# Patient Record
Sex: FEMALE | Race: BLACK OR AFRICAN AMERICAN | NOT HISPANIC OR LATINO | Employment: OTHER | URBAN - METROPOLITAN AREA
[De-identification: names, ages, dates, MRNs, and addresses within clinical notes are randomized per-mention and may not be internally consistent; named-entity substitution may affect disease eponyms.]

---

## 2017-01-31 ENCOUNTER — HISTORICAL (OUTPATIENT)
Dept: ADMINISTRATIVE | Facility: HOSPITAL | Age: 67
End: 2017-01-31

## 2017-03-21 ENCOUNTER — HISTORICAL (OUTPATIENT)
Dept: ADMINISTRATIVE | Facility: HOSPITAL | Age: 67
End: 2017-03-21

## 2017-04-17 ENCOUNTER — HISTORICAL (OUTPATIENT)
Dept: RADIOLOGY | Facility: HOSPITAL | Age: 67
End: 2017-04-17

## 2017-08-22 ENCOUNTER — HISTORICAL (OUTPATIENT)
Dept: ADMINISTRATIVE | Facility: HOSPITAL | Age: 67
End: 2017-08-22

## 2017-09-06 ENCOUNTER — HISTORICAL (OUTPATIENT)
Dept: RADIOLOGY | Facility: HOSPITAL | Age: 67
End: 2017-09-06

## 2017-10-24 ENCOUNTER — HISTORICAL (OUTPATIENT)
Dept: ADMINISTRATIVE | Facility: HOSPITAL | Age: 67
End: 2017-10-24

## 2018-08-27 ENCOUNTER — HISTORICAL (OUTPATIENT)
Dept: RADIOLOGY | Facility: HOSPITAL | Age: 68
End: 2018-08-27

## 2021-08-16 ENCOUNTER — HISTORICAL (OUTPATIENT)
Dept: ADMINISTRATIVE | Facility: HOSPITAL | Age: 71
End: 2021-08-16

## 2021-08-16 LAB — SARS-COV-2 RNA RESP QL NAA+PROBE: DETECTED

## 2022-04-10 ENCOUNTER — HISTORICAL (OUTPATIENT)
Dept: ADMINISTRATIVE | Facility: HOSPITAL | Age: 72
End: 2022-04-10

## 2022-04-29 VITALS
BODY MASS INDEX: 21.31 KG/M2 | DIASTOLIC BLOOD PRESSURE: 77 MMHG | HEIGHT: 65 IN | SYSTOLIC BLOOD PRESSURE: 157 MMHG | WEIGHT: 127.88 LBS

## 2022-05-04 NOTE — HISTORICAL [facility]
This is a historical note converted from [employer]. Formatting and pictures may have been removed.  Please reference C[employer]ony for original formatting and attached multimedia. Chief Complaint  F/U RIGHT KNEE REPLACEMENT 8/1/16. DENIES OF ANY PAIN...SB  History of Present Illness  This patient comes in today?primarily for the issue of right hip pain. ?She had a right partial knee done?on 8/1/2016 and she is doing amazingly well with that.? She states her hip hurts her in the groin. ?She has pain with weightbearing.? States the pain?in the hip is moderate to severe.? She also complains of night pain.  Review of Systems  ????GENERAL: No fevers, chills, unexpected weight loss or gain  ????MUSCULOSKELETAL: Joint pain, extremity pain  Physical Exam  Vitals & Measurements  HR:?88?(Peripheral)? BP:?155/77? HT:?165?cm? HT:?165?cm? WT:?58.96?kg? WT:?58.96?kg? BMI:?21.66?  General: Well-developed, well-nourished.  Neuro: Alert and oriented x 3.  Psych: Normal mood and affect.  CV: Palpable radial pulses.  Resp: Smooth and unlabored.  Skin: No evidence of focal lesions or trauma.  Hem/Imm/Lymph: No evidence of lymphangitis or adenopathy.  Gait: No trendelenburg gait.  DTR: 2+, no hypo or hyperreflexia.  Coordination and Balance: No tremors or abnormal station.  Hip Exam:  No obvious deformity.??Functional range of motion?negative ERNIE test. No flexion contracture. Negative greater trochanteric tenderness. 4/5 strength, normal skin appearance and palpable pulses distally. Sensibility normal.  Assessment/Plan  1.?S/P right unicompartmental knee replacement  Patient is doing overall fairly well with her right partial knee replacement.? Her x-rays from today demonstrate?no signs of loosening and appropriate alignment of the knee.  Ordered:  nabumetone, 500 mg = 1 tab(s), Oral, Daily, # 90 tab(s), 0 Refill(s), Pharmacy: [facility] #[id number]  Office/Outpatient Visit Level 4 Established 79620 PC, S/P right unicompartmental knee  replacement  Right hip pain, [facility] AMB - Ortho Surg SW, 08/22/17 8:15:00 CDT  ?  2.?Right hip pain  She has had this right hip pain for the last 3 weeks. ?I will prescribe her nabumetone for now.? I will see her back in a couple of months with x-rays of the right hip.  Ordered:  nabumetone, 500 mg = 1 tab(s), Oral, Daily, # 90 tab(s), 0 Refill(s), Pharmacy: [facility] #[id number]  Office/Outpatient Visit Level 4 Established 23456 PC, S/P right unicompartmental knee replacement  Right hip pain, [facility]MD AMB - Ortho Surg SW, 08/22/17 8:15:00 CDT  ?   Problem List/Past Medical History  DM (diabetes mellitus)  Dyslipidemia  HYPERTENSION  HYPERTENSION  Localized osteoarthritis of right knee  Obesity  S/P right unicompartmental knee replacement  Historical  Acute renal failure  UTI (urinary tract infection)  Procedure/Surgical History  Partial Knee Arthroplasty (Right) (08/01/2016), Replacement of Right Knee Joint with Synthetic Substitute, Cemented, Open Approach (08/01/2016), Catheter placement in coronary artery(s) for coronary angiography, including intraprocedural injection(s) for coronary angiography, imaging supervision and interpretation; with left heart catheterization including intraprocedural injection(s) for left naomi (11/18/2015), Fluoroscopy of Left Heart using Low Osmolar Contrast (11/18/2015), Fluoroscopy of Multiple Coronary Arteries using Low Osmolar Contrast (11/18/2015), Fluoroscopy of Right Lower Extremity Arteries using Low Osmolar Contrast (11/18/2015), Iliac art angio,cardiac cath 01-JAN-2009 <?> (11/18/2015), Measurement of Cardiac Sampling and Pressure, Left Heart, Percutaneous Approach (11/18/2015), Left hip replacement (2010), Left breast biospy (1992), Appendectomy, Hysterectomy, Left knee replacement.  Medications  Actos 45 mg oral tablet, 45 mg, 1 tab(s), Oral, Daily  amlodipine 10 mg oral tablet, 10 mg, 1 tab(s), Oral, Daily  aspirin 81 mg oral Delayed Release (EC) tablet, 81 mg, 1 tab(s), Oral,  Daily  betamethasone valerate 0.1% topical cream, 1 yvonne, TOP, Once  chlorthalidone 25 mg oral tablet, 25 mg, 1 tab(s), Oral, Daily  clopidogrel 75 mg oral tablet, 75 mg, 1 tab(s), Oral, Daily  Lipitor 20 mg oral tablet, 20 mg, 1 tab(s), Oral, Daily  metFORMIN 500 mg oral tablet, extended release, 2000 mg, 4 tab(s), Oral, BID  nabumetone 500 mg oral tablet, 500 mg, 1 tab(s), Oral, Daily  Tradjenta 5 mg oral tablet, 5 mg, 1 tab(s), Oral, Daily  traMADol 50 mg oral tablet, 50 mg, 1 tab(s), Oral, q6hr, PRN  Zetia 10 mg oral tablet, 10 mg, 1 tab(s), Oral, Daily  Allergies  ACE inhibitors  Social History  Alcohol - Denies Alcohol Use  Current, 1-2 times per month  Employment/School - Not employed or in school  Unemployed, Work/School description: disabled.  Exercise - Does not exercise  Home/Environment - No Risk  Lives with Alone. Living situation: Home/Independent. Home equipment: Glucose monitoring, Walker/Cane. Alcohol abuse in household: No. Substance abuse in household: No. Smoker in household: No. Feels unsafe at home: No. Safe place to go: Yes. Family/Friends available for support: Yes.  Substance Abuse - Denies Substance Abuse  Never  Tobacco - Denies Tobacco Use  Former smoker, 2 per day. 25 year(s).  Family History  Heart disease: Father and Sister.  Primary malignant neoplasm of breast: Mother.

## 2022-05-04 NOTE — HISTORICAL [facility]
This is a historical note converted from [employer]. Formatting and pictures may have been removed.  Please reference Ceryony for original formatting and attached multimedia. Chief Complaint  F/U RIGHT HIP PER DR. CHURCHILL, PT ALSO C/O NECK PAIN TODAY...CV  History of Present Illness  This patient had a partial replacement?on 8/1/2016. ?She is doing well with that.? She comes in with a main complaint of right hip pain and neck pain. ?The hip pain?is in the groin area.? Hip pain is a 7 out of 10.? Neck pain is the most severe. ?She states this started yesterday morning.? She has difficulty turning her neck.? He denies any associated trauma.  Review of Systems  ????GENERAL: No fevers, chills, unexpected weight loss or gain  ????MUSCULOSKELETAL: Joint pain, extremity pain  Physical Exam  Vitals & Measurements  BP:?157/77?  HT:?165?cm? HT:?165?cm? WT:?58?kg? WT:?58?kg? BMI:?21.3?  General: Well-developed, well-nourished.  Neuro: Alert and oriented x 3.  Psych: Normal mood and affect.  Hip Exam:  No obvious deformity.??Functional range of motion?negative ERNIE test. No flexion contracture. Negative greater trochanteric tenderness. 4/5 strength, normal skin appearance and palpable pulses distally. Sensibility normal. [1]  Assessment/Plan  1.?Osteoarthritis of right hip  ? I will refer her to Dr. Rose for her hip osteoarthritis.? Nares do show significant degenerative changes.  Ordered:  Office/Outpatient Visit Level 3 Established 29812 PC, Osteoarthritis of right hip  S/P right unicompartmental knee replacement  Neck pain, LGMD AMB - Ortho Surg , 10/24/17 8:39:00 CDT  2.?S/P right unicompartmental knee replacement  ? Doing well.  Ordered:  Office/Outpatient Visit Level 3 Established 27910 PC, Osteoarthritis of right hip  S/P right unicompartmental knee replacement  Neck pain, LGMD AMB - Ortho Surg , 10/24/17 8:39:00 CDT  3.?Neck pain  ? This patient will go?to the nearest urgent care or emergency room for further  workup of this neck pain.? This seems to be?associated with a cramps and spasms.? She denies any?numbness and tingling.  Ordered:  Office/Outpatient Visit Level 3 Established 23180 PC, Osteoarthritis of right hip  S/P right unicompartmental knee replacement  Neck pain, [facility]MD AMB - Ortho Surg , 10/24/17 8:39:00 CDT   Problem List/Past Medical History  Ongoing  DM (diabetes mellitus)  Dyslipidemia  HYPERTENSION  HYPERTENSION  Localized osteoarthritis of right knee  Obesity  S/P right unicompartmental knee replacement  Historical  Acute renal failure  UTI (urinary tract infection)  Procedure/Surgical History  Partial Knee Arthroplasty (Right) (08/01/2016)  Replacement of Right Knee Joint with Synthetic Substitute, Cemented, Open Approach (08/01/2016)  Catheter placement in coronary artery(s) for coronary angiography, including intraprocedural injection(s) for coronary angiography, imaging supervision and interpretation; with left heart catheterization including intraprocedural injection(s) for left naomi (11/18/2015)  Fluoroscopy of Left Heart using Low Osmolar Contrast (11/18/2015)  Fluoroscopy of Multiple Coronary Arteries using Low Osmolar Contrast (11/18/2015)  Fluoroscopy of Right Lower Extremity Arteries using Low Osmolar Contrast (11/18/2015)  Iliac art angio,cardiac cath 01-JAN-2009 <?> (11/18/2015)  Measurement of Cardiac Sampling and Pressure, Left Heart, Percutaneous Approach (11/18/2015)  Left hip replacement (2010)  Left breast biospy (1992)  Appendectomy  Hysterectomy  Left knee replacement  Medications  Actos 45 mg oral tablet, 45 mg= 1 tab(s), Oral, Daily  amlodipine 10 mg oral tablet, 10 mg= 1 tab(s), Oral, Daily  aspirin 81 mg oral Delayed Release (EC) tablet, 81 mg= 1 tab(s), Oral, Daily  betamethasone valerate 0.1% topical cream, 1 yvonne, TOP, Once  chlorthalidone 25 mg oral tablet, 25 mg= 1 tab(s), Oral, Daily  clopidogrel 75 mg oral tablet, 75 mg= 1 tab(s), Oral, Daily  Lipitor 20 mg oral tablet,  20 mg= 1 tab(s), Oral, Daily  metFORMIN 500 mg oral tablet, extended release, 2000 mg= 4 tab(s), Oral, BID  nabumetone 500 mg oral tablet, 500 mg= 1 tab(s), Oral, Daily  tiZANidine 2 mg oral tablet, 2 mg= 1 tab(s), Oral, q8hr, PRN  Tradjenta 5 mg oral tablet, 5 mg= 1 tab(s), Oral, Daily  traMADol 50 mg oral tablet, 50 mg= 1 tab(s), Oral, q6hr, PRN  Zetia 10 mg oral tablet, 10 mg= 1 tab(s), Oral, Daily  Allergies  ACE inhibitors  Social History  Alcohol - Denies Alcohol Use, 05/20/2017  Current, 1-2 times per month  Employment/School - Not employed or in school, 05/20/2017  Unemployed, Work/School description: disabled.  Exercise - Does not exercise, 05/20/2017  Home/Environment - No Risk, 05/20/2017  Lives with Alone. Living situation: Home/Independent. Home equipment: Glucose monitoring, Walker/Cane. Alcohol abuse in household: No. Substance abuse in household: No. Smoker in household: No. Feels unsafe at home: No. Safe place to go: Yes. Family/Friends available for support: Yes.  Substance Abuse - Denies Substance Abuse, 05/20/2017  Never  Tobacco - Denies Tobacco Use, 05/20/2017  Former smoker, 2 per day. 25 year(s).  Family History  Heart disease: Father and Sister.  Primary malignant neoplasm of breast: Mother.     [1]?Office Visit Note; Santana MEZA, William CARDONA 08/22/2017 08:16 CDT

## 2022-10-13 ENCOUNTER — HOSPITAL ENCOUNTER (EMERGENCY)
Facility: HOSPITAL | Age: 72
Discharge: HOME | End: 2022-10-13
Attending: INTERNAL MEDICINE
Payer: MEDICARE

## 2022-10-13 VITALS
RESPIRATION RATE: 16 BRPM | SYSTOLIC BLOOD PRESSURE: 168 MMHG | DIASTOLIC BLOOD PRESSURE: 95 MMHG | TEMPERATURE: 98 F | WEIGHT: 220 LBS | HEIGHT: 65 IN | HEART RATE: 76 BPM | BODY MASS INDEX: 36.65 KG/M2 | OXYGEN SATURATION: 97 %

## 2022-10-13 DIAGNOSIS — G89.29 CHRONIC RIGHT HIP PAIN: Primary | ICD-10-CM

## 2022-10-13 DIAGNOSIS — M25.561 RIGHT KNEE PAIN, UNSPECIFIED CHRONICITY: ICD-10-CM

## 2022-10-13 DIAGNOSIS — M25.551 CHRONIC RIGHT HIP PAIN: Primary | ICD-10-CM

## 2022-10-13 DIAGNOSIS — Z87.39 HISTORY OF OSTEOARTHRITIS: ICD-10-CM

## 2022-10-13 PROCEDURE — 63600175 PHARM REV CODE 636 W HCPCS: Performed by: PHYSICIAN ASSISTANT

## 2022-10-13 PROCEDURE — 99284 EMERGENCY DEPT VISIT MOD MDM: CPT | Mod: 25

## 2022-10-13 PROCEDURE — 96372 THER/PROPH/DIAG INJ SC/IM: CPT | Performed by: PHYSICIAN ASSISTANT

## 2022-10-13 RX ORDER — KETOROLAC TROMETHAMINE 30 MG/ML
15 INJECTION, SOLUTION INTRAMUSCULAR; INTRAVENOUS
Status: COMPLETED | OUTPATIENT
Start: 2022-10-13 | End: 2022-10-13

## 2022-10-13 RX ADMIN — KETOROLAC TROMETHAMINE 15 MG: 30 INJECTION, SOLUTION INTRAMUSCULAR; INTRAVENOUS at 10:10

## 2022-10-13 NOTE — ED PROVIDER NOTES
Encounter Date: 10/13/2022       History     Chief Complaint   Patient presents with    Hip Pain     PT W CO RT HIP AND KNEE PAIN X 1 WK.  DENIES INJURY.       Patient with pmhx of HTN, DM, HLD, and OA presents today c/o right hip pain and right knee pain. She reports chronic right hip pain. Her right knee has been hurting for the last 3 or so days. Hx of partial knee replacement on the right. She is currently in pain management. She takes norco for her pain. She denies injury or trauma, recent fall, fever, chills, joint swelling, calf pain/swelling, cp, sob.     The history is provided by the patient. No  was used.   Review of patient's allergies indicates:   Allergen Reactions    Ace inhibitors Swelling    Clopidogrel Swelling     No past medical history on file.  No past surgical history on file.  No family history on file.     Review of Systems   Constitutional:  Negative for chills and fever.   Respiratory:  Negative for cough, chest tightness and shortness of breath.    Cardiovascular:  Negative for chest pain, palpitations and leg swelling.   Gastrointestinal:  Negative for abdominal pain, constipation, diarrhea, nausea and vomiting.   Genitourinary:  Negative for dysuria, flank pain and hematuria.   Musculoskeletal:  Positive for arthralgias.   Skin:  Negative for rash.   Neurological:  Negative for syncope, light-headedness and headaches.     Physical Exam     Initial Vitals   BP Pulse Resp Temp SpO2   10/13/22 0931 10/13/22 0931 10/13/22 0931 10/13/22 0931 10/13/22 1128   (!) 168/95 76 16 97.9 °F (36.6 °C) 97 %      MAP       --                Physical Exam    Vitals reviewed.  Constitutional: She appears well-developed and well-nourished. She is not diaphoretic. No distress.   Obese   HENT:   Head: Normocephalic and atraumatic.   Mouth/Throat: Oropharynx is clear and moist. No oropharyngeal exudate.   Eyes: Conjunctivae and EOM are normal.   Neck: Neck supple.   Normal range of  motion.  Cardiovascular:  Normal rate, regular rhythm, normal heart sounds and intact distal pulses.           Pulmonary/Chest: Breath sounds normal. No respiratory distress.   Abdominal: Abdomen is soft. Bowel sounds are normal. She exhibits no distension. There is no abdominal tenderness. There is no rebound and no guarding.   Musculoskeletal:      Cervical back: Normal range of motion and neck supple.      Comments: Right hip and knee non-ttp. No swelling, erythema, ecchymosis, or deformity. Full AROM of right hip and knee, but increased pain with movement. NVI, 2+ dp pulses.      Neurological: She is alert and oriented to person, place, and time. She has normal strength. No sensory deficit. GCS score is 15. GCS eye subscore is 4. GCS verbal subscore is 5. GCS motor subscore is 6.   Skin: Skin is warm and dry. Capillary refill takes less than 2 seconds.   Psychiatric: She has a normal mood and affect.       ED Course   Procedures  Labs Reviewed - No data to display       Imaging Results              X-Ray Knee 3 View Right (Final result)  Result time 10/13/22 11:24:54      Final result by Catina Bettencourt MD (10/13/22 11:24:54)                   Impression:      1. No acute fracture identified.  2. Small joint effusion.      Electronically signed by: Catina Bettencourt  Date:    10/13/2022  Time:    11:24               Narrative:    EXAMINATION:  XR KNEE 3 VIEW RIGHT    CLINICAL HISTORY:  Pain in unspecified knee    COMPARISON:  X-rays dated 08/22/2017    FINDINGS:  There are stable postoperative changes of the knee.  There is no acute fracture or malalignment.  There is a small knee joint effusion.  Vascular calcifications are noted.                                       Medications   ketorolac injection 15 mg (15 mg Intramuscular Given 10/13/22 1005)                              Clinical Impression:   Final diagnoses:  [M25.551, G89.29] Chronic right hip pain (Primary)  [M25.561] Right knee pain, unspecified  chronicity  [Z87.39] History of osteoarthritis      ED Disposition Condition    Discharge Stable          ED Prescriptions    None       Follow-up Information       Follow up With Specialties Details Why Contact Info    [facility] - Emergency Dept Emergency Medicine  If symptoms worsen return to ED immediately [address] [facility] [place] [address] [phone]    Primary Care Provider within 1-2 days  Go in 1 day      Follow up with your pain management specialist  In 1 day               FARZAD Nguyen  10/13/22 1135

## 2023-11-02 ENCOUNTER — ANESTHESIA EVENT (OUTPATIENT)
Dept: SURGERY | Facility: HOSPITAL | Age: 73
DRG: 233 | End: 2023-11-02
Payer: MEDICARE

## 2023-11-02 ENCOUNTER — HOSPITAL ENCOUNTER (INPATIENT)
Facility: HOSPITAL | Age: 73
LOS: 7 days | Discharge: NURSING, REHAB OR LONG-TERM CARE | DRG: 233 | End: 2023-11-09
Attending: INTERNAL MEDICINE | Admitting: INTERNAL MEDICINE
Payer: MEDICARE

## 2023-11-02 ENCOUNTER — ANESTHESIA (OUTPATIENT)
Dept: SURGERY | Facility: HOSPITAL | Age: 73
DRG: 233 | End: 2023-11-02
Payer: MEDICARE

## 2023-11-02 DIAGNOSIS — Z01.818 PRE-OP EVALUATION: ICD-10-CM

## 2023-11-02 DIAGNOSIS — I25.10 CORONARY ARTERY DISEASE INVOLVING NATIVE CORONARY ARTERY OF NATIVE HEART WITHOUT ANGINA PECTORIS: ICD-10-CM

## 2023-11-02 DIAGNOSIS — I25.10 ATHEROSCLEROSIS OF NATIVE CORONARY ARTERY OF NATIVE HEART WITHOUT ANGINA PECTORIS: ICD-10-CM

## 2023-11-02 DIAGNOSIS — I25.10 CAD (CORONARY ARTERY DISEASE): ICD-10-CM

## 2023-11-02 DIAGNOSIS — I25.10 CORONARY ARTERY DISEASE: ICD-10-CM

## 2023-11-02 DIAGNOSIS — I25.10 CORONARY ATHEROSCLEROSIS OF NATIVE CORONARY ARTERY: Primary | ICD-10-CM

## 2023-11-02 DIAGNOSIS — Z95.1 S/P CABG (CORONARY ARTERY BYPASS GRAFT): ICD-10-CM

## 2023-11-02 LAB
ALLENS TEST BLOOD GAS (OHS): ABNORMAL
ALLENS TEST BLOOD GAS (OHS): ABNORMAL
ANION GAP SERPL CALC-SCNC: 9 MEQ/L
APTT PPP: 20 SECONDS (ref 23.2–33.7)
APTT PPP: 29 SECONDS (ref 23.2–33.7)
AV INDEX (PROSTH): 0.55
AV MEAN GRADIENT: 6 MMHG
AV PEAK GRADIENT: 11 MMHG
AV VALVE AREA BY VELOCITY RATIO: 1.35 CM²
AV VALVE AREA: 1.4 CM²
AV VELOCITY RATIO: 0.53
BASE EXCESS BLD CALC-SCNC: -2.4 MMOL/L (ref -2–2)
BASE EXCESS BLD CALC-SCNC: -3.5 MMOL/L (ref -2–2)
BASOPHILS # BLD AUTO: 0.03 X10(3)/MCL
BASOPHILS NFR BLD AUTO: 0.2 %
BLOOD GAS SAMPLE TYPE (OHS): ABNORMAL
BLOOD GAS SAMPLE TYPE (OHS): ABNORMAL
BSA FOR ECHO PROCEDURE: 1.99 M2
BUN SERPL-MCNC: 26.6 MG/DL (ref 9.8–20.1)
CA-I BLD-SCNC: 1.22 MMOL/L (ref 1.12–1.23)
CA-I BLD-SCNC: 1.38 MMOL/L (ref 1.12–1.23)
CALCIUM SERPL-MCNC: 9.9 MG/DL (ref 8.4–10.2)
CHLORIDE SERPL-SCNC: 112 MMOL/L (ref 98–107)
CO2 BLDA-SCNC: 23.4 MMOL/L
CO2 BLDA-SCNC: 24.5 MMOL/L
CO2 SERPL-SCNC: 17 MMOL/L (ref 23–31)
COHGB MFR BLDA: 1.6 % (ref 0.5–1.5)
COHGB MFR BLDA: 1.8 % (ref 0.5–1.5)
CREAT SERPL-MCNC: 0.93 MG/DL (ref 0.55–1.02)
CREAT/UREA NIT SERPL: 29
CV ECHO LV RWT: 0.42 CM
DOP CALC AO PEAK VEL: 1.66 M/S
DOP CALC AO VTI: 35.5 CM
DOP CALC LVOT AREA: 2.5 CM2
DOP CALC LVOT DIAMETER: 1.8 CM
DOP CALC LVOT PEAK VEL: 0.88 M/S
DOP CALC LVOT STROKE VOLUME: 49.6 CM3
DOP CALC MV VTI: 23.9 CM
DOP CALCLVOT PEAK VEL VTI: 19.5 CM
DRAWN BY BLOOD GAS (OHS): ABNORMAL
DRAWN BY BLOOD GAS (OHS): ABNORMAL
E WAVE DECELERATION TIME: 240 MSEC
E/A RATIO: 0.59
E/E' RATIO: 8.92 M/S
ECHO LV POSTERIOR WALL: 1 CM (ref 0.6–1.1)
EOSINOPHIL # BLD AUTO: 0.01 X10(3)/MCL (ref 0–0.9)
EOSINOPHIL NFR BLD AUTO: 0.1 %
ERYTHROCYTE [DISTWIDTH] IN BLOOD BY AUTOMATED COUNT: 14.1 % (ref 11.5–17)
EST. AVERAGE GLUCOSE BLD GHB EST-MCNC: 220.2 MG/DL
FIO2 BLOOD GAS (OHS): 21 %
FIO2 BLOOD GAS (OHS): 60 %
FRACTIONAL SHORTENING: 27 % (ref 28–44)
GFR SERPLBLD CREATININE-BSD FMLA CKD-EPI: >60 MLS/MIN/1.73/M2
GLUCOSE SERPL-MCNC: 233 MG/DL (ref 70–110)
GLUCOSE SERPL-MCNC: 264 MG/DL (ref 70–110)
GLUCOSE SERPL-MCNC: 270 MG/DL (ref 82–115)
GLUCOSE SERPL-MCNC: 287 MG/DL (ref 70–110)
GROUP & RH: NORMAL
HBA1C MFR BLD: 9.3 %
HCO3 BLDA-SCNC: 22.1 MMOL/L (ref 22–26)
HCO3 BLDA-SCNC: 23.2 MMOL/L (ref 22–26)
HCO3 UR-SCNC: 18.5 MMOL/L (ref 24–28)
HCO3 UR-SCNC: 21.2 MMOL/L (ref 24–28)
HCO3 UR-SCNC: 22.6 MMOL/L (ref 24–28)
HCT VFR BLD AUTO: 32.4 % (ref 37–47)
HCT VFR BLD CALC: 28 %PCV (ref 36–54)
HCT VFR BLD CALC: 29 %PCV (ref 36–54)
HCT VFR BLD CALC: 33 %PCV (ref 36–54)
HGB BLD-MCNC: 10 G/DL
HGB BLD-MCNC: 10 G/DL
HGB BLD-MCNC: 10.3 G/DL (ref 12–16)
HGB BLD-MCNC: 11 G/DL
IMM GRANULOCYTES # BLD AUTO: 0.18 X10(3)/MCL (ref 0–0.04)
IMM GRANULOCYTES NFR BLD AUTO: 1.2 %
INDIRECT COOMBS GEL: NORMAL
INR PPP: 1.6
INR PPP: 1.6
INTERVENTRICULAR SEPTUM: 0.8 CM (ref 0.6–1.1)
LEFT ATRIUM SIZE: 4.3 CM
LEFT ATRIUM VOLUME INDEX MOD: 38.1 ML/M2
LEFT ATRIUM VOLUME MOD: 73.5 CM3
LEFT INTERNAL DIMENSION IN SYSTOLE: 3.5 CM (ref 2.1–4)
LEFT VENTRICLE DIASTOLIC VOLUME INDEX: 55.96 ML/M2
LEFT VENTRICLE DIASTOLIC VOLUME: 108 ML
LEFT VENTRICLE MASS INDEX: 77 G/M2
LEFT VENTRICLE SYSTOLIC VOLUME INDEX: 26.4 ML/M2
LEFT VENTRICLE SYSTOLIC VOLUME: 50.9 ML
LEFT VENTRICULAR INTERNAL DIMENSION IN DIASTOLE: 4.8 CM (ref 3.5–6)
LEFT VENTRICULAR MASS: 147.78 G
LV LATERAL E/E' RATIO: 7.25 M/S
LV SEPTAL E/E' RATIO: 11.6 M/S
LVOT MG: 2 MMHG
LVOT MV: 0.58 CM/S
LYMPHOCYTES # BLD AUTO: 2.7 X10(3)/MCL (ref 0.6–4.6)
LYMPHOCYTES NFR BLD AUTO: 18.6 %
MCH RBC QN AUTO: 29 PG (ref 27–31)
MCHC RBC AUTO-ENTMCNC: 31.8 G/DL (ref 33–36)
MCV RBC AUTO: 91.3 FL (ref 80–94)
MECH RR (OHS): 18 B/MIN
MECH VT (OHS): 450 ML
METHGB MFR BLDA: 0.7 % (ref 0.4–1.5)
METHGB MFR BLDA: 1.3 % (ref 0.4–1.5)
MODE (OHS): ABNORMAL
MONOCYTES # BLD AUTO: 0.39 X10(3)/MCL (ref 0.1–1.3)
MONOCYTES NFR BLD AUTO: 2.7 %
MRSA PCR SCRN (OHS): NOT DETECTED
MV MEAN GRADIENT: 2 MMHG
MV PEAK A VEL: 0.99 M/S
MV PEAK E VEL: 0.58 M/S
MV PEAK GRADIENT: 6 MMHG
MV VALVE AREA BY CONTINUITY EQUATION: 2.08 CM2
NEUTROPHILS # BLD AUTO: 11.17 X10(3)/MCL (ref 2.1–9.2)
NEUTROPHILS NFR BLD AUTO: 77.2 %
NRBC BLD AUTO-RTO: 0 %
O2 HB BLOOD GAS (OHS): 93.6 % (ref 94–97)
O2 HB BLOOD GAS (OHS): 93.7 % (ref 94–97)
OHS LV EJECTION FRACTION SIMPSONS BIPLANE MOD: 40 %
OXYGEN DEVICE BLOOD GAS (OHS): ABNORMAL
OXYHGB MFR BLDA: 11 G/DL (ref 12–16)
OXYHGB MFR BLDA: 14.2 G/DL (ref 12–16)
PCO2 BLDA: 32.8 MMHG (ref 35–45)
PCO2 BLDA: 38.5 MMHG (ref 35–45)
PCO2 BLDA: 39 MMHG (ref 35–45)
PCO2 BLDA: 41 MMHG (ref 35–45)
PCO2 BLDA: 42 MMHG (ref 35–45)
PEEP (OHS): 5 CMH2O
PH BLDA: 7.34 [PH] (ref 7.35–7.45)
PH BLDA: 7.35 [PH] (ref 7.35–7.45)
PH SMN: 7.35 [PH] (ref 7.35–7.45)
PH SMN: 7.36 [PH] (ref 7.35–7.45)
PH SMN: 7.37 [PH] (ref 7.35–7.45)
PLATELET # BLD AUTO: 191 X10(3)/MCL (ref 130–400)
PMV BLD AUTO: 9.7 FL (ref 7.4–10.4)
PO2 BLDA: 279 MMHG (ref 80–100)
PO2 BLDA: 50 MMHG (ref 40–60)
PO2 BLDA: 50 MMHG (ref 40–60)
PO2 BLDA: 71 MMHG (ref 80–100)
PO2 BLDA: 78 MMHG (ref 80–100)
POC BE: -3 MMOL/L
POC BE: -4 MMOL/L
POC BE: -7 MMOL/L
POC IONIZED CALCIUM: 1.05 MMOL/L (ref 1.06–1.42)
POC IONIZED CALCIUM: 1.22 MMOL/L (ref 1.06–1.42)
POC IONIZED CALCIUM: 1.42 MMOL/L (ref 1.06–1.42)
POC SATURATED O2: 100 % (ref 95–100)
POC SATURATED O2: 83 % (ref 95–100)
POC SATURATED O2: 84 % (ref 95–100)
POC TCO2: 19 MMOL/L (ref 23–27)
POC TCO2: 22 MMOL/L (ref 24–29)
POC TCO2: 24 MMOL/L (ref 24–29)
POCT GLUCOSE: 188 MG/DL (ref 70–110)
POCT GLUCOSE: 330 MG/DL (ref 70–110)
POCT GLUCOSE: 407 MG/DL (ref 70–110)
POCT GLUCOSE: 468 MG/DL (ref 70–110)
POTASSIUM BLD-SCNC: 3.3 MMOL/L (ref 3.5–5.1)
POTASSIUM BLD-SCNC: 3.9 MMOL/L (ref 3.5–5.1)
POTASSIUM BLD-SCNC: 4.6 MMOL/L (ref 3.5–5.1)
POTASSIUM BLOOD GAS (OHS): 2.9 MMOL/L (ref 3.5–5)
POTASSIUM BLOOD GAS (OHS): 3.1 MMOL/L (ref 3.5–5)
POTASSIUM SERPL-SCNC: 3.3 MMOL/L (ref 3.5–5.1)
PROTHROMBIN TIME: 18.4 SECONDS (ref 12.5–14.5)
PROTHROMBIN TIME: 18.9 SECONDS (ref 12.5–14.5)
PS (OHS): 10 CMH2O
RA MAJOR: 4.6 CM
RA WIDTH: 3.7 CM
RBC # BLD AUTO: 3.55 X10(6)/MCL (ref 4.2–5.4)
RIGHT VENTRICULAR END-DIASTOLIC DIMENSION: 3.4 CM
SAMPLE SITE BLOOD GAS (OHS): ABNORMAL
SAMPLE SITE BLOOD GAS (OHS): ABNORMAL
SAMPLE: ABNORMAL
SAO2 % BLDA: 92.9 %
SAO2 % BLDA: 94.7 %
SODIUM BLD-SCNC: 140 MMOL/L (ref 136–145)
SODIUM BLD-SCNC: 141 MMOL/L (ref 136–145)
SODIUM BLD-SCNC: 142 MMOL/L (ref 136–145)
SODIUM BLOOD GAS (OHS): 138 MMOL/L (ref 137–145)
SODIUM BLOOD GAS (OHS): 139 MMOL/L (ref 137–145)
SODIUM SERPL-SCNC: 138 MMOL/L (ref 136–145)
SPECIMEN OUTDATE: NORMAL
TDI LATERAL: 0.08 M/S
TDI SEPTAL: 0.05 M/S
TDI: 0.07 M/S
TRICUSPID ANNULAR PLANE SYSTOLIC EXCURSION: 1.65 CM
WBC # SPEC AUTO: 14.48 X10(3)/MCL (ref 4.5–11.5)
Z-SCORE OF LEFT VENTRICULAR DIMENSION IN END DIASTOLE: -1.3
Z-SCORE OF LEFT VENTRICULAR DIMENSION IN END SYSTOLE: 0.32

## 2023-11-02 PROCEDURE — 36556 INSERT NON-TUNNEL CV CATH: CPT | Mod: 59,,, | Performed by: ANESTHESIOLOGY

## 2023-11-02 PROCEDURE — 76937 CENTRAL LINE: ICD-10-PCS | Mod: 26,,, | Performed by: ANESTHESIOLOGY

## 2023-11-02 PROCEDURE — 25000003 PHARM REV CODE 250: Performed by: SPECIALIST

## 2023-11-02 PROCEDURE — D9220A PRA ANESTHESIA: Mod: CRNA,,,

## 2023-11-02 PROCEDURE — 25000003 PHARM REV CODE 250

## 2023-11-02 PROCEDURE — 94761 N-INVAS EAR/PLS OXIMETRY MLT: CPT

## 2023-11-02 PROCEDURE — 27000513 HC SENSOR FLOTRAC

## 2023-11-02 PROCEDURE — 99223 PR INITIAL HOSPITAL CARE,LEVL III: ICD-10-PCS | Mod: 57,,, | Performed by: PHYSICIAN ASSISTANT

## 2023-11-02 PROCEDURE — 85730 THROMBOPLASTIN TIME PARTIAL: CPT | Performed by: PHYSICIAN ASSISTANT

## 2023-11-02 PROCEDURE — 99900035 HC TECH TIME PER 15 MIN (STAT)

## 2023-11-02 PROCEDURE — C1729 CATH, DRAINAGE: HCPCS | Performed by: SPECIALIST

## 2023-11-02 PROCEDURE — 63600175 PHARM REV CODE 636 W HCPCS: Performed by: PHYSICIAN ASSISTANT

## 2023-11-02 PROCEDURE — D9220A PRA ANESTHESIA: Mod: ANES,,, | Performed by: ANESTHESIOLOGY

## 2023-11-02 PROCEDURE — 63600175 PHARM REV CODE 636 W HCPCS: Performed by: INTERNAL MEDICINE

## 2023-11-02 PROCEDURE — 33533 CABG ARTERIAL SINGLE: CPT | Mod: AS,,, | Performed by: PHYSICIAN ASSISTANT

## 2023-11-02 PROCEDURE — 33533 PR CABG, ARTERIAL, SINGLE: ICD-10-PCS | Mod: ,,, | Performed by: SPECIALIST

## 2023-11-02 PROCEDURE — 83036 HEMOGLOBIN GLYCOSYLATED A1C: CPT | Performed by: NURSE PRACTITIONER

## 2023-11-02 PROCEDURE — 25000003 PHARM REV CODE 250: Performed by: PHYSICIAN ASSISTANT

## 2023-11-02 PROCEDURE — 27201423 OPTIME MED/SURG SUP & DEVICES STERILE SUPPLY: Performed by: INTERNAL MEDICINE

## 2023-11-02 PROCEDURE — C1894 INTRO/SHEATH, NON-LASER: HCPCS | Performed by: INTERNAL MEDICINE

## 2023-11-02 PROCEDURE — 36000713 HC OR TIME LEV V EA ADD 15 MIN: Performed by: SPECIALIST

## 2023-11-02 PROCEDURE — 85610 PROTHROMBIN TIME: CPT | Performed by: PHYSICIAN ASSISTANT

## 2023-11-02 PROCEDURE — 86923 COMPATIBILITY TEST ELECTRIC: CPT | Mod: 91 | Performed by: PHYSICIAN ASSISTANT

## 2023-11-02 PROCEDURE — 36000712 HC OR TIME LEV V 1ST 15 MIN: Performed by: SPECIALIST

## 2023-11-02 PROCEDURE — 63600175 PHARM REV CODE 636 W HCPCS: Performed by: STUDENT IN AN ORGANIZED HEALTH CARE EDUCATION/TRAINING PROGRAM

## 2023-11-02 PROCEDURE — 63600175 PHARM REV CODE 636 W HCPCS: Mod: JZ,JG | Performed by: PHYSICIAN ASSISTANT

## 2023-11-02 PROCEDURE — 99223 1ST HOSP IP/OBS HIGH 75: CPT | Mod: 57,,, | Performed by: PHYSICIAN ASSISTANT

## 2023-11-02 PROCEDURE — 82803 BLOOD GASES ANY COMBINATION: CPT

## 2023-11-02 PROCEDURE — 33508 ENDOSCOPIC VEIN HARVEST: CPT | Mod: 59,,, | Performed by: SPECIALIST

## 2023-11-02 PROCEDURE — 25000003 PHARM REV CODE 250: Performed by: ANESTHESIOLOGY

## 2023-11-02 PROCEDURE — 63600175 PHARM REV CODE 636 W HCPCS

## 2023-11-02 PROCEDURE — 33517 PR CABG, ARTERY-VEIN, SINGLE: ICD-10-PCS | Mod: ,,, | Performed by: SPECIALIST

## 2023-11-02 PROCEDURE — 25000003 PHARM REV CODE 250: Performed by: STUDENT IN AN ORGANIZED HEALTH CARE EDUCATION/TRAINING PROGRAM

## 2023-11-02 PROCEDURE — 33517 CABG ARTERY-VEIN SINGLE: CPT | Mod: AS,,, | Performed by: PHYSICIAN ASSISTANT

## 2023-11-02 PROCEDURE — C1887 CATHETER, GUIDING: HCPCS | Performed by: INTERNAL MEDICINE

## 2023-11-02 PROCEDURE — 25500020 PHARM REV CODE 255: Performed by: INTERNAL MEDICINE

## 2023-11-02 PROCEDURE — 86901 BLOOD TYPING SEROLOGIC RH(D): CPT | Performed by: PHYSICIAN ASSISTANT

## 2023-11-02 PROCEDURE — 37799 UNLISTED PX VASCULAR SURGERY: CPT

## 2023-11-02 PROCEDURE — 20000000 HC ICU ROOM

## 2023-11-02 PROCEDURE — P9045 ALBUMIN (HUMAN), 5%, 250 ML: HCPCS | Mod: JZ,JG | Performed by: PHYSICIAN ASSISTANT

## 2023-11-02 PROCEDURE — 76937 US GUIDE VASCULAR ACCESS: CPT | Mod: 26,,, | Performed by: ANESTHESIOLOGY

## 2023-11-02 PROCEDURE — 63600175 PHARM REV CODE 636 W HCPCS: Performed by: ANESTHESIOLOGY

## 2023-11-02 PROCEDURE — 87641 MR-STAPH DNA AMP PROBE: CPT | Performed by: PHYSICIAN ASSISTANT

## 2023-11-02 PROCEDURE — 33517 PR CABG, ARTERY-VEIN, SINGLE: ICD-10-PCS | Mod: AS,,, | Performed by: PHYSICIAN ASSISTANT

## 2023-11-02 PROCEDURE — 85610 PROTHROMBIN TIME: CPT | Performed by: SPECIALIST

## 2023-11-02 PROCEDURE — 33508 PR ENDOSCOPY W/VIDEO-ASST VEIN HARVEST,CABG: ICD-10-PCS | Mod: 59,,, | Performed by: SPECIALIST

## 2023-11-02 PROCEDURE — C1751 CATH, INF, PER/CENT/MIDLINE: HCPCS

## 2023-11-02 PROCEDURE — 33517 CABG ARTERY-VEIN SINGLE: CPT | Mod: ,,, | Performed by: SPECIALIST

## 2023-11-02 PROCEDURE — 99152 MOD SED SAME PHYS/QHP 5/>YRS: CPT | Performed by: INTERNAL MEDICINE

## 2023-11-02 PROCEDURE — 63600175 PHARM REV CODE 636 W HCPCS: Performed by: SPECIALIST

## 2023-11-02 PROCEDURE — 80048 BASIC METABOLIC PNL TOTAL CA: CPT | Performed by: SPECIALIST

## 2023-11-02 PROCEDURE — 85025 COMPLETE CBC W/AUTO DIFF WBC: CPT | Performed by: SPECIALIST

## 2023-11-02 PROCEDURE — 94002 VENT MGMT INPAT INIT DAY: CPT

## 2023-11-02 PROCEDURE — C1769 GUIDE WIRE: HCPCS | Performed by: INTERNAL MEDICINE

## 2023-11-02 PROCEDURE — 33533 PR CABG, ARTERIAL, SINGLE: ICD-10-PCS | Mod: AS,,, | Performed by: PHYSICIAN ASSISTANT

## 2023-11-02 PROCEDURE — 33533 CABG ARTERIAL SINGLE: CPT | Mod: ,,, | Performed by: SPECIALIST

## 2023-11-02 PROCEDURE — D9220A PRA ANESTHESIA: ICD-10-PCS | Mod: CRNA,,,

## 2023-11-02 PROCEDURE — C1894 INTRO/SHEATH, NON-LASER: HCPCS | Performed by: SPECIALIST

## 2023-11-02 PROCEDURE — 37000008 HC ANESTHESIA 1ST 15 MINUTES: Performed by: SPECIALIST

## 2023-11-02 PROCEDURE — 93005 ELECTROCARDIOGRAM TRACING: CPT

## 2023-11-02 PROCEDURE — 93458 L HRT ARTERY/VENTRICLE ANGIO: CPT | Performed by: INTERNAL MEDICINE

## 2023-11-02 PROCEDURE — 37000009 HC ANESTHESIA EA ADD 15 MINS: Performed by: SPECIALIST

## 2023-11-02 PROCEDURE — D9220A PRA ANESTHESIA: ICD-10-PCS | Mod: ANES,,, | Performed by: ANESTHESIOLOGY

## 2023-11-02 PROCEDURE — 36556 CENTRAL LINE: ICD-10-PCS | Mod: 59,,, | Performed by: ANESTHESIOLOGY

## 2023-11-02 PROCEDURE — S5010 5% DEXTROSE AND 0.45% SALINE: HCPCS | Performed by: SPECIALIST

## 2023-11-02 PROCEDURE — 85730 THROMBOPLASTIN TIME PARTIAL: CPT | Performed by: SPECIALIST

## 2023-11-02 PROCEDURE — 27201423 OPTIME MED/SURG SUP & DEVICES STERILE SUPPLY: Performed by: SPECIALIST

## 2023-11-02 PROCEDURE — C1887 CATHETER, GUIDING: HCPCS | Performed by: SPECIALIST

## 2023-11-02 PROCEDURE — 27200966 HC CLOSED SUCTION SYSTEM

## 2023-11-02 PROCEDURE — 25000003 PHARM REV CODE 250: Performed by: INTERNAL MEDICINE

## 2023-11-02 PROCEDURE — P9016 RBC LEUKOCYTES REDUCED: HCPCS | Performed by: PHYSICIAN ASSISTANT

## 2023-11-02 PROCEDURE — 27100171 HC OXYGEN HIGH FLOW UP TO 24 HOURS

## 2023-11-02 PROCEDURE — 99900031 HC PATIENT EDUCATION (STAT)

## 2023-11-02 RX ORDER — VASOPRESSIN 20 [USP'U]/ML
INJECTION, SOLUTION INTRAMUSCULAR; SUBCUTANEOUS
Status: DISCONTINUED | OUTPATIENT
Start: 2023-11-02 | End: 2023-11-02

## 2023-11-02 RX ORDER — GLYCOPYRROLATE 0.2 MG/ML
INJECTION INTRAMUSCULAR; INTRAVENOUS
Status: DISCONTINUED | OUTPATIENT
Start: 2023-11-02 | End: 2023-11-02

## 2023-11-02 RX ORDER — PAPAVERINE HYDROCHLORIDE 30 MG/ML
INJECTION INTRAMUSCULAR; INTRAVENOUS
Status: DISCONTINUED | OUTPATIENT
Start: 2023-11-02 | End: 2023-11-02 | Stop reason: HOSPADM

## 2023-11-02 RX ORDER — ONDANSETRON 4 MG/1
8 TABLET, ORALLY DISINTEGRATING ORAL EVERY 8 HOURS PRN
Status: DISCONTINUED | OUTPATIENT
Start: 2023-11-02 | End: 2023-11-02

## 2023-11-02 RX ORDER — HYDROCODONE BITARTRATE AND ACETAMINOPHEN 5; 325 MG/1; MG/1
1 TABLET ORAL EVERY 4 HOURS PRN
Status: DISCONTINUED | OUTPATIENT
Start: 2023-11-02 | End: 2023-11-03

## 2023-11-02 RX ORDER — CALCIUM GLUCONATE 20 MG/ML
1 INJECTION, SOLUTION INTRAVENOUS
Status: DISCONTINUED | OUTPATIENT
Start: 2023-11-02 | End: 2023-11-09 | Stop reason: HOSPADM

## 2023-11-02 RX ORDER — PROPOFOL 10 MG/ML
VIAL (ML) INTRAVENOUS
Status: DISCONTINUED | OUTPATIENT
Start: 2023-11-02 | End: 2023-11-02

## 2023-11-02 RX ORDER — AMLODIPINE BESYLATE 10 MG/1
10 TABLET ORAL DAILY
Status: ON HOLD | COMMUNITY
End: 2023-11-21 | Stop reason: SDUPTHER

## 2023-11-02 RX ORDER — MORPHINE SULFATE 4 MG/ML
2 INJECTION, SOLUTION INTRAMUSCULAR; INTRAVENOUS EVERY 4 HOURS PRN
Status: DISCONTINUED | OUTPATIENT
Start: 2023-11-02 | End: 2023-11-02

## 2023-11-02 RX ORDER — MAGNESIUM SULFATE HEPTAHYDRATE 40 MG/ML
4 INJECTION, SOLUTION INTRAVENOUS
Status: DISCONTINUED | OUTPATIENT
Start: 2023-11-02 | End: 2023-11-09 | Stop reason: HOSPADM

## 2023-11-02 RX ORDER — CALCIUM GLUCONATE 20 MG/ML
2 INJECTION, SOLUTION INTRAVENOUS
Status: DISCONTINUED | OUTPATIENT
Start: 2023-11-02 | End: 2023-11-09 | Stop reason: HOSPADM

## 2023-11-02 RX ORDER — ENOXAPARIN SODIUM 100 MG/ML
40 INJECTION SUBCUTANEOUS EVERY 24 HOURS
Status: DISCONTINUED | OUTPATIENT
Start: 2023-11-03 | End: 2023-11-09

## 2023-11-02 RX ORDER — MIDAZOLAM HYDROCHLORIDE 1 MG/ML
INJECTION INTRAMUSCULAR; INTRAVENOUS
Status: DISCONTINUED | OUTPATIENT
Start: 2023-11-02 | End: 2023-11-02

## 2023-11-02 RX ORDER — ETOMIDATE 2 MG/ML
INJECTION INTRAVENOUS
Status: DISCONTINUED | OUTPATIENT
Start: 2023-11-02 | End: 2023-11-02

## 2023-11-02 RX ORDER — ROCURONIUM BROMIDE 10 MG/ML
INJECTION, SOLUTION INTRAVENOUS
Status: DISCONTINUED | OUTPATIENT
Start: 2023-11-02 | End: 2023-11-02

## 2023-11-02 RX ORDER — FENTANYL CITRATE 50 UG/ML
INJECTION, SOLUTION INTRAMUSCULAR; INTRAVENOUS
Status: DISCONTINUED | OUTPATIENT
Start: 2023-11-02 | End: 2023-11-02

## 2023-11-02 RX ORDER — PANTOPRAZOLE SODIUM 20 MG/1
20 TABLET, DELAYED RELEASE ORAL DAILY
Status: ON HOLD | COMMUNITY
End: 2023-11-09

## 2023-11-02 RX ORDER — DOCUSATE SODIUM 100 MG/1
100 CAPSULE, LIQUID FILLED ORAL 2 TIMES DAILY
Status: DISCONTINUED | OUTPATIENT
Start: 2023-11-03 | End: 2023-11-09 | Stop reason: HOSPADM

## 2023-11-02 RX ORDER — MUPIROCIN 20 MG/G
OINTMENT TOPICAL
Status: DISCONTINUED | OUTPATIENT
Start: 2023-11-02 | End: 2023-11-02 | Stop reason: HOSPADM

## 2023-11-02 RX ORDER — LIDOCAINE HYDROCHLORIDE 20 MG/ML
INJECTION, SOLUTION INFILTRATION; PERINEURAL
Status: DISCONTINUED | OUTPATIENT
Start: 2023-11-02 | End: 2023-11-02

## 2023-11-02 RX ORDER — METOPROLOL TARTRATE 25 MG/1
12.5 TABLET ORAL 2 TIMES DAILY
Status: DISCONTINUED | OUTPATIENT
Start: 2023-11-03 | End: 2023-11-07

## 2023-11-02 RX ORDER — METOCLOPRAMIDE HYDROCHLORIDE 5 MG/ML
5 INJECTION INTRAMUSCULAR; INTRAVENOUS EVERY 6 HOURS PRN
Status: DISCONTINUED | OUTPATIENT
Start: 2023-11-02 | End: 2023-11-09 | Stop reason: HOSPADM

## 2023-11-02 RX ORDER — DIPHENHYDRAMINE HCL 25 MG
50 CAPSULE ORAL
Status: DISCONTINUED | OUTPATIENT
Start: 2023-11-02 | End: 2023-11-02

## 2023-11-02 RX ORDER — ASPIRIN 81 MG/1
81 TABLET ORAL DAILY
Status: DISCONTINUED | OUTPATIENT
Start: 2023-11-03 | End: 2023-11-02

## 2023-11-02 RX ORDER — DIPHENHYDRAMINE HCL 25 MG
25 CAPSULE ORAL ONCE
Status: ON HOLD | COMMUNITY
End: 2023-11-09

## 2023-11-02 RX ORDER — NITROGLYCERIN 20 MG/100ML
INJECTION INTRAVENOUS
Status: DISCONTINUED | OUTPATIENT
Start: 2023-11-02 | End: 2023-11-02

## 2023-11-02 RX ORDER — POTASSIUM CHLORIDE 14.9 MG/ML
20 INJECTION INTRAVENOUS
Status: DISCONTINUED | OUTPATIENT
Start: 2023-11-02 | End: 2023-11-09 | Stop reason: HOSPADM

## 2023-11-02 RX ORDER — AMLODIPINE BESYLATE 5 MG/1
10 TABLET ORAL DAILY
Status: DISCONTINUED | OUTPATIENT
Start: 2023-11-02 | End: 2023-11-09 | Stop reason: HOSPADM

## 2023-11-02 RX ORDER — HYDRALAZINE HYDROCHLORIDE 20 MG/ML
10 INJECTION INTRAMUSCULAR; INTRAVENOUS EVERY 4 HOURS PRN
Status: DISCONTINUED | OUTPATIENT
Start: 2023-11-02 | End: 2023-11-09 | Stop reason: HOSPADM

## 2023-11-02 RX ORDER — METOPROLOL SUCCINATE 25 MG/1
25 TABLET, EXTENDED RELEASE ORAL DAILY
Status: ON HOLD | COMMUNITY
End: 2023-11-09

## 2023-11-02 RX ORDER — EZETIMIBE 10 MG/1
10 TABLET ORAL DAILY
Status: DISCONTINUED | OUTPATIENT
Start: 2023-11-02 | End: 2023-11-09 | Stop reason: HOSPADM

## 2023-11-02 RX ORDER — LIDOCAINE HYDROCHLORIDE 10 MG/ML
1 INJECTION, SOLUTION EPIDURAL; INFILTRATION; INTRACAUDAL; PERINEURAL ONCE
Status: CANCELLED | OUTPATIENT
Start: 2023-11-02 | End: 2023-11-02

## 2023-11-02 RX ORDER — PHENYLEPHRINE HCL IN 0.9% NACL 1 MG/10 ML
SYRINGE (ML) INTRAVENOUS
Status: DISCONTINUED | OUTPATIENT
Start: 2023-11-02 | End: 2023-11-02

## 2023-11-02 RX ORDER — OXYCODONE HYDROCHLORIDE 5 MG/1
10 TABLET ORAL EVERY 4 HOURS PRN
Status: DISCONTINUED | OUTPATIENT
Start: 2023-11-02 | End: 2023-11-09 | Stop reason: HOSPADM

## 2023-11-02 RX ORDER — ASPIRIN 81 MG/1
81 TABLET ORAL DAILY
Status: ON HOLD | COMMUNITY
End: 2023-11-21 | Stop reason: SDUPTHER

## 2023-11-02 RX ORDER — DEXTROSE MONOHYDRATE AND SODIUM CHLORIDE 5; .45 G/100ML; G/100ML
INJECTION, SOLUTION INTRAVENOUS CONTINUOUS
Status: DISCONTINUED | OUTPATIENT
Start: 2023-11-02 | End: 2023-11-08

## 2023-11-02 RX ORDER — CEFAZOLIN SODIUM 2 G/50ML
2 SOLUTION INTRAVENOUS
Status: COMPLETED | OUTPATIENT
Start: 2023-11-02 | End: 2023-11-02

## 2023-11-02 RX ORDER — CALCIUM CARB/VITAMIN D3/VIT K1 500-500-40
400 TABLET,CHEWABLE ORAL DAILY
Status: ON HOLD | COMMUNITY
End: 2023-11-21 | Stop reason: HOSPADM

## 2023-11-02 RX ORDER — MUPIROCIN 20 MG/G
OINTMENT TOPICAL 2 TIMES DAILY
Status: COMPLETED | OUTPATIENT
Start: 2023-11-02 | End: 2023-11-07

## 2023-11-02 RX ORDER — ROSUVASTATIN CALCIUM 40 MG/1
10 TABLET, COATED ORAL DAILY
Status: ON HOLD | COMMUNITY
End: 2023-11-09

## 2023-11-02 RX ORDER — HYDROCODONE BITARTRATE AND ACETAMINOPHEN 7.5; 325 MG/1; MG/1
1 TABLET ORAL 3 TIMES DAILY PRN
Status: ON HOLD | COMMUNITY
End: 2023-11-21 | Stop reason: HOSPADM

## 2023-11-02 RX ORDER — IBUPROFEN 800 MG/1
800 TABLET ORAL EVERY 6 HOURS PRN
Status: ON HOLD | COMMUNITY
End: 2023-11-09

## 2023-11-02 RX ORDER — LOPERAMIDE HYDROCHLORIDE 2 MG/1
2 CAPSULE ORAL CONTINUOUS PRN
Status: DISCONTINUED | OUTPATIENT
Start: 2023-11-02 | End: 2023-11-09 | Stop reason: HOSPADM

## 2023-11-02 RX ORDER — FAMOTIDINE 10 MG/ML
20 INJECTION INTRAVENOUS DAILY
Status: DISCONTINUED | OUTPATIENT
Start: 2023-11-03 | End: 2023-11-09 | Stop reason: HOSPADM

## 2023-11-02 RX ORDER — ACETAMINOPHEN 325 MG/1
650 TABLET ORAL EVERY 4 HOURS PRN
Status: DISCONTINUED | OUTPATIENT
Start: 2023-11-02 | End: 2023-11-02

## 2023-11-02 RX ORDER — DEXMEDETOMIDINE HYDROCHLORIDE 4 UG/ML
0-1.4 INJECTION, SOLUTION INTRAVENOUS CONTINUOUS
Status: DISCONTINUED | OUTPATIENT
Start: 2023-11-02 | End: 2023-11-05

## 2023-11-02 RX ORDER — HEPARIN SODIUM 1000 [USP'U]/ML
INJECTION, SOLUTION INTRAVENOUS; SUBCUTANEOUS
Status: DISCONTINUED | OUTPATIENT
Start: 2023-11-02 | End: 2023-11-02

## 2023-11-02 RX ORDER — 3% SODIUM CHLORIDE 3 G/100ML
INJECTION, SOLUTION INTRAVENOUS
Status: COMPLETED | OUTPATIENT
Start: 2023-11-02 | End: 2023-11-02

## 2023-11-02 RX ORDER — SUCCINYLCHOLINE CHLORIDE 20 MG/ML
INJECTION INTRAMUSCULAR; INTRAVENOUS
Status: DISCONTINUED | OUTPATIENT
Start: 2023-11-02 | End: 2023-11-02

## 2023-11-02 RX ORDER — FAMOTIDINE 10 MG/ML
20 INJECTION INTRAVENOUS ONCE
Status: COMPLETED | OUTPATIENT
Start: 2023-11-02 | End: 2023-11-02

## 2023-11-02 RX ORDER — MILRINONE LACTATE 0.2 MG/ML
0.25 INJECTION, SOLUTION INTRAVENOUS CONTINUOUS
Status: DISCONTINUED | OUTPATIENT
Start: 2023-11-03 | End: 2023-11-05

## 2023-11-02 RX ORDER — HEPARIN 100 UNIT/ML
500 SYRINGE INTRAVENOUS
Status: DISCONTINUED | OUTPATIENT
Start: 2023-11-02 | End: 2023-11-02 | Stop reason: HOSPADM

## 2023-11-02 RX ORDER — SODIUM CHLORIDE 9 MG/ML
INJECTION, SOLUTION INTRAVENOUS ONCE
Status: DISCONTINUED | OUTPATIENT
Start: 2023-11-02 | End: 2023-11-02

## 2023-11-02 RX ORDER — EZETIMIBE 10 MG/1
10 TABLET ORAL DAILY
Status: ON HOLD | COMMUNITY
End: 2023-11-21 | Stop reason: SDUPTHER

## 2023-11-02 RX ORDER — LACTULOSE 10 G/15ML
20 SOLUTION ORAL EVERY 6 HOURS PRN
Status: DISCONTINUED | OUTPATIENT
Start: 2023-11-02 | End: 2023-11-09 | Stop reason: HOSPADM

## 2023-11-02 RX ORDER — ASPIRIN 81 MG/1
81 TABLET ORAL DAILY
Status: DISCONTINUED | OUTPATIENT
Start: 2023-11-02 | End: 2023-11-09 | Stop reason: HOSPADM

## 2023-11-02 RX ORDER — HEPARIN 100 UNIT/ML
SYRINGE INTRAVENOUS
Status: DISCONTINUED | OUTPATIENT
Start: 2023-11-02 | End: 2023-11-02 | Stop reason: HOSPADM

## 2023-11-02 RX ORDER — ACETAMINOPHEN 650 MG/20.3ML
650 LIQUID ORAL EVERY 6 HOURS PRN
Status: DISCONTINUED | OUTPATIENT
Start: 2023-11-02 | End: 2023-11-09 | Stop reason: HOSPADM

## 2023-11-02 RX ORDER — SODIUM CHLORIDE 9 MG/ML
INJECTION, SOLUTION INTRAVENOUS CONTINUOUS
Status: ACTIVE | OUTPATIENT
Start: 2023-11-02 | End: 2023-11-02

## 2023-11-02 RX ORDER — CALCIUM GLUCONATE 20 MG/ML
3 INJECTION, SOLUTION INTRAVENOUS
Status: DISCONTINUED | OUTPATIENT
Start: 2023-11-02 | End: 2023-11-09 | Stop reason: HOSPADM

## 2023-11-02 RX ORDER — FOLIC ACID 1 MG/1
1 TABLET ORAL DAILY
Status: DISCONTINUED | OUTPATIENT
Start: 2023-11-03 | End: 2023-11-09 | Stop reason: HOSPADM

## 2023-11-02 RX ORDER — TRANEXAMIC ACID 100 MG/ML
INJECTION, SOLUTION INTRAVENOUS
Status: DISCONTINUED | OUTPATIENT
Start: 2023-11-02 | End: 2023-11-02

## 2023-11-02 RX ORDER — VERAPAMIL HYDROCHLORIDE 2.5 MG/ML
INJECTION, SOLUTION INTRAVENOUS
Status: DISCONTINUED | OUTPATIENT
Start: 2023-11-02 | End: 2023-11-02

## 2023-11-02 RX ORDER — MORPHINE SULFATE 4 MG/ML
4 INJECTION, SOLUTION INTRAMUSCULAR; INTRAVENOUS EVERY 4 HOURS PRN
Status: DISCONTINUED | OUTPATIENT
Start: 2023-11-02 | End: 2023-11-09 | Stop reason: HOSPADM

## 2023-11-02 RX ORDER — LIDOCAINE HYDROCHLORIDE 20 MG/ML
INJECTION INTRAVENOUS
Status: DISCONTINUED | OUTPATIENT
Start: 2023-11-02 | End: 2023-11-02

## 2023-11-02 RX ORDER — MAGNESIUM SULFATE HEPTAHYDRATE 40 MG/ML
2 INJECTION, SOLUTION INTRAVENOUS
Status: DISCONTINUED | OUTPATIENT
Start: 2023-11-02 | End: 2023-11-09 | Stop reason: HOSPADM

## 2023-11-02 RX ORDER — ONDANSETRON 2 MG/ML
4 INJECTION INTRAMUSCULAR; INTRAVENOUS EVERY 4 HOURS PRN
Status: DISCONTINUED | OUTPATIENT
Start: 2023-11-02 | End: 2023-11-09 | Stop reason: HOSPADM

## 2023-11-02 RX ORDER — CALCIUM CHLORIDE INJECTION 100 MG/ML
INJECTION, SOLUTION INTRAVENOUS
Status: DISCONTINUED | OUTPATIENT
Start: 2023-11-02 | End: 2023-11-02

## 2023-11-02 RX ORDER — SODIUM BICARBONATE 42 MG/ML
INJECTION, SOLUTION INTRAVENOUS
Status: DISCONTINUED | OUTPATIENT
Start: 2023-11-02 | End: 2023-11-02

## 2023-11-02 RX ORDER — PREDNISONE 50 MG/1
50 TABLET ORAL ONCE
Status: ON HOLD | COMMUNITY
End: 2023-11-09

## 2023-11-02 RX ORDER — HYDROCODONE BITARTRATE AND ACETAMINOPHEN 5; 325 MG/1; MG/1
1 TABLET ORAL EVERY 4 HOURS PRN
Status: DISCONTINUED | OUTPATIENT
Start: 2023-11-02 | End: 2023-11-02

## 2023-11-02 RX ORDER — METOPROLOL SUCCINATE 25 MG/1
25 TABLET, EXTENDED RELEASE ORAL DAILY
Status: DISCONTINUED | OUTPATIENT
Start: 2023-11-02 | End: 2023-11-02

## 2023-11-02 RX ORDER — ALBUMIN HUMAN 50 G/1000ML
12.5 SOLUTION INTRAVENOUS
Status: DISCONTINUED | OUTPATIENT
Start: 2023-11-02 | End: 2023-11-09 | Stop reason: HOSPADM

## 2023-11-02 RX ORDER — CEFAZOLIN SODIUM 2 G/50ML
2 SOLUTION INTRAVENOUS
Status: COMPLETED | OUTPATIENT
Start: 2023-11-03 | End: 2023-11-03

## 2023-11-02 RX ORDER — POTASSIUM CHLORIDE 14.9 MG/ML
40 INJECTION INTRAVENOUS
Status: DISCONTINUED | OUTPATIENT
Start: 2023-11-02 | End: 2023-11-09 | Stop reason: HOSPADM

## 2023-11-02 RX ORDER — DIAZEPAM 5 MG/1
10 TABLET ORAL
Status: DISCONTINUED | OUTPATIENT
Start: 2023-11-02 | End: 2023-11-02

## 2023-11-02 RX ORDER — VANCOMYCIN HYDROCHLORIDE 1 G/20ML
INJECTION, POWDER, LYOPHILIZED, FOR SOLUTION INTRAVENOUS
Status: DISCONTINUED | OUTPATIENT
Start: 2023-11-02 | End: 2023-11-02 | Stop reason: HOSPADM

## 2023-11-02 RX ORDER — DEXTROSE MONOHYDRATE AND SODIUM CHLORIDE 5; .45 G/100ML; G/100ML
INJECTION, SOLUTION INTRAVENOUS CONTINUOUS
Status: CANCELLED | OUTPATIENT
Start: 2023-11-02

## 2023-11-02 RX ORDER — SUCRALFATE 1 G/1
1 TABLET ORAL
Status: DISCONTINUED | OUTPATIENT
Start: 2023-11-03 | End: 2023-11-09 | Stop reason: HOSPADM

## 2023-11-02 RX ORDER — FAMOTIDINE 40 MG/1
40 TABLET, FILM COATED ORAL ONCE
Status: ON HOLD | COMMUNITY
End: 2023-11-21 | Stop reason: HOSPADM

## 2023-11-02 RX ORDER — MELOXICAM 15 MG/1
15 TABLET ORAL DAILY
Status: ON HOLD | COMMUNITY
End: 2023-11-21 | Stop reason: HOSPADM

## 2023-11-02 RX ORDER — PROTAMINE SULFATE 10 MG/ML
INJECTION, SOLUTION INTRAVENOUS
Status: DISCONTINUED | OUTPATIENT
Start: 2023-11-02 | End: 2023-11-02

## 2023-11-02 RX ORDER — GLIPIZIDE 10 MG/1
5 TABLET, FILM COATED, EXTENDED RELEASE ORAL
Status: ON HOLD | COMMUNITY
End: 2023-11-21 | Stop reason: HOSPADM

## 2023-11-02 RX ORDER — POTASSIUM CHLORIDE 14.9 MG/ML
60 INJECTION INTRAVENOUS
Status: COMPLETED | OUTPATIENT
Start: 2023-11-02 | End: 2023-11-02

## 2023-11-02 RX ADMIN — SUCCINYLCHOLINE CHLORIDE 140 MG: 20 INJECTION, SOLUTION INTRAMUSCULAR; INTRAVENOUS at 05:11

## 2023-11-02 RX ADMIN — SODIUM CHLORIDE: 9 INJECTION, SOLUTION INTRAVENOUS at 08:11

## 2023-11-02 RX ADMIN — HEPARIN SODIUM 30000 UNITS: 1000 INJECTION, SOLUTION INTRAVENOUS; SUBCUTANEOUS at 06:11

## 2023-11-02 RX ADMIN — TRANEXAMIC ACID 900 MG: 100 INJECTION, SOLUTION INTRAVENOUS at 07:11

## 2023-11-02 RX ADMIN — INSULIN HUMAN 6 UNITS: 100 INJECTION, SOLUTION PARENTERAL at 03:11

## 2023-11-02 RX ADMIN — VASOPRESSIN 1 UNITS: 20 INJECTION INTRAVENOUS at 06:11

## 2023-11-02 RX ADMIN — ETOMIDATE 18 MG: 2 INJECTION INTRAVENOUS at 05:11

## 2023-11-02 RX ADMIN — VASOPRESSIN 0.5 UNITS: 20 INJECTION INTRAVENOUS at 05:11

## 2023-11-02 RX ADMIN — DIAZEPAM 10 MG: 5 TABLET ORAL at 07:11

## 2023-11-02 RX ADMIN — DIPHENHYDRAMINE HYDROCHLORIDE 50 MG: 25 CAPSULE ORAL at 07:11

## 2023-11-02 RX ADMIN — MIDAZOLAM HYDROCHLORIDE 1.5 MG: 1 INJECTION, SOLUTION INTRAMUSCULAR; INTRAVENOUS at 07:11

## 2023-11-02 RX ADMIN — MUPIROCIN: 20 OINTMENT TOPICAL at 10:11

## 2023-11-02 RX ADMIN — METHYLPREDNISOLONE SODIUM SUCCINATE 80 MG: 40 INJECTION, POWDER, FOR SOLUTION INTRAMUSCULAR; INTRAVENOUS at 03:11

## 2023-11-02 RX ADMIN — FENTANYL CITRATE 100 MCG: 50 INJECTION, SOLUTION INTRAMUSCULAR; INTRAVENOUS at 08:11

## 2023-11-02 RX ADMIN — VASOPRESSIN 0.5 UNITS: 20 INJECTION INTRAVENOUS at 06:11

## 2023-11-02 RX ADMIN — PROTAMINE SULFATE 400 MG: 10 INJECTION, SOLUTION INTRAVENOUS at 07:11

## 2023-11-02 RX ADMIN — SODIUM BICARBONATE 50 MEQ: 42 INJECTION, SOLUTION INTRAVENOUS at 08:11

## 2023-11-02 RX ADMIN — FENTANYL CITRATE 50 MCG: 50 INJECTION, SOLUTION INTRAMUSCULAR; INTRAVENOUS at 06:11

## 2023-11-02 RX ADMIN — MIDAZOLAM HYDROCHLORIDE 2 MG: 1 INJECTION, SOLUTION INTRAMUSCULAR; INTRAVENOUS at 05:11

## 2023-11-02 RX ADMIN — FENTANYL CITRATE 150 MCG: 50 INJECTION, SOLUTION INTRAMUSCULAR; INTRAVENOUS at 05:11

## 2023-11-02 RX ADMIN — ROCURONIUM BROMIDE 40 MG: 10 SOLUTION INTRAVENOUS at 05:11

## 2023-11-02 RX ADMIN — MILRINONE LACTATE IN DEXTROSE 0.25 MCG/KG/MIN: 200 INJECTION, SOLUTION INTRAVENOUS at 11:11

## 2023-11-02 RX ADMIN — PROPOFOL 30 MG: 10 INJECTION, EMULSION INTRAVENOUS at 05:11

## 2023-11-02 RX ADMIN — LIDOCAINE HYDROCHLORIDE 80 MG: 20 INJECTION INTRAVENOUS at 05:11

## 2023-11-02 RX ADMIN — TRANEXAMIC ACID 862 MG: 100 INJECTION, SOLUTION INTRAVENOUS at 05:11

## 2023-11-02 RX ADMIN — Medication 100 MCG: at 06:11

## 2023-11-02 RX ADMIN — Medication 50 MCG: at 05:11

## 2023-11-02 RX ADMIN — ALBUMIN (HUMAN) 12.5 G: 12.5 SOLUTION INTRAVENOUS at 10:11

## 2023-11-02 RX ADMIN — ROCURONIUM BROMIDE 25 MG: 10 SOLUTION INTRAVENOUS at 06:11

## 2023-11-02 RX ADMIN — FAMOTIDINE 20 MG: 10 INJECTION, SOLUTION INTRAVENOUS at 03:11

## 2023-11-02 RX ADMIN — POTASSIUM CHLORIDE 60 MEQ: 14.9 INJECTION, SOLUTION INTRAVENOUS at 10:11

## 2023-11-02 RX ADMIN — SODIUM CHLORIDE 6 UNITS/HR: 9 INJECTION, SOLUTION INTRAVENOUS at 06:11

## 2023-11-02 RX ADMIN — MUPIROCIN: 20 OINTMENT TOPICAL at 11:11

## 2023-11-02 RX ADMIN — GLYCOPYRROLATE 0.2 MG: 0.2 INJECTION INTRAMUSCULAR; INTRAVENOUS at 06:11

## 2023-11-02 RX ADMIN — ROCURONIUM BROMIDE 25 MG: 10 SOLUTION INTRAVENOUS at 07:11

## 2023-11-02 RX ADMIN — DEXMEDETOMIDINE HYDROCHLORIDE 0.8 MCG/KG/HR: 400 INJECTION INTRAVENOUS at 07:11

## 2023-11-02 RX ADMIN — EPINEPHRINE 0.02 MCG/KG/MIN: 0.1 INJECTION INTRAVENOUS at 07:11

## 2023-11-02 RX ADMIN — DEXTROSE AND SODIUM CHLORIDE: 5; 450 INJECTION, SOLUTION INTRAVENOUS at 09:11

## 2023-11-02 RX ADMIN — Medication 100 MCG: at 05:11

## 2023-11-02 RX ADMIN — INSULIN HUMAN 12 UNITS: 100 INJECTION, SOLUTION PARENTERAL at 02:11

## 2023-11-02 RX ADMIN — CALCIUM CHLORIDE INJECTION 0.25 G: 100 INJECTION, SOLUTION INTRAVENOUS at 08:11

## 2023-11-02 RX ADMIN — CEFAZOLIN SODIUM 2 G: 2 SOLUTION INTRAVENOUS at 05:11

## 2023-11-02 RX ADMIN — MIDAZOLAM HYDROCHLORIDE 1.5 MG: 1 INJECTION, SOLUTION INTRAMUSCULAR; INTRAVENOUS at 06:11

## 2023-11-02 RX ADMIN — SODIUM CHLORIDE: 9 INJECTION, SOLUTION INTRAVENOUS at 04:11

## 2023-11-02 RX ADMIN — FENTANYL CITRATE 100 MCG: 50 INJECTION, SOLUTION INTRAMUSCULAR; INTRAVENOUS at 06:11

## 2023-11-02 RX ADMIN — Medication 50 MCG: at 06:11

## 2023-11-02 RX ADMIN — ROCURONIUM BROMIDE 55 MG: 10 SOLUTION INTRAVENOUS at 05:11

## 2023-11-02 RX ADMIN — FENTANYL CITRATE 200 MCG: 50 INJECTION, SOLUTION INTRAMUSCULAR; INTRAVENOUS at 05:11

## 2023-11-02 RX ADMIN — ROCURONIUM BROMIDE 5 MG: 10 SOLUTION INTRAVENOUS at 05:11

## 2023-11-02 RX ADMIN — CALCIUM CHLORIDE INJECTION 0.5 G: 100 INJECTION, SOLUTION INTRAVENOUS at 07:11

## 2023-11-02 NOTE — HPI
Ms. Barry is a 73yo female with PMH Of HTN, CAD, cerebrovascular disease, PAD and diabetes mellitus.  She c/o intermittent chest pain with activity over the last few months with activity. Describes it as a heaviness. She underwent a LHC this morning revealing severe multivessel CAD.  CV surgery has been consulted for coronary artery bypass grafting. She ambulates with a rolling walker for balance.

## 2023-11-02 NOTE — ANESTHESIA PROCEDURE NOTES
Intubation    Date/Time: 11/2/2023 5:10 PM    Performed by: Cindy Dorsey CRNA  Authorized by: Boo Klein DO    Intubation:     Induction:  Rapid sequence induction (modified rapid sequence with breaths)    Intubated:  Postinduction    Mask Ventilation:  Easy mask    Attempts:  1    Attempted By:  SANDRA    Blade:  [place] 3    Laryngeal View Grade: Grade I - full view of cords      Difficult Airway Encountered?: No      Complications:  None    Airway Device:  Oral endotracheal tube    Airway Device Size:  8.0    Style/Cuff Inflation:  Cuffed (inflated to minimal occlusive pressure)    Inflation Amount (mL):  6    Tube secured:  22    Secured at:  The lips    Placement Verified By:  Capnometry    Findings Post-Intubation:  BS equal bilateral and atraumatic/condition of teeth unchanged

## 2023-11-02 NOTE — ANESTHESIA PREPROCEDURE EVALUATION
11/02/2023  Geno Barry is a 72 y.o., female.      Pre-op Assessment    I have reviewed the Patient Summary Reports.     I have reviewed the Nursing Notes. I have reviewed the NPO Status.   I have reviewed the Medications.     Review of Systems  Anesthesia Hx:  No problems with previous Anesthesia    Social:  Non-Smoker    Cardiovascular:   Hypertension Denies MI. CAD    Angina  Denies CHF. PVD hyperlipidemia    Pulmonary:   Denies COPD.  Denies Asthma.    Hepatic/GI:   GERD, well controlled Denies Liver Disease. Denies Hepatitis.    Neurological:   Denies CVA. Denies Seizures.    Endocrine:   Diabetes, poorly controlled, type 2, using insulin Denies Hypothyroidism. Denies Hyperthyroidism.  Denies Obesity / BMI > 30      Physical Exam  General: Well nourished, Cooperative, Alert and Oriented    Airway:  Mallampati: I   Mouth Opening: Normal  TM Distance: Normal  Tongue: Normal  Neck ROM: Normal ROM    Dental:  Edentulous        Anesthesia Plan  Type of Anesthesia, risks & benefits discussed:    Anesthesia Type: Gen ETT  Intra-op Monitoring Plan: Standard ASA Monitors, Art Line, Central Line and MAGY  Induction:  IV  Airway Plan: Video and Direct  Informed Consent: Informed consent signed with the Patient and all parties understand the risks and agree with anesthesia plan.  All questions answered. Patient consented to blood products? Yes  ASA Score: 4  Day of Surgery Review of History & Physical: H&P Update referred to the surgeon/provider.    Ready For Surgery From Anesthesia Perspective.     .

## 2023-11-02 NOTE — ANESTHESIA PROCEDURE NOTES
Central Line    Diagnosis: CVP monitoring  Patient location during procedure: done in OR    Staffing  Authorizing Provider: Boo Klein DO  Performing Provider: Tania Walker    Staffing  Anesthesiologist: Boo Klein DO  Other anesthesia staff: Tania Walker  Performed by: Tania Walker  Authorized by: Boo Klein DO    Anesthesiologist was present at the time of the procedure.  Preanesthetic Checklist  Completed: patient identified, IV checked, site marked, risks and benefits discussed, surgical consent, monitors and equipment checked, pre-op evaluation, timeout performed and anesthesia consent given  Indication   Indication: hemodynamic monitoring, vascular access, med administration     Anesthesia   general anesthesia    Central Line   Skin Prep: skin prepped with ChloraPrep, skin prep agent completely dried prior to procedure  Sterile Barriers Followed: Yes    All five maximal barriers used- gloves, gown, cap, mask, and large sterile sheet    hand hygiene performed prior to central venous catheter insertion  Location: right internal jugular.   Catheter type: double lumen  Catheter Size: 7 Fr  Ultrasound: vascular probe with ultrasound   Vessel Caliber: patent  Needle advanced into vessel with real time Ultrasound guidance.  Guidewire confirmed in vessel.  Image recorded and saved.  sterile gel and probe cover used in ultrasound-guided central venous catheter insertion  Manometry: none  Insertion Attempts: 1   Securement:line sutured, sterile dressing applied and blood return through all ports    Post-Procedure        Guidewire Guidewire removed intact. Guidewire removed intact, verified with nurse.

## 2023-11-02 NOTE — SUBJECTIVE & OBJECTIVE
No current facility-administered medications on file prior to encounter.     Current Outpatient Medications on File Prior to Encounter   Medication Sig    aspirin (ECOTRIN) 81 MG EC tablet Take 81 mg by mouth once daily.    cholecalciferol, vitamin D3, (VITAMIN D3) 10 mcg (400 unit) Cap capsule Take 400 Units by mouth once daily.    diphenhydrAMINE (BENADRYL) 25 mg capsule Take 25 mg by mouth once. 2 tabs at 6pm, midnight, and 6am morning of procedure    ezetimibe (ZETIA) 10 mg tablet Take 10 mg by mouth once daily.    famotidine (PEPCID) 40 MG tablet Take 40 mg by mouth once. Take 1 tab at 6pm, midnight, and 6am morning of procedure    glipiZIDE (GLUCOTROL) 10 MG TR24 Take 5 mg by mouth daily with breakfast.    ibuprofen (ADVIL,MOTRIN) 800 MG tablet Take 800 mg by mouth every 6 (six) hours as needed for Pain.    metoprolol succinate (TOPROL-XL) 25 MG 24 hr tablet Take 25 mg by mouth once daily.    pantoprazole (PROTONIX) 20 MG tablet Take 20 mg by mouth once daily.    predniSONE (DELTASONE) 50 MG Tab Take 50 mg by mouth once. Take 1 tab at 6pm, midnight, and 6am morning of procedure    rosuvastatin (CRESTOR) 40 MG Tab Take 10 mg by mouth once daily.    SITagliptan-metformin (JANUMET) 50-1,000 mg per tablet Take 1 tablet by mouth 2 (two) times daily with meals.    amLODIPine (NORVASC) 10 MG tablet Take 10 mg by mouth once daily.    HYDROcodone-acetaminophen (NORCO) 7.5-325 mg per tablet Take 1 tablet by mouth 3 (three) times daily as needed for Pain.    meloxicam (MOBIC) 15 MG tablet Take 15 mg by mouth once daily.       Review of patient's allergies indicates:   Allergen Reactions    Ace inhibitors Swelling    Clopidogrel Swelling    Iodine        Past Medical History:   Diagnosis Date    CAD (coronary artery disease)     Diabetes mellitus     Hypertension     PAD (peripheral artery disease)      Past Surgical History:   Procedure Laterality Date    HYSTERECTOMY      LUMPECTOMY, BREAST Right      Family History     None       Tobacco Use    Smoking status: Not on file    Smokeless tobacco: Not on file   Substance and Sexual Activity    Alcohol use: Not on file    Drug use: Not on file    Sexual activity: Not on file     Review of Systems   Constitutional:  Positive for fatigue. Negative for fever.   HENT: Negative.     Respiratory:  Negative for shortness of breath.    Cardiovascular:  Positive for chest pain.   Gastrointestinal: Negative.    Genitourinary: Negative.    Musculoskeletal: Negative.    Neurological: Negative.    Psychiatric/Behavioral: Negative.       Objective:     Vital Signs (Most Recent):  Temp: 98.6 °F (37 °C) (11/02/23 0649)  Pulse: 73 (11/02/23 0845)  BP: (!) 150/85 (11/02/23 0845)  SpO2: 100 % (11/02/23 0845) Vital Signs (24h Range):  Temp:  [98.6 °F (37 °C)] 98.6 °F (37 °C)  Pulse:  [71-81] 73  SpO2:  [98 %-100 %] 100 %  BP: (129-176)/(70-89) 150/85     Weight: 86.5 kg (190 lb 11.2 oz)  Body mass index is 31.73 kg/m².    SpO2: 100 %        Intake/Output - Last 3 Shifts       None             Lines/Drains/Airways       Drain  Duration             Female External Urinary Catheter 11/02/23 0930 <1 day              Peripheral Intravenous Line  Duration                  Peripheral IV - Single Lumen 11/02/23 0715 22 G Posterior;Right Hand <1 day                     STS Risk Score: not calculated     Physical Exam  Constitutional:       General: She is not in acute distress.  HENT:      Head: Normocephalic and atraumatic.      Mouth/Throat:      Mouth: Mucous membranes are moist.   Eyes:      Extraocular Movements: Extraocular movements intact.      Conjunctiva/sclera: Conjunctivae normal.   Cardiovascular:      Rate and Rhythm: Normal rate and regular rhythm.   Pulmonary:      Effort: Pulmonary effort is normal.      Breath sounds: Normal breath sounds.   Abdominal:      General: Bowel sounds are normal.      Palpations: Abdomen is soft.   Musculoskeletal:         General: Normal range of motion.   Skin:      General: Skin is warm.   Neurological:      General: No focal deficit present.      Mental Status: She is alert.   Psychiatric:         Mood and Affect: Mood normal.            Significant Labs:  All pertinent labs from the last 24 hours have been reviewed.    Significant Diagnostics:  I have reviewed all pertinent imaging results/findings within the past 24 hours.

## 2023-11-02 NOTE — Clinical Note
The catheter was repositioned into the ostium   right coronary artery. Hemodynamics were performed.  An angiography was performed of the right coronary arteries. The angiography was performed via power injection.

## 2023-11-02 NOTE — INTERVAL H&P NOTE
Patient name: Geno Barry  MRN: [id number]  : 1950  Cath Lab Procedure H&P Update    Pre-Procedure Assessment:    I saw and examined the patient face to face. The patient has been re-evaluated and her condition is unchanged. The reason for admission, procedure and care is still present.  Based on the patients H&P, pre-procedure physical exam, relevant diagnostic studies, NPO status and information obtained from the patient, I determined the patient is an appropriate candidate for the proposed procedure and anesthesia planned. I further certify the anesthesia risks, benefits and options have been explained to the patient to which she agrees as documented on the procedural consent.

## 2023-11-03 LAB
ALBUMIN SERPL-MCNC: 3.1 G/DL (ref 3.4–4.8)
ALBUMIN/GLOB SERPL: 1.3 RATIO (ref 1.1–2)
ALLENS TEST BLOOD GAS (OHS): ABNORMAL
ALP SERPL-CCNC: 50 UNIT/L (ref 40–150)
ALT SERPL-CCNC: 13 UNIT/L (ref 0–55)
ANION GAP SERPL CALC-SCNC: 9 MEQ/L
AST SERPL-CCNC: 16 UNIT/L (ref 5–34)
BASE EXCESS BLD CALC-SCNC: 1.1 MMOL/L (ref -2–2)
BASOPHILS # BLD AUTO: 0.02 X10(3)/MCL
BASOPHILS # BLD AUTO: 0.03 X10(3)/MCL
BASOPHILS NFR BLD AUTO: 0.2 %
BASOPHILS NFR BLD AUTO: 0.2 %
BILIRUB SERPL-MCNC: 0.7 MG/DL
BLOOD GAS SAMPLE TYPE (OHS): ABNORMAL
BUN SERPL-MCNC: 22.5 MG/DL (ref 9.8–20.1)
BUN SERPL-MCNC: 22.7 MG/DL (ref 9.8–20.1)
CA-I BLD-SCNC: 1.25 MMOL/L (ref 1.12–1.23)
CALCIUM SERPL-MCNC: 9 MG/DL (ref 8.4–10.2)
CALCIUM SERPL-MCNC: 9.2 MG/DL (ref 8.4–10.2)
CHLORIDE SERPL-SCNC: 111 MMOL/L (ref 98–107)
CHLORIDE SERPL-SCNC: 114 MMOL/L (ref 98–107)
CHOLEST SERPL-MCNC: 129 MG/DL
CHOLEST/HDLC SERPL: 3 {RATIO} (ref 0–5)
CO2 BLDA-SCNC: 28 MMOL/L
CO2 SERPL-SCNC: 22 MMOL/L (ref 23–31)
CO2 SERPL-SCNC: 22 MMOL/L (ref 23–31)
CREAT SERPL-MCNC: 0.79 MG/DL (ref 0.55–1.02)
CREAT SERPL-MCNC: 0.91 MG/DL (ref 0.55–1.02)
CREAT/UREA NIT SERPL: 25
DRAWN BY BLOOD GAS (OHS): ABNORMAL
EOSINOPHIL # BLD AUTO: 0 X10(3)/MCL (ref 0–0.9)
EOSINOPHIL # BLD AUTO: 0 X10(3)/MCL (ref 0–0.9)
EOSINOPHIL NFR BLD AUTO: 0 %
EOSINOPHIL NFR BLD AUTO: 0 %
ERYTHROCYTE [DISTWIDTH] IN BLOOD BY AUTOMATED COUNT: 13.9 % (ref 11.5–17)
ERYTHROCYTE [DISTWIDTH] IN BLOOD BY AUTOMATED COUNT: 14.2 % (ref 11.5–17)
FIO2 BLOOD GAS (OHS): 30 %
GFR SERPLBLD CREATININE-BSD FMLA CKD-EPI: >60 MLS/MIN/1.73/M2
GFR SERPLBLD CREATININE-BSD FMLA CKD-EPI: >60 MLS/MIN/1.73/M2
GLOBULIN SER-MCNC: 2.3 GM/DL (ref 2.4–3.5)
GLUCOSE SERPL-MCNC: 141 MG/DL (ref 82–115)
GLUCOSE SERPL-MCNC: 239 MG/DL (ref 82–115)
GLUCOSE SERPL-MCNC: 296 MG/DL (ref 70–110)
HCO3 BLDA-SCNC: 26.6 MMOL/L (ref 22–26)
HCO3 UR-SCNC: 22.1 MMOL/L (ref 24–28)
HCT VFR BLD AUTO: 31.3 % (ref 37–47)
HCT VFR BLD AUTO: 33.6 % (ref 37–47)
HCT VFR BLD CALC: 26 %PCV (ref 36–54)
HDLC SERPL-MCNC: 38 MG/DL (ref 35–60)
HGB BLD-MCNC: 10.2 G/DL (ref 12–16)
HGB BLD-MCNC: 10.8 G/DL (ref 12–16)
HGB BLD-MCNC: 9 G/DL
IMM GRANULOCYTES # BLD AUTO: 0.07 X10(3)/MCL (ref 0–0.04)
IMM GRANULOCYTES # BLD AUTO: 0.09 X10(3)/MCL (ref 0–0.04)
IMM GRANULOCYTES NFR BLD AUTO: 0.5 %
IMM GRANULOCYTES NFR BLD AUTO: 0.7 %
LDLC SERPL CALC-MCNC: 83 MG/DL (ref 50–140)
LYMPHOCYTES # BLD AUTO: 2.18 X10(3)/MCL (ref 0.6–4.6)
LYMPHOCYTES # BLD AUTO: 2.93 X10(3)/MCL (ref 0.6–4.6)
LYMPHOCYTES NFR BLD AUTO: 17 %
LYMPHOCYTES NFR BLD AUTO: 22.6 %
MAGNESIUM SERPL-MCNC: 2.3 MG/DL (ref 1.6–2.6)
MCH RBC QN AUTO: 28.6 PG (ref 27–31)
MCH RBC QN AUTO: 28.8 PG (ref 27–31)
MCHC RBC AUTO-ENTMCNC: 32.1 G/DL (ref 33–36)
MCHC RBC AUTO-ENTMCNC: 32.6 G/DL (ref 33–36)
MCV RBC AUTO: 87.7 FL (ref 80–94)
MCV RBC AUTO: 89.6 FL (ref 80–94)
MODE (OHS): ABNORMAL
MONOCYTES # BLD AUTO: 0.9 X10(3)/MCL (ref 0.1–1.3)
MONOCYTES # BLD AUTO: 1.59 X10(3)/MCL (ref 0.1–1.3)
MONOCYTES NFR BLD AUTO: 12.4 %
MONOCYTES NFR BLD AUTO: 6.9 %
NEUTROPHILS # BLD AUTO: 8.94 X10(3)/MCL (ref 2.1–9.2)
NEUTROPHILS # BLD AUTO: 9.05 X10(3)/MCL (ref 2.1–9.2)
NEUTROPHILS NFR BLD AUTO: 69.7 %
NEUTROPHILS NFR BLD AUTO: 69.8 %
NRBC BLD AUTO-RTO: 0 %
NRBC BLD AUTO-RTO: 0 %
OXYGEN DEVICE BLOOD GAS (OHS): ABNORMAL
PCO2 BLDA: 37.6 MMHG (ref 35–45)
PCO2 BLDA: 45 MMHG (ref 35–45)
PEEP (OHS): 5 CMH2O
PH BLDA: 7.38 [PH] (ref 7.35–7.45)
PH SMN: 7.38 [PH] (ref 7.35–7.45)
PHOSPHATE SERPL-MCNC: 2.7 MG/DL (ref 2.3–4.7)
PLATELET # BLD AUTO: 190 X10(3)/MCL (ref 130–400)
PLATELET # BLD AUTO: 246 X10(3)/MCL (ref 130–400)
PMV BLD AUTO: 10.1 FL (ref 7.4–10.4)
PMV BLD AUTO: 9.8 FL (ref 7.4–10.4)
PO2 BLDA: 411 MMHG (ref 80–100)
PO2 BLDA: 82 MMHG (ref 80–100)
POC BE: -3 MMOL/L
POC IONIZED CALCIUM: 1.01 MMOL/L (ref 1.06–1.42)
POC SATURATED O2: 100 % (ref 95–100)
POC TCO2: 23 MMOL/L (ref 23–27)
POCT GLUCOSE: 111 MG/DL (ref 70–110)
POCT GLUCOSE: 119 MG/DL (ref 70–110)
POCT GLUCOSE: 121 MG/DL (ref 70–110)
POCT GLUCOSE: 131 MG/DL (ref 70–110)
POCT GLUCOSE: 156 MG/DL (ref 70–110)
POCT GLUCOSE: 163 MG/DL (ref 70–110)
POCT GLUCOSE: 169 MG/DL (ref 70–110)
POCT GLUCOSE: 175 MG/DL (ref 70–110)
POCT GLUCOSE: 185 MG/DL (ref 70–110)
POCT GLUCOSE: 187 MG/DL (ref 70–110)
POCT GLUCOSE: 193 MG/DL (ref 70–110)
POCT GLUCOSE: 213 MG/DL (ref 70–110)
POCT GLUCOSE: 221 MG/DL (ref 70–110)
POCT GLUCOSE: 221 MG/DL (ref 70–110)
POCT GLUCOSE: 244 MG/DL (ref 70–110)
POCT GLUCOSE: 270 MG/DL (ref 70–110)
POCT GLUCOSE: 94 MG/DL (ref 70–110)
POTASSIUM BLD-SCNC: 4.7 MMOL/L (ref 3.5–5.1)
POTASSIUM BLOOD GAS (OHS): 4.2 MMOL/L (ref 3.5–5)
POTASSIUM SERPL-SCNC: 4 MMOL/L (ref 3.5–5.1)
POTASSIUM SERPL-SCNC: 4.3 MMOL/L (ref 3.5–5.1)
PROT SERPL-MCNC: 5.4 GM/DL (ref 5.8–7.6)
PS (OHS): 10 CMH2O
RBC # BLD AUTO: 3.57 X10(6)/MCL (ref 4.2–5.4)
RBC # BLD AUTO: 3.75 X10(6)/MCL (ref 4.2–5.4)
SAMPLE SITE BLOOD GAS (OHS): ABNORMAL
SAMPLE: ABNORMAL
SAO2 % BLDA: 96 %
SODIUM BLD-SCNC: 140 MMOL/L (ref 136–145)
SODIUM BLOOD GAS (OHS): 135 MMOL/L (ref 137–145)
SODIUM SERPL-SCNC: 142 MMOL/L (ref 136–145)
SODIUM SERPL-SCNC: 143 MMOL/L (ref 136–145)
TRIGL SERPL-MCNC: 42 MG/DL (ref 37–140)
VLDLC SERPL CALC-MCNC: 8 MG/DL
WBC # SPEC AUTO: 12.83 X10(3)/MCL (ref 4.5–11.5)
WBC # SPEC AUTO: 12.97 X10(3)/MCL (ref 4.5–11.5)

## 2023-11-03 PROCEDURE — 37799 UNLISTED PX VASCULAR SURGERY: CPT

## 2023-11-03 PROCEDURE — 63600175 PHARM REV CODE 636 W HCPCS

## 2023-11-03 PROCEDURE — 94003 VENT MGMT INPAT SUBQ DAY: CPT

## 2023-11-03 PROCEDURE — 85025 COMPLETE CBC W/AUTO DIFF WBC: CPT | Performed by: INTERNAL MEDICINE

## 2023-11-03 PROCEDURE — 99024 PR POST-OP FOLLOW-UP VISIT: ICD-10-PCS | Mod: POP,,, | Performed by: PHYSICIAN ASSISTANT

## 2023-11-03 PROCEDURE — 99900031 HC PATIENT EDUCATION (STAT)

## 2023-11-03 PROCEDURE — 99900017 HC EXTUBATION W/PARAMETERS (STAT)

## 2023-11-03 PROCEDURE — P9045 ALBUMIN (HUMAN), 5%, 250 ML: HCPCS | Mod: JZ,JG | Performed by: PHYSICIAN ASSISTANT

## 2023-11-03 PROCEDURE — 63600175 PHARM REV CODE 636 W HCPCS: Performed by: NURSE PRACTITIONER

## 2023-11-03 PROCEDURE — 25000003 PHARM REV CODE 250: Performed by: PHYSICIAN ASSISTANT

## 2023-11-03 PROCEDURE — 27100171 HC OXYGEN HIGH FLOW UP TO 24 HOURS

## 2023-11-03 PROCEDURE — 82803 BLOOD GASES ANY COMBINATION: CPT

## 2023-11-03 PROCEDURE — 99024 POSTOP FOLLOW-UP VISIT: CPT | Mod: POP,,, | Performed by: PHYSICIAN ASSISTANT

## 2023-11-03 PROCEDURE — 63600175 PHARM REV CODE 636 W HCPCS: Performed by: PHYSICIAN ASSISTANT

## 2023-11-03 PROCEDURE — 80053 COMPREHEN METABOLIC PANEL: CPT | Performed by: PHYSICIAN ASSISTANT

## 2023-11-03 PROCEDURE — 63600175 PHARM REV CODE 636 W HCPCS: Performed by: SPECIALIST

## 2023-11-03 PROCEDURE — 94761 N-INVAS EAR/PLS OXIMETRY MLT: CPT

## 2023-11-03 PROCEDURE — 25000003 PHARM REV CODE 250: Performed by: INTERNAL MEDICINE

## 2023-11-03 PROCEDURE — 83735 ASSAY OF MAGNESIUM: CPT | Performed by: SPECIALIST

## 2023-11-03 PROCEDURE — 93005 ELECTROCARDIOGRAM TRACING: CPT

## 2023-11-03 PROCEDURE — 85025 COMPLETE CBC W/AUTO DIFF WBC: CPT | Performed by: SPECIALIST

## 2023-11-03 PROCEDURE — 99900035 HC TECH TIME PER 15 MIN (STAT)

## 2023-11-03 PROCEDURE — 80061 LIPID PANEL: CPT | Performed by: NURSE PRACTITIONER

## 2023-11-03 PROCEDURE — 84100 ASSAY OF PHOSPHORUS: CPT | Performed by: SPECIALIST

## 2023-11-03 PROCEDURE — 20000000 HC ICU ROOM

## 2023-11-03 RX ORDER — INSULIN ASPART 100 [IU]/ML
0-5 INJECTION, SOLUTION INTRAVENOUS; SUBCUTANEOUS EVERY 6 HOURS PRN
Status: DISCONTINUED | OUTPATIENT
Start: 2023-11-03 | End: 2023-11-08

## 2023-11-03 RX ORDER — ACETAMINOPHEN 10 MG/ML
1000 INJECTION, SOLUTION INTRAVENOUS EVERY 8 HOURS
Status: COMPLETED | OUTPATIENT
Start: 2023-11-03 | End: 2023-11-03

## 2023-11-03 RX ORDER — HYDROCODONE BITARTRATE AND ACETAMINOPHEN 5; 325 MG/1; MG/1
1 TABLET ORAL EVERY 4 HOURS PRN
Status: DISCONTINUED | OUTPATIENT
Start: 2023-11-03 | End: 2023-11-09 | Stop reason: HOSPADM

## 2023-11-03 RX ORDER — ATORVASTATIN CALCIUM 40 MG/1
40 TABLET, FILM COATED ORAL DAILY
Status: DISCONTINUED | OUTPATIENT
Start: 2023-11-03 | End: 2023-11-09 | Stop reason: HOSPADM

## 2023-11-03 RX ORDER — KETOROLAC TROMETHAMINE 30 MG/ML
15 INJECTION, SOLUTION INTRAMUSCULAR; INTRAVENOUS EVERY 6 HOURS PRN
Status: DISPENSED | OUTPATIENT
Start: 2023-11-03 | End: 2023-11-05

## 2023-11-03 RX ORDER — GLUCAGON 1 MG
1 KIT INJECTION
Status: DISCONTINUED | OUTPATIENT
Start: 2023-11-03 | End: 2023-11-09 | Stop reason: HOSPADM

## 2023-11-03 RX ADMIN — KETOROLAC TROMETHAMINE 15 MG: 30 INJECTION, SOLUTION INTRAMUSCULAR; INTRAVENOUS at 09:11

## 2023-11-03 RX ADMIN — MORPHINE SULFATE 4 MG: 4 INJECTION, SOLUTION INTRAMUSCULAR; INTRAVENOUS at 12:11

## 2023-11-03 RX ADMIN — AMIODARONE HYDROCHLORIDE 150 MG: 1.5 INJECTION, SOLUTION INTRAVENOUS at 11:11

## 2023-11-03 RX ADMIN — ACETAMINOPHEN 1000 MG: 10 INJECTION, SOLUTION INTRAVENOUS at 06:11

## 2023-11-03 RX ADMIN — MUPIROCIN: 20 OINTMENT TOPICAL at 08:11

## 2023-11-03 RX ADMIN — DOCUSATE SODIUM 100 MG: 100 CAPSULE, LIQUID FILLED ORAL at 09:11

## 2023-11-03 RX ADMIN — KETOROLAC TROMETHAMINE 15 MG: 30 INJECTION, SOLUTION INTRAMUSCULAR; INTRAVENOUS at 11:11

## 2023-11-03 RX ADMIN — ASPIRIN 81 MG: 81 TABLET, COATED ORAL at 08:11

## 2023-11-03 RX ADMIN — CEFAZOLIN SODIUM 2 G: 2 SOLUTION INTRAVENOUS at 05:11

## 2023-11-03 RX ADMIN — MUPIROCIN: 20 OINTMENT TOPICAL at 09:11

## 2023-11-03 RX ADMIN — AMIODARONE HYDROCHLORIDE 1 MG/MIN: 1.8 INJECTION, SOLUTION INTRAVENOUS at 11:11

## 2023-11-03 RX ADMIN — ALBUMIN (HUMAN) 12.5 G: 12.5 SOLUTION INTRAVENOUS at 06:11

## 2023-11-03 RX ADMIN — ACETAMINOPHEN 1000 MG: 10 INJECTION, SOLUTION INTRAVENOUS at 02:11

## 2023-11-03 RX ADMIN — FOLIC ACID 1 MG: 1 TABLET ORAL at 08:11

## 2023-11-03 RX ADMIN — ACETAMINOPHEN 1000 MG: 10 INJECTION, SOLUTION INTRAVENOUS at 09:11

## 2023-11-03 RX ADMIN — SUCRALFATE 1 G: 1 TABLET ORAL at 09:11

## 2023-11-03 RX ADMIN — DOCUSATE SODIUM 100 MG: 100 CAPSULE, LIQUID FILLED ORAL at 08:11

## 2023-11-03 RX ADMIN — FAMOTIDINE 20 MG: 10 INJECTION, SOLUTION INTRAVENOUS at 08:11

## 2023-11-03 RX ADMIN — ENOXAPARIN SODIUM 40 MG: 40 INJECTION SUBCUTANEOUS at 05:11

## 2023-11-03 RX ADMIN — INSULIN HUMAN 2 UNITS/HR: 1 INJECTION, SOLUTION INTRAVENOUS at 11:11

## 2023-11-03 NOTE — TRANSFER OF CARE
"Anesthesia Transfer of Care Note    Patient: Geno Barry    Procedure(s) Performed: Procedure(s) (LRB):  CORONARY ARTERY BYPASS GRAFT (CABG) (N/A)  SURGICAL PROCUREMENT, VEIN, ENDOSCOPIC (N/A)    Patient location: ICU    Anesthesia Type: general    Transport from OR: Transported from OR intubated on 100% O2 by AMBU with adequate controlled ventilation. Upon arrival to PACU/ICU, patient attached to ventilator and auscultated to confirm bilateral breath sounds and adequate TV. Continuous ECG monitoring in transport. Continuous SpO2 monitoring in transport. Continuos invasive BP monitoring in transport    Post pain: adequate analgesia    Post assessment: no apparent anesthetic complications    Post vital signs: stable    Level of consciousness: sedated    Nausea/Vomiting: no nausea/vomiting    Complications: none    Transfer of care protocol was followed      Last vitals:   Visit Vitals  /60 (BP Location: Right arm, Patient Position: Lying)   Pulse 78   Temp 36.6 °C (97.9 °F) (Oral)   Resp 16   Ht 5' 4.96" (1.65 m)   Wt 86.2 kg (190 lb)   SpO2 96%   Breastfeeding No   BMI 31.66 kg/m²     "

## 2023-11-03 NOTE — NURSING
Nurses Note -- 4 Eyes      11/3/2023   10:00 AM      Skin assessed during: Daily Assessment      [x] No Altered Skin Integrity Present    [x]Prevention Measures Documented      [] Yes- Altered Skin Integrity Present or Discovered   [] LDA Added if Not in Epic (Describe Wound)   [] New Altered Skin Integrity was Present on Admit and Documented in LDA   [] Wound Image Taken    Wound Care Consulted? No    Attending Nurse:  Varsha ESPINOSA     Second RN/Staff Member:     FRANCISCA Tellez

## 2023-11-03 NOTE — PT/OT/SLP PROGRESS
Physical Therapy      Patient Name:  Geno Barry   MRN:  [id number]    Patient not seen today secondary to RN hold . Pt remains on epi, and had an episode of AFIB. Will follow-up 11/4 as appropriate.

## 2023-11-03 NOTE — ANESTHESIA POSTPROCEDURE EVALUATION
Anesthesia Post Evaluation    Patient: Geno Barry    Procedure(s) Performed: Procedure(s) (LRB):  CORONARY ARTERY BYPASS GRAFT (CABG) (N/A)  SURGICAL PROCUREMENT, VEIN, ENDOSCOPIC (N/A)    Final Anesthesia Type: general (/Regional//MAC)      Patient location during evaluation: ICU  Patient participation: No - Unable to Participate, Intubation  Post-procedure mental status: @ basline.  Post-procedure vital signs: reviewed and stable  Pain management: adequate    PONV status: See postop meds for drugs used to control n/v if any.  Anesthetic complications: no      Cardiovascular status: blood pressure returned to baseline and stable  Respiratory status: ventilator  Hydration status: euvolemic  Follow-up not needed.      Pt transported to ICU, post anesthesia follow up note deferred.    Vitals Value Taken Time   /59 11/03/23 1601   Temp 37 °C (98.6 °F) 11/03/23 1600   Pulse 73 11/03/23 1624   Resp 22 11/03/23 1624   SpO2 61 % 11/03/23 1622   Vitals shown include unvalidated device data.      No case tracking events are documented in the log.      Pain/Brianna Score: Pain Rating Prior to Med Admin: 7 (11/3/2023  2:56 PM)  Pain Rating Post Med Admin: 5 (11/3/2023  2:38 PM)

## 2023-11-03 NOTE — PROGRESS NOTES
"[facility]    Cardiology  Progress Note    Patient Name: Geno Barry  MRN: [id number]  Admission Date: 11/2/2023  Hospital Length of Stay: 1 days  Code Status: Full Code   Attending Physician: Heriberto Lara IV, MD   Primary Care Physician: Ninoska, Primary Doctor  Expected Discharge Date:   Principal Problem:<principal problem not specified>    Subjective:     Brief HPI: Ms. Barry is a 71 y/o female who is known to [facility], Dr. Vera. The patient recently was evaluate in [facility] clinic on a routine follow up, however, at this F/U Appointment she reported Progressively Worsening MICHAEL that started about 3 weeks prior to 10.9.23. She was scheduled for a PET Scan in which she underwent with results of High Risk for Future CV Events. She was evaluated in office on 10.31.23 for results and was scheduled on 11.2.23 for C with Dr. Vera. 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 On 11.2.23 she underwent a CABG with results of LIMA to LAD and rSVG to Diag 1. She tolerated the procedure well and was admitted to ICU for further management.    Hospital Course:   11.3.23: NAD. Laying in Bed. Denies SOB and Palps. + CP/Incisional. No Pressors. "I want coffee."    PMH: PAD, DM II, CAD/CABG, YULI/Bilaterally Mild, HTN  PSH: CABG (11.2.23) LIMA to LAD and rSVG to Diag1, ZACH, R Breast Lumpectomy, Angiogram   Family History: Father, D, 62, Diastolic HF; Mother, D, 63, Breast CA; Brother, L, Colon Cancer; Sister, L, CAD/CABG  Social History: Denies Illicit Drug, ETOH and Tobacco Use; Former Smoker, 1 ppd fro 22+ Years; Quit in 1992    Previous Diagnostics:   C 11.2.23:  Left Main-luminal irregularities  LAD-mid subtotal occlusion involving large diagonal  LCX-non dominant, luminal irregularities  RCA-dominant, 40% ostial stenosis  LVEDP 18    PET 10.26.23:  This is an abnormal perfusion study.   This scan is suggestive of high risk for future " cardiovascular events.   Large partially reversible perfusion abnormality of severe intensity in the anterior apical region. Medium partially reversible perfusion abnormality of severe intensity in the septal region. Medium partially reversible perfusion abnormality of severe intensity in the inferior region. Small partially reversible perfusion abnormality of severe intensity in the apical lateral segment.   The left ventricular cavity is noted to be mildly enlarged on the stress studies. The stress left ventricular ejection fraction was calculated to be 46% and left ventricular global function is mildly reduced. The rest left ventricular cavity is noted to be normal. The rest left ventricular ejection fraction was calculated to be 47% and rest left ventricular global function is mildly reduced.   Persistent hypokinesis of the anterior region and apical inferior segment is noted in both rest and stress studies. When compared to the resting ejection fraction (47%), the stress ejection fraction (46%) has decreased.   The study quality is average.   There was a rise in myocardial blood flow between rest and stress.  Global myocardial blood flow reserve was 2.25.  Myocardial blood flow reserve is reduced in the apical and spetal territories which is suggestive of flow limiting stenosis in these territories.    Carotid US 10.5.23:  The study quality is good.   1-39% stenosis in the proximal left internal carotid artery based on Bluth Criteria.   1-39% stenosis in the proximal right internal carotid artery based on Bluth Criteria.   Antegrade right vertebral artery flow.   Antegrade left vertebral artery flow.     ECHO 10.11.22:  The study quality is average.   The left ventricle is normal in size. Global left ventricular systolic function is normal. The left ventricular ejection fraction is 55%. The left ventricle diastolic function is impaired (Grade I) with normal left atrial pressure.  Mild (1+) mitral regurgitation.    Mild calcification of the aortic valve is noted with adequate cuspal excursion.  The pulmonary artery systolic pressure is 30 mmHg.      Review of Systems   Constitutional: Positive for malaise/fatigue.   Cardiovascular:  Positive for chest pain (Incisional). Negative for claudication, irregular heartbeat, leg swelling, orthopnea, palpitations and paroxysmal nocturnal dyspnea.   Respiratory:  Negative for shortness of breath.    All other systems reviewed and are negative.    Objective:     Vital Signs (Most Recent):  Temp: 97.5 °F (36.4 °C) (11/03/23 0700)  Pulse: 86 (11/03/23 0755)  Resp: 15 (11/03/23 0755)  BP: 110/63 (11/03/23 0445)  SpO2: 99 % (11/03/23 0755) Vital Signs (24h Range):  Temp:  [97.5 °F (36.4 °C)-98.4 °F (36.9 °C)] 97.5 °F (36.4 °C)  Pulse:  [47-96] 86  Resp:  [9-26] 15  SpO2:  [90 %-100 %] 99 %  BP: (101-177)/() 110/63  Arterial Line BP: (102-186)/(51-77) 120/58     Weight: 86.2 kg (190 lb)  Body mass index is 32.61 kg/m².    SpO2: 99 %         Intake/Output Summary (Last 24 hours) at 11/3/2023 0816  Last data filed at 11/3/2023 0809  Gross per 24 hour   Intake 1203.72 ml   Output 2005 ml   Net -801.28 ml     Lines/Drains/Airways       Central Venous Catheter Line  Duration             Percutaneous Central Line Insertion/Assessment - Double Lumen  11/02/23 1836 Internal Jugular Right <1 day              Drain  Duration                  Chest Tube 11/02/23 1933 Mediastinal 24 Fr. <1 day         Urethral Catheter 11/02/23 1800 Straight-tip 16 Fr. <1 day              Arterial Line  Duration             Arterial Line 11/02/23 1600 Left Brachial <1 day              Peripheral Intravenous Line  Duration                  Peripheral IV - Single Lumen 11/02/23 1400 22 G Left;Posterior Hand <1 day                  Significant Labs:   Recent Results (from the past 72 hour(s))   POCT glucose    Collection Time: 11/02/23  7:12 AM   Result Value Ref Range    POCT Glucose 468 (HH) 70 - 110 mg/dL    Prepare RBC 4 Units; on call to OR - CABG 11/2    Collection Time: 11/02/23 10:25 AM   Result Value Ref Range    UNIT NUMBER [id number]     UNIT ABO/RH O POS     DISPENSE STATUS Transfused     Unit Expiration 202312072359     Product Code Q1099N60     Unit Blood Type Code 5100     CROSSMATCH INTERPRETATION Compatible     UNIT NUMBER [id number]     UNIT ABO/RH O POS     DISPENSE STATUS Transfused     Unit Expiration [id number]     Product Code B2616Q34     Unit Blood Type Code 5100     CROSSMATCH INTERPRETATION Compatible     UNIT NUMBER [id number]     UNIT ABO/RH O POS     DISPENSE STATUS Selected     Unit Expiration 202312072359     Product Code V9304Q19     Unit Blood Type Code 5100     CROSSMATCH INTERPRETATION Compatible     UNIT NUMBER [id number]     UNIT ABO/RH O POS     DISPENSE STATUS Selected     Unit Expiration 202312072359     Product Code F3517N61     Unit Blood Type Code 5100     CROSSMATCH INTERPRETATION Compatible    Type & Screen    Collection Time: 11/02/23 10:25 AM   Result Value Ref Range    Group & Rh O POS     Indirect Prasad GEL NEG     Specimen Outdate 11/05/2023 23:59    MRSA PCR    Collection Time: 11/02/23 10:46 AM   Result Value Ref Range    MRSA PCR SCRN ([facility]) Not Detected Not Detected   Echo    Collection Time: 11/02/23  1:00 PM   Result Value Ref Range    BSA 1.99 m2    Craven's Biplane MOD Ejection Fraction 40 %    LVOT stroke volume 49.60 cm3    LVIDd 4.80 3.5 - 6.0 cm    LV Systolic Volume 50.90 mL    LV Systolic Volume Index 26.4 mL/m2    LVIDs 3.50 2.1 - 4.0 cm    LV Diastolic Volume 108.00 mL    LV Diastolic Volume Index 55.96 mL/m2    IVS 0.80 0.6 - 1.1 cm    LVOT diameter 1.80 cm    LVOT area 2.5 cm2    FS 27 (A) 28 - 44 %    Left Ventricle Relative Wall Thickness 0.42 cm    Posterior Wall 1.00 0.6 - 1.1 cm    LV mass 147.78 g    LV Mass Index 77 g/m2    MV Peak E Howard 0.58 m/s    TDI LATERAL 0.08 m/s    TDI SEPTAL 0.05 m/s    E/E' ratio 8.92 m/s    MV Peak A Howard  0.99 m/s    E/A ratio 0.59     E wave deceleration time 240.00 msec    LV SEPTAL E/E' RATIO 11.60 m/s    LV LATERAL E/E' RATIO 7.25 m/s    LVOT peak param 0.88 m/s    Left Ventricular Outflow Tract Mean Velocity 0.58 cm/s    Left Ventricular Outflow Tract Mean Gradient 2.00 mmHg    RVDD 3.40 cm    TAPSE 1.65 cm    LA size 4.30 cm    LA volume (mod) 73.50 cm3    LA Volume Index (Mod) 38.1 mL/m2    RA Major Axis 4.60 cm    RA Width 3.70 cm    AV mean gradient 6 mmHg    AV peak gradient 11 mmHg    Ao peak param 1.66 m/s    Ao VTI 35.50 cm    LVOT peak VTI 19.50 cm    AV valve area 1.40 cm²    AV Velocity Ratio 0.53     AV index (prosthetic) 0.55     MICK by Velocity Ratio 1.35 cm²    MV mean gradient 2 mmHg    MV peak gradient 6 mmHg    MV valve area by continuity eq 2.08 cm2    MV VTI 23.9 cm    Mean e' 0.07 m/s    ZLVIDS 0.32     ZLVIDD -1.30    Hemoglobin A1C    Collection Time: 11/02/23  1:43 PM   Result Value Ref Range    Hemoglobin A1c 9.3 (H) <=7.0 %    Estimated Average Glucose 220.2 mg/dL   POCT glucose    Collection Time: 11/02/23  2:07 PM   Result Value Ref Range    POCT Glucose 407 (H) 70 - 110 mg/dL   POCT glucose    Collection Time: 11/02/23  3:18 PM   Result Value Ref Range    POCT Glucose 330 (H) 70 - 110 mg/dL   RT Blood Gas    Collection Time: 11/02/23  4:00 PM   Result Value Ref Range    Sample Type Arterial Blood     Sample site Arterial Line     Drawn by bb holding     pH, Blood gas 7.350 7.350 - 7.450    pCO2, Blood gas 42.0 35.0 - 45.0 mmHg    pO2, Blood gas 78.0 (L) 80.0 - 100.0 mmHg    Sodium, Blood Gas 138 137 - 145 mmol/L    Potassium, Blood Gas 3.1 (L) 3.5 - 5.0 mmol/L    Calcium Level Ionized 1.22 1.12 - 1.23 mmol/L    TOC2, Blood gas 24.5 mmol/L    Base Excess, Blood gas -2.40 (L) -2.00 - 2.00 mmol/L    sO2, Blood gas 94.7 %    HCO3, Blood gas 23.2 22.0 - 26.0 mmol/L    THb, Blood gas 14.2 12 - 16 g/dL    O2 Hb, Blood Gas 93.6 (L) 94.0 - 97.0 %    CO Hgb 1.8 (H) 0.5 - 1.5 %    Met Hgb 1.3 0.4 -  1.5 %    Allens Test N/A     FIO2, Blood gas 21.0 %   ISTAT PROCEDURE    Collection Time: 11/02/23  5:35 PM   Result Value Ref Range    POC PH 7.371 7.35 - 7.45    POC PCO2 39.0 35 - 45 mmHg    POC PO2 50 40 - 60 mmHg    POC HCO3 22.6 (L) 24 - 28 mmol/L    POC BE -3 (L) -2 to 2 mmol/L    POC SATURATED O2 84 95 - 100 %    POC Glucose 233 (H) 70 - 110 mg/dL    POC Sodium 140 136 - 145 mmol/L    POC Potassium 3.9 3.5 - 5.1 mmol/L    POC TCO2 24 24 - 29 mmol/L    POC Ionized Calcium 1.22 1.06 - 1.42 mmol/L    POC Hematocrit 33 (L) 36 - 54 %PCV    POC HEMOGLOBIN 11 g/dL    Sample VENOUS    Basic Metabolic Panel    Collection Time: 11/02/23  6:35 PM   Result Value Ref Range    Sodium Level 138 136 - 145 mmol/L    Potassium Level 3.3 (L) 3.5 - 5.1 mmol/L    Chloride 112 (H) 98 - 107 mmol/L    Carbon Dioxide 17 (L) 23 - 31 mmol/L    Glucose Level 270 (H) 82 - 115 mg/dL    Blood Urea Nitrogen 26.6 (H) 9.8 - 20.1 mg/dL    Creatinine 0.93 0.55 - 1.02 mg/dL    BUN/Creatinine Ratio 29     Calcium Level Total 9.9 8.4 - 10.2 mg/dL    Anion Gap 9.0 mEq/L    eGFR >60 mls/min/1.73/m2   CBC with Differential    Collection Time: 11/02/23  6:35 PM   Result Value Ref Range    WBC 14.48 (H) 4.50 - 11.50 x10(3)/mcL    RBC 3.55 (L) 4.20 - 5.40 x10(6)/mcL    Hgb 10.3 (L) 12.0 - 16.0 g/dL    Hct 32.4 (L) 37.0 - 47.0 %    MCV 91.3 80.0 - 94.0 fL    MCH 29.0 27.0 - 31.0 pg    MCHC 31.8 (L) 33.0 - 36.0 g/dL    RDW 14.1 11.5 - 17.0 %    Platelet 191 130 - 400 x10(3)/mcL    MPV 9.7 7.4 - 10.4 fL    Neut % 77.2 %    Lymph % 18.6 %    Mono % 2.7 %    Eos % 0.1 %    Basophil % 0.2 %    Lymph # 2.70 0.6 - 4.6 x10(3)/mcL    Neut # 11.17 (H) 2.1 - 9.2 x10(3)/mcL    Mono # 0.39 0.1 - 1.3 x10(3)/mcL    Eos # 0.01 0 - 0.9 x10(3)/mcL    Baso # 0.03 <=0.2 x10(3)/mcL    IG# 0.18 (H) 0 - 0.04 x10(3)/mcL    IG% 1.2 %    NRBC% 0.0 %   Protime-INR    Collection Time: 11/02/23  6:35 PM   Result Value Ref Range    PT 18.9 (H) 12.5 - 14.5 seconds    INR 1.6 (H)  <=1.3   APTT    Collection Time: 11/02/23  6:35 PM   Result Value Ref Range    PTT 20.0 (L) 23.2 - 33.7 seconds   ISTAT PROCEDURE    Collection Time: 11/02/23  6:54 PM   Result Value Ref Range    POC PH 7.378 7.35 - 7.45    POC PCO2 37.6 35 - 45 mmHg    POC PO2 411 (H) 80 - 100 mmHg    POC HCO3 22.1 (L) 24 - 28 mmol/L    POC BE -3 (L) -2 to 2 mmol/L    POC SATURATED O2 100 95 - 100 %    POC Glucose 296 (H) 70 - 110 mg/dL    POC Sodium 140 136 - 145 mmol/L    POC Potassium 4.7 3.5 - 5.1 mmol/L    POC TCO2 23 23 - 27 mmol/L    POC Ionized Calcium 1.01 (L) 1.06 - 1.42 mmol/L    POC Hematocrit 26 (L) 36 - 54 %PCV    POC HEMOGLOBIN 9 g/dL    Sample ARTERIAL    ISTAT PROCEDURE    Collection Time: 11/02/23  6:58 PM   Result Value Ref Range    POC PH 7.348 (L) 7.35 - 7.45    POC PCO2 38.5 35 - 45 mmHg    POC PO2 50 40 - 60 mmHg    POC HCO3 21.2 (L) 24 - 28 mmol/L    POC BE -4 (L) -2 to 2 mmol/L    POC SATURATED O2 83 95 - 100 %    POC Glucose 287 (H) 70 - 110 mg/dL    POC Sodium 141 136 - 145 mmol/L    POC Potassium 4.6 3.5 - 5.1 mmol/L    POC TCO2 22 (L) 24 - 29 mmol/L    POC Ionized Calcium 1.05 (L) 1.06 - 1.42 mmol/L    POC Hematocrit 28 (L) 36 - 54 %PCV    POC HEMOGLOBIN 10 g/dL    Sample VENOUS    ISTAT PROCEDURE    Collection Time: 11/02/23  8:08 PM   Result Value Ref Range    POC PH 7.359 7.35 - 7.45    POC PCO2 32.8 (L) 35 - 45 mmHg    POC PO2 279 (H) 80 - 100 mmHg    POC HCO3 18.5 (L) 24 - 28 mmol/L    POC BE -7 (L) -2 to 2 mmol/L    POC SATURATED O2 100 95 - 100 %    POC Glucose 264 (H) 70 - 110 mg/dL    POC Sodium 142 136 - 145 mmol/L    POC Potassium 3.3 (L) 3.5 - 5.1 mmol/L    POC TCO2 19 (L) 23 - 27 mmol/L    POC Ionized Calcium 1.42 1.06 - 1.42 mmol/L    POC Hematocrit 29 (L) 36 - 54 %PCV    POC HEMOGLOBIN 10 g/dL    Sample ARTERIAL    POCT glucose    Collection Time: 11/02/23  8:49 PM   Result Value Ref Range    POCT Glucose 188 (H) 70 - 110 mg/dL   Protime-INR    Collection Time: 11/02/23  9:36 PM   Result  Value Ref Range    PT 18.4 (H) 12.5 - 14.5 seconds    INR 1.6 (H) <=1.3   APTT    Collection Time: 11/02/23  9:36 PM   Result Value Ref Range    PTT 29.0 23.2 - 33.7 seconds   RT Blood Gas    Collection Time: 11/02/23  9:38 PM   Result Value Ref Range    Sample Type Arterial Blood     Sample site Arterial Line     Drawn by J Race RRT     pH, Blood gas 7.340 (L) 7.350 - 7.450    pCO2, Blood gas 41.0 35.0 - 45.0 mmHg    pO2, Blood gas 71.0 (L) 80.0 - 100.0 mmHg    Sodium, Blood Gas 139 137 - 145 mmol/L    Potassium, Blood Gas 2.9 (L) 3.5 - 5.0 mmol/L    Calcium Level Ionized 1.38 (H) 1.12 - 1.23 mmol/L    TOC2, Blood gas 23.4 mmol/L    Base Excess, Blood gas -3.50 (L) -2.00 - 2.00 mmol/L    sO2, Blood gas 92.9 %    HCO3, Blood gas 22.1 22.0 - 26.0 mmol/L    THb, Blood gas 11.0 (L) 12 - 16 g/dL    O2 Hb, Blood Gas 93.7 (L) 94.0 - 97.0 %    CO Hgb 1.6 (H) 0.5 - 1.5 %    Met Hgb 0.7 0.4 - 1.5 %    Allens Test N/A     MODE SIMV     Oxygen Device, Blood gas Ventilator     FIO2, Blood gas 60 %    Mech Vt 450 ml    Mech RR 18 b/min    PEEP 5.0 cmH2O    PS 10.0 cmH2O   POCT glucose    Collection Time: 11/02/23 10:13 PM   Result Value Ref Range    POCT Glucose 131 (H) 70 - 110 mg/dL   POCT glucose    Collection Time: 11/02/23 11:17 PM   Result Value Ref Range    POCT Glucose 94 70 - 110 mg/dL   POCT glucose    Collection Time: 11/03/23 12:20 AM   Result Value Ref Range    POCT Glucose 111 (H) 70 - 110 mg/dL   Magnesium    Collection Time: 11/03/23 12:27 AM   Result Value Ref Range    Magnesium Level 2.30 1.60 - 2.60 mg/dL   Phosphorus    Collection Time: 11/03/23 12:27 AM   Result Value Ref Range    Phosphorus Level 2.7 2.3 - 4.7 mg/dL   Lipid Panel    Collection Time: 11/03/23 12:27 AM   Result Value Ref Range    Cholesterol Total 129 <=200 mg/dL    HDL Cholesterol 38 35 - 60 mg/dL    Triglyceride 42 37 - 140 mg/dL    Cholesterol/HDL Ratio 3 0 - 5    Very Low Density Lipoprotein 8     LDL Cholesterol 83.00 50.00 - 140.00  mg/dL   Comprehensive Metabolic Panel    Collection Time: 11/03/23 12:27 AM   Result Value Ref Range    Sodium Level 143 136 - 145 mmol/L    Potassium Level 4.3 3.5 - 5.1 mmol/L    Chloride 114 (H) 98 - 107 mmol/L    Carbon Dioxide 22 (L) 23 - 31 mmol/L    Glucose Level 141 (H) 82 - 115 mg/dL    Blood Urea Nitrogen 22.7 (H) 9.8 - 20.1 mg/dL    Creatinine 0.79 0.55 - 1.02 mg/dL    Calcium Level Total 9.2 8.4 - 10.2 mg/dL    Protein Total 5.4 (L) 5.8 - 7.6 gm/dL    Albumin Level 3.1 (L) 3.4 - 4.8 g/dL    Globulin 2.3 (L) 2.4 - 3.5 gm/dL    Albumin/Globulin Ratio 1.3 1.1 - 2.0 ratio    Bilirubin Total 0.7 <=1.5 mg/dL    Alkaline Phosphatase 50 40 - 150 unit/L    Alanine Aminotransferase 13 0 - 55 unit/L    Aspartate Aminotransferase 16 5 - 34 unit/L    eGFR >60 mls/min/1.73/m2   CBC with Differential    Collection Time: 11/03/23 12:27 AM   Result Value Ref Range    WBC 12.83 (H) 4.50 - 11.50 x10(3)/mcL    RBC 3.57 (L) 4.20 - 5.40 x10(6)/mcL    Hgb 10.2 (L) 12.0 - 16.0 g/dL    Hct 31.3 (L) 37.0 - 47.0 %    MCV 87.7 80.0 - 94.0 fL    MCH 28.6 27.0 - 31.0 pg    MCHC 32.6 (L) 33.0 - 36.0 g/dL    RDW 13.9 11.5 - 17.0 %    Platelet 190 130 - 400 x10(3)/mcL    MPV 9.8 7.4 - 10.4 fL    Neut % 69.7 %    Lymph % 17.0 %    Mono % 12.4 %    Eos % 0.0 %    Basophil % 0.2 %    Lymph # 2.18 0.6 - 4.6 x10(3)/mcL    Neut # 8.94 2.1 - 9.2 x10(3)/mcL    Mono # 1.59 (H) 0.1 - 1.3 x10(3)/mcL    Eos # 0.00 0 - 0.9 x10(3)/mcL    Baso # 0.03 <=0.2 x10(3)/mcL    IG# 0.09 (H) 0 - 0.04 x10(3)/mcL    IG% 0.7 %    NRBC% 0.0 %   POCT glucose    Collection Time: 11/03/23  2:15 AM   Result Value Ref Range    POCT Glucose 175 (H) 70 - 110 mg/dL   POCT glucose    Collection Time: 11/03/23  4:05 AM   Result Value Ref Range    POCT Glucose 185 (H) 70 - 110 mg/dL   RT Blood Gas    Collection Time: 11/03/23  4:55 AM   Result Value Ref Range    Sample Type Arterial Blood     Sample site Arterial Line     Drawn by J Race RRT     pH, Blood gas 7.380 7.350 -  7.450    pCO2, Blood gas 45.0 35.0 - 45.0 mmHg    pO2, Blood gas 82.0 80.0 - 100.0 mmHg    Sodium, Blood Gas 135 (L) 137 - 145 mmol/L    Potassium, Blood Gas 4.2 3.5 - 5.0 mmol/L    Calcium Level Ionized 1.25 (H) 1.12 - 1.23 mmol/L    TOC2, Blood gas 28.0 mmol/L    Base Excess, Blood gas 1.10 -2.00 - 2.00 mmol/L    sO2, Blood gas 96.0 %    HCO3, Blood gas 26.6 (H) 22.0 - 26.0 mmol/L    Allens Test N/A     MODE CPAP     Oxygen Device, Blood gas Ventilator     FIO2, Blood gas 30 %    PEEP 5.0 cmH2O    PS 10.0 cmH2O   Basic Metabolic Panel    Collection Time: 11/03/23  6:15 AM   Result Value Ref Range    Sodium Level 142 136 - 145 mmol/L    Potassium Level 4.0 3.5 - 5.1 mmol/L    Chloride 111 (H) 98 - 107 mmol/L    Carbon Dioxide 22 (L) 23 - 31 mmol/L    Glucose Level 239 (H) 82 - 115 mg/dL    Blood Urea Nitrogen 22.5 (H) 9.8 - 20.1 mg/dL    Creatinine 0.91 0.55 - 1.02 mg/dL    BUN/Creatinine Ratio 25     Calcium Level Total 9.0 8.4 - 10.2 mg/dL    Anion Gap 9.0 mEq/L    eGFR >60 mls/min/1.73/m2   CBC with Differential    Collection Time: 11/03/23  6:15 AM   Result Value Ref Range    WBC 12.97 (H) 4.50 - 11.50 x10(3)/mcL    RBC 3.75 (L) 4.20 - 5.40 x10(6)/mcL    Hgb 10.8 (L) 12.0 - 16.0 g/dL    Hct 33.6 (L) 37.0 - 47.0 %    MCV 89.6 80.0 - 94.0 fL    MCH 28.8 27.0 - 31.0 pg    MCHC 32.1 (L) 33.0 - 36.0 g/dL    RDW 14.2 11.5 - 17.0 %    Platelet 246 130 - 400 x10(3)/mcL    MPV 10.1 7.4 - 10.4 fL    Neut % 69.8 %    Lymph % 22.6 %    Mono % 6.9 %    Eos % 0.0 %    Basophil % 0.2 %    Lymph # 2.93 0.6 - 4.6 x10(3)/mcL    Neut # 9.05 2.1 - 9.2 x10(3)/mcL    Mono # 0.90 0.1 - 1.3 x10(3)/mcL    Eos # 0.00 0 - 0.9 x10(3)/mcL    Baso # 0.02 <=0.2 x10(3)/mcL    IG# 0.07 (H) 0 - 0.04 x10(3)/mcL    IG% 0.5 %    NRBC% 0.0 %   POCT glucose    Collection Time: 11/03/23  6:44 AM   Result Value Ref Range    POCT Glucose 187 (H) 70 - 110 mg/dL   POCT glucose    Collection Time: 11/03/23  7:47 AM   Result Value Ref Range    POCT Glucose  193 (H) 70 - 110 mg/dL     Telemetry: SR    Physical Exam  Constitutional:       Appearance: Normal appearance.   HENT:      Head: Normocephalic.      Mouth/Throat:      Mouth: Mucous membranes are moist.   Eyes:      Extraocular Movements: Extraocular movements intact.      Pupils: Pupils are equal, round, and reactive to light.   Cardiovascular:      Rate and Rhythm: Normal rate and regular rhythm.      Pulses: Normal pulses.   Pulmonary:      Effort: Pulmonary effort is normal.      Breath sounds: Normal breath sounds.   Abdominal:      Palpations: Abdomen is soft.   Musculoskeletal:         General: No swelling. Normal range of motion.   Skin:     General: Skin is warm and dry.      Comments: Midline Sternotomy Dressing C/D/I. + CTs   Neurological:      General: No focal deficit present.      Mental Status: She is alert and oriented to person, place, and time.   Psychiatric:         Mood and Affect: Mood normal.         Behavior: Behavior normal.       Current Inpatient Medications:    Current Facility-Administered Medications:     acetaminophen 1,000 mg/100 mL (10 mg/mL) injection 1,000 mg, 1,000 mg, Intravenous, Q8H, Heriberto Lara IV, MD, Last Rate: 400 mL/hr at 11/03/23 0621, 1,000 mg at 11/03/23 0621    acetaminophen oral solution 650 mg, 650 mg, Per OG tube, Q6H PRN, Bi Verduzco PA-C    albumin human 5% bottle 12.5 g, 12.5 g, Intravenous, PRN, Bi Verduzco PA-C, Last Rate: 250 mL/hr at 11/03/23 0809, Rate Verify at 11/03/23 0809    amLODIPine tablet 10 mg, 10 mg, Oral, Daily, Puma Vera MD    aspirin EC tablet 81 mg, 81 mg, Oral, Daily, Puma Vera MD, 81 mg at 11/03/23 0813    calcium gluconate 1 g in NS IVPB (premixed), 1 g, Intravenous, PRN, Bi Verduzco PA-C    calcium gluconate 1 g in NS IVPB (premixed), 2 g, Intravenous, PRN, Bi Verduzco PA-C    calcium gluconate 1 g in NS IVPB (premixed), 3 g, Intravenous, PRN, Bi Verduzco PA-C    cefazolin (ANCEF) 2  gram in dextrose 5% 50 mL IVPB (premix), 2 g, Intravenous, Q12H, Bi Verduzco PA-C, Last Rate: 100 mL/hr at 11/03/23 0550, 2 g at 11/03/23 0550    clevidipine (CLEVIPREX) 25 mg/50 mL infusion, 0-16 mg/hr, Intravenous, Continuous, Heriberto Lara IV, MD, Held at 11/02/23 2300    dexmedetomidine (PRECEDEX) 400mcg/100mL 0.9% NaCL infusion, 0-1.4 mcg/kg/hr, Intravenous, Continuous, Bi Verduzco PA-C, Stopped at 11/02/23 2331    dextrose 10% bolus 125 mL 125 mL, 12.5 g, Intravenous, PRN, Bi Verduzco PA-C    dextrose 10% bolus 125 mL 125 mL, 12.5 g, Intravenous, PRN, Heriberto Lara IV, MD    dextrose 10% bolus 250 mL 250 mL, 25 g, Intravenous, PRN, Bi Verduzco PA-C    dextrose 10% bolus 250 mL 250 mL, 25 g, Intravenous, PRN, Heriberto Lara IV, MD    dextrose 5 % and 0.45 % NaCl infusion, , Intravenous, Continuous, Heriberto Lara IV, MD, Last Rate: 100 mL/hr at 11/02/23 2152, New Bag at 11/02/23 2152    docusate sodium capsule 100 mg, 100 mg, Oral, BID, Bi Verduzco PA-C, 100 mg at 11/03/23 0813    enoxaparin injection 40 mg, 40 mg, Subcutaneous, Daily, Bi Verduzco PA-C    ezetimibe tablet 10 mg, 10 mg, Oral, Daily, Puma Vera MD    famotidine (PF) injection 20 mg, 20 mg, Intravenous, Daily, Bi Verduzco PA-C, 20 mg at 11/03/23 0813    folic acid tablet 1 mg, 1 mg, Oral, Daily, Bi Verduzco PA-C, 1 mg at 11/03/23 0813    hydrALAZINE injection 10 mg, 10 mg, Intravenous, Q4H PRN, Puma Vera MD    HYDROcodone-acetaminophen 5-325 mg per tablet 1 tablet, 1 tablet, Oral, Q4H PRN, Bi Verduzco PA-C    insulin regular in 0.9 % NaCl 100 unit/100 mL (1 unit/mL) infusion, 0-52 Units/hr, Intravenous, Continuous, Bi Verduzco PA-C, Last Rate: 3 mL/hr at 11/03/23 0809, 3 Units/hr at 11/03/23 0809    ketorolac injection 15 mg, 15 mg, Intravenous, Q6H PRN, Bi Verduzco PA-C    lactulose 10 gram/15 ml solution 20 g, 20 g, Oral, Q6H PRN, Bi Verduzco,  POLA    loperamide capsule 2 mg, 2 mg, Oral, Continuous PRN, Bi Verduzco PA-C    magnesium sulfate 2g in water 50mL IVPB (premix), 2 g, Intravenous, PRN, Bi Verduzco PA-C    magnesium sulfate 2g in water 50mL IVPB (premix), 4 g, Intravenous, PRN, Bi Verduzco PA-C    metoclopramide HCl injection 5 mg, 5 mg, Intravenous, Q6H PRN, Bi Verduzco PA-C    metoprolol tartrate (LOPRESSOR) split tablet 12.5 mg, 12.5 mg, Oral, BID, Bi Verduzco PA-C    milrinone 20mg in D5W 100 mL infusion, 0.25 mcg/kg/min, Intravenous, Continuous, Heriberto Lara IV, MD, Last Rate: 6.5 mL/hr at 11/02/23 2327, 0.25 mcg/kg/min at 11/02/23 2327    morphine injection 4 mg, 4 mg, Intravenous, Q4H PRN, Bi Verduzco PA-C, 4 mg at 11/03/23 0004    mupirocin 2 % ointment, , Nasal, BID, Bi Verduzco PA-C, Given at 11/03/23 0814    ondansetron injection 4 mg, 4 mg, Intravenous, Q4H PRN, Bi Verduzco PA-C    oxyCODONE immediate release tablet Tab 10 mg, 10 mg, Oral, Q4H PRN, Bi Verduzco PA-C    potassium chloride 20 mEq in 100 mL IVPB (FOR CENTRAL LINE ADMINISTRATION ONLY), 20 mEq, Intravenous, PRN, Bi Verduzco PA-C    potassium chloride 20 mEq in 100 mL IVPB (FOR CENTRAL LINE ADMINISTRATION ONLY), 40 mEq, Intravenous, PRN, Bi Verduzco PA-C    sodium phosphate 15 mmol in dextrose 5 % (D5W) 250 mL IVPB, 15 mmol, Intravenous, PRN, Bi Verduzco PA-C    sodium phosphate 20.01 mmol in dextrose 5 % (D5W) 250 mL IVPB, 20.01 mmol, Intravenous, PRN, Bi Verduzco PA-KRAIG    sodium phosphate 30 mmol in dextrose 5 % (D5W) 250 mL IVPB, 30 mmol, Intravenous, PRN, Bi Verduzco PA-C    sucralfate tablet 1 g, 1 g, Oral, QID (AC & HS), Bi Verduzco PA-C    VTE Risk Mitigation (From admission, onward)           Ordered     enoxaparin injection 40 mg  Daily         11/02/23 2120     Place sequential compression device  Until discontinued         11/02/23 2120     IP VTE HIGH RISK PATIENT  Once          11/02/23 1724     Place sequential compression device  Until discontinued         11/02/23 1018                  Assessment:   Abnormal PET Scan     - s/p CABG (11.2.23) - LIMA to LAD and rSVG to Diag 1    - s/p LHC (11.2.23) - Left main-luminal irregularities; LAD-mid subtotal occlusion involving large diagonal; LCX-non dominant, luminal irregularities; RCA-dominant, 40% ostial stenosis  MVCAD s/p CABG  YULI/Bilaterally Mild  HTN  DM II  PAD/Interventions   Obesity  No Hx of GIB     Plan:   Continue ASA, Norvasc and BB  Start Atorvastatin 40mg PO Qday  Aggressive Mobilization of PT and Q1HR IS  Labs and EKG in AM: CBC, CMP and Mg    Francisco Porter, OKSANA  Cardiology  [facility]  11/03/2023     Physician addendum:  I have seen and examined this patient as a split-shared visit with the ANDERS d/t complicated medical management of above problems written in assessment and high acuity requiring physician expertise in medical decision-making. I performed the substantive portion of the history and exam. Above medical decision-making is also formulated by me.  Patient doing well without complaints this morning    Cardiovascular exam:  S1, S2  Lungs:  Clear to auscultation, chest tubes in place  Extremities:  No edema    Plan:  Okay for step-down to telemetry from cardiac standpoint.  We will await CT surgery's input on rounds  Plan for outpatient ICR referral    Puma Vera MD  Cardiologist

## 2023-11-03 NOTE — PLAN OF CARE
Patient has sitter services with [facility] from 9-2 everyday. She is set up with [facility]. Awaiting PT/OT tisha.       11/03/23 7682   Discharge Assessment   Assessment Type Discharge Planning Assessment   Confirmed/corrected address, phone number and insurance Yes   Confirmed Demographics Correct on Facesheet   Source of Information patient   Does patient/caregiver understand observation status Yes   Communicated AKHIL with patient/caregiver Yes;Date not available/Unable to determine   People in Home alone   Do you expect to return to your current living situation? Yes   Prior to hospitilization cognitive status: Alert/Oriented   Current cognitive status: Alert/Oriented   Walking or Climbing Stairs ambulation difficulty, requires equipment   Dressing/Bathing bathing difficulty, assistance 1 person;dressing difficulty, assistance 1 person   Equipment Currently Used at Home cane, straight;rollator   Readmission within 30 days? No   Do you currently have service(s) that help you manage your care at home? Yes   Name and Contact number of agency [facility]   Is the pt/caregiver preference to resume services with current agency Yes   Do you take prescription medications? Yes   Do you have prescription coverage? Yes   Coverage Medicare Part A & B and Medicaid   Do you have any problems affording any of your prescribed medications? No   Who is going to help you get home at discharge? Family can take home.   How do you get to doctors appointments? other (see comments)  (Caretaker takes her to her appointments)   Are you on dialysis? No   Do you take coumadin? No   Discharge Plan discussed with: Patient   Discharge Plan A Other  (To be determined)   Discharge Plan B Home Health

## 2023-11-03 NOTE — PROGRESS NOTES
CT SURGERY PROGRESS NOTE  Geno Barry  72 y.o.  1950    Patients Procedure: Procedure(s) (LRB):  CORONARY ARTERY BYPASS GRAFT (CABG) (N/A)  SURGICAL PROCUREMENT, VEIN, ENDOSCOPIC (N/A)    Subjective  Interval History: POD 1    ROS    Medication List  Infusions   clevidipine Stopped (11/02/23 2300)    dexmedeTOMIDine (Precedex) infusion (titrating) Stopped (11/02/23 2331)    dextrose 5 % and 0.45 % NaCl 100 mL/hr at 11/02/23 2152    EPINEPHrine Stopped (11/03/23 0600)    insulin regular 1 units/mL infusion orderable (CTS POST-OP) 3 Units/hr (11/03/23 0700)    loperamide      milrinone 0.25 mcg/kg/min (11/02/23 2327)     Scheduled   acetaminophen  1,000 mg Intravenous Q8H    amLODIPine  10 mg Oral Daily    aspirin  81 mg Oral Daily    ceFAZolin (ANCEF) IVPB  2 g Intravenous Q12H    docusate sodium  100 mg Oral BID    enoxparin  40 mg Subcutaneous Daily    ezetimibe  10 mg Oral Daily    famotidine (PF)  20 mg Intravenous Daily    folic acid  1 mg Oral Daily    metoprolol tartrate  12.5 mg Oral BID    mupirocin   Nasal BID    sucralfate  1 g Oral QID (AC & HS)       Objective:  Recent Vitals:  Temp:  [97.9 °F (36.6 °C)-98.4 °F (36.9 °C)] 98.4 °F (36.9 °C)  Pulse:  [47-96] 95  Resp:  [9-26] 15  SpO2:  [90 %-100 %] 99 %  BP: (101-177)/() 110/63  Arterial Line BP: (102-186)/(51-77) 120/58    Physical Exam     I/O last 24 hrs:  Intake/Output - Last 3 Shifts         11/01 0700 11/02 0659 11/02 0700 11/03 0659 11/03 0700 11/04 0659    Blood  1138     IV Piggyback  50     Total Intake(mL/kg)  1188 (13.8)     Urine (mL/kg/hr)  1800 (0.9)     Chest Tube  100     Total Output  1900     Net  -712                    Labs  BMP:   Recent Labs   Lab 11/03/23  0027 11/03/23  0615    142   K 4.3 4.0   CO2 22* 22*   BUN 22.7* 22.5*   CREATININE 0.79 0.91   CALCIUM 9.2 9.0   MG 2.30  --      CBC:   Recent Labs   Lab 11/03/23  0615   WBC 12.97*   RBC 3.75*   HGB 10.8*   HCT 33.6*      MCV 89.6   MCH 28.8  Kingsley Gibbons MD:  Sony Garcia is a 60 MCHC 32.1*     CMP:   Recent Labs   Lab 11/03/23  0027 11/03/23  0615   CALCIUM 9.2 9.0   ALBUMIN 3.1*  --     142   K 4.3 4.0   CO2 22* 22*   BUN 22.7* 22.5*   CREATININE 0.79 0.91   ALKPHOS 50  --    ALT 13  --    AST 16  --    BILITOT 0.7  --          Imaging:   CXR: X-Ray Chest 1 View    Result Date: 11/2/2023  1.  Please withdraw the endotracheal tube by 1.5 cm. 2.  Please reposition the nasogastric tube which from the level of mid chest loops back into the neck. Electronically signed by: Darrel Carlson Date:    11/02/2023 Time:    21:19    X-Ray Chest AP Portable    Result Date: 11/2/2023  As above. Electronically signed by: Diony Vincent Date:    11/02/2023 Time:    11:04         ASSESSMENT/PLAN:    Just extubated  Doing well   Mobilize  IS   Eval for telem later     Case and plan of care discussed with MD Bi Verduzco PA-C    [email or web address]  Fact sheet for Patients: [email or web address]       [facility]           [facility]. [address] [place] [address],[address] [place] [address]  [phone]    Nithya Guillermo MD Pulmonary Disease Follow up in 2 week(s)  [address]  [address]  [address] [facility]      Will Gamez MD Endocrinology Physician Follow up in 1 week(s)  [address] [facility] [address] [place], [place] Neurology Follow up in 3 week(s)  [address] [facility]      Susana Perez MD Family Medicine Go on 2/22/2023 at 11:00am for your PCP hospital follow up with REMY Stanley.  [address] [place] [address] [address]  [phone]            ________________________________________________________________    Risk of deterioration: High    Condition at Discharge:  Stable  __________________________________________________________________    Disposition  Home with family and home health services    ____________________________________________________________________    Code Status: Full Code  ___________________________________________________________________      Total time in minutes spent coordinating this discharge (includes going over instructions, follow-up, prescriptions, and preparing report for sign off to her PCP) :  54 minutes    Signed:  Moisés Mcneil MD

## 2023-11-03 NOTE — OP NOTE
Date of service 11/02/2023   Preop diagnosis:  Coronary artery disease  Postop diagnosis:  Same  Procedure:  CABG x2; LIMA to the LAD; reverse saphenous vein to the 1st diagonal; endoscopic vein harvest of the left greater saphenous vein  Surgeon:  Marco  Assistant:  Dax   Anesthesia:  General endotracheal with cardiopulmonary bypass  Drains:  Chest tubes x1     Procedure in detail:   The patient was taken to the operating room under informed consent placed on table in a supine position.  General endotracheal anesthesia was induced by the anesthesia department.  The patient was prepped and draped in usual sterile fashion from the chin to the toes.  Incision made the left thigh in the left greater saphenous vein exposed and harvested from the thigh with an endoscopic vein harvester gently dilated.  All branches were ligated and the vein noted to be of adequate caliber for bypass.  These wounds closed with 2-0 Vicryl the skin with a 3-0 subcuticular stitch.  An incision made in the chest from the sternal notch to the xiphoid and carried down sharply to the sternum.  Sternum was transected and the left internal mammary artery harvested along with the surrounding soft tissue pedicle.  Pericardium was incised and the patient fully heparinized.  Aortic and venous cannulae were placed and the patient placed on bypass and cooled to 32° systemic.  The aorta was crossclamped and cold blood cardioplegia given in an antegrade fashion through the aortic root.  The 1st diagonal was dissected and entered.  This was a moderately diseased 1.5 mm vessel.  Reversed saphenous vein was anastomosed in an end-to-side fashion with a running 7 0 Prolene suture.  There was good flow through this vessel after completion of the anastomosis and the vein measured to reach the aorta and transected.  The left anterior descending was dissected and entered.  This was a 2 mm moderately diseased vessel.  The left internal mammary artery was  anastomosed in an end-to-side fashion with a running 7 0 Prolene suture.  There was brisk flow through the distal distribution of this vessel after through the anastomosis and the pedicle sutured to the epicardium with an interrupted 5 0 Prolene suture.  The crossclamp removed and an aortic exclusion clamp placed.  The patient was rewarmed.  A single aortotomy made with an 11 blade and enlarged with a 4.8 mm punch.  The proximal venous anastomosis then performed in an end-to-side fashion with a running 5 0 Prolene suture.  Vein marker was placed on the ostium of the vein graft.  The patient spontaneously returned to sinus rhythm and subsequently was easily weaned from cardiopulmonary bypass.  Protamine given to reverse the heparin and the arterial and venous cannulae removed and their pursestring sutures secured.  The wound copiously irrigated with a 3% saline antibiotic solution.  A mediastinal tube was placed in both the anterior and posterior space and brought out through separate stab wound and secured to the skin.  Platelet concentrated aggregated growth factor was infused throughout the wound and sternum.  The sternum was closed with sternal wires.  The linea alba and sternal fascia closed with a running 1. Vicryl.  The subcutaneous tissue with a 2-0 Vicryl and the skin with a 3-0 subcuticular stitch.  The patient tolerated his procedure well.  Transesophageal echo revealed excellent left ventricular function.  She left the room in stable condition.

## 2023-11-04 LAB
ALBUMIN SERPL-MCNC: 3.2 G/DL (ref 3.4–4.8)
ALBUMIN/GLOB SERPL: 1.5 RATIO (ref 1.1–2)
ALP SERPL-CCNC: 55 UNIT/L (ref 40–150)
ALT SERPL-CCNC: 12 UNIT/L (ref 0–55)
AST SERPL-CCNC: 17 UNIT/L (ref 5–34)
BILIRUB SERPL-MCNC: 0.4 MG/DL
BUN SERPL-MCNC: 24.1 MG/DL (ref 9.8–20.1)
CALCIUM SERPL-MCNC: 8.6 MG/DL (ref 8.4–10.2)
CHLORIDE SERPL-SCNC: 108 MMOL/L (ref 98–107)
CO2 SERPL-SCNC: 18 MMOL/L (ref 23–31)
CREAT SERPL-MCNC: 1.23 MG/DL (ref 0.55–1.02)
ERYTHROCYTE [DISTWIDTH] IN BLOOD BY AUTOMATED COUNT: 14.8 % (ref 11.5–17)
GFR SERPLBLD CREATININE-BSD FMLA CKD-EPI: 46 MLS/MIN/1.73/M2
GLOBULIN SER-MCNC: 2.1 GM/DL (ref 2.4–3.5)
GLUCOSE SERPL-MCNC: 240 MG/DL (ref 82–115)
HCT VFR BLD AUTO: 30.8 % (ref 37–47)
HGB BLD-MCNC: 10 G/DL (ref 12–16)
MAGNESIUM SERPL-MCNC: 1.9 MG/DL (ref 1.6–2.6)
MCH RBC QN AUTO: 28.9 PG (ref 27–31)
MCHC RBC AUTO-ENTMCNC: 32.5 G/DL (ref 33–36)
MCV RBC AUTO: 89 FL (ref 80–94)
NRBC BLD AUTO-RTO: 0 %
PLATELET # BLD AUTO: 235 X10(3)/MCL (ref 130–400)
PMV BLD AUTO: 10.6 FL (ref 7.4–10.4)
POCT GLUCOSE: 246 MG/DL (ref 70–110)
POCT GLUCOSE: 261 MG/DL (ref 70–110)
POCT GLUCOSE: 285 MG/DL (ref 70–110)
POCT GLUCOSE: 304 MG/DL (ref 70–110)
POTASSIUM SERPL-SCNC: 3.5 MMOL/L (ref 3.5–5.1)
PROT SERPL-MCNC: 5.3 GM/DL (ref 5.8–7.6)
RBC # BLD AUTO: 3.46 X10(6)/MCL (ref 4.2–5.4)
SODIUM SERPL-SCNC: 140 MMOL/L (ref 136–145)
WBC # SPEC AUTO: 11.76 X10(3)/MCL (ref 4.5–11.5)

## 2023-11-04 PROCEDURE — 25000003 PHARM REV CODE 250: Performed by: PHYSICIAN ASSISTANT

## 2023-11-04 PROCEDURE — 85027 COMPLETE CBC AUTOMATED: CPT | Performed by: PHYSICIAN ASSISTANT

## 2023-11-04 PROCEDURE — 51798 US URINE CAPACITY MEASURE: CPT

## 2023-11-04 PROCEDURE — 93010 ELECTROCARDIOGRAM REPORT: CPT | Mod: ,,, | Performed by: INTERNAL MEDICINE

## 2023-11-04 PROCEDURE — 63600175 PHARM REV CODE 636 W HCPCS: Performed by: PHYSICIAN ASSISTANT

## 2023-11-04 PROCEDURE — 63600175 PHARM REV CODE 636 W HCPCS: Performed by: STUDENT IN AN ORGANIZED HEALTH CARE EDUCATION/TRAINING PROGRAM

## 2023-11-04 PROCEDURE — 93005 ELECTROCARDIOGRAM TRACING: CPT

## 2023-11-04 PROCEDURE — 25000003 PHARM REV CODE 250: Performed by: INTERNAL MEDICINE

## 2023-11-04 PROCEDURE — 63600175 PHARM REV CODE 636 W HCPCS: Performed by: SPECIALIST

## 2023-11-04 PROCEDURE — 94761 N-INVAS EAR/PLS OXIMETRY MLT: CPT

## 2023-11-04 PROCEDURE — 25000003 PHARM REV CODE 250: Performed by: NURSE PRACTITIONER

## 2023-11-04 PROCEDURE — 93010 EKG 12-LEAD: ICD-10-PCS | Mod: ,,, | Performed by: INTERNAL MEDICINE

## 2023-11-04 PROCEDURE — 21400001 HC TELEMETRY ROOM

## 2023-11-04 PROCEDURE — 80053 COMPREHEN METABOLIC PANEL: CPT | Performed by: PHYSICIAN ASSISTANT

## 2023-11-04 PROCEDURE — 83735 ASSAY OF MAGNESIUM: CPT | Performed by: NURSE PRACTITIONER

## 2023-11-04 PROCEDURE — 25000003 PHARM REV CODE 250: Performed by: SPECIALIST

## 2023-11-04 PROCEDURE — 27000221 HC OXYGEN, UP TO 24 HOURS

## 2023-11-04 RX ADMIN — SODIUM CHLORIDE 250 ML: 9 INJECTION, SOLUTION INTRAVENOUS at 09:11

## 2023-11-04 RX ADMIN — SUCRALFATE 1 G: 1 TABLET ORAL at 05:11

## 2023-11-04 RX ADMIN — INSULIN ASPART 2 UNITS: 100 INJECTION, SOLUTION INTRAVENOUS; SUBCUTANEOUS at 11:11

## 2023-11-04 RX ADMIN — METOPROLOL TARTRATE 12.5 MG: 25 TABLET, FILM COATED ORAL at 08:11

## 2023-11-04 RX ADMIN — AMLODIPINE BESYLATE 10 MG: 5 TABLET ORAL at 08:11

## 2023-11-04 RX ADMIN — ATORVASTATIN CALCIUM 40 MG: 40 TABLET, FILM COATED ORAL at 08:11

## 2023-11-04 RX ADMIN — KETOROLAC TROMETHAMINE 15 MG: 30 INJECTION, SOLUTION INTRAMUSCULAR; INTRAVENOUS at 05:11

## 2023-11-04 RX ADMIN — INSULIN ASPART 4 UNITS: 100 INJECTION, SOLUTION INTRAVENOUS; SUBCUTANEOUS at 05:11

## 2023-11-04 RX ADMIN — FAMOTIDINE 20 MG: 10 INJECTION, SOLUTION INTRAVENOUS at 08:11

## 2023-11-04 RX ADMIN — POTASSIUM CHLORIDE 20 MEQ: 14.9 INJECTION, SOLUTION INTRAVENOUS at 09:11

## 2023-11-04 RX ADMIN — DOCUSATE SODIUM 100 MG: 100 CAPSULE, LIQUID FILLED ORAL at 08:11

## 2023-11-04 RX ADMIN — FOLIC ACID 1 MG: 1 TABLET ORAL at 08:11

## 2023-11-04 RX ADMIN — ASPIRIN 81 MG: 81 TABLET, COATED ORAL at 08:11

## 2023-11-04 RX ADMIN — MUPIROCIN: 20 OINTMENT TOPICAL at 08:11

## 2023-11-04 RX ADMIN — SUCRALFATE 1 G: 1 TABLET ORAL at 08:11

## 2023-11-04 RX ADMIN — INSULIN ASPART 3 UNITS: 100 INJECTION, SOLUTION INTRAVENOUS; SUBCUTANEOUS at 05:11

## 2023-11-04 RX ADMIN — EZETIMIBE 10 MG: 10 TABLET ORAL at 08:11

## 2023-11-04 RX ADMIN — EPINEPHRINE 0.06 MCG/KG/MIN: 1 INJECTION INTRAMUSCULAR; INTRAVENOUS; SUBCUTANEOUS at 02:11

## 2023-11-04 RX ADMIN — KETOROLAC TROMETHAMINE 15 MG: 30 INJECTION, SOLUTION INTRAMUSCULAR; INTRAVENOUS at 08:11

## 2023-11-04 RX ADMIN — ENOXAPARIN SODIUM 40 MG: 40 INJECTION SUBCUTANEOUS at 05:11

## 2023-11-04 RX ADMIN — INSULIN ASPART 3 UNITS: 100 INJECTION, SOLUTION INTRAVENOUS; SUBCUTANEOUS at 08:11

## 2023-11-04 NOTE — H&P
[facility] - [facility]  Pulmonary Critical Care Note    Patient Name: Geno Barry  MRN: [id number]  Admission Date: 11/2/2023  Hospital Length of Stay: 2 days  Code Status: Full Code  Attending Provider: Heriberto Lara IV, MD  Primary Care Provider: Delmy Montano FNP     Subjective:     HPI:   Geno Barry is a 73 yo female with PMH of CAD, CVA, PAD, HTN and DM who presents to [facility] for scheduled LHC. Before presentation, patient was having shortness of breath along with chest pain. She had a positive Pamela scan on 10/26/23 with a large reversible anterior apical, medium reversible septic and medium reversible inferior defect. During LHC today, patient was noted to have severe multivessel CAD and Cardiovascular surgery was consulted for CABG. Patient underwent CABGx2 (lima to LAD & reverse saphenous to 1st diagonal) without complication and is being upgraded to ICU for close surveillance & post-CABG protocol.    Hospital Course/Significant events:  11/2/23: Admitted to ICU s/p CABG    24 Hour Interval History:  Patient remains on epi at 0.02 mcg/kg/Min.  Patient has no current subjective complaints, currently on OxyMask.  No febrile episodes overnight.  Labs today revealed GENNA, elevated creatinine and BUN 1.23/24.1.  White count remains elevated however improved from previous values at 11.6, stable hemoglobin at 10. Net +1575 today.    Past Medical History:   Diagnosis Date    CAD (coronary artery disease)     Diabetes mellitus     Hypertension     PAD (peripheral artery disease)        Past Surgical History:   Procedure Laterality Date    HYSTERECTOMY      LEFT HEART CATHETERIZATION Left 11/2/2023    Procedure: Left heart cath;  Surgeon: Puma Vera MD;  Location: [facility] CATH LAB;  Service: Cardiology;  Laterality: Left;  LHC +/- PCI VIA RRA    LUMPECTOMY, BREAST Right        Social History     Socioeconomic History    Marital status:            Current Outpatient  Medications   Medication Instructions    amLODIPine (NORVASC) 10 mg, Oral, Daily    aspirin (ECOTRIN) 81 mg, Oral, Daily    cholecalciferol (vitamin D3) (VITAMIN D3) 400 Units, Oral, Daily    diphenhydrAMINE (BENADRYL) 25 mg, Oral, Once, 2 tabs at 6pm, midnight, and 6am morning of procedure    ezetimibe (ZETIA) 10 mg, Oral, Daily    famotidine (PEPCID) 40 mg, Oral, Once, Take 1 tab at 6pm, midnight, and 6am morning of procedure     glipiZIDE (GLUCOTROL) 5 mg, Oral, With breakfast    HYDROcodone-acetaminophen (NORCO) 7.5-325 mg per tablet 1 tablet, Oral, 3 times daily PRN    ibuprofen (ADVIL,MOTRIN) 800 mg, Oral, Every 6 hours PRN    meloxicam (MOBIC) 15 mg, Oral, Daily    metoprolol succinate (TOPROL-XL) 25 mg, Oral, Daily    pantoprazole (PROTONIX) 20 mg, Oral, Daily    predniSONE (DELTASONE) 50 mg, Oral, Once, Take 1 tab at 6pm, midnight, and 6am morning of procedure    rosuvastatin (CRESTOR) 10 mg, Oral, Daily    SITagliptan-metformin (JANUMET) 50-1,000 mg per tablet 1 tablet, Oral, 2 times daily with meals       Current Inpatient Medications   amiodarone in dextrose  150 mg Intravenous Once    amLODIPine  10 mg Oral Daily    aspirin  81 mg Oral Daily    atorvastatin  40 mg Oral Daily    docusate sodium  100 mg Oral BID    enoxparin  40 mg Subcutaneous Daily    ezetimibe  10 mg Oral Daily    famotidine (PF)  20 mg Intravenous Daily    folic acid  1 mg Oral Daily    metoprolol tartrate  12.5 mg Oral BID    mupirocin   Nasal BID    sucralfate  1 g Oral QID (AC & HS)       Current Intravenous Infusions   clevidipine Stopped (11/02/23 2300)    dexmedeTOMIDine (Precedex) infusion (titrating) Stopped (11/02/23 2331)    dextrose 5 % and 0.45 % NaCl Stopped (11/03/23 2000)    EPINEPHrine 0.04 mcg/kg/min (11/04/23 0600)    insulin regular 1 units/mL infusion orderable (CTS POST-OP) Stopped (11/03/23 1900)    loperamide      milrinone 0.25 mcg/kg/min (11/02/23 2031)         Review of Systems   Unable to perform ROS:  Intubated          Objective:       Intake/Output Summary (Last 24 hours) at 11/4/2023 0946  Last data filed at 11/4/2023 0600  Gross per 24 hour   Intake 2719.9 ml   Output 1050 ml   Net 1669.9 ml           Vital Signs (Most Recent):  Temp: 98.3 °F (36.8 °C) (11/04/23 0400)  Pulse: 100 (11/04/23 0600)  Resp: (!) 27 (11/04/23 0600)  BP: (!) 115/51 (11/04/23 0600)  SpO2: 98 % (11/04/23 0600)  Body mass index is 32.61 kg/m².  Weight: 86.2 kg (190 lb) Vital Signs (24h Range):  Temp:  [97.8 °F (36.6 °C)-98.6 °F (37 °C)] 98.3 °F (36.8 °C)  Pulse:  [] 100  Resp:  [16-34] 27  SpO2:  [85 %-100 %] 98 %  BP: ()/(45-67) 115/51  Arterial Line BP: ()/(35-51) 142/51     Physical Exam  Constitutional:       General: She is not in acute distress.     Appearance: She is not ill-appearing or toxic-appearing.      Comments: Intubated, not sedated, not reverse procedural only, in no acute distress, on mechanical ventilation   HENT:      Head: Normocephalic and atraumatic.      Mouth/Throat:      Comments: Endotracheal tube secured in place  Neck:      Comments: Right IJ triple-lumen central line in place without surrounding skin breakdown/erythema or oozing/bleeding  Cardiovascular:      Rate and Rhythm: Normal rate and regular rhythm.      Heart sounds: Normal heart sounds.      Comments: Nonpalpable left DP though dopplerable.  Pulmonary:      Effort: Pulmonary effort is normal. No respiratory distress.      Breath sounds: Normal breath sounds. No wheezing, rhonchi or rales.      Comments: Mechanically ventilated  Abdominal:      Palpations: Abdomen is soft.      Comments: Hypoactive bowel sounds   Musculoskeletal:         General: No swelling.      Right lower leg: No edema.      Left lower leg: No edema.   Skin:     General: Skin is warm and dry.      Comments: Left foot cold to touch with x2 darkened nailbeds   Neurological:      Mental Status: She is alert and oriented to person, place, and time. Mental status  is at baseline.           Lines/Drains/Airways       Central Venous Catheter Line  Duration             Percutaneous Central Line Insertion/Assessment - Double Lumen  11/02/23 1836 Internal Jugular Right 1 day              Drain  Duration                  Chest Tube 11/02/23 1933 Mediastinal 24 Fr. 1 day         Urethral Catheter 11/02/23 1800 Straight-tip 16 Fr. 1 day              Arterial Line  Duration             Arterial Line 11/02/23 1600 Left Brachial 1 day              Peripheral Intravenous Line  Duration                  Peripheral IV - Single Lumen 11/02/23 1400 22 G Left;Posterior Hand 1 day                    Significant Labs:    Lab Results   Component Value Date    WBC 11.76 (H) 11/04/2023    HGB 10.0 (L) 11/04/2023    HCT 30.8 (L) 11/04/2023    MCV 89.0 11/04/2023     11/04/2023           BMP  Lab Results   Component Value Date     11/04/2023    K 3.5 11/04/2023    CHLORIDE 108 (H) 11/04/2023    CO2 18 (L) 11/04/2023    BUN 24.1 (H) 11/04/2023    CREATININE 1.23 (H) 11/04/2023    CALCIUM 8.6 11/04/2023    AGAP 9.0 11/03/2023    EGFRNONAA 47 08/05/2021         ABG  Recent Labs   Lab 11/03/23 0455   PH 7.380   PO2 82.0   PCO2 45.0   HCO3 26.6*   POCBASEDEF 1.10         Mechanical Ventilation Support:  Vent Mode: CPAP / PSV (11/03/23 0452)  Ventilator Initiated: Yes (11/02/23 2048)  Set Rate: 12 BPM (11/03/23 0100)  Vt Set: 450 mL (11/03/23 0400)  Pressure Support: 10 cmH20 (11/03/23 0452)  PEEP/CPAP: 5 cmH20 (11/03/23 0452)  Oxygen Concentration (%): 30 (11/03/23 0452)  Peak Airway Pressure: 16 cmH20 (11/03/23 0452)  Total Ve: 5.2 L/m (11/03/23 0452)  F/VT Ratio<105 (RSBI): (!) 41.32 (11/03/23 0452)      Significant Imaging:  I have reviewed the pertinent imaging within the past 24 hours.        Assessment/Plan:     Assessment  Severe CAD s/p CABG x2  CVA  PAD  HTN  DM  GENNA    Plan  - Admitted to the ICU s/p CABG for close monitoring  - CT Surgery following  - Continue CABG protocol  -  Currently on epi, wean off as tolerated  - Replete electrolytes as necessary  - Will closely follow from a critical care aspect  - CTS ordered 250 cc bolus of fluids for GENNA, will recheck levels tomorrow.    DVT Prophylaxis: Famotidine 20mg q.D  GI Prophylaxis: Lovenox 40mg q.D.     32 minutes of critical care was time spent personally by me on the following activities: development of treatment plan with patient or surrogate and bedside caregivers, discussions with consultants, evaluation of patient's response to treatment, examination of patient, ordering and performing treatments and interventions, ordering and review of laboratory studies, ordering and review of radiographic studies, pulse oximetry, re-evaluation of patient's condition.  This critical care time did not overlap with that of any other provider or involve time for any procedures.     Willi Horner MD  Pulmonary Critical Care Medicine  [facility] - [facility]  DOS: 11/04/2023

## 2023-11-04 NOTE — PROGRESS NOTES
"[facility]    Cardiology  Progress Note    Patient Name: Geno Barry  MRN: [id number]  Admission Date: 11/2/2023  Hospital Length of Stay: 2 days  Code Status: Full Code   Attending Physician: Heriberto Lara IV, MD   Primary Care Physician: Delmy Montano FNP  Expected Discharge Date:   Principal Problem:<principal problem not specified>    Subjective:     Brief HPI: Ms. Barry is a 71 y/o female who is known to [facility], Dr. Vera. The patient recently was evaluate in [facility] clinic on a routine follow up, however, at this F/U Appointment she reported Progressively Worsening MICHAEL that started about 3 weeks prior to 10.9.23. She was scheduled for a PET Scan in which she underwent with results of High Risk for Future CV Events. She was evaluated in office on 10.31.23 for results and was scheduled on 11.2.23 for [facility] with Dr. Vera. 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 On 11.2.23 she underwent a CABG with results of LIMA to LAD and rSVG to Diag 1. She tolerated the procedure well and was admitted to ICU for further management.    Hospital Course:   11.3.23: NAD. Laying in Bed. Denies SOB and Palps. + CP/Incisional. No Pressors. "I want coffee."  11.4.23: NAD. "I am feeling better today." Denies SOB and Palps. + CP/Incisional. SR/ST    PMH: PAD, DM II, CAD/CABG, YULI/Bilaterally Mild, HTN  PSH: CABG (11.2.23) LIMA to LAD and rSVG to Diag1, ZACH, R Breast Lumpectomy, Angiogram   Family History: Father, D, 62, Diastolic HF; Mother, D, 63, Breast CA; Brother, L, Colon Cancer; Sister, L, CAD/CABG  Social History: Denies Illicit Drug, ETOH and Tobacco Use; Former Smoker, 1 ppd fro 22+ Years; Quit in 1992    Previous Diagnostics:   [facility] 11.2.23:  Left Main-luminal irregularities  LAD-mid subtotal occlusion involving large diagonal  LCX-non dominant, luminal irregularities  RCA-dominant, 40% ostial stenosis  LVEDP 18    PET " 10.26.23:  This is an abnormal perfusion study.   This scan is suggestive of high risk for future cardiovascular events.   Large partially reversible perfusion abnormality of severe intensity in the anterior apical region. Medium partially reversible perfusion abnormality of severe intensity in the septal region. Medium partially reversible perfusion abnormality of severe intensity in the inferior region. Small partially reversible perfusion abnormality of severe intensity in the apical lateral segment.   The left ventricular cavity is noted to be mildly enlarged on the stress studies. The stress left ventricular ejection fraction was calculated to be 46% and left ventricular global function is mildly reduced. The rest left ventricular cavity is noted to be normal. The rest left ventricular ejection fraction was calculated to be 47% and rest left ventricular global function is mildly reduced.   Persistent hypokinesis of the anterior region and apical inferior segment is noted in both rest and stress studies. When compared to the resting ejection fraction (47%), the stress ejection fraction (46%) has decreased.   The study quality is average.   There was a rise in myocardial blood flow between rest and stress.  Global myocardial blood flow reserve was 2.25.  Myocardial blood flow reserve is reduced in the apical and spetal territories which is suggestive of flow limiting stenosis in these territories.    Carotid US 10.5.23:  The study quality is good.   1-39% stenosis in the proximal left internal carotid artery based on Bluth Criteria.   1-39% stenosis in the proximal right internal carotid artery based on Bluth Criteria.   Antegrade right vertebral artery flow.   Antegrade left vertebral artery flow.     ECHO 10.11.22:  The study quality is average.   The left ventricle is normal in size. Global left ventricular systolic function is normal. The left ventricular ejection fraction is 55%. The left ventricle diastolic  lacerations function is impaired (Grade I) with normal left atrial pressure.  Mild (1+) mitral regurgitation.   Mild calcification of the aortic valve is noted with adequate cuspal excursion.  The pulmonary artery systolic pressure is 30 mmHg.      Review of Systems   Constitutional: Negative for malaise/fatigue.   Cardiovascular:  Positive for chest pain (Incisional). Negative for claudication, irregular heartbeat, leg swelling, orthopnea, palpitations and paroxysmal nocturnal dyspnea.   Respiratory:  Negative for shortness of breath.    All other systems reviewed and are negative.    Objective:     Vital Signs (Most Recent):  Temp: 98.3 °F (36.8 °C) (11/04/23 1200)  Pulse: 82 (11/04/23 1400)  Resp: (!) 24 (11/04/23 1400)  BP: (!) 123/57 (11/04/23 1400)  SpO2: (!) 94 % (11/04/23 1400) Vital Signs (24h Range):  Temp:  [97.8 °F (36.6 °C)-98.6 °F (37 °C)] 98.3 °F (36.8 °C)  Pulse:  [] 82  Resp:  [18-34] 24  SpO2:  [90 %-100 %] 94 %  BP: ()/(45-67) 123/57  Arterial Line BP: ()/(35-59) 98/44     Weight: 86.2 kg (190 lb)  Body mass index is 32.61 kg/m².    SpO2: (!) 94 %         Intake/Output Summary (Last 24 hours) at 11/4/2023 1628  Last data filed at 11/4/2023 1200  Gross per 24 hour   Intake 1919.51 ml   Output 870 ml   Net 1049.51 ml       Lines/Drains/Airways       Central Venous Catheter Line  Duration             Percutaneous Central Line Insertion/Assessment - Double Lumen  11/02/23 1836 Internal Jugular Right 1 day              Drain  Duration                  Chest Tube 11/02/23 1933 Mediastinal 24 Fr. 1 day    Female External Urinary Catheter 11/04/23 0830 <1 day              Arterial Line  Duration             Arterial Line 11/02/23 1600 Left Brachial 2 days              Peripheral Intravenous Line  Duration                  Peripheral IV - Single Lumen 11/02/23 1400 22 G Left;Posterior Hand 2 days                  Significant Labs:   Recent Results (from the past 72 hour(s))   POCT glucose     Collection Time: 11/02/23  7:12 AM   Result Value Ref Range    POCT Glucose 468 (HH) 70 - 110 mg/dL   Prepare RBC 4 Units; on call to OR - CABG 11/2    Collection Time: 11/02/23 10:25 AM   Result Value Ref Range    UNIT NUMBER [id number]     UNIT ABO/RH O POS     DISPENSE STATUS Transfused     Unit Expiration [id number]     Product Code W9559R00     Unit Blood Type Code 5100     CROSSMATCH INTERPRETATION Compatible     UNIT NUMBER [id number]     UNIT ABO/RH O POS     DISPENSE STATUS Transfused     Unit Expiration [id number]     Product Code U7151D56     Unit Blood Type Code 5100     CROSSMATCH INTERPRETATION Compatible     UNIT NUMBER [id number]     UNIT ABO/RH O POS     DISPENSE STATUS Selected     Unit Expiration [id number]     Product Code [id number]     Unit Blood Type Code 5100     CROSSMATCH INTERPRETATION Compatible     UNIT NUMBER [id number]     UNIT ABO/RH O POS     DISPENSE STATUS Selected     Unit Expiration [id number]     Product Code P4743H48     Unit Blood Type Code 5100     CROSSMATCH INTERPRETATION Compatible    Type & Screen    Collection Time: 11/02/23 10:25 AM   Result Value Ref Range    Group & Rh O POS     Indirect Prasad GEL NEG     Specimen Outdate 11/05/2023 23:59    MRSA PCR    Collection Time: 11/02/23 10:46 AM   Result Value Ref Range    MRSA PCR SCRN ([facility]) Not Detected Not Detected   Echo    Collection Time: 11/02/23  1:00 PM   Result Value Ref Range    BSA 1.99 m2    Craven's Biplane MOD Ejection Fraction 40 %    LVOT stroke volume 49.60 cm3    LVIDd 4.80 3.5 - 6.0 cm    LV Systolic Volume 50.90 mL    LV Systolic Volume Index 26.4 mL/m2    LVIDs 3.50 2.1 - 4.0 cm    LV Diastolic Volume 108.00 mL    LV Diastolic Volume Index 55.96 mL/m2    IVS 0.80 0.6 - 1.1 cm    LVOT diameter 1.80 cm    LVOT area 2.5 cm2    FS 27 (A) 28 - 44 %    Left Ventricle Relative Wall Thickness 0.42 cm    Posterior Wall 1.00 0.6 - 1.1 cm    LV mass 147.78 g    LV Mass Index 77 g/m2    MV Peak E  Howard 0.58 m/s    TDI LATERAL 0.08 m/s    TDI SEPTAL 0.05 m/s    E/E' ratio 8.92 m/s    MV Peak A Howard 0.99 m/s    E/A ratio 0.59     E wave deceleration time 240.00 msec    LV SEPTAL E/E' RATIO 11.60 m/s    LV LATERAL E/E' RATIO 7.25 m/s    LVOT peak howard 0.88 m/s    Left Ventricular Outflow Tract Mean Velocity 0.58 cm/s    Left Ventricular Outflow Tract Mean Gradient 2.00 mmHg    RVDD 3.40 cm    TAPSE 1.65 cm    LA size 4.30 cm    LA volume (mod) 73.50 cm3    LA Volume Index (Mod) 38.1 mL/m2    RA Major Axis 4.60 cm    RA Width 3.70 cm    AV mean gradient 6 mmHg    AV peak gradient 11 mmHg    Ao peak howard 1.66 m/s    Ao VTI 35.50 cm    LVOT peak VTI 19.50 cm    AV valve area 1.40 cm²    AV Velocity Ratio 0.53     AV index (prosthetic) 0.55     MICK by Velocity Ratio 1.35 cm²    MV mean gradient 2 mmHg    MV peak gradient 6 mmHg    MV valve area by continuity eq 2.08 cm2    MV VTI 23.9 cm    Mean e' 0.07 m/s    ZLVIDS 0.32     ZLVIDD -1.30    Hemoglobin A1C    Collection Time: 11/02/23  1:43 PM   Result Value Ref Range    Hemoglobin A1c 9.3 (H) <=7.0 %    Estimated Average Glucose 220.2 mg/dL   POCT glucose    Collection Time: 11/02/23  2:07 PM   Result Value Ref Range    POCT Glucose 407 (H) 70 - 110 mg/dL   POCT glucose    Collection Time: 11/02/23  3:18 PM   Result Value Ref Range    POCT Glucose 330 (H) 70 - 110 mg/dL   RT Blood Gas    Collection Time: 11/02/23  4:00 PM   Result Value Ref Range    Sample Type Arterial Blood     Sample site Arterial Line     Drawn by bb holding     pH, Blood gas 7.350 7.350 - 7.450    pCO2, Blood gas 42.0 35.0 - 45.0 mmHg    pO2, Blood gas 78.0 (L) 80.0 - 100.0 mmHg    Sodium, Blood Gas 138 137 - 145 mmol/L    Potassium, Blood Gas 3.1 (L) 3.5 - 5.0 mmol/L    Calcium Level Ionized 1.22 1.12 - 1.23 mmol/L    TOC2, Blood gas 24.5 mmol/L    Base Excess, Blood gas -2.40 (L) -2.00 - 2.00 mmol/L    sO2, Blood gas 94.7 %    HCO3, Blood gas 23.2 22.0 - 26.0 mmol/L    THb, Blood gas 14.2 12 -  16 g/dL    O2 Hb, Blood Gas 93.6 (L) 94.0 - 97.0 %    CO Hgb 1.8 (H) 0.5 - 1.5 %    Met Hgb 1.3 0.4 - 1.5 %    Allens Test N/A     FIO2, Blood gas 21.0 %   ISTAT PROCEDURE    Collection Time: 11/02/23  5:35 PM   Result Value Ref Range    POC PH 7.371 7.35 - 7.45    POC PCO2 39.0 35 - 45 mmHg    POC PO2 50 40 - 60 mmHg    POC HCO3 22.6 (L) 24 - 28 mmol/L    POC BE -3 (L) -2 to 2 mmol/L    POC SATURATED O2 84 95 - 100 %    POC Glucose 233 (H) 70 - 110 mg/dL    POC Sodium 140 136 - 145 mmol/L    POC Potassium 3.9 3.5 - 5.1 mmol/L    POC TCO2 24 24 - 29 mmol/L    POC Ionized Calcium 1.22 1.06 - 1.42 mmol/L    POC Hematocrit 33 (L) 36 - 54 %PCV    POC HEMOGLOBIN 11 g/dL    Sample VENOUS    Basic Metabolic Panel    Collection Time: 11/02/23  6:35 PM   Result Value Ref Range    Sodium Level 138 136 - 145 mmol/L    Potassium Level 3.3 (L) 3.5 - 5.1 mmol/L    Chloride 112 (H) 98 - 107 mmol/L    Carbon Dioxide 17 (L) 23 - 31 mmol/L    Glucose Level 270 (H) 82 - 115 mg/dL    Blood Urea Nitrogen 26.6 (H) 9.8 - 20.1 mg/dL    Creatinine 0.93 0.55 - 1.02 mg/dL    BUN/Creatinine Ratio 29     Calcium Level Total 9.9 8.4 - 10.2 mg/dL    Anion Gap 9.0 mEq/L    eGFR >60 mls/min/1.73/m2   CBC with Differential    Collection Time: 11/02/23  6:35 PM   Result Value Ref Range    WBC 14.48 (H) 4.50 - 11.50 x10(3)/mcL    RBC 3.55 (L) 4.20 - 5.40 x10(6)/mcL    Hgb 10.3 (L) 12.0 - 16.0 g/dL    Hct 32.4 (L) 37.0 - 47.0 %    MCV 91.3 80.0 - 94.0 fL    MCH 29.0 27.0 - 31.0 pg    MCHC 31.8 (L) 33.0 - 36.0 g/dL    RDW 14.1 11.5 - 17.0 %    Platelet 191 130 - 400 x10(3)/mcL    MPV 9.7 7.4 - 10.4 fL    Neut % 77.2 %    Lymph % 18.6 %    Mono % 2.7 %    Eos % 0.1 %    Basophil % 0.2 %    Lymph # 2.70 0.6 - 4.6 x10(3)/mcL    Neut # 11.17 (H) 2.1 - 9.2 x10(3)/mcL    Mono # 0.39 0.1 - 1.3 x10(3)/mcL    Eos # 0.01 0 - 0.9 x10(3)/mcL    Baso # 0.03 <=0.2 x10(3)/mcL    IG# 0.18 (H) 0 - 0.04 x10(3)/mcL    IG% 1.2 %    NRBC% 0.0 %   Protime-INR    Collection  Time: 11/02/23  6:35 PM   Result Value Ref Range    PT 18.9 (H) 12.5 - 14.5 seconds    INR 1.6 (H) <=1.3   APTT    Collection Time: 11/02/23  6:35 PM   Result Value Ref Range    PTT 20.0 (L) 23.2 - 33.7 seconds   ISTAT PROCEDURE    Collection Time: 11/02/23  6:54 PM   Result Value Ref Range    POC PH 7.378 7.35 - 7.45    POC PCO2 37.6 35 - 45 mmHg    POC PO2 411 (H) 80 - 100 mmHg    POC HCO3 22.1 (L) 24 - 28 mmol/L    POC BE -3 (L) -2 to 2 mmol/L    POC SATURATED O2 100 95 - 100 %    POC Glucose 296 (H) 70 - 110 mg/dL    POC Sodium 140 136 - 145 mmol/L    POC Potassium 4.7 3.5 - 5.1 mmol/L    POC TCO2 23 23 - 27 mmol/L    POC Ionized Calcium 1.01 (L) 1.06 - 1.42 mmol/L    POC Hematocrit 26 (L) 36 - 54 %PCV    POC HEMOGLOBIN 9 g/dL    Sample ARTERIAL    ISTAT PROCEDURE    Collection Time: 11/02/23  6:58 PM   Result Value Ref Range    POC PH 7.348 (L) 7.35 - 7.45    POC PCO2 38.5 35 - 45 mmHg    POC PO2 50 40 - 60 mmHg    POC HCO3 21.2 (L) 24 - 28 mmol/L    POC BE -4 (L) -2 to 2 mmol/L    POC SATURATED O2 83 95 - 100 %    POC Glucose 287 (H) 70 - 110 mg/dL    POC Sodium 141 136 - 145 mmol/L    POC Potassium 4.6 3.5 - 5.1 mmol/L    POC TCO2 22 (L) 24 - 29 mmol/L    POC Ionized Calcium 1.05 (L) 1.06 - 1.42 mmol/L    POC Hematocrit 28 (L) 36 - 54 %PCV    POC HEMOGLOBIN 10 g/dL    Sample VENOUS    ISTAT PROCEDURE    Collection Time: 11/02/23  8:08 PM   Result Value Ref Range    POC PH 7.359 7.35 - 7.45    POC PCO2 32.8 (L) 35 - 45 mmHg    POC PO2 279 (H) 80 - 100 mmHg    POC HCO3 18.5 (L) 24 - 28 mmol/L    POC BE -7 (L) -2 to 2 mmol/L    POC SATURATED O2 100 95 - 100 %    POC Glucose 264 (H) 70 - 110 mg/dL    POC Sodium 142 136 - 145 mmol/L    POC Potassium 3.3 (L) 3.5 - 5.1 mmol/L    POC TCO2 19 (L) 23 - 27 mmol/L    POC Ionized Calcium 1.42 1.06 - 1.42 mmol/L    POC Hematocrit 29 (L) 36 - 54 %PCV    POC HEMOGLOBIN 10 g/dL    Sample ARTERIAL    POCT glucose    Collection Time: 11/02/23  8:49 PM   Result Value Ref Range     POCT Glucose 188 (H) 70 - 110 mg/dL   Protime-INR    Collection Time: 11/02/23  9:36 PM   Result Value Ref Range    PT 18.4 (H) 12.5 - 14.5 seconds    INR 1.6 (H) <=1.3   APTT    Collection Time: 11/02/23  9:36 PM   Result Value Ref Range    PTT 29.0 23.2 - 33.7 seconds   RT Blood Gas    Collection Time: 11/02/23  9:38 PM   Result Value Ref Range    Sample Type Arterial Blood     Sample site Arterial Line     Drawn by J Race RRT     pH, Blood gas 7.340 (L) 7.350 - 7.450    pCO2, Blood gas 41.0 35.0 - 45.0 mmHg    pO2, Blood gas 71.0 (L) 80.0 - 100.0 mmHg    Sodium, Blood Gas 139 137 - 145 mmol/L    Potassium, Blood Gas 2.9 (L) 3.5 - 5.0 mmol/L    Calcium Level Ionized 1.38 (H) 1.12 - 1.23 mmol/L    TOC2, Blood gas 23.4 mmol/L    Base Excess, Blood gas -3.50 (L) -2.00 - 2.00 mmol/L    sO2, Blood gas 92.9 %    HCO3, Blood gas 22.1 22.0 - 26.0 mmol/L    THb, Blood gas 11.0 (L) 12 - 16 g/dL    O2 Hb, Blood Gas 93.7 (L) 94.0 - 97.0 %    CO Hgb 1.6 (H) 0.5 - 1.5 %    Met Hgb 0.7 0.4 - 1.5 %    Allens Test N/A     MODE SIMV     Oxygen Device, Blood gas Ventilator     FIO2, Blood gas 60 %    Mech Vt 450 ml    Mech RR 18 b/min    PEEP 5.0 cmH2O    PS 10.0 cmH2O   POCT glucose    Collection Time: 11/02/23 10:13 PM   Result Value Ref Range    POCT Glucose 131 (H) 70 - 110 mg/dL   POCT glucose    Collection Time: 11/02/23 11:17 PM   Result Value Ref Range    POCT Glucose 94 70 - 110 mg/dL   POCT glucose    Collection Time: 11/03/23 12:20 AM   Result Value Ref Range    POCT Glucose 111 (H) 70 - 110 mg/dL   Magnesium    Collection Time: 11/03/23 12:27 AM   Result Value Ref Range    Magnesium Level 2.30 1.60 - 2.60 mg/dL   Phosphorus    Collection Time: 11/03/23 12:27 AM   Result Value Ref Range    Phosphorus Level 2.7 2.3 - 4.7 mg/dL   Lipid Panel    Collection Time: 11/03/23 12:27 AM   Result Value Ref Range    Cholesterol Total 129 <=200 mg/dL    HDL Cholesterol 38 35 - 60 mg/dL    Triglyceride 42 37 - 140 mg/dL     Cholesterol/HDL Ratio 3 0 - 5    Very Low Density Lipoprotein 8     LDL Cholesterol 83.00 50.00 - 140.00 mg/dL   Comprehensive Metabolic Panel    Collection Time: 11/03/23 12:27 AM   Result Value Ref Range    Sodium Level 143 136 - 145 mmol/L    Potassium Level 4.3 3.5 - 5.1 mmol/L    Chloride 114 (H) 98 - 107 mmol/L    Carbon Dioxide 22 (L) 23 - 31 mmol/L    Glucose Level 141 (H) 82 - 115 mg/dL    Blood Urea Nitrogen 22.7 (H) 9.8 - 20.1 mg/dL    Creatinine 0.79 0.55 - 1.02 mg/dL    Calcium Level Total 9.2 8.4 - 10.2 mg/dL    Protein Total 5.4 (L) 5.8 - 7.6 gm/dL    Albumin Level 3.1 (L) 3.4 - 4.8 g/dL    Globulin 2.3 (L) 2.4 - 3.5 gm/dL    Albumin/Globulin Ratio 1.3 1.1 - 2.0 ratio    Bilirubin Total 0.7 <=1.5 mg/dL    Alkaline Phosphatase 50 40 - 150 unit/L    Alanine Aminotransferase 13 0 - 55 unit/L    Aspartate Aminotransferase 16 5 - 34 unit/L    eGFR >60 mls/min/1.73/m2   CBC with Differential    Collection Time: 11/03/23 12:27 AM   Result Value Ref Range    WBC 12.83 (H) 4.50 - 11.50 x10(3)/mcL    RBC 3.57 (L) 4.20 - 5.40 x10(6)/mcL    Hgb 10.2 (L) 12.0 - 16.0 g/dL    Hct 31.3 (L) 37.0 - 47.0 %    MCV 87.7 80.0 - 94.0 fL    MCH 28.6 27.0 - 31.0 pg    MCHC 32.6 (L) 33.0 - 36.0 g/dL    RDW 13.9 11.5 - 17.0 %    Platelet 190 130 - 400 x10(3)/mcL    MPV 9.8 7.4 - 10.4 fL    Neut % 69.7 %    Lymph % 17.0 %    Mono % 12.4 %    Eos % 0.0 %    Basophil % 0.2 %    Lymph # 2.18 0.6 - 4.6 x10(3)/mcL    Neut # 8.94 2.1 - 9.2 x10(3)/mcL    Mono # 1.59 (H) 0.1 - 1.3 x10(3)/mcL    Eos # 0.00 0 - 0.9 x10(3)/mcL    Baso # 0.03 <=0.2 x10(3)/mcL    IG# 0.09 (H) 0 - 0.04 x10(3)/mcL    IG% 0.7 %    NRBC% 0.0 %   POCT glucose    Collection Time: 11/03/23  2:15 AM   Result Value Ref Range    POCT Glucose 175 (H) 70 - 110 mg/dL   POCT glucose    Collection Time: 11/03/23  4:05 AM   Result Value Ref Range    POCT Glucose 185 (H) 70 - 110 mg/dL   RT Blood Gas    Collection Time: 11/03/23  4:55 AM   Result Value Ref Range    Sample  Type Arterial Blood     Sample site Arterial Line     Drawn by TRINI Queen RRT     pH, Blood gas 7.380 7.350 - 7.450    pCO2, Blood gas 45.0 35.0 - 45.0 mmHg    pO2, Blood gas 82.0 80.0 - 100.0 mmHg    Sodium, Blood Gas 135 (L) 137 - 145 mmol/L    Potassium, Blood Gas 4.2 3.5 - 5.0 mmol/L    Calcium Level Ionized 1.25 (H) 1.12 - 1.23 mmol/L    TOC2, Blood gas 28.0 mmol/L    Base Excess, Blood gas 1.10 -2.00 - 2.00 mmol/L    sO2, Blood gas 96.0 %    HCO3, Blood gas 26.6 (H) 22.0 - 26.0 mmol/L    Allens Test N/A     MODE CPAP     Oxygen Device, Blood gas Ventilator     FIO2, Blood gas 30 %    PEEP 5.0 cmH2O    PS 10.0 cmH2O   Basic Metabolic Panel    Collection Time: 11/03/23  6:15 AM   Result Value Ref Range    Sodium Level 142 136 - 145 mmol/L    Potassium Level 4.0 3.5 - 5.1 mmol/L    Chloride 111 (H) 98 - 107 mmol/L    Carbon Dioxide 22 (L) 23 - 31 mmol/L    Glucose Level 239 (H) 82 - 115 mg/dL    Blood Urea Nitrogen 22.5 (H) 9.8 - 20.1 mg/dL    Creatinine 0.91 0.55 - 1.02 mg/dL    BUN/Creatinine Ratio 25     Calcium Level Total 9.0 8.4 - 10.2 mg/dL    Anion Gap 9.0 mEq/L    eGFR >60 mls/min/1.73/m2   CBC with Differential    Collection Time: 11/03/23  6:15 AM   Result Value Ref Range    WBC 12.97 (H) 4.50 - 11.50 x10(3)/mcL    RBC 3.75 (L) 4.20 - 5.40 x10(6)/mcL    Hgb 10.8 (L) 12.0 - 16.0 g/dL    Hct 33.6 (L) 37.0 - 47.0 %    MCV 89.6 80.0 - 94.0 fL    MCH 28.8 27.0 - 31.0 pg    MCHC 32.1 (L) 33.0 - 36.0 g/dL    RDW 14.2 11.5 - 17.0 %    Platelet 246 130 - 400 x10(3)/mcL    MPV 10.1 7.4 - 10.4 fL    Neut % 69.8 %    Lymph % 22.6 %    Mono % 6.9 %    Eos % 0.0 %    Basophil % 0.2 %    Lymph # 2.93 0.6 - 4.6 x10(3)/mcL    Neut # 9.05 2.1 - 9.2 x10(3)/mcL    Mono # 0.90 0.1 - 1.3 x10(3)/mcL    Eos # 0.00 0 - 0.9 x10(3)/mcL    Baso # 0.02 <=0.2 x10(3)/mcL    IG# 0.07 (H) 0 - 0.04 x10(3)/mcL    IG% 0.5 %    NRBC% 0.0 %   POCT glucose    Collection Time: 11/03/23  6:44 AM   Result Value Ref Range    POCT Glucose 187 (H) 70  - 110 mg/dL   POCT glucose    Collection Time: 11/03/23  7:47 AM   Result Value Ref Range    POCT Glucose 193 (H) 70 - 110 mg/dL   POCT glucose    Collection Time: 11/03/23  9:35 AM   Result Value Ref Range    POCT Glucose 163 (H) 70 - 110 mg/dL   POCT glucose    Collection Time: 11/03/23 10:53 AM   Result Value Ref Range    POCT Glucose 169 (H) 70 - 110 mg/dL   POCT glucose    Collection Time: 11/03/23 12:01 PM   Result Value Ref Range    POCT Glucose 221 (H) 70 - 110 mg/dL   POCT glucose    Collection Time: 11/03/23  1:11 PM   Result Value Ref Range    POCT Glucose 270 (H) 70 - 110 mg/dL   POCT glucose    Collection Time: 11/03/23  1:46 PM   Result Value Ref Range    POCT Glucose 244 (H) 70 - 110 mg/dL   POCT glucose    Collection Time: 11/03/23  3:31 PM   Result Value Ref Range    POCT Glucose 213 (H) 70 - 110 mg/dL   POCT glucose    Collection Time: 11/03/23  3:55 PM   Result Value Ref Range    POCT Glucose 221 (H) 70 - 110 mg/dL   POCT glucose    Collection Time: 11/03/23  5:26 PM   Result Value Ref Range    POCT Glucose 119 (H) 70 - 110 mg/dL   POCT glucose    Collection Time: 11/03/23  7:23 PM   Result Value Ref Range    POCT Glucose 121 (H) 70 - 110 mg/dL   POCT glucose    Collection Time: 11/03/23  8:22 PM   Result Value Ref Range    POCT Glucose 156 (H) 70 - 110 mg/dL   Magnesium    Collection Time: 11/04/23  1:30 AM   Result Value Ref Range    Magnesium Level 1.90 1.60 - 2.60 mg/dL   Comprehensive Metabolic Panel    Collection Time: 11/04/23  1:30 AM   Result Value Ref Range    Sodium Level 140 136 - 145 mmol/L    Potassium Level 3.5 3.5 - 5.1 mmol/L    Chloride 108 (H) 98 - 107 mmol/L    Carbon Dioxide 18 (L) 23 - 31 mmol/L    Glucose Level 240 (H) 82 - 115 mg/dL    Blood Urea Nitrogen 24.1 (H) 9.8 - 20.1 mg/dL    Creatinine 1.23 (H) 0.55 - 1.02 mg/dL    Calcium Level Total 8.6 8.4 - 10.2 mg/dL    Protein Total 5.3 (L) 5.8 - 7.6 gm/dL    Albumin Level 3.2 (L) 3.4 - 4.8 g/dL    Globulin 2.1 (L) 2.4 - 3.5  gm/dL    Albumin/Globulin Ratio 1.5 1.1 - 2.0 ratio    Bilirubin Total 0.4 <=1.5 mg/dL    Alkaline Phosphatase 55 40 - 150 unit/L    Alanine Aminotransferase 12 0 - 55 unit/L    Aspartate Aminotransferase 17 5 - 34 unit/L    eGFR 46 mls/min/1.73/m2   CBC Without Differential    Collection Time: 11/04/23  1:30 AM   Result Value Ref Range    WBC 11.76 (H) 4.50 - 11.50 x10(3)/mcL    RBC 3.46 (L) 4.20 - 5.40 x10(6)/mcL    Hgb 10.0 (L) 12.0 - 16.0 g/dL    Hct 30.8 (L) 37.0 - 47.0 %    MCV 89.0 80.0 - 94.0 fL    MCH 28.9 27.0 - 31.0 pg    MCHC 32.5 (L) 33.0 - 36.0 g/dL    RDW 14.8 11.5 - 17.0 %    Platelet 235 130 - 400 x10(3)/mcL    MPV 10.6 (H) 7.4 - 10.4 fL    NRBC% 0.0 %   POCT glucose    Collection Time: 11/04/23  5:21 AM   Result Value Ref Range    POCT Glucose 304 (H) 70 - 110 mg/dL   POCT glucose    Collection Time: 11/04/23 11:44 AM   Result Value Ref Range    POCT Glucose 246 (H) 70 - 110 mg/dL     Telemetry: SR    Physical Exam  Constitutional:       General: She is not in acute distress.     Appearance: Normal appearance.   HENT:      Head: Normocephalic.      Mouth/Throat:      Mouth: Mucous membranes are moist.   Eyes:      Extraocular Movements: Extraocular movements intact.      Conjunctiva/sclera: Conjunctivae normal.   Cardiovascular:      Rate and Rhythm: Normal rate and regular rhythm.      Pulses: Normal pulses.      Heart sounds: Normal heart sounds. No murmur heard.  Pulmonary:      Effort: Pulmonary effort is normal.      Breath sounds: Normal breath sounds.   Abdominal:      Palpations: Abdomen is soft.   Musculoskeletal:         General: No swelling. Normal range of motion.   Skin:     General: Skin is warm and dry.      Comments: Midline Sternotomy Dressing C/D/I. + CTs   Neurological:      General: No focal deficit present.      Mental Status: She is alert and oriented to person, place, and time.   Psychiatric:         Mood and Affect: Mood normal.         Behavior: Behavior normal.          Judgment: Judgment normal.       Current Inpatient Medications:    Current Facility-Administered Medications:     acetaminophen oral solution 650 mg, 650 mg, Per OG tube, Q6H PRN, Bi Verduzco PA-C    albumin human 5% bottle 12.5 g, 12.5 g, Intravenous, PRN, Bi Verduzco PA-C, Last Rate: 250 mL/hr at 11/03/23 1128, Rate Verify at 11/03/23 1128    amiodarone in dextrose 150 mg/100 mL (1.5 mg/mL) loading dose 150 mg, 150 mg, Intravenous, Once, Francisco Porter ANP    amLODIPine tablet 10 mg, 10 mg, Oral, Daily, Puma Vera MD, 10 mg at 11/04/23 0845    aspirin EC tablet 81 mg, 81 mg, Oral, Daily, Puma Vera MD, 81 mg at 11/04/23 0845    atorvastatin tablet 40 mg, 40 mg, Oral, Daily, Francisco Porter ANP, 40 mg at 11/04/23 0845    calcium gluconate 1 g in NS IVPB (premixed), 1 g, Intravenous, PRN, Bi Verduzco PA-C    calcium gluconate 1 g in NS IVPB (premixed), 2 g, Intravenous, PRN, Bi Verduzco PA-C    calcium gluconate 1 g in NS IVPB (premixed), 3 g, Intravenous, PRN, Bi Verduzco PA-C    clevidipine (CLEVIPREX) 25 mg/50 mL infusion, 0-16 mg/hr, Intravenous, Continuous, Heriberto Lara IV, MD, Held at 11/02/23 2300    dexmedetomidine (PRECEDEX) 400mcg/100mL 0.9% NaCL infusion, 0-1.4 mcg/kg/hr, Intravenous, Continuous, Bi Verduzco PA-C, Stopped at 11/02/23 2331    dextrose 10% bolus 125 mL 125 mL, 12.5 g, Intravenous, PRN, Heriberto Lara IV, MD    dextrose 10% bolus 125 mL 125 mL, 12.5 g, Intravenous, PRN, Cisco Farrell MD    dextrose 10% bolus 250 mL 250 mL, 25 g, Intravenous, Marco CHANG Victor E IV, MD    dextrose 10% bolus 250 mL 250 mL, 25 g, Intravenous, PRN, Cisco Farrell MD    dextrose 5 % and 0.45 % NaCl infusion, , Intravenous, Continuous, Heriberto Lara IV, MD, Stopped at 11/03/23 2000    docusate sodium capsule 100 mg, 100 mg, Oral, BID, Bi Verduzco PA-C, 100 mg at 11/04/23 0845    enoxaparin injection 40 mg, 40 mg, Subcutaneous,  Daily, Bi Verduzco PA-C, 40 mg at 11/03/23 1738    EPINEPHrine (ADRENALIN) 5 mg in dextrose 5 % (D5W) 250 mL infusion, 0-2 mcg/kg/min, Intravenous, Continuous, Heriberto Lara IV, MD, Stopped at 11/04/23 0957    ezetimibe tablet 10 mg, 10 mg, Oral, Daily, Puma Vera MD, 10 mg at 11/04/23 0845    famotidine (PF) injection 20 mg, 20 mg, Intravenous, Daily, Bi Verduzco PA-C, 20 mg at 11/04/23 0845    folic acid tablet 1 mg, 1 mg, Oral, Daily, Bi Verduzco PA-C, 1 mg at 11/04/23 0845    glucagon (human recombinant) injection 1 mg, 1 mg, Intramuscular, PRN, Cisco Farrell MD    hydrALAZINE injection 10 mg, 10 mg, Intravenous, Q4H PRN, Puma Vera MD    HYDROcodone-acetaminophen 5-325 mg per tablet 1 tablet, 1 tablet, Oral, Q4H PRN, Heriberto Lara IV, MD    insulin aspart U-100 injection 0-5 Units, 0-5 Units, Subcutaneous, Q6H PRN, Cisco Farrell MD, 2 Units at 11/04/23 1149    insulin regular in 0.9 % NaCl 100 unit/100 mL (1 unit/mL) infusion, 0-52 Units/hr, Intravenous, Continuous, Bi Verduzco PA-C, Stopped at 11/03/23 1900    ketorolac injection 15 mg, 15 mg, Intravenous, Q6H PRN, Bi Verduzco PA-C, 15 mg at 11/04/23 0535    lactulose 10 gram/15 ml solution 20 g, 20 g, Oral, Q6H PRN, Bi Verduzco PA-C    loperamide capsule 2 mg, 2 mg, Oral, Continuous PRN, Bi Verduzco PA-C    magnesium sulfate 2g in water 50mL IVPB (premix), 2 g, Intravenous, PRN, Bi Verduzco PA-C    magnesium sulfate 2g in water 50mL IVPB (premix), 4 g, Intravenous, PRN, Bi Verduzco PA-C    metoclopramide HCl injection 5 mg, 5 mg, Intravenous, Q6H PRN, Bi Verduzco PA-C    metoprolol tartrate (LOPRESSOR) split tablet 12.5 mg, 12.5 mg, Oral, BID, Bi Verduzco PA-C, 12.5 mg at 11/04/23 0845    milrinone 20mg in D5W 100 mL infusion, 0.25 mcg/kg/min, Intravenous, Continuous, Heriberto Lara IV, MD, Last Rate: 6.5 mL/hr at 11/02/23 2327, 0.25 mcg/kg/min at 11/02/23  2327    morphine injection 4 mg, 4 mg, Intravenous, Q4H PRN, Bi Verduzco PA-C, 4 mg at 11/03/23 0004    mupirocin 2 % ointment, , Nasal, BID, Bi Verduzco PA-C, Given at 11/04/23 0845    ondansetron injection 4 mg, 4 mg, Intravenous, Q4H PRN, Bi Verduzco PA-C    oxyCODONE immediate release tablet Tab 10 mg, 10 mg, Oral, Q4H PRN, Bi Verduzco PA-C    potassium chloride 20 mEq in 100 mL IVPB (FOR CENTRAL LINE ADMINISTRATION ONLY), 20 mEq, Intravenous, PRN, Bi Verduzco PA-C, Stopped at 11/04/23 1117    potassium chloride 20 mEq in 100 mL IVPB (FOR CENTRAL LINE ADMINISTRATION ONLY), 40 mEq, Intravenous, PRN, Bi Verduzco PA-C    sodium phosphate 15 mmol in dextrose 5 % (D5W) 250 mL IVPB, 15 mmol, Intravenous, PRN, Bi Verduzco PA-C    sodium phosphate 20.01 mmol in dextrose 5 % (D5W) 250 mL IVPB, 20.01 mmol, Intravenous, PRN, Bi Verduzco PA-KRAIG    sodium phosphate 30 mmol in dextrose 5 % (D5W) 250 mL IVPB, 30 mmol, Intravenous, PRN, Bi Verduzco PA-C    sucralfate tablet 1 g, 1 g, Oral, QID (AC & HS), Bi Verduzco PA-C, 1 g at 11/04/23 0545    VTE Risk Mitigation (From admission, onward)           Ordered     enoxaparin injection 40 mg  Daily         11/02/23 2120     Place sequential compression device  Until discontinued         11/02/23 2120     IP VTE HIGH RISK PATIENT  Once         11/02/23 1724     Place sequential compression device  Until discontinued         11/02/23 1018                  Assessment:   Abnormal PET Scan     - s/p CABG (11.2.23) - LIMA to LAD and rSVG to Diag 1    - s/p LHC (11.2.23) - Left main-luminal irregularities; LAD-mid subtotal occlusion involving large diagonal; LCX-non dominant, luminal irregularities; RCA-dominant, 40% ostial stenosis  MVCAD s/p CABG  Post Operative PAF with RVR - Now SR/ST    - CHADsVASc - 3 Points - 3.2% Stroke Risk per Year   YULI/Bilaterally Mild  Hypotension requiring Pressors - Resolved    - Hx of HTN  DM  II  PAD/Interventions   Obesity  No Hx of GIB     Plan:   Continue ASA, Norvasc and BB  Start Atorvastatin 40mg PO Qday  Aggressive Mobilization of PT and Q1HR IS  Continue IV Amiodarone at 0.5mg/min and will Transition to Oral in AM  Labs and EKG in AM: CBC, CMP and Mg    Francisco Porter, ANP  Cardiology  [facility]  11/04/2023     Physician addendum:  I have seen and examined this patient as a split-shared visit with the ANDERS d/t complicated medical management of above problems written in assessment and high acuity requiring physician expertise in medical decision-making. I performed the substantive portion of the history and exam. Above medical decision-making is also formulated by me.    Cardiovascular exam:  S1, S2  Lungs:  fine crackles at bases.  Extremities:  1+ edema bilaterally    Plan:  Medications as above.  Supportive therapy as per ICU.  Continue IV amiodarone plan to transition to oral in AM.      Fuad Barragan MD  Cardiologist

## 2023-11-04 NOTE — NURSING
Nurses Note -- 4 Eyes      11/4/2023   12:57 PM      Skin assessed during: Daily Assessment      [x] No Altered Skin Integrity Present    [x]Prevention Measures Documented      [] Yes- Altered Skin Integrity Present or Discovered   [] LDA Added if Not in Epic (Describe Wound)   [] New Altered Skin Integrity was Present on Admit and Documented in LDA   [] Wound Image Taken    Wound Care Consulted? No    Attending Nurse:  Mando Grant RN    Second RN/Staff Member:  Deepak Longoria RN

## 2023-11-04 NOTE — PT/OT/SLP PROGRESS
Per nursing staff, pt just got off of epi and while she's doing better today would likely benefit from holding therapy until tomorrow. Will f/u tomorrow as appropriate.

## 2023-11-04 NOTE — PROGRESS NOTES
[facility] - 7 North ICU  Pulmonary Critical Care Note    Patient Name: Geno Barry  MRN: [id number]  Admission Date: 11/2/2023  Hospital Length of Stay: 2 days  Code Status: Full Code  Attending Provider: Heriberto Lara IV, MD  Primary Care Provider: Delmy Montano FNP     Subjective:     HPI:   Geno Barry is a 73 yo female with PMH of CAD, CVA, PAD, HTN and DM who presents to [facility] for scheduled LHC. Before presentation, patient was having shortness of breath along with chest pain. She had a positive Pamela scan on 10/26/23 with a large reversible anterior apical, medium reversible septic and medium reversible inferior defect. During LHC today, patient was noted to have severe multivessel CAD and Cardiovascular surgery was consulted for CABG. Patient underwent CABGx2 (lima to LAD & reverse saphenous to 1st diagonal) without complication and is being upgraded to ICU for close surveillance & post-CABG protocol.    Hospital Course/Significant events:  11/2/23: Admitted to ICU s/p CABG    24 Hour Interval History:  Patient remains on epi at 0.02 mcg/kg/Min.  Patient has no current subjective complaints, currently on OxyMask.  No febrile episodes overnight.  Labs today revealed GENNA, elevated creatinine and BUN 1.23/24.1.  White count remains elevated however improved from previous values at 11.6, stable hemoglobin at 10. Net +1575 today.    Past Medical History:   Diagnosis Date    CAD (coronary artery disease)     Diabetes mellitus     Hypertension     PAD (peripheral artery disease)        Past Surgical History:   Procedure Laterality Date    HYSTERECTOMY      LEFT HEART CATHETERIZATION Left 11/2/2023    Procedure: Left heart cath;  Surgeon: Puma Vera MD;  Location: [facility] CATH LAB;  Service: Cardiology;  Laterality: Left;  LHC +/- PCI VIA RRA    LUMPECTOMY, BREAST Right        Social History     Socioeconomic History    Marital status:            Current Outpatient  Medications   Medication Instructions    amLODIPine (NORVASC) 10 mg, Oral, Daily    aspirin (ECOTRIN) 81 mg, Oral, Daily    cholecalciferol (vitamin D3) (VITAMIN D3) 400 Units, Oral, Daily    diphenhydrAMINE (BENADRYL) 25 mg, Oral, Once, 2 tabs at 6pm, midnight, and 6am morning of procedure    ezetimibe (ZETIA) 10 mg, Oral, Daily    famotidine (PEPCID) 40 mg, Oral, Once, Take 1 tab at 6pm, midnight, and 6am morning of procedure     glipiZIDE (GLUCOTROL) 5 mg, Oral, With breakfast    HYDROcodone-acetaminophen (NORCO) 7.5-325 mg per tablet 1 tablet, Oral, 3 times daily PRN    ibuprofen (ADVIL,MOTRIN) 800 mg, Oral, Every 6 hours PRN    meloxicam (MOBIC) 15 mg, Oral, Daily    metoprolol succinate (TOPROL-XL) 25 mg, Oral, Daily    pantoprazole (PROTONIX) 20 mg, Oral, Daily    predniSONE (DELTASONE) 50 mg, Oral, Once, Take 1 tab at 6pm, midnight, and 6am morning of procedure    rosuvastatin (CRESTOR) 10 mg, Oral, Daily    SITagliptan-metformin (JANUMET) 50-1,000 mg per tablet 1 tablet, Oral, 2 times daily with meals       Current Inpatient Medications   amiodarone in dextrose  150 mg Intravenous Once    amLODIPine  10 mg Oral Daily    aspirin  81 mg Oral Daily    atorvastatin  40 mg Oral Daily    docusate sodium  100 mg Oral BID    enoxparin  40 mg Subcutaneous Daily    ezetimibe  10 mg Oral Daily    famotidine (PF)  20 mg Intravenous Daily    folic acid  1 mg Oral Daily    metoprolol tartrate  12.5 mg Oral BID    mupirocin   Nasal BID    sucralfate  1 g Oral QID (AC & HS)       Current Intravenous Infusions   clevidipine Stopped (11/02/23 2300)    dexmedeTOMIDine (Precedex) infusion (titrating) Stopped (11/02/23 2331)    dextrose 5 % and 0.45 % NaCl Stopped (11/03/23 2000)    EPINEPHrine 0.04 mcg/kg/min (11/04/23 0600)    insulin regular 1 units/mL infusion orderable (CTS POST-OP) Stopped (11/03/23 1900)    loperamide      milrinone 0.25 mcg/kg/min (11/02/23 9472)         Review of Systems   Unable to perform ROS:  Intubated          Objective:       Intake/Output Summary (Last 24 hours) at 11/4/2023 0946  Last data filed at 11/4/2023 0600  Gross per 24 hour   Intake 2719.9 ml   Output 1050 ml   Net 1669.9 ml           Vital Signs (Most Recent):  Temp: 98.3 °F (36.8 °C) (11/04/23 0400)  Pulse: 100 (11/04/23 0600)  Resp: (!) 27 (11/04/23 0600)  BP: (!) 115/51 (11/04/23 0600)  SpO2: 98 % (11/04/23 0600)  Body mass index is 32.61 kg/m².  Weight: 86.2 kg (190 lb) Vital Signs (24h Range):  Temp:  [97.8 °F (36.6 °C)-98.6 °F (37 °C)] 98.3 °F (36.8 °C)  Pulse:  [] 100  Resp:  [16-34] 27  SpO2:  [85 %-100 %] 98 %  BP: ()/(45-67) 115/51  Arterial Line BP: ()/(35-51) 142/51     Physical Exam  Constitutional:       General: She is not in acute distress.     Appearance: She is not ill-appearing or toxic-appearing.      Comments: Intubated, not sedated, not reverse procedural only, in no acute distress, on mechanical ventilation   HENT:      Head: Normocephalic and atraumatic.      Mouth/Throat:      Comments: Endotracheal tube secured in place  Neck:      Comments: Right IJ triple-lumen central line in place without surrounding skin breakdown/erythema or oozing/bleeding  Cardiovascular:      Rate and Rhythm: Normal rate and regular rhythm.      Heart sounds: Normal heart sounds.      Comments: Nonpalpable left DP though dopplerable.  Pulmonary:      Effort: Pulmonary effort is normal. No respiratory distress.      Breath sounds: Normal breath sounds. No wheezing, rhonchi or rales.      Comments: Mechanically ventilated  Abdominal:      Palpations: Abdomen is soft.      Comments: Hypoactive bowel sounds   Musculoskeletal:         General: No swelling.      Right lower leg: No edema.      Left lower leg: No edema.   Skin:     General: Skin is warm and dry.      Comments: Left foot cold to touch with x2 darkened nailbeds   Neurological:      Mental Status: She is alert and oriented to person, place, and time. Mental status  is at baseline.           Lines/Drains/Airways       Central Venous Catheter Line  Duration             Percutaneous Central Line Insertion/Assessment - Double Lumen  11/02/23 1836 Internal Jugular Right 1 day              Drain  Duration                  Chest Tube 11/02/23 1933 Mediastinal 24 Fr. 1 day         Urethral Catheter 11/02/23 1800 Straight-tip 16 Fr. 1 day              Arterial Line  Duration             Arterial Line 11/02/23 1600 Left Brachial 1 day              Peripheral Intravenous Line  Duration                  Peripheral IV - Single Lumen 11/02/23 1400 22 G Left;Posterior Hand 1 day                    Significant Labs:    Lab Results   Component Value Date    WBC 11.76 (H) 11/04/2023    HGB 10.0 (L) 11/04/2023    HCT 30.8 (L) 11/04/2023    MCV 89.0 11/04/2023     11/04/2023           BMP  Lab Results   Component Value Date     11/04/2023    K 3.5 11/04/2023    CHLORIDE 108 (H) 11/04/2023    CO2 18 (L) 11/04/2023    BUN 24.1 (H) 11/04/2023    CREATININE 1.23 (H) 11/04/2023    CALCIUM 8.6 11/04/2023    AGAP 9.0 11/03/2023    EGFRNONAA 47 08/05/2021         ABG  Recent Labs   Lab 11/03/23 0455   PH 7.380   PO2 82.0   PCO2 45.0   HCO3 26.6*   POCBASEDEF 1.10         Mechanical Ventilation Support:  Vent Mode: CPAP / PSV (11/03/23 0452)  Ventilator Initiated: Yes (11/02/23 2048)  Set Rate: 12 BPM (11/03/23 0100)  Vt Set: 450 mL (11/03/23 0400)  Pressure Support: 10 cmH20 (11/03/23 0452)  PEEP/CPAP: 5 cmH20 (11/03/23 0452)  Oxygen Concentration (%): 30 (11/03/23 0452)  Peak Airway Pressure: 16 cmH20 (11/03/23 0452)  Total Ve: 5.2 L/m (11/03/23 0452)  F/VT Ratio<105 (RSBI): (!) 41.32 (11/03/23 0452)      Significant Imaging:  I have reviewed the pertinent imaging within the past 24 hours.        Assessment/Plan:     Assessment  Severe CAD s/p CABG x2  CVA  PAD  HTN  DM  GENNA    Plan  - Admitted to the ICU s/p CABG for close monitoring  - CT Surgery following  - Continue CABG protocol  -  Currently on epi, wean off as tolerated  - Replete electrolytes as necessary  - Will closely follow from a critical care aspect  - CTS ordered 250 cc bolus of fluids for GENNA, will recheck levels tomorrow.    DVT Prophylaxis: Famotidine 20mg q.D  GI Prophylaxis: Lovenox 40mg q.D.     32 minutes of critical care was time spent personally by me on the following activities: development of treatment plan with patient or surrogate and bedside caregivers, discussions with consultants, evaluation of patient's response to treatment, examination of patient, ordering and performing treatments and interventions, ordering and review of laboratory studies, ordering and review of radiographic studies, pulse oximetry, re-evaluation of patient's condition.  This critical care time did not overlap with that of any other provider or involve time for any procedures.     Willi Horner MD  Pulmonary Critical Care Medicine  [facility] - [facility] ICU  DOS: 11/04/2023        266.7 254.4

## 2023-11-04 NOTE — PLAN OF CARE
Problem: Adult Inpatient Plan of Care  Goal: Plan of Care Review  Outcome: Ongoing, Progressing  Goal: Patient-Specific Goal (Individualized)  Outcome: Ongoing, Progressing  Goal: Absence of Hospital-Acquired Illness or Injury  Outcome: Ongoing, Progressing  Goal: Optimal Comfort and Wellbeing  Outcome: Ongoing, Progressing  Goal: Readiness for Transition of Care  Outcome: Ongoing, Progressing     Problem: Infection  Goal: Absence of Infection Signs and Symptoms  Outcome: Ongoing, Progressing     Problem: Nutrition Impairment (Mechanical Ventilation, Invasive)  Goal: Optimal Nutrition Delivery  Outcome: Ongoing, Progressing     Problem: Skin and Tissue Injury (Mechanical Ventilation, Invasive)  Goal: Absence of Device-Related Skin and Tissue Injury  Outcome: Ongoing, Progressing     Problem: Ventilator-Induced Lung Injury (Mechanical Ventilation, Invasive)  Goal: Absence of Ventilator-Induced Lung Injury  Outcome: Ongoing, Progressing     Problem: Skin and Tissue Injury (Artificial Airway)  Goal: Absence of Device-Related Skin or Tissue Injury  Outcome: Ongoing, Progressing     Problem: Fall Injury Risk  Goal: Absence of Fall and Fall-Related Injury  Outcome: Ongoing, Progressing     Problem: Skin Injury Risk Increased  Goal: Skin Health and Integrity  Outcome: Ongoing, Progressing     Problem: Communication Impairment (Mechanical Ventilation, Invasive)  Goal: Effective Communication  Outcome: Met     Problem: Device-Related Complication Risk (Mechanical Ventilation, Invasive)  Goal: Optimal Device Function  Outcome: Met     Problem: Inability to Wean (Mechanical Ventilation, Invasive)  Goal: Mechanical Ventilation Liberation  Outcome: Met     Problem: Communication Impairment (Artificial Airway)  Goal: Effective Communication  Outcome: Met     Problem: Device-Related Complication Risk (Artificial Airway)  Goal: Optimal Device Function  Outcome: Met

## 2023-11-04 NOTE — PROGRESS NOTES
Geno Barry is a 72 y.o. female patient.   1. Coronary atherosclerosis of native coronary artery    2. Atherosclerosis of native coronary artery of native heart without angina pectoris    3. Pre-op evaluation    4. Coronary artery disease    5. CAD (coronary artery disease)    6. S/P CABG (coronary artery bypass graft)      Past Medical History:   Diagnosis Date    CAD (coronary artery disease)     Diabetes mellitus     Hypertension     PAD (peripheral artery disease)      No past surgical history pertinent negatives on file.  Scheduled Meds:   amiodarone in dextrose  150 mg Intravenous Once    amLODIPine  10 mg Oral Daily    aspirin  81 mg Oral Daily    atorvastatin  40 mg Oral Daily    docusate sodium  100 mg Oral BID    enoxparin  40 mg Subcutaneous Daily    ezetimibe  10 mg Oral Daily    famotidine (PF)  20 mg Intravenous Daily    folic acid  1 mg Oral Daily    metoprolol tartrate  12.5 mg Oral BID    mupirocin   Nasal BID    sucralfate  1 g Oral QID (AC & HS)     Continuous Infusions:   clevidipine Stopped (11/02/23 2300)    dexmedeTOMIDine (Precedex) infusion (titrating) Stopped (11/02/23 2331)    dextrose 5 % and 0.45 % NaCl Stopped (11/03/23 2000)    EPINEPHrine 0.04 mcg/kg/min (11/04/23 0600)    insulin regular 1 units/mL infusion orderable (CTS POST-OP) Stopped (11/03/23 1900)    loperamide      milrinone 0.25 mcg/kg/min (11/02/23 2327)     PRN Meds:acetaminophen, albumin human 5%, calcium gluconate IVPB, calcium gluconate IVPB, calcium gluconate IVPB, dextrose 10%, dextrose 10%, dextrose 10%, dextrose 10%, glucagon (human recombinant), hydrALAZINE, HYDROcodone-acetaminophen, insulin aspart U-100, ketorolac, lactulose 10 gram/15 ml, loperamide, magnesium sulfate IVPB, magnesium sulfate IVPB, metoclopramide HCl, morphine, ondansetron, oxyCODONE, potassium chloride in water, potassium chloride in water, sodium phosphate 15 mmol in dextrose 5 % (D5W) 250 mL IVPB, sodium phosphate 20.01 mmol in dextrose 5  "% (D5W) 250 mL IVPB, sodium phosphate 30 mmol in dextrose 5 % (D5W) 250 mL IVPB    Review of patient's allergies indicates:   Allergen Reactions    Ace inhibitors Swelling    Clopidogrel Swelling    Iodine      There are no hospital problems to display for this patient.    Blood pressure 117/64, pulse 83, temperature 98.6 °F (37 °C), temperature source Oral, resp. rate 20, height 5' 4" (1.626 m), weight 86.2 kg (190 lb), SpO2 (!) 90 %, not currently breastfeeding.    Subjective:    POD #2  Awake. Alert.  Sitting up in bed  On epi   Getting a 250cc fluid bolus    Objective:   AFVSS. 97% on oxymask  Heart: RRR  Lungs: respirations nonlabored  Incision: dressed, dry  Cts draining  Cxr: stable  H/h: 10/30  Cr: 1.23    Assesment/Plan:    S/p CAB  - mobilize  - IS  - wean epi as tolerated  - watch kidney function  - continue CTs            FARZAD Ledezma  11/4/2023    "

## 2023-11-05 LAB
ALBUMIN SERPL-MCNC: 3 G/DL (ref 3.4–4.8)
ALBUMIN/GLOB SERPL: 1.2 RATIO (ref 1.1–2)
ALP SERPL-CCNC: 67 UNIT/L (ref 40–150)
ALT SERPL-CCNC: 9 UNIT/L (ref 0–55)
AST SERPL-CCNC: 18 UNIT/L (ref 5–34)
BILIRUB SERPL-MCNC: 0.7 MG/DL
BUN SERPL-MCNC: 24.3 MG/DL (ref 9.8–20.1)
CALCIUM SERPL-MCNC: 8.4 MG/DL (ref 8.4–10.2)
CHLORIDE SERPL-SCNC: 109 MMOL/L (ref 98–107)
CO2 SERPL-SCNC: 21 MMOL/L (ref 23–31)
CREAT SERPL-MCNC: 1.03 MG/DL (ref 0.55–1.02)
ERYTHROCYTE [DISTWIDTH] IN BLOOD BY AUTOMATED COUNT: 15.2 % (ref 11.5–17)
GFR SERPLBLD CREATININE-BSD FMLA CKD-EPI: 57 MLS/MIN/1.73/M2
GLOBULIN SER-MCNC: 2.6 GM/DL (ref 2.4–3.5)
GLUCOSE SERPL-MCNC: 249 MG/DL (ref 82–115)
HCT VFR BLD AUTO: 31.5 % (ref 37–47)
HGB BLD-MCNC: 10.2 G/DL (ref 12–16)
MCH RBC QN AUTO: 28.7 PG (ref 27–31)
MCHC RBC AUTO-ENTMCNC: 32.4 G/DL (ref 33–36)
MCV RBC AUTO: 88.7 FL (ref 80–94)
NRBC BLD AUTO-RTO: 0 %
PLATELET # BLD AUTO: 191 X10(3)/MCL (ref 130–400)
PMV BLD AUTO: 10.5 FL (ref 7.4–10.4)
POCT GLUCOSE: 228 MG/DL (ref 70–110)
POCT GLUCOSE: 254 MG/DL (ref 70–110)
POCT GLUCOSE: 264 MG/DL (ref 70–110)
POTASSIUM SERPL-SCNC: 4 MMOL/L (ref 3.5–5.1)
PROT SERPL-MCNC: 5.6 GM/DL (ref 5.8–7.6)
RBC # BLD AUTO: 3.55 X10(6)/MCL (ref 4.2–5.4)
SODIUM SERPL-SCNC: 141 MMOL/L (ref 136–145)
WBC # SPEC AUTO: 9.98 X10(3)/MCL (ref 4.5–11.5)

## 2023-11-05 PROCEDURE — 21400001 HC TELEMETRY ROOM

## 2023-11-05 PROCEDURE — 80053 COMPREHEN METABOLIC PANEL: CPT | Performed by: PHYSICIAN ASSISTANT

## 2023-11-05 PROCEDURE — 63600175 PHARM REV CODE 636 W HCPCS: Performed by: STUDENT IN AN ORGANIZED HEALTH CARE EDUCATION/TRAINING PROGRAM

## 2023-11-05 PROCEDURE — 97163 PT EVAL HIGH COMPLEX 45 MIN: CPT

## 2023-11-05 PROCEDURE — 25000003 PHARM REV CODE 250: Performed by: INTERNAL MEDICINE

## 2023-11-05 PROCEDURE — 25000003 PHARM REV CODE 250: Performed by: PHYSICIAN ASSISTANT

## 2023-11-05 PROCEDURE — 63600175 PHARM REV CODE 636 W HCPCS: Performed by: PHYSICIAN ASSISTANT

## 2023-11-05 PROCEDURE — 25000003 PHARM REV CODE 250: Performed by: NURSE PRACTITIONER

## 2023-11-05 PROCEDURE — 27000221 HC OXYGEN, UP TO 24 HOURS

## 2023-11-05 PROCEDURE — 85027 COMPLETE CBC AUTOMATED: CPT | Performed by: PHYSICIAN ASSISTANT

## 2023-11-05 PROCEDURE — 94761 N-INVAS EAR/PLS OXIMETRY MLT: CPT

## 2023-11-05 RX ORDER — AMIODARONE HYDROCHLORIDE 200 MG/1
200 TABLET ORAL DAILY
Status: DISCONTINUED | OUTPATIENT
Start: 2023-11-11 | End: 2023-11-09 | Stop reason: HOSPADM

## 2023-11-05 RX ORDER — AMIODARONE HYDROCHLORIDE 200 MG/1
200 TABLET ORAL 2 TIMES DAILY
Status: DISCONTINUED | OUTPATIENT
Start: 2023-11-08 | End: 2023-11-09 | Stop reason: HOSPADM

## 2023-11-05 RX ORDER — AMIODARONE HYDROCHLORIDE 200 MG/1
400 TABLET ORAL 2 TIMES DAILY
Status: COMPLETED | OUTPATIENT
Start: 2023-11-05 | End: 2023-11-07

## 2023-11-05 RX ADMIN — OXYCODONE HYDROCHLORIDE 10 MG: 10 TABLET ORAL at 08:11

## 2023-11-05 RX ADMIN — AMIODARONE HYDROCHLORIDE 400 MG: 200 TABLET ORAL at 08:11

## 2023-11-05 RX ADMIN — AMLODIPINE BESYLATE 10 MG: 5 TABLET ORAL at 08:11

## 2023-11-05 RX ADMIN — MUPIROCIN: 20 OINTMENT TOPICAL at 08:11

## 2023-11-05 RX ADMIN — SUCRALFATE 1 G: 1 TABLET ORAL at 08:11

## 2023-11-05 RX ADMIN — FAMOTIDINE 20 MG: 10 INJECTION, SOLUTION INTRAVENOUS at 08:11

## 2023-11-05 RX ADMIN — DOCUSATE SODIUM 100 MG: 100 CAPSULE, LIQUID FILLED ORAL at 08:11

## 2023-11-05 RX ADMIN — FOLIC ACID 1 MG: 1 TABLET ORAL at 08:11

## 2023-11-05 RX ADMIN — KETOROLAC TROMETHAMINE 15 MG: 30 INJECTION, SOLUTION INTRAMUSCULAR; INTRAVENOUS at 05:11

## 2023-11-05 RX ADMIN — SUCRALFATE 1 G: 1 TABLET ORAL at 10:11

## 2023-11-05 RX ADMIN — INSULIN ASPART 2 UNITS: 100 INJECTION, SOLUTION INTRAVENOUS; SUBCUTANEOUS at 12:11

## 2023-11-05 RX ADMIN — EZETIMIBE 10 MG: 10 TABLET ORAL at 08:11

## 2023-11-05 RX ADMIN — SUCRALFATE 1 G: 1 TABLET ORAL at 05:11

## 2023-11-05 RX ADMIN — ENOXAPARIN SODIUM 40 MG: 40 INJECTION SUBCUTANEOUS at 05:11

## 2023-11-05 RX ADMIN — INSULIN ASPART 2 UNITS: 100 INJECTION, SOLUTION INTRAVENOUS; SUBCUTANEOUS at 05:11

## 2023-11-05 RX ADMIN — ASPIRIN 81 MG: 81 TABLET, COATED ORAL at 08:11

## 2023-11-05 RX ADMIN — INSULIN ASPART 3 UNITS: 100 INJECTION, SOLUTION INTRAVENOUS; SUBCUTANEOUS at 05:11

## 2023-11-05 RX ADMIN — METOPROLOL TARTRATE 12.5 MG: 25 TABLET, FILM COATED ORAL at 08:11

## 2023-11-05 RX ADMIN — ATORVASTATIN CALCIUM 40 MG: 40 TABLET, FILM COATED ORAL at 08:11

## 2023-11-05 NOTE — PROGRESS NOTES
Geno Barry is a 72 y.o. female patient.   1. Coronary atherosclerosis of native coronary artery    2. Atherosclerosis of native coronary artery of native heart without angina pectoris    3. Pre-op evaluation    4. Coronary artery disease    5. CAD (coronary artery disease)    6. S/P CABG (coronary artery bypass graft)      Past Medical History:   Diagnosis Date    CAD (coronary artery disease)     Diabetes mellitus     Hypertension     PAD (peripheral artery disease)      No past surgical history pertinent negatives on file.  Scheduled Meds:   amiodarone in dextrose  150 mg Intravenous Once    amLODIPine  10 mg Oral Daily    aspirin  81 mg Oral Daily    atorvastatin  40 mg Oral Daily    docusate sodium  100 mg Oral BID    enoxparin  40 mg Subcutaneous Daily    ezetimibe  10 mg Oral Daily    famotidine (PF)  20 mg Intravenous Daily    folic acid  1 mg Oral Daily    metoprolol tartrate  12.5 mg Oral BID    mupirocin   Nasal BID    sucralfate  1 g Oral QID (AC & HS)     Continuous Infusions:   clevidipine Stopped (11/02/23 2300)    dexmedeTOMIDine (Precedex) infusion (titrating) Stopped (11/02/23 2331)    dextrose 5 % and 0.45 % NaCl Stopped (11/03/23 2000)    EPINEPHrine Stopped (11/04/23 0957)    insulin regular 1 units/mL infusion orderable (CTS POST-OP) Stopped (11/03/23 1900)    loperamide      milrinone 0.25 mcg/kg/min (11/02/23 2327)     PRN Meds:acetaminophen, albumin human 5%, calcium gluconate IVPB, calcium gluconate IVPB, calcium gluconate IVPB, dextrose 10%, dextrose 10%, dextrose 10%, dextrose 10%, glucagon (human recombinant), hydrALAZINE, HYDROcodone-acetaminophen, insulin aspart U-100, ketorolac, lactulose 10 gram/15 ml, loperamide, magnesium sulfate IVPB, magnesium sulfate IVPB, metoclopramide HCl, morphine, ondansetron, oxyCODONE, potassium chloride in water, potassium chloride in water, sodium phosphate 15 mmol in dextrose 5 % (D5W) 250 mL IVPB, sodium phosphate 20.01 mmol in dextrose 5 % (D5W)  "250 mL IVPB, sodium phosphate 30 mmol in dextrose 5 % (D5W) 250 mL IVPB    Review of patient's allergies indicates:   Allergen Reactions    Ace inhibitors Swelling    Clopidogrel Swelling    Iodine      There are no hospital problems to display for this patient.    Blood pressure (!) 147/86, pulse 88, temperature 98.9 °F (37.2 °C), temperature source Oral, resp. rate (!) 28, height 5' 4" (1.626 m), weight 101 kg (222 lb 10.6 oz), SpO2 98 %, not currently breastfeeding.       Subjective:    POD #3  Awake. Alert.  Sitting up in bed  Off epi     Objective:   AFVSS. 97% on 4L NC. Wt up 5kg  Heart: RRR  Lungs: respirations nonlabored  Incision: dressed, dry  Cts: 70cc/24hrs  Cxr: stable  H/h: 10/31  Cr: 1.03     Assesment/Plan:    S/p CAB  - ambulate/PT  - IS  - ok to floor  - d/c CT                FARZAD Ledezma  11/5/2023    "

## 2023-11-05 NOTE — PLAN OF CARE
Problem: Physical Therapy  Goal: Physical Therapy Goal  Description: Goals to be met by: 12/5/23     Patient will increase functional independence with mobility by performing:    Supine to sit with Modified Gilliam  Sit to stand transfer with Modified Gilliam  Gait  x 200 feet with Modified Gilliam using Rolling Walker.     Outcome: Ongoing, Progressing

## 2023-11-05 NOTE — NURSING
Nurses Note -- 4 Eyes      11/4/2023   10:24 PM      Skin assessed during: Daily Assessment      [] No Altered Skin Integrity Present    [x]Prevention Measures Documented      [x] Yes- Altered Skin Integrity Present or Discovered   [] LDA Added if Not in Epic (Describe Wound)   [] New Altered Skin Integrity was Present on Admit and Documented in LDA   [] Wound Image Taken    Wound Care Consulted? No    Attending Nurse:  Diamond Dill RN/Staff Member:  FRANCISCA Limon    Patient has no redness/breakdown to sacrum.

## 2023-11-05 NOTE — PROGRESS NOTES
"[facility]    Cardiology  Progress Note    Patient Name: Geno Barry  MRN: [id number]  Admission Date: 11/2/2023  Hospital Length of Stay: 3 days  Code Status: Full Code   Attending Physician: Heriberto Lara IV, MD   Primary Care Physician: Delmy Montano FNP  Expected Discharge Date:   Principal Problem:<principal problem not specified>    Subjective:     Brief HPI: Ms. Barry is a 73 y/o female who is known to [facility], Dr. Vera. The patient recently was evaluate in [facility] clinic on a routine follow up, however, at this F/U Appointment she reported Progressively Worsening MICHAEL that started about 3 weeks prior to 10.9.23. She was scheduled for a PET Scan in which she underwent with results of High Risk for Future CV Events. She was evaluated in office on 10.31.23 for results and was scheduled on 11.2.23 for [facility] with Dr. Vera. 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 On 11.2.23 she underwent a CABG with results of LIMA to LAD and rSVG to Diag 1. She tolerated the procedure well and was admitted to ICU for further management.    Hospital Course:   11.3.23: NAD. Laying in Bed. Denies SOB and Palps. + CP/Incisional. No Pressors. "I want coffee."  11.4.23: NAD. "I am feeling better today." Denies SOB and Palps. + CP/Incisional. SR/ST  11.5.23: NAD. " I am good." Denies SOB and Palps. + CP/Incisional. Sitting in Bedside Chair.     PMH: PAD, DM II, CAD/CABG, YULI/Bilaterally Mild, HTN  PSH: CABG (11.2.23) LIMA to LAD and rSVG to Diag1, ZACH, R Breast Lumpectomy, Angiogram   Family History: Father, D, 62, Diastolic HF; Mother, D, 63, Breast CA; Brother, L, Colon Cancer; Sister, L, CAD/CABG  Social History: Denies Illicit Drug, ETOH and Tobacco Use; Former Smoker, 1 ppd fro 22+ Years; Quit in 1992    Previous Diagnostics:   [facility] 11.2.23:  Left Main-luminal irregularities  LAD-mid subtotal occlusion involving large diagonal  LCX-non " dominant, luminal irregularities  RCA-dominant, 40% ostial stenosis  LVEDP 18    PET 10.26.23:  This is an abnormal perfusion study.   This scan is suggestive of high risk for future cardiovascular events.   Large partially reversible perfusion abnormality of severe intensity in the anterior apical region. Medium partially reversible perfusion abnormality of severe intensity in the septal region. Medium partially reversible perfusion abnormality of severe intensity in the inferior region. Small partially reversible perfusion abnormality of severe intensity in the apical lateral segment.   The left ventricular cavity is noted to be mildly enlarged on the stress studies. The stress left ventricular ejection fraction was calculated to be 46% and left ventricular global function is mildly reduced. The rest left ventricular cavity is noted to be normal. The rest left ventricular ejection fraction was calculated to be 47% and rest left ventricular global function is mildly reduced.   Persistent hypokinesis of the anterior region and apical inferior segment is noted in both rest and stress studies. When compared to the resting ejection fraction (47%), the stress ejection fraction (46%) has decreased.   The study quality is average.   There was a rise in myocardial blood flow between rest and stress.  Global myocardial blood flow reserve was 2.25.  Myocardial blood flow reserve is reduced in the apical and spetal territories which is suggestive of flow limiting stenosis in these territories.    Carotid US 10.5.23:  The study quality is good.   1-39% stenosis in the proximal left internal carotid artery based on Bluth Criteria.   1-39% stenosis in the proximal right internal carotid artery based on Bluth Criteria.   Antegrade right vertebral artery flow.   Antegrade left vertebral artery flow.     ECHO 10.11.22:  The study quality is average.   The left ventricle is normal in size. Global left ventricular systolic function  is normal. The left ventricular ejection fraction is 55%. The left ventricle diastolic function is impaired (Grade I) with normal left atrial pressure.  Mild (1+) mitral regurgitation.   Mild calcification of the aortic valve is noted with adequate cuspal excursion.  The pulmonary artery systolic pressure is 30 mmHg.      Review of Systems   Constitutional: Negative for malaise/fatigue.   Cardiovascular:  Positive for chest pain (Incisional). Negative for claudication, irregular heartbeat, leg swelling and palpitations.   Respiratory:  Negative for shortness of breath.    All other systems reviewed and are negative.    Objective:     Vital Signs (Most Recent):  Temp: 98.7 °F (37.1 °C) (11/05/23 1600)  Pulse: 84 (11/05/23 1600)  Resp: 20 (11/05/23 1600)  BP: 125/71 (11/05/23 1600)  SpO2: 95 % (11/05/23 1600) Vital Signs (24h Range):  Temp:  [98.1 °F (36.7 °C)-98.9 °F (37.2 °C)] 98.7 °F (37.1 °C)  Pulse:  [78-92] 84  Resp:  [13-35] 20  SpO2:  [92 %-98 %] 95 %  BP: (122-157)/() 125/71     Weight: 101 kg (222 lb 10.6 oz)  Body mass index is 38.22 kg/m².    SpO2: 95 %         Intake/Output Summary (Last 24 hours) at 11/5/2023 1659  Last data filed at 11/5/2023 1619  Gross per 24 hour   Intake 960 ml   Output 1540 ml   Net -580 ml       Lines/Drains/Airways       Drain  Duration                  Chest Tube 11/02/23 1933 Mediastinal 24 Fr. 2 days    Female External Urinary Catheter 11/04/23 0830 1 day              Peripheral Intravenous Line  Duration                  Peripheral IV - Single Lumen 11/04/23 1737 20 G Anterior;Left Forearm 1 day                  Significant Labs:   Recent Results (from the past 72 hour(s))   Basic Metabolic Panel    Collection Time: 11/02/23  6:35 PM   Result Value Ref Range    Sodium Level 138 136 - 145 mmol/L    Potassium Level 3.3 (L) 3.5 - 5.1 mmol/L    Chloride 112 (H) 98 - 107 mmol/L    Carbon Dioxide 17 (L) 23 - 31 mmol/L    Glucose Level 270 (H) 82 - 115 mg/dL    Blood Urea  Nitrogen 26.6 (H) 9.8 - 20.1 mg/dL    Creatinine 0.93 0.55 - 1.02 mg/dL    BUN/Creatinine Ratio 29     Calcium Level Total 9.9 8.4 - 10.2 mg/dL    Anion Gap 9.0 mEq/L    eGFR >60 mls/min/1.73/m2   CBC with Differential    Collection Time: 11/02/23  6:35 PM   Result Value Ref Range    WBC 14.48 (H) 4.50 - 11.50 x10(3)/mcL    RBC 3.55 (L) 4.20 - 5.40 x10(6)/mcL    Hgb 10.3 (L) 12.0 - 16.0 g/dL    Hct 32.4 (L) 37.0 - 47.0 %    MCV 91.3 80.0 - 94.0 fL    MCH 29.0 27.0 - 31.0 pg    MCHC 31.8 (L) 33.0 - 36.0 g/dL    RDW 14.1 11.5 - 17.0 %    Platelet 191 130 - 400 x10(3)/mcL    MPV 9.7 7.4 - 10.4 fL    Neut % 77.2 %    Lymph % 18.6 %    Mono % 2.7 %    Eos % 0.1 %    Basophil % 0.2 %    Lymph # 2.70 0.6 - 4.6 x10(3)/mcL    Neut # 11.17 (H) 2.1 - 9.2 x10(3)/mcL    Mono # 0.39 0.1 - 1.3 x10(3)/mcL    Eos # 0.01 0 - 0.9 x10(3)/mcL    Baso # 0.03 <=0.2 x10(3)/mcL    IG# 0.18 (H) 0 - 0.04 x10(3)/mcL    IG% 1.2 %    NRBC% 0.0 %   Protime-INR    Collection Time: 11/02/23  6:35 PM   Result Value Ref Range    PT 18.9 (H) 12.5 - 14.5 seconds    INR 1.6 (H) <=1.3   APTT    Collection Time: 11/02/23  6:35 PM   Result Value Ref Range    PTT 20.0 (L) 23.2 - 33.7 seconds   ISTAT PROCEDURE    Collection Time: 11/02/23  6:54 PM   Result Value Ref Range    POC PH 7.378 7.35 - 7.45    POC PCO2 37.6 35 - 45 mmHg    POC PO2 411 (H) 80 - 100 mmHg    POC HCO3 22.1 (L) 24 - 28 mmol/L    POC BE -3 (L) -2 to 2 mmol/L    POC SATURATED O2 100 95 - 100 %    POC Glucose 296 (H) 70 - 110 mg/dL    POC Sodium 140 136 - 145 mmol/L    POC Potassium 4.7 3.5 - 5.1 mmol/L    POC TCO2 23 23 - 27 mmol/L    POC Ionized Calcium 1.01 (L) 1.06 - 1.42 mmol/L    POC Hematocrit 26 (L) 36 - 54 %PCV    POC HEMOGLOBIN 9 g/dL    Sample ARTERIAL    ISTAT PROCEDURE    Collection Time: 11/02/23  6:58 PM   Result Value Ref Range    POC PH 7.348 (L) 7.35 - 7.45    POC PCO2 38.5 35 - 45 mmHg    POC PO2 50 40 - 60 mmHg    POC HCO3 21.2 (L) 24 - 28 mmol/L    POC BE -4 (L) -2 to 2  mmol/L    POC SATURATED O2 83 95 - 100 %    POC Glucose 287 (H) 70 - 110 mg/dL    POC Sodium 141 136 - 145 mmol/L    POC Potassium 4.6 3.5 - 5.1 mmol/L    POC TCO2 22 (L) 24 - 29 mmol/L    POC Ionized Calcium 1.05 (L) 1.06 - 1.42 mmol/L    POC Hematocrit 28 (L) 36 - 54 %PCV    POC HEMOGLOBIN 10 g/dL    Sample VENOUS    ISTAT PROCEDURE    Collection Time: 11/02/23  8:08 PM   Result Value Ref Range    POC PH 7.359 7.35 - 7.45    POC PCO2 32.8 (L) 35 - 45 mmHg    POC PO2 279 (H) 80 - 100 mmHg    POC HCO3 18.5 (L) 24 - 28 mmol/L    POC BE -7 (L) -2 to 2 mmol/L    POC SATURATED O2 100 95 - 100 %    POC Glucose 264 (H) 70 - 110 mg/dL    POC Sodium 142 136 - 145 mmol/L    POC Potassium 3.3 (L) 3.5 - 5.1 mmol/L    POC TCO2 19 (L) 23 - 27 mmol/L    POC Ionized Calcium 1.42 1.06 - 1.42 mmol/L    POC Hematocrit 29 (L) 36 - 54 %PCV    POC HEMOGLOBIN 10 g/dL    Sample ARTERIAL    POCT glucose    Collection Time: 11/02/23  8:49 PM   Result Value Ref Range    POCT Glucose 188 (H) 70 - 110 mg/dL   Protime-INR    Collection Time: 11/02/23  9:36 PM   Result Value Ref Range    PT 18.4 (H) 12.5 - 14.5 seconds    INR 1.6 (H) <=1.3   APTT    Collection Time: 11/02/23  9:36 PM   Result Value Ref Range    PTT 29.0 23.2 - 33.7 seconds   RT Blood Gas    Collection Time: 11/02/23  9:38 PM   Result Value Ref Range    Sample Type Arterial Blood     Sample site Arterial Line     Drawn by J Race RRT     pH, Blood gas 7.340 (L) 7.350 - 7.450    pCO2, Blood gas 41.0 35.0 - 45.0 mmHg    pO2, Blood gas 71.0 (L) 80.0 - 100.0 mmHg    Sodium, Blood Gas 139 137 - 145 mmol/L    Potassium, Blood Gas 2.9 (L) 3.5 - 5.0 mmol/L    Calcium Level Ionized 1.38 (H) 1.12 - 1.23 mmol/L    TOC2, Blood gas 23.4 mmol/L    Base Excess, Blood gas -3.50 (L) -2.00 - 2.00 mmol/L    sO2, Blood gas 92.9 %    HCO3, Blood gas 22.1 22.0 - 26.0 mmol/L    THb, Blood gas 11.0 (L) 12 - 16 g/dL    O2 Hb, Blood Gas 93.7 (L) 94.0 - 97.0 %    CO Hgb 1.6 (H) 0.5 - 1.5 %    Met Hgb 0.7  0.4 - 1.5 %    Allens Test N/A     MODE SIMV     Oxygen Device, Blood gas Ventilator     FIO2, Blood gas 60 %    Mech Vt 450 ml    Mech RR 18 b/min    PEEP 5.0 cmH2O    PS 10.0 cmH2O   POCT glucose    Collection Time: 11/02/23 10:13 PM   Result Value Ref Range    POCT Glucose 131 (H) 70 - 110 mg/dL   POCT glucose    Collection Time: 11/02/23 11:17 PM   Result Value Ref Range    POCT Glucose 94 70 - 110 mg/dL   POCT glucose    Collection Time: 11/03/23 12:20 AM   Result Value Ref Range    POCT Glucose 111 (H) 70 - 110 mg/dL   Magnesium    Collection Time: 11/03/23 12:27 AM   Result Value Ref Range    Magnesium Level 2.30 1.60 - 2.60 mg/dL   Phosphorus    Collection Time: 11/03/23 12:27 AM   Result Value Ref Range    Phosphorus Level 2.7 2.3 - 4.7 mg/dL   Lipid Panel    Collection Time: 11/03/23 12:27 AM   Result Value Ref Range    Cholesterol Total 129 <=200 mg/dL    HDL Cholesterol 38 35 - 60 mg/dL    Triglyceride 42 37 - 140 mg/dL    Cholesterol/HDL Ratio 3 0 - 5    Very Low Density Lipoprotein 8     LDL Cholesterol 83.00 50.00 - 140.00 mg/dL   Comprehensive Metabolic Panel    Collection Time: 11/03/23 12:27 AM   Result Value Ref Range    Sodium Level 143 136 - 145 mmol/L    Potassium Level 4.3 3.5 - 5.1 mmol/L    Chloride 114 (H) 98 - 107 mmol/L    Carbon Dioxide 22 (L) 23 - 31 mmol/L    Glucose Level 141 (H) 82 - 115 mg/dL    Blood Urea Nitrogen 22.7 (H) 9.8 - 20.1 mg/dL    Creatinine 0.79 0.55 - 1.02 mg/dL    Calcium Level Total 9.2 8.4 - 10.2 mg/dL    Protein Total 5.4 (L) 5.8 - 7.6 gm/dL    Albumin Level 3.1 (L) 3.4 - 4.8 g/dL    Globulin 2.3 (L) 2.4 - 3.5 gm/dL    Albumin/Globulin Ratio 1.3 1.1 - 2.0 ratio    Bilirubin Total 0.7 <=1.5 mg/dL    Alkaline Phosphatase 50 40 - 150 unit/L    Alanine Aminotransferase 13 0 - 55 unit/L    Aspartate Aminotransferase 16 5 - 34 unit/L    eGFR >60 mls/min/1.73/m2   CBC with Differential    Collection Time: 11/03/23 12:27 AM   Result Value Ref Range    WBC 12.83 (H) 4.50 -  11.50 x10(3)/mcL    RBC 3.57 (L) 4.20 - 5.40 x10(6)/mcL    Hgb 10.2 (L) 12.0 - 16.0 g/dL    Hct 31.3 (L) 37.0 - 47.0 %    MCV 87.7 80.0 - 94.0 fL    MCH 28.6 27.0 - 31.0 pg    MCHC 32.6 (L) 33.0 - 36.0 g/dL    RDW 13.9 11.5 - 17.0 %    Platelet 190 130 - 400 x10(3)/mcL    MPV 9.8 7.4 - 10.4 fL    Neut % 69.7 %    Lymph % 17.0 %    Mono % 12.4 %    Eos % 0.0 %    Basophil % 0.2 %    Lymph # 2.18 0.6 - 4.6 x10(3)/mcL    Neut # 8.94 2.1 - 9.2 x10(3)/mcL    Mono # 1.59 (H) 0.1 - 1.3 x10(3)/mcL    Eos # 0.00 0 - 0.9 x10(3)/mcL    Baso # 0.03 <=0.2 x10(3)/mcL    IG# 0.09 (H) 0 - 0.04 x10(3)/mcL    IG% 0.7 %    NRBC% 0.0 %   POCT glucose    Collection Time: 11/03/23  2:15 AM   Result Value Ref Range    POCT Glucose 175 (H) 70 - 110 mg/dL   POCT glucose    Collection Time: 11/03/23  4:05 AM   Result Value Ref Range    POCT Glucose 185 (H) 70 - 110 mg/dL   RT Blood Gas    Collection Time: 11/03/23  4:55 AM   Result Value Ref Range    Sample Type Arterial Blood     Sample site Arterial Line     Drawn by J Race RRT     pH, Blood gas 7.380 7.350 - 7.450    pCO2, Blood gas 45.0 35.0 - 45.0 mmHg    pO2, Blood gas 82.0 80.0 - 100.0 mmHg    Sodium, Blood Gas 135 (L) 137 - 145 mmol/L    Potassium, Blood Gas 4.2 3.5 - 5.0 mmol/L    Calcium Level Ionized 1.25 (H) 1.12 - 1.23 mmol/L    TOC2, Blood gas 28.0 mmol/L    Base Excess, Blood gas 1.10 -2.00 - 2.00 mmol/L    sO2, Blood gas 96.0 %    HCO3, Blood gas 26.6 (H) 22.0 - 26.0 mmol/L    Allens Test N/A     MODE CPAP     Oxygen Device, Blood gas Ventilator     FIO2, Blood gas 30 %    PEEP 5.0 cmH2O    PS 10.0 cmH2O   Basic Metabolic Panel    Collection Time: 11/03/23  6:15 AM   Result Value Ref Range    Sodium Level 142 136 - 145 mmol/L    Potassium Level 4.0 3.5 - 5.1 mmol/L    Chloride 111 (H) 98 - 107 mmol/L    Carbon Dioxide 22 (L) 23 - 31 mmol/L    Glucose Level 239 (H) 82 - 115 mg/dL    Blood Urea Nitrogen 22.5 (H) 9.8 - 20.1 mg/dL    Creatinine 0.91 0.55 - 1.02 mg/dL     BUN/Creatinine Ratio 25     Calcium Level Total 9.0 8.4 - 10.2 mg/dL    Anion Gap 9.0 mEq/L    eGFR >60 mls/min/1.73/m2   CBC with Differential    Collection Time: 11/03/23  6:15 AM   Result Value Ref Range    WBC 12.97 (H) 4.50 - 11.50 x10(3)/mcL    RBC 3.75 (L) 4.20 - 5.40 x10(6)/mcL    Hgb 10.8 (L) 12.0 - 16.0 g/dL    Hct 33.6 (L) 37.0 - 47.0 %    MCV 89.6 80.0 - 94.0 fL    MCH 28.8 27.0 - 31.0 pg    MCHC 32.1 (L) 33.0 - 36.0 g/dL    RDW 14.2 11.5 - 17.0 %    Platelet 246 130 - 400 x10(3)/mcL    MPV 10.1 7.4 - 10.4 fL    Neut % 69.8 %    Lymph % 22.6 %    Mono % 6.9 %    Eos % 0.0 %    Basophil % 0.2 %    Lymph # 2.93 0.6 - 4.6 x10(3)/mcL    Neut # 9.05 2.1 - 9.2 x10(3)/mcL    Mono # 0.90 0.1 - 1.3 x10(3)/mcL    Eos # 0.00 0 - 0.9 x10(3)/mcL    Baso # 0.02 <=0.2 x10(3)/mcL    IG# 0.07 (H) 0 - 0.04 x10(3)/mcL    IG% 0.5 %    NRBC% 0.0 %   POCT glucose    Collection Time: 11/03/23  6:44 AM   Result Value Ref Range    POCT Glucose 187 (H) 70 - 110 mg/dL   POCT glucose    Collection Time: 11/03/23  7:47 AM   Result Value Ref Range    POCT Glucose 193 (H) 70 - 110 mg/dL   POCT glucose    Collection Time: 11/03/23  9:35 AM   Result Value Ref Range    POCT Glucose 163 (H) 70 - 110 mg/dL   POCT glucose    Collection Time: 11/03/23 10:53 AM   Result Value Ref Range    POCT Glucose 169 (H) 70 - 110 mg/dL   POCT glucose    Collection Time: 11/03/23 12:01 PM   Result Value Ref Range    POCT Glucose 221 (H) 70 - 110 mg/dL   POCT glucose    Collection Time: 11/03/23  1:11 PM   Result Value Ref Range    POCT Glucose 270 (H) 70 - 110 mg/dL   POCT glucose    Collection Time: 11/03/23  1:46 PM   Result Value Ref Range    POCT Glucose 244 (H) 70 - 110 mg/dL   POCT glucose    Collection Time: 11/03/23  3:31 PM   Result Value Ref Range    POCT Glucose 213 (H) 70 - 110 mg/dL   POCT glucose    Collection Time: 11/03/23  3:55 PM   Result Value Ref Range    POCT Glucose 221 (H) 70 - 110 mg/dL   POCT glucose    Collection Time: 11/03/23   5:26 PM   Result Value Ref Range    POCT Glucose 119 (H) 70 - 110 mg/dL   POCT glucose    Collection Time: 11/03/23  7:23 PM   Result Value Ref Range    POCT Glucose 121 (H) 70 - 110 mg/dL   POCT glucose    Collection Time: 11/03/23  8:22 PM   Result Value Ref Range    POCT Glucose 156 (H) 70 - 110 mg/dL   Magnesium    Collection Time: 11/04/23  1:30 AM   Result Value Ref Range    Magnesium Level 1.90 1.60 - 2.60 mg/dL   Comprehensive Metabolic Panel    Collection Time: 11/04/23  1:30 AM   Result Value Ref Range    Sodium Level 140 136 - 145 mmol/L    Potassium Level 3.5 3.5 - 5.1 mmol/L    Chloride 108 (H) 98 - 107 mmol/L    Carbon Dioxide 18 (L) 23 - 31 mmol/L    Glucose Level 240 (H) 82 - 115 mg/dL    Blood Urea Nitrogen 24.1 (H) 9.8 - 20.1 mg/dL    Creatinine 1.23 (H) 0.55 - 1.02 mg/dL    Calcium Level Total 8.6 8.4 - 10.2 mg/dL    Protein Total 5.3 (L) 5.8 - 7.6 gm/dL    Albumin Level 3.2 (L) 3.4 - 4.8 g/dL    Globulin 2.1 (L) 2.4 - 3.5 gm/dL    Albumin/Globulin Ratio 1.5 1.1 - 2.0 ratio    Bilirubin Total 0.4 <=1.5 mg/dL    Alkaline Phosphatase 55 40 - 150 unit/L    Alanine Aminotransferase 12 0 - 55 unit/L    Aspartate Aminotransferase 17 5 - 34 unit/L    eGFR 46 mls/min/1.73/m2   CBC Without Differential    Collection Time: 11/04/23  1:30 AM   Result Value Ref Range    WBC 11.76 (H) 4.50 - 11.50 x10(3)/mcL    RBC 3.46 (L) 4.20 - 5.40 x10(6)/mcL    Hgb 10.0 (L) 12.0 - 16.0 g/dL    Hct 30.8 (L) 37.0 - 47.0 %    MCV 89.0 80.0 - 94.0 fL    MCH 28.9 27.0 - 31.0 pg    MCHC 32.5 (L) 33.0 - 36.0 g/dL    RDW 14.8 11.5 - 17.0 %    Platelet 235 130 - 400 x10(3)/mcL    MPV 10.6 (H) 7.4 - 10.4 fL    NRBC% 0.0 %   POCT glucose    Collection Time: 11/04/23  5:21 AM   Result Value Ref Range    POCT Glucose 304 (H) 70 - 110 mg/dL   POCT glucose    Collection Time: 11/04/23 11:44 AM   Result Value Ref Range    POCT Glucose 246 (H) 70 - 110 mg/dL   POCT glucose    Collection Time: 11/04/23  5:20 PM   Result Value Ref Range     POCT Glucose 261 (H) 70 - 110 mg/dL   POCT glucose    Collection Time: 11/04/23  8:22 PM   Result Value Ref Range    POCT Glucose 285 (H) 70 - 110 mg/dL   CBC Without Differential    Collection Time: 11/05/23  1:48 AM   Result Value Ref Range    WBC 9.98 4.50 - 11.50 x10(3)/mcL    RBC 3.55 (L) 4.20 - 5.40 x10(6)/mcL    Hgb 10.2 (L) 12.0 - 16.0 g/dL    Hct 31.5 (L) 37.0 - 47.0 %    MCV 88.7 80.0 - 94.0 fL    MCH 28.7 27.0 - 31.0 pg    MCHC 32.4 (L) 33.0 - 36.0 g/dL    RDW 15.2 11.5 - 17.0 %    Platelet 191 130 - 400 x10(3)/mcL    MPV 10.5 (H) 7.4 - 10.4 fL    NRBC% 0.0 %   Comprehensive Metabolic Panel    Collection Time: 11/05/23  1:48 AM   Result Value Ref Range    Sodium Level 141 136 - 145 mmol/L    Potassium Level 4.0 3.5 - 5.1 mmol/L    Chloride 109 (H) 98 - 107 mmol/L    Carbon Dioxide 21 (L) 23 - 31 mmol/L    Glucose Level 249 (H) 82 - 115 mg/dL    Blood Urea Nitrogen 24.3 (H) 9.8 - 20.1 mg/dL    Creatinine 1.03 (H) 0.55 - 1.02 mg/dL    Calcium Level Total 8.4 8.4 - 10.2 mg/dL    Protein Total 5.6 (L) 5.8 - 7.6 gm/dL    Albumin Level 3.0 (L) 3.4 - 4.8 g/dL    Globulin 2.6 2.4 - 3.5 gm/dL    Albumin/Globulin Ratio 1.2 1.1 - 2.0 ratio    Bilirubin Total 0.7 <=1.5 mg/dL    Alkaline Phosphatase 67 40 - 150 unit/L    Alanine Aminotransferase 9 0 - 55 unit/L    Aspartate Aminotransferase 18 5 - 34 unit/L    eGFR 57 mls/min/1.73/m2   POCT glucose    Collection Time: 11/05/23  6:41 AM   Result Value Ref Range    POCT Glucose 228 (H) 70 - 110 mg/dL     Telemetry: SR    Physical Exam  Constitutional:       General: She is not in acute distress.     Appearance: Normal appearance.   HENT:      Head: Normocephalic.      Mouth/Throat:      Mouth: Mucous membranes are moist.   Eyes:      Extraocular Movements: Extraocular movements intact.      Conjunctiva/sclera: Conjunctivae normal.   Cardiovascular:      Rate and Rhythm: Normal rate and regular rhythm.      Pulses: Normal pulses.      Heart sounds: Normal heart sounds.  No murmur heard.  Pulmonary:      Effort: Pulmonary effort is normal.      Breath sounds: Normal breath sounds.   Abdominal:      Palpations: Abdomen is soft.   Musculoskeletal:         General: No swelling. Normal range of motion.   Skin:     General: Skin is warm and dry.      Comments: Midline Sternotomy SARAH with No Sign of Bleed/Infection   Neurological:      General: No focal deficit present.      Mental Status: She is alert and oriented to person, place, and time.   Psychiatric:         Mood and Affect: Mood normal.         Behavior: Behavior normal.         Judgment: Judgment normal.       Current Inpatient Medications:    Current Facility-Administered Medications:     acetaminophen oral solution 650 mg, 650 mg, Per OG tube, Q6H PRN, Bi Verduzco PA-C    albumin human 5% bottle 12.5 g, 12.5 g, Intravenous, PRN, Bi Verduzco PA-C, Last Rate: 250 mL/hr at 11/03/23 1128, Rate Verify at 11/03/23 1128    amiodarone in dextrose 150 mg/100 mL (1.5 mg/mL) loading dose 150 mg, 150 mg, Intravenous, Once, Francisco Porter ANP    amLODIPine tablet 10 mg, 10 mg, Oral, Daily, Puma Vera MD, 10 mg at 11/05/23 0824    aspirin EC tablet 81 mg, 81 mg, Oral, Daily, Puma Vera MD, 81 mg at 11/05/23 0824    atorvastatin tablet 40 mg, 40 mg, Oral, Daily, Francisco Porter ANP, 40 mg at 11/05/23 0824    calcium gluconate 1 g in NS IVPB (premixed), 1 g, Intravenous, PRN, Bi Verduzco PA-C    calcium gluconate 1 g in NS IVPB (premixed), 2 g, Intravenous, PRN, Bi Verduzco PA-C    calcium gluconate 1 g in NS IVPB (premixed), 3 g, Intravenous, PRN, Bi Verduzco PA-C    dextrose 10% bolus 125 mL 125 mL, 12.5 g, Intravenous, PRN, Heriberto Lara IV, MD    dextrose 10% bolus 125 mL 125 mL, 12.5 g, Intravenous, PRN, Cisco Farrell MD    dextrose 10% bolus 250 mL 250 mL, 25 g, Intravenous, PRN, Heriberto Lara IV, MD    dextrose 10% bolus 250 mL 250 mL, 25 g, Intravenous, PRN, Cisco Farrell  MD DIANA    dextrose 5 % and 0.45 % NaCl infusion, , Intravenous, Continuous, Heriberto Lara IV, MD, Stopped at 11/03/23 2000    docusate sodium capsule 100 mg, 100 mg, Oral, BID, Bi Verduzco PA-C, 100 mg at 11/05/23 0824    enoxaparin injection 40 mg, 40 mg, Subcutaneous, Daily, Bi Verduzco PA-C, 40 mg at 11/04/23 1723    ezetimibe tablet 10 mg, 10 mg, Oral, Daily, Puma Vera MD, 10 mg at 11/05/23 0824    famotidine (PF) injection 20 mg, 20 mg, Intravenous, Daily, Bi Verduzco PA-C, 20 mg at 11/05/23 0824    folic acid tablet 1 mg, 1 mg, Oral, Daily, Bi Verduzco PA-C, 1 mg at 11/05/23 0824    glucagon (human recombinant) injection 1 mg, 1 mg, Intramuscular, PRN, Cisco Farrell MD    hydrALAZINE injection 10 mg, 10 mg, Intravenous, Q4H PRN, Puma Vera MD    HYDROcodone-acetaminophen 5-325 mg per tablet 1 tablet, 1 tablet, Oral, Q4H PRN, Heriberto Lara IV, MD    insulin aspart U-100 injection 0-5 Units, 0-5 Units, Subcutaneous, Q6H PRN, Cisco Farrell MD, 2 Units at 11/05/23 1223    lactulose 10 gram/15 ml solution 20 g, 20 g, Oral, Q6H PRN, Bi Verduzco PA-C    loperamide capsule 2 mg, 2 mg, Oral, Continuous PRN, Bi Verduzco PA-C    magnesium sulfate 2g in water 50mL IVPB (premix), 2 g, Intravenous, PRN, Bi Verduzco PA-C    magnesium sulfate 2g in water 50mL IVPB (premix), 4 g, Intravenous, PRN, Bi Verduzco PA-C    metoclopramide HCl injection 5 mg, 5 mg, Intravenous, Q6H PRN, Bi Verduzco PA-C    metoprolol tartrate (LOPRESSOR) split tablet 12.5 mg, 12.5 mg, Oral, BID, Bi Verduzco PA-C, 12.5 mg at 11/05/23 0824    morphine injection 4 mg, 4 mg, Intravenous, Q4H PRN, Bi Verduzco PA-C, 4 mg at 11/03/23 0004    mupirocin 2 % ointment, , Nasal, BID, Bi Verduzco PA-C, Given at 11/05/23 0824    ondansetron injection 4 mg, 4 mg, Intravenous, Q4H PRN, Bi Verduzco PA-C    oxyCODONE immediate release tablet Tab 10 mg, 10  mg, Oral, Q4H PRN, Bi Verduzco PA-C    potassium chloride 20 mEq in 100 mL IVPB (FOR CENTRAL LINE ADMINISTRATION ONLY), 20 mEq, Intravenous, PRN, Bi Verduzco PA-C, Stopped at 11/04/23 1117    potassium chloride 20 mEq in 100 mL IVPB (FOR CENTRAL LINE ADMINISTRATION ONLY), 40 mEq, Intravenous, PRN, Bi Verduzco PA-C    sodium phosphate 15 mmol in dextrose 5 % (D5W) 250 mL IVPB, 15 mmol, Intravenous, PRN, Bi Verduzco, PA-C    sodium phosphate 20.01 mmol in dextrose 5 % (D5W) 250 mL IVPB, 20.01 mmol, Intravenous, PRN, Bi Verduzco, PA-C    sodium phosphate 30 mmol in dextrose 5 % (D5W) 250 mL IVPB, 30 mmol, Intravenous, PRN, Bi Verduzco PA-C    sucralfate tablet 1 g, 1 g, Oral, QID (AC & HS), Bi Verduzco PA-C, 1 g at 11/05/23 1009    VTE Risk Mitigation (From admission, onward)           Ordered     enoxaparin injection 40 mg  Daily         11/02/23 2120     Place sequential compression device  Until discontinued         11/02/23 2120     IP VTE HIGH RISK PATIENT  Once         11/02/23 1724     Place sequential compression device  Until discontinued         11/02/23 1018                  Assessment:   Abnormal PET Scan     - s/p CABG (11.2.23) - LIMA to LAD and rSVG to Diag 1    - s/p LHC (11.2.23) - Left main-luminal irregularities; LAD-mid subtotal occlusion involving large diagonal; LCX-non dominant, luminal irregularities; RCA-dominant, 40% ostial stenosis  MVCAD s/p CABG  Post Operative PAF with RVR - Now SR/ST    - CHADsVASc - 3 Points - 3.2% Stroke Risk per Year   YULI/Bilaterally Mild  Hypotension requiring Pressors - Resolved    - Hx of HTN  DM II  PAD/Interventions   Obesity  No Hx of GIB     Plan:   Continue ASA, Statin, Norvasc and BB  D/C IV Amiodarone and Start Oral Amiodarone Load: Amiodarone 400mg PO BID x 3 Days then 200mg PO BID x 3 Days then 200mg PO Daily there after   Start DOAC when OK with CV Surgery Re Stroke Risk Reduction   Aggressive Mobilization of PT and  Q1HR IS  Labs in AM: CBC, CMP and Mg    OKSANA Rosa  Cardiology  [facility]  11/05/2023     Physician addendum:  I have seen and examined this patient as a split-shared visit with the ANDERS d/t complicated medical management of above problems written in assessment and high acuity requiring physician expertise in medical decision-making. I performed the substantive portion of the history and exam. Above medical decision-making is also formulated by me.    Cardiovascular exam:  S1, S2  Lungs:  Clear to auscultation  Extremities:  1+ edema bilaterally    Plan:  Start oral amiodarone taper.  Oral anticoagulation with Eliquis once okay with surgery.  Continue supportive therapy.      Fuad Barragan MD  Cardiologist

## 2023-11-05 NOTE — PLAN OF CARE
Problem: Adult Inpatient Plan of Care  Goal: Plan of Care Review  Outcome: Ongoing, Progressing  Flowsheets (Taken 11/4/2023 2223)  Plan of Care Reviewed With: patient  Goal: Patient-Specific Goal (Individualized)  Outcome: Ongoing, Progressing  Goal: Absence of Hospital-Acquired Illness or Injury  Outcome: Ongoing, Progressing  Goal: Optimal Comfort and Wellbeing  Outcome: Ongoing, Progressing  Goal: Readiness for Transition of Care  Outcome: Ongoing, Progressing     Problem: Infection  Goal: Absence of Infection Signs and Symptoms  Outcome: Ongoing, Progressing     Problem: Nutrition Impairment (Mechanical Ventilation, Invasive)  Goal: Optimal Nutrition Delivery  Outcome: Ongoing, Progressing     Problem: Skin and Tissue Injury (Mechanical Ventilation, Invasive)  Goal: Absence of Device-Related Skin and Tissue Injury  Outcome: Ongoing, Progressing     Problem: Ventilator-Induced Lung Injury (Mechanical Ventilation, Invasive)  Goal: Absence of Ventilator-Induced Lung Injury  Outcome: Ongoing, Progressing     Problem: Skin and Tissue Injury (Artificial Airway)  Goal: Absence of Device-Related Skin or Tissue Injury  Outcome: Ongoing, Progressing     Problem: Fall Injury Risk  Goal: Absence of Fall and Fall-Related Injury  Outcome: Ongoing, Progressing     Problem: Skin Injury Risk Increased  Goal: Skin Health and Integrity  Outcome: Ongoing, Progressing

## 2023-11-05 NOTE — PT/OT/SLP EVAL
Physical Therapy Evaluation    Patient Name:  Geno Barry   MRN:  [id number]    Recommendations:     Discharge therapy intensity: high intensity   Discharge Equipment Recommendations: to be determined by next level of care   Barriers to discharge: Impaired mobility and Ongoing medical needs    Assessment:     Geno Barry is a 72 y.o. female admitted with a medical diagnosis of <principal problem not specified>.  She presents with the following impairments/functional limitations: weakness, impaired endurance, impaired functional mobility, gait instability .    Rehab Prognosis: Good; patient would benefit from acute skilled PT services to address these deficits and reach maximum level of function.    Recent Surgery: Procedure(s) (LRB):  CORONARY ARTERY BYPASS GRAFT (CABG) (N/A)  SURGICAL PROCUREMENT, VEIN, ENDOSCOPIC (N/A) 3 Days Post-Op    Plan:     During this hospitalization, patient to be seen 6 x/week to address the identified rehab impairments via gait training, therapeutic activities, therapeutic exercises, neuromuscular re-education and progress toward the following goals:    Plan of Care Expires:  12/05/23    Subjective     Chief Complaint: Feels weak  Patient/Family Comments/goals: To get better and go home  Pain/Comfort:       Patients cultural, spiritual, Church conflicts given the current situation: no    Living Environment:  Plans to go live with her cousin, previously had a caretaker during the day but is unsure of the arrangements now with sx. [facility] without stairs.   Prior to admission, patients level of function was independent with ADLs.  Equipment used at home: rollator, cane, straight.  DME owned (not currently used): none.  Upon discharge, patient will have assistance from her family.    Objective:     Communicated with nurse prior to session.  Patient found up in chair with telemetry, oxygen, pulse ox (continuous), blood pressure cuff  upon PT entry to room.    General Precautions:  Standard, sternal  Orthopedic Precautions:    Braces:    Respiratory Status: Nasal cannula, flow 3 L/min  Blood Pressure: 139/74   HR: 84  Sp02: 96%      Exams:  RLE ROM: WFL  RLE Strength: WFL  LLE ROM: WFL  LLE Strength: WFL  Skin integrity: Visible skin intact      Functional Mobility:  Transfers:     Sit to Stand:  moderate assistance with hand-held assist  Gait: Pt ambulates with min A and RW for 60 ft. Assistance required with turning in order to maintain base of support.      AM-PAC 6 CLICK MOBILITY  Total Score:        Treatment & Education:      Patient provided with verbal education education regarding POC.  Understanding was verbalized.     Patient left up in chair with all lines intact, call button in reach, and nurse notified.    GOALS:   Multidisciplinary Problems       Physical Therapy Goals          Problem: Physical Therapy    Goal Priority Disciplines Outcome Goal Variances Interventions   Physical Therapy Goal     PT, PT/OT Ongoing, Progressing     Description: Goals to be met by: 12/5/23     Patient will increase functional independence with mobility by performing:    Supine to sit with Modified [place]  Sit to stand transfer with Modified [place]  Gait  x 200 feet with Modified [place] using Rolling Walker.                          History:     Past Medical History:   Diagnosis Date    CAD (coronary artery disease)     Diabetes mellitus     Hypertension     PAD (peripheral artery disease)        Past Surgical History:   Procedure Laterality Date    HYSTERECTOMY      LEFT HEART CATHETERIZATION Left 11/2/2023    Procedure: Left heart cath;  Surgeon: Puma Vear MD;  Location: [facility] CATH LAB;  Service: Cardiology;  Laterality: Left;  LHC +/- PCI VIA RRA    LUMPECTOMY, BREAST Right        Time Tracking:     PT Received On: 11/05/23  PT Start Time: 1220     PT Stop Time: 1245  PT Total Time (min): 25 min     Billable Minutes: Evaluation 25      11/05/2023

## 2023-11-06 LAB
ABO + RH BLD: NORMAL
ALBUMIN SERPL-MCNC: 2.8 G/DL (ref 3.4–4.8)
ALBUMIN/GLOB SERPL: 1 RATIO (ref 1.1–2)
ALP SERPL-CCNC: 82 UNIT/L (ref 40–150)
ALT SERPL-CCNC: 15 UNIT/L (ref 0–55)
AST SERPL-CCNC: 23 UNIT/L (ref 5–34)
BASOPHILS # BLD AUTO: 0.03 X10(3)/MCL
BASOPHILS NFR BLD AUTO: 0.3 %
BILIRUB SERPL-MCNC: 0.6 MG/DL
BLD PROD TYP BPU: NORMAL
BLOOD UNIT EXPIRATION DATE: NORMAL
BLOOD UNIT TYPE CODE: 5100
BUN SERPL-MCNC: 19.5 MG/DL (ref 9.8–20.1)
CALCIUM SERPL-MCNC: 8.2 MG/DL (ref 8.4–10.2)
CHLORIDE SERPL-SCNC: 111 MMOL/L (ref 98–107)
CO2 SERPL-SCNC: 22 MMOL/L (ref 23–31)
CREAT SERPL-MCNC: 0.82 MG/DL (ref 0.55–1.02)
CROSSMATCH INTERPRETATION: NORMAL
DISPENSE STATUS: NORMAL
EOSINOPHIL # BLD AUTO: 0.12 X10(3)/MCL (ref 0–0.9)
EOSINOPHIL NFR BLD AUTO: 1.2 %
ERYTHROCYTE [DISTWIDTH] IN BLOOD BY AUTOMATED COUNT: 15.5 % (ref 11.5–17)
GFR SERPLBLD CREATININE-BSD FMLA CKD-EPI: >60 MLS/MIN/1.73/M2
GLOBULIN SER-MCNC: 2.8 GM/DL (ref 2.4–3.5)
GLUCOSE SERPL-MCNC: 219 MG/DL (ref 82–115)
HCT VFR BLD AUTO: 32 % (ref 37–47)
HGB BLD-MCNC: 10.2 G/DL (ref 12–16)
IMM GRANULOCYTES # BLD AUTO: 0.04 X10(3)/MCL (ref 0–0.04)
IMM GRANULOCYTES NFR BLD AUTO: 0.4 %
LYMPHOCYTES # BLD AUTO: 1.71 X10(3)/MCL (ref 0.6–4.6)
LYMPHOCYTES NFR BLD AUTO: 17.1 %
MAGNESIUM SERPL-MCNC: 2.4 MG/DL (ref 1.6–2.6)
MCH RBC QN AUTO: 28.8 PG (ref 27–31)
MCHC RBC AUTO-ENTMCNC: 31.9 G/DL (ref 33–36)
MCV RBC AUTO: 90.4 FL (ref 80–94)
MONOCYTES # BLD AUTO: 0.82 X10(3)/MCL (ref 0.1–1.3)
MONOCYTES NFR BLD AUTO: 8.2 %
NEUTROPHILS # BLD AUTO: 7.26 X10(3)/MCL (ref 2.1–9.2)
NEUTROPHILS NFR BLD AUTO: 72.8 %
NRBC BLD AUTO-RTO: 0 %
PLATELET # BLD AUTO: 208 X10(3)/MCL (ref 130–400)
PMV BLD AUTO: 10.5 FL (ref 7.4–10.4)
POCT GLUCOSE: 114 MG/DL (ref 70–110)
POCT GLUCOSE: 214 MG/DL (ref 70–110)
POCT GLUCOSE: 240 MG/DL (ref 70–110)
POCT GLUCOSE: 419 MG/DL (ref 70–110)
POTASSIUM SERPL-SCNC: 4.2 MMOL/L (ref 3.5–5.1)
PROT SERPL-MCNC: 5.6 GM/DL (ref 5.8–7.6)
RBC # BLD AUTO: 3.54 X10(6)/MCL (ref 4.2–5.4)
SODIUM SERPL-SCNC: 142 MMOL/L (ref 136–145)
UNIT NUMBER: NORMAL
WBC # SPEC AUTO: 9.98 X10(3)/MCL (ref 4.5–11.5)

## 2023-11-06 PROCEDURE — 25000003 PHARM REV CODE 250: Performed by: SPECIALIST

## 2023-11-06 PROCEDURE — S5010 5% DEXTROSE AND 0.45% SALINE: HCPCS | Performed by: SPECIALIST

## 2023-11-06 PROCEDURE — 83735 ASSAY OF MAGNESIUM: CPT | Performed by: NURSE PRACTITIONER

## 2023-11-06 PROCEDURE — 63600175 PHARM REV CODE 636 W HCPCS: Performed by: STUDENT IN AN ORGANIZED HEALTH CARE EDUCATION/TRAINING PROGRAM

## 2023-11-06 PROCEDURE — 11000001 HC ACUTE MED/SURG PRIVATE ROOM

## 2023-11-06 PROCEDURE — 63600175 PHARM REV CODE 636 W HCPCS: Performed by: INTERNAL MEDICINE

## 2023-11-06 PROCEDURE — 63600175 PHARM REV CODE 636 W HCPCS: Performed by: PHYSICIAN ASSISTANT

## 2023-11-06 PROCEDURE — 99024 POSTOP FOLLOW-UP VISIT: CPT | Mod: POP,,, | Performed by: PHYSICIAN ASSISTANT

## 2023-11-06 PROCEDURE — 25000003 PHARM REV CODE 250: Performed by: PHYSICIAN ASSISTANT

## 2023-11-06 PROCEDURE — 25000003 PHARM REV CODE 250: Performed by: NURSE PRACTITIONER

## 2023-11-06 PROCEDURE — 85025 COMPLETE CBC W/AUTO DIFF WBC: CPT | Performed by: NURSE PRACTITIONER

## 2023-11-06 PROCEDURE — 99024 PR POST-OP FOLLOW-UP VISIT: ICD-10-PCS | Mod: POP,,, | Performed by: PHYSICIAN ASSISTANT

## 2023-11-06 PROCEDURE — 21400001 HC TELEMETRY ROOM

## 2023-11-06 PROCEDURE — 97530 THERAPEUTIC ACTIVITIES: CPT

## 2023-11-06 PROCEDURE — 80053 COMPREHEN METABOLIC PANEL: CPT | Performed by: NURSE PRACTITIONER

## 2023-11-06 PROCEDURE — 25000003 PHARM REV CODE 250: Performed by: INTERNAL MEDICINE

## 2023-11-06 PROCEDURE — 27000221 HC OXYGEN, UP TO 24 HOURS

## 2023-11-06 PROCEDURE — 94760 N-INVAS EAR/PLS OXIMETRY 1: CPT

## 2023-11-06 PROCEDURE — 97110 THERAPEUTIC EXERCISES: CPT

## 2023-11-06 RX ORDER — FUROSEMIDE 10 MG/ML
40 INJECTION INTRAMUSCULAR; INTRAVENOUS
Status: COMPLETED | OUTPATIENT
Start: 2023-11-06 | End: 2023-11-06

## 2023-11-06 RX ADMIN — AMIODARONE HYDROCHLORIDE 400 MG: 200 TABLET ORAL at 08:11

## 2023-11-06 RX ADMIN — ENOXAPARIN SODIUM 40 MG: 40 INJECTION SUBCUTANEOUS at 04:11

## 2023-11-06 RX ADMIN — MUPIROCIN: 20 OINTMENT TOPICAL at 08:11

## 2023-11-06 RX ADMIN — DOCUSATE SODIUM 100 MG: 100 CAPSULE, LIQUID FILLED ORAL at 08:11

## 2023-11-06 RX ADMIN — MUPIROCIN: 20 OINTMENT TOPICAL at 09:11

## 2023-11-06 RX ADMIN — FUROSEMIDE 40 MG: 10 INJECTION, SOLUTION INTRAMUSCULAR; INTRAVENOUS at 09:11

## 2023-11-06 RX ADMIN — EZETIMIBE 10 MG: 10 TABLET ORAL at 08:11

## 2023-11-06 RX ADMIN — AMLODIPINE BESYLATE 10 MG: 5 TABLET ORAL at 08:11

## 2023-11-06 RX ADMIN — SUCRALFATE 1 G: 1 TABLET ORAL at 09:11

## 2023-11-06 RX ADMIN — INSULIN ASPART 3 UNITS: 100 INJECTION, SOLUTION INTRAVENOUS; SUBCUTANEOUS at 10:11

## 2023-11-06 RX ADMIN — FAMOTIDINE 20 MG: 10 INJECTION, SOLUTION INTRAVENOUS at 08:11

## 2023-11-06 RX ADMIN — ASPIRIN 81 MG: 81 TABLET, COATED ORAL at 08:11

## 2023-11-06 RX ADMIN — AMIODARONE HYDROCHLORIDE 400 MG: 200 TABLET ORAL at 09:11

## 2023-11-06 RX ADMIN — FUROSEMIDE 40 MG: 10 INJECTION, SOLUTION INTRAMUSCULAR; INTRAVENOUS at 08:11

## 2023-11-06 RX ADMIN — DEXTROSE AND SODIUM CHLORIDE: 5; 450 INJECTION, SOLUTION INTRAVENOUS at 06:11

## 2023-11-06 RX ADMIN — SUCRALFATE 1 G: 1 TABLET ORAL at 04:11

## 2023-11-06 RX ADMIN — METOPROLOL TARTRATE 12.5 MG: 25 TABLET, FILM COATED ORAL at 09:11

## 2023-11-06 RX ADMIN — FOLIC ACID 1 MG: 1 TABLET ORAL at 08:11

## 2023-11-06 RX ADMIN — INSULIN ASPART 2 UNITS: 100 INJECTION, SOLUTION INTRAVENOUS; SUBCUTANEOUS at 05:11

## 2023-11-06 RX ADMIN — SUCRALFATE 1 G: 1 TABLET ORAL at 05:11

## 2023-11-06 RX ADMIN — HYDRALAZINE HYDROCHLORIDE 10 MG: 20 INJECTION INTRAMUSCULAR; INTRAVENOUS at 09:11

## 2023-11-06 RX ADMIN — ATORVASTATIN CALCIUM 40 MG: 40 TABLET, FILM COATED ORAL at 08:11

## 2023-11-06 RX ADMIN — METOPROLOL TARTRATE 12.5 MG: 25 TABLET, FILM COATED ORAL at 08:11

## 2023-11-06 NOTE — PROGRESS NOTES
"[facility]    Cardiology  Progress Note    Patient Name: Geno Barry  MRN: [id number]  Admission Date: 11/2/2023  Hospital Length of Stay: 4 days  Code Status: Full Code   Attending Physician: Fuad Barragan MD   Primary Care Physician: Delmy Montano FNP  Expected Discharge Date:   Principal Problem:<principal problem not specified>    Subjective:     Brief HPI: Ms. Barry is a 71 y/o female who is known to [facility], Dr. Vera. The patient recently was evaluate in [facility] clinic on a routine follow up, however, at this F/U Appointment she reported Progressively Worsening MICHAEL that started about 3 weeks prior to 10.9.23. She was scheduled for a PET Scan in which she underwent with results of High Risk for Future CV Events. She was evaluated in office on 10.31.23 for results and was scheduled on 11.2.23 for LHC with Dr. Vera. 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 On 11.2.23 she underwent a CABG with results of LIMA to LAD and rSVG to Diag 1. She tolerated the procedure well and was admitted to ICU for further management.    Hospital Course:   11.3.23: NAD. Laying in Bed. Denies SOB and Palps. + CP/Incisional. No Pressors. "I want coffee."  11.4.23: NAD. "I am feeling better today." Denies SOB and Palps. + CP/Incisional. SR/ST  11.5.23: NAD. " I am good." Denies SOB and Palps. + CP/Incisional. Sitting in Bedside Chair.   11.6.23: NAD. "I am feeling pretty good." Sitting in Bedside Chair. Denies SOB and Palps. + CP/Incisional.     PMH: PAD, DM II, CAD/CABG, YULI/Bilaterally Mild, HTN  PSH: CABG (11.2.23) LIMA to LAD and rSVG to Diag1, ZACH, R Breast Lumpectomy, Angiogram   Family History: Father, D, 62, Diastolic HF; Mother, D, 63, Breast CA; Brother, L, Colon Cancer; Sister, L, CAD/CABG  Social History: Denies Illicit Drug, ETOH and Tobacco Use; Former Smoker, 1 ppd fro 22+ Years; Quit in 1992    Previous Diagnostics:   [facility] " 11.2.23:  Left Main-luminal irregularities  LAD-mid subtotal occlusion involving large diagonal  LCX-non dominant, luminal irregularities  RCA-dominant, 40% ostial stenosis  LVEDP 18    PET 10.26.23:  This is an abnormal perfusion study.   This scan is suggestive of high risk for future cardiovascular events.   Large partially reversible perfusion abnormality of severe intensity in the anterior apical region. Medium partially reversible perfusion abnormality of severe intensity in the septal region. Medium partially reversible perfusion abnormality of severe intensity in the inferior region. Small partially reversible perfusion abnormality of severe intensity in the apical lateral segment.   The left ventricular cavity is noted to be mildly enlarged on the stress studies. The stress left ventricular ejection fraction was calculated to be 46% and left ventricular global function is mildly reduced. The rest left ventricular cavity is noted to be normal. The rest left ventricular ejection fraction was calculated to be 47% and rest left ventricular global function is mildly reduced.   Persistent hypokinesis of the anterior region and apical inferior segment is noted in both rest and stress studies. When compared to the resting ejection fraction (47%), the stress ejection fraction (46%) has decreased.   The study quality is average.   There was a rise in myocardial blood flow between rest and stress.  Global myocardial blood flow reserve was 2.25.  Myocardial blood flow reserve is reduced in the apical and spetal territories which is suggestive of flow limiting stenosis in these territories.    Carotid US 10.5.23:  The study quality is good.   1-39% stenosis in the proximal left internal carotid artery based on Bluth Criteria.   1-39% stenosis in the proximal right internal carotid artery based on Bluth Criteria.   Antegrade right vertebral artery flow.   Antegrade left vertebral artery flow.     ECHO 10.11.22:  The  study quality is average.   The left ventricle is normal in size. Global left ventricular systolic function is normal. The left ventricular ejection fraction is 55%. The left ventricle diastolic function is impaired (Grade I) with normal left atrial pressure.  Mild (1+) mitral regurgitation.   Mild calcification of the aortic valve is noted with adequate cuspal excursion.  The pulmonary artery systolic pressure is 30 mmHg.      Review of Systems   Constitutional: Negative for malaise/fatigue.   Cardiovascular:  Positive for chest pain (Incisional). Negative for claudication, irregular heartbeat, leg swelling and palpitations.   Respiratory:  Negative for shortness of breath.    All other systems reviewed and are negative.    Objective:     Vital Signs (Most Recent):  Temp: 99.3 °F (37.4 °C) (11/06/23 1107)  Pulse: 76 (11/06/23 1107)  Resp: 20 (11/06/23 1107)  BP: (!) 148/73 (11/06/23 1107)  SpO2: 96 % (11/06/23 1107) Vital Signs (24h Range):  Temp:  [97.8 °F (36.6 °C)-99.3 °F (37.4 °C)] 99.3 °F (37.4 °C)  Pulse:  [76-91] 76  Resp:  [15-31] 20  SpO2:  [94 %-99 %] 96 %  BP: (115-157)/() 148/73     Weight: 98.1 kg (216 lb 4.3 oz)  Body mass index is 37.12 kg/m².    SpO2: 96 %         Intake/Output Summary (Last 24 hours) at 11/6/2023 1208  Last data filed at 11/6/2023 0608  Gross per 24 hour   Intake 750 ml   Output 750 ml   Net 0 ml       Lines/Drains/Airways       Drain  Duration             Female External Urinary Catheter 11/04/23 0830 2 days              Peripheral Intravenous Line  Duration                  Peripheral IV - Single Lumen 11/04/23 1737 20 G Anterior;Left Forearm 1 day                  Significant Labs:   Recent Results (from the past 72 hour(s))   POCT glucose    Collection Time: 11/03/23  1:11 PM   Result Value Ref Range    POCT Glucose 270 (H) 70 - 110 mg/dL   POCT glucose    Collection Time: 11/03/23  1:46 PM   Result Value Ref Range    POCT Glucose 244 (H) 70 - 110 mg/dL   POCT glucose     Collection Time: 11/03/23  3:31 PM   Result Value Ref Range    POCT Glucose 213 (H) 70 - 110 mg/dL   POCT glucose    Collection Time: 11/03/23  3:55 PM   Result Value Ref Range    POCT Glucose 221 (H) 70 - 110 mg/dL   POCT glucose    Collection Time: 11/03/23  5:26 PM   Result Value Ref Range    POCT Glucose 119 (H) 70 - 110 mg/dL   POCT glucose    Collection Time: 11/03/23  6:15 PM   Result Value Ref Range    POCT Glucose 114 (H) 70 - 110 mg/dL   POCT glucose    Collection Time: 11/03/23  7:23 PM   Result Value Ref Range    POCT Glucose 121 (H) 70 - 110 mg/dL   POCT glucose    Collection Time: 11/03/23  8:22 PM   Result Value Ref Range    POCT Glucose 156 (H) 70 - 110 mg/dL   Magnesium    Collection Time: 11/04/23  1:30 AM   Result Value Ref Range    Magnesium Level 1.90 1.60 - 2.60 mg/dL   Comprehensive Metabolic Panel    Collection Time: 11/04/23  1:30 AM   Result Value Ref Range    Sodium Level 140 136 - 145 mmol/L    Potassium Level 3.5 3.5 - 5.1 mmol/L    Chloride 108 (H) 98 - 107 mmol/L    Carbon Dioxide 18 (L) 23 - 31 mmol/L    Glucose Level 240 (H) 82 - 115 mg/dL    Blood Urea Nitrogen 24.1 (H) 9.8 - 20.1 mg/dL    Creatinine 1.23 (H) 0.55 - 1.02 mg/dL    Calcium Level Total 8.6 8.4 - 10.2 mg/dL    Protein Total 5.3 (L) 5.8 - 7.6 gm/dL    Albumin Level 3.2 (L) 3.4 - 4.8 g/dL    Globulin 2.1 (L) 2.4 - 3.5 gm/dL    Albumin/Globulin Ratio 1.5 1.1 - 2.0 ratio    Bilirubin Total 0.4 <=1.5 mg/dL    Alkaline Phosphatase 55 40 - 150 unit/L    Alanine Aminotransferase 12 0 - 55 unit/L    Aspartate Aminotransferase 17 5 - 34 unit/L    eGFR 46 mls/min/1.73/m2   CBC Without Differential    Collection Time: 11/04/23  1:30 AM   Result Value Ref Range    WBC 11.76 (H) 4.50 - 11.50 x10(3)/mcL    RBC 3.46 (L) 4.20 - 5.40 x10(6)/mcL    Hgb 10.0 (L) 12.0 - 16.0 g/dL    Hct 30.8 (L) 37.0 - 47.0 %    MCV 89.0 80.0 - 94.0 fL    MCH 28.9 27.0 - 31.0 pg    MCHC 32.5 (L) 33.0 - 36.0 g/dL    RDW 14.8 11.5 - 17.0 %    Platelet 235 130  - 400 x10(3)/mcL    MPV 10.6 (H) 7.4 - 10.4 fL    NRBC% 0.0 %   POCT glucose    Collection Time: 11/04/23  5:21 AM   Result Value Ref Range    POCT Glucose 304 (H) 70 - 110 mg/dL   POCT glucose    Collection Time: 11/04/23 11:44 AM   Result Value Ref Range    POCT Glucose 246 (H) 70 - 110 mg/dL   POCT glucose    Collection Time: 11/04/23  5:20 PM   Result Value Ref Range    POCT Glucose 261 (H) 70 - 110 mg/dL   POCT glucose    Collection Time: 11/04/23  8:22 PM   Result Value Ref Range    POCT Glucose 285 (H) 70 - 110 mg/dL   CBC Without Differential    Collection Time: 11/05/23  1:48 AM   Result Value Ref Range    WBC 9.98 4.50 - 11.50 x10(3)/mcL    RBC 3.55 (L) 4.20 - 5.40 x10(6)/mcL    Hgb 10.2 (L) 12.0 - 16.0 g/dL    Hct 31.5 (L) 37.0 - 47.0 %    MCV 88.7 80.0 - 94.0 fL    MCH 28.7 27.0 - 31.0 pg    MCHC 32.4 (L) 33.0 - 36.0 g/dL    RDW 15.2 11.5 - 17.0 %    Platelet 191 130 - 400 x10(3)/mcL    MPV 10.5 (H) 7.4 - 10.4 fL    NRBC% 0.0 %   Comprehensive Metabolic Panel    Collection Time: 11/05/23  1:48 AM   Result Value Ref Range    Sodium Level 141 136 - 145 mmol/L    Potassium Level 4.0 3.5 - 5.1 mmol/L    Chloride 109 (H) 98 - 107 mmol/L    Carbon Dioxide 21 (L) 23 - 31 mmol/L    Glucose Level 249 (H) 82 - 115 mg/dL    Blood Urea Nitrogen 24.3 (H) 9.8 - 20.1 mg/dL    Creatinine 1.03 (H) 0.55 - 1.02 mg/dL    Calcium Level Total 8.4 8.4 - 10.2 mg/dL    Protein Total 5.6 (L) 5.8 - 7.6 gm/dL    Albumin Level 3.0 (L) 3.4 - 4.8 g/dL    Globulin 2.6 2.4 - 3.5 gm/dL    Albumin/Globulin Ratio 1.2 1.1 - 2.0 ratio    Bilirubin Total 0.7 <=1.5 mg/dL    Alkaline Phosphatase 67 40 - 150 unit/L    Alanine Aminotransferase 9 0 - 55 unit/L    Aspartate Aminotransferase 18 5 - 34 unit/L    eGFR 57 mls/min/1.73/m2   POCT glucose    Collection Time: 11/05/23  6:41 AM   Result Value Ref Range    POCT Glucose 228 (H) 70 - 110 mg/dL   POCT glucose    Collection Time: 11/05/23 12:14 PM   Result Value Ref Range    POCT Glucose 240 (H)  70 - 110 mg/dL   POCT glucose    Collection Time: 11/05/23  6:21 PM   Result Value Ref Range    POCT Glucose 264 (H) 70 - 110 mg/dL   POCT glucose    Collection Time: 11/05/23  8:18 PM   Result Value Ref Range    POCT Glucose 254 (H) 70 - 110 mg/dL   Comprehensive Metabolic Panel    Collection Time: 11/06/23  1:28 AM   Result Value Ref Range    Sodium Level 142 136 - 145 mmol/L    Potassium Level 4.2 3.5 - 5.1 mmol/L    Chloride 111 (H) 98 - 107 mmol/L    Carbon Dioxide 22 (L) 23 - 31 mmol/L    Glucose Level 219 (H) 82 - 115 mg/dL    Blood Urea Nitrogen 19.5 9.8 - 20.1 mg/dL    Creatinine 0.82 0.55 - 1.02 mg/dL    Calcium Level Total 8.2 (L) 8.4 - 10.2 mg/dL    Protein Total 5.6 (L) 5.8 - 7.6 gm/dL    Albumin Level 2.8 (L) 3.4 - 4.8 g/dL    Globulin 2.8 2.4 - 3.5 gm/dL    Albumin/Globulin Ratio 1.0 (L) 1.1 - 2.0 ratio    Bilirubin Total 0.6 <=1.5 mg/dL    Alkaline Phosphatase 82 40 - 150 unit/L    Alanine Aminotransferase 15 0 - 55 unit/L    Aspartate Aminotransferase 23 5 - 34 unit/L    eGFR >60 mls/min/1.73/m2   Magnesium    Collection Time: 11/06/23  1:28 AM   Result Value Ref Range    Magnesium Level 2.40 1.60 - 2.60 mg/dL   CBC with Differential    Collection Time: 11/06/23  1:28 AM   Result Value Ref Range    WBC 9.98 4.50 - 11.50 x10(3)/mcL    RBC 3.54 (L) 4.20 - 5.40 x10(6)/mcL    Hgb 10.2 (L) 12.0 - 16.0 g/dL    Hct 32.0 (L) 37.0 - 47.0 %    MCV 90.4 80.0 - 94.0 fL    MCH 28.8 27.0 - 31.0 pg    MCHC 31.9 (L) 33.0 - 36.0 g/dL    RDW 15.5 11.5 - 17.0 %    Platelet 208 130 - 400 x10(3)/mcL    MPV 10.5 (H) 7.4 - 10.4 fL    Neut % 72.8 %    Lymph % 17.1 %    Mono % 8.2 %    Eos % 1.2 %    Basophil % 0.3 %    Lymph # 1.71 0.6 - 4.6 x10(3)/mcL    Neut # 7.26 2.1 - 9.2 x10(3)/mcL    Mono # 0.82 0.1 - 1.3 x10(3)/mcL    Eos # 0.12 0 - 0.9 x10(3)/mcL    Baso # 0.03 <=0.2 x10(3)/mcL    IG# 0.04 0 - 0.04 x10(3)/mcL    IG% 0.4 %    NRBC% 0.0 %   POCT glucose    Collection Time: 11/06/23  5:46 AM   Result Value Ref Range     POCT Glucose 214 (H) 70 - 110 mg/dL     Telemetry: SR    Physical Exam  Constitutional:       General: She is not in acute distress.     Appearance: Normal appearance.   HENT:      Head: Normocephalic.      Mouth/Throat:      Mouth: Mucous membranes are moist.   Eyes:      Extraocular Movements: Extraocular movements intact.      Conjunctiva/sclera: Conjunctivae normal.   Cardiovascular:      Rate and Rhythm: Normal rate and regular rhythm.      Pulses: Normal pulses.      Heart sounds: Normal heart sounds. No murmur heard.  Pulmonary:      Effort: Pulmonary effort is normal.      Breath sounds: Normal breath sounds.   Abdominal:      Palpations: Abdomen is soft.   Musculoskeletal:         General: No swelling. Normal range of motion.   Skin:     General: Skin is warm and dry.      Comments: Midline Sternotomy SARAH with No Sign of Bleed/Infection   Neurological:      General: No focal deficit present.      Mental Status: She is alert and oriented to person, place, and time.   Psychiatric:         Mood and Affect: Mood normal.         Behavior: Behavior normal.         Judgment: Judgment normal.       Current Inpatient Medications:    Current Facility-Administered Medications:     acetaminophen oral solution 650 mg, 650 mg, Per OG tube, Q6H PRN, Bi Verduzco PA-C    albumin human 5% bottle 12.5 g, 12.5 g, Intravenous, PRN, Bi Verduzco PA-C, Last Rate: 250 mL/hr at 11/03/23 1128, Rate Verify at 11/03/23 1128    [START ON 11/8/2023] amiodarone tablet 200 mg, 200 mg, Oral, BID, Francisco Porter ANP    [START ON 11/11/2023] amiodarone tablet 200 mg, 200 mg, Oral, Daily, Francisco Porter ANP    amiodarone tablet 400 mg, 400 mg, Oral, BID, Francisco Porter ANP, 400 mg at 11/06/23 0808    amLODIPine tablet 10 mg, 10 mg, Oral, Daily, Puma Vera MD, 10 mg at 11/06/23 0807    aspirin EC tablet 81 mg, 81 mg, Oral, Daily, Puma Vera MD, 81 mg at 11/06/23 0807    atorvastatin tablet 40 mg, 40 mg, Oral,  Daily, Francisco Porter ANP, 40 mg at 11/06/23 0807    calcium gluconate 1 g in NS IVPB (premixed), 1 g, Intravenous, PRN, Bi Verduzco PA-C    calcium gluconate 1 g in NS IVPB (premixed), 2 g, Intravenous, PRN, Bi Verduzco PA-C    calcium gluconate 1 g in NS IVPB (premixed), 3 g, Intravenous, PRN, Bi Verduzco PA-C    dextrose 10% bolus 125 mL 125 mL, 12.5 g, Intravenous, PRN, Heriberto Lara IV, MD    dextrose 10% bolus 125 mL 125 mL, 12.5 g, Intravenous, PRJami JAFFE Royce B, MD    dextrose 10% bolus 250 mL 250 mL, 25 g, Intravenous, PRN, Heriberto Lara IV, MD    dextrose 10% bolus 250 mL 250 mL, 25 g, Intravenous, PRJami JAFFE Royce B, MD    dextrose 5 % and 0.45 % NaCl infusion, , Intravenous, Continuous, Heriberto Lara IV, MD, Stopped at 11/03/23 2000    docusate sodium capsule 100 mg, 100 mg, Oral, BID, Bi Verduzco PA-C, 100 mg at 11/06/23 0807    enoxaparin injection 40 mg, 40 mg, Subcutaneous, Daily, Bi Verduzco PA-C, 40 mg at 11/05/23 1702    ezetimibe tablet 10 mg, 10 mg, Oral, Daily, Puma Vera MD, 10 mg at 11/06/23 0807    famotidine (PF) injection 20 mg, 20 mg, Intravenous, Daily, Bi Verduzco PA-C, 20 mg at 11/06/23 0808    folic acid tablet 1 mg, 1 mg, Oral, Daily, Bi Verduzco PA-C, 1 mg at 11/06/23 0807    furosemide injection 40 mg, 40 mg, Intravenous, Q12H, Wan Russo PA 40 mg at 11/06/23 0821    glucagon (human recombinant) injection 1 mg, 1 mg, Intramuscular, PRN, Cisco Farrell MD    hydrALAZINE injection 10 mg, 10 mg, Intravenous, Q4H PRN, Puma Vera MD    HYDROcodone-acetaminophen 5-325 mg per tablet 1 tablet, 1 tablet, Oral, Q4H PRN, Heriberto Lara IV, MD    insulin aspart U-100 injection 0-5 Units, 0-5 Units, Subcutaneous, Q6H PRN, Cisco Farrell MD, 2 Units at 11/06/23 0555    lactulose 10 gram/15 ml solution 20 g, 20 g, Oral, Q6H PRN, Bi Verduzco, PA-C    loperamide capsule 2 mg, 2 mg, Oral,  Continuous PRN, Bi Verduzco PA-C    magnesium sulfate 2g in water 50mL IVPB (premix), 2 g, Intravenous, PRN, Bi Verduzco PA-C    magnesium sulfate 2g in water 50mL IVPB (premix), 4 g, Intravenous, PRN, Bi Verduzco PA-C    metoclopramide HCl injection 5 mg, 5 mg, Intravenous, Q6H PRN, Bi Verduzco PA-C    metoprolol tartrate (LOPRESSOR) split tablet 12.5 mg, 12.5 mg, Oral, BID, Bi Verduzco PA-C, 12.5 mg at 11/06/23 0807    morphine injection 4 mg, 4 mg, Intravenous, Q4H PRN, Bi Verduzco PA-C, 4 mg at 11/03/23 0004    mupirocin 2 % ointment, , Nasal, BID, Bi Verduzco PA-C, Given at 11/06/23 0808    ondansetron injection 4 mg, 4 mg, Intravenous, Q4H PRN, Bi Verduzco PA-C    oxyCODONE immediate release tablet 10 mg, 10 mg, Oral, Q4H PRN, Bi Verduzco PA-KRAIG, 10 mg at 11/05/23 2023    potassium chloride 20 mEq in 100 mL IVPB (FOR CENTRAL LINE ADMINISTRATION ONLY), 20 mEq, Intravenous, PRN, Bi Verduzco PA-C, Stopped at 11/04/23 1117    potassium chloride 20 mEq in 100 mL IVPB (FOR CENTRAL LINE ADMINISTRATION ONLY), 40 mEq, Intravenous, PRN, Bi Verduzco PA-C    sodium phosphate 15 mmol in dextrose 5 % (D5W) 250 mL IVPB, 15 mmol, Intravenous, PRN, Bi Verduzco PA-KRAIG    sodium phosphate 20.01 mmol in dextrose 5 % (D5W) 250 mL IVPB, 20.01 mmol, Intravenous, PRN, Bi Verduzco PA-C    sodium phosphate 30 mmol in dextrose 5 % (D5W) 250 mL IVPB, 30 mmol, Intravenous, PRN, Bi Verduzco PA-C    sucralfate tablet 1 g, 1 g, Oral, QID (AC & HS), Bi Verduzco PA-C, 1 g at 11/06/23 0555    VTE Risk Mitigation (From admission, onward)           Ordered     enoxaparin injection 40 mg  Daily         11/02/23 2120     Place sequential compression device  Until discontinued         11/02/23 2120     IP VTE HIGH RISK PATIENT  Once         11/02/23 1724     Place sequential compression device  Until discontinued         11/02/23 1018                  Assessment:    Abnormal PET Scan     - s/p CABG (11.2.23) - LIMA to LAD and rSVG to Diag 1    - s/p LHC (11.2.23) - Left main-luminal irregularities; LAD-mid subtotal occlusion involving large diagonal; LCX-non dominant, luminal irregularities; RCA-dominant, 40% ostial stenosis  MVCAD s/p CABG  Post Operative PAF with RVR - Now SR/ST    - CHADsVASc - 3 Points - 3.2% Stroke Risk per Year     - ECHO (10.11.22) - LVEF 55%  Acute on Chronic Diastolic HF/EF 55%  YULI/Bilaterally Mild  Hypotension requiring Pressors - Resolved    - Hx of HTN  DM II  PAD/Interventions   Obesity  No Hx of GIB     Plan:   Continue ASA, Statin, Norvasc, Amiodarone Load and BB  Start DOAC when OK with CV Surgery Re Stroke Risk Reduction   Continue IV Diuresis 40mg BID   Accurate I&Os and Daily Weights   Aggressive Mobilization of PT and Q1HR IS  Labs in AM: CBC, CMP and Mg    Francisco Porter, [place]SANA  Cardiology  [facility]  11/06/2023     Physician addendum:  I have seen and examined this patient as a split-shared visit with the ANDERS d/t complicated medical management of above problems written in assessment and high acuity requiring physician expertise in medical decision-making. I performed the substantive portion of the history and exam. Above medical decision-making is also formulated by me.    Cardiovascular exam:  S1, S2  Lungs:  fine crackles at bases.  Extremities:  1+ edema bilaterally    Plan:  Status post CABG.  Stable.  Continue supportive therapy.  Continue medications as above.  Follow up.      Fuad Barragan MD  Cardiologist

## 2023-11-06 NOTE — PLAN OF CARE
Problem: Adult Inpatient Plan of Care  Goal: Plan of Care Review  Outcome: Ongoing, Progressing  Flowsheets (Taken 11/5/2023 1044)  Plan of Care Reviewed With: patient  Goal: Patient-Specific Goal (Individualized)  Outcome: Ongoing, Progressing  Goal: Absence of Hospital-Acquired Illness or Injury  Outcome: Ongoing, Progressing  Goal: Optimal Comfort and Wellbeing  Outcome: Ongoing, Progressing  Goal: Readiness for Transition of Care  Outcome: Ongoing, Progressing     Problem: Infection  Goal: Absence of Infection Signs and Symptoms  Outcome: Ongoing, Progressing     Problem: Nutrition Impairment (Mechanical Ventilation, Invasive)  Goal: Optimal Nutrition Delivery  Outcome: Ongoing, Progressing     Problem: Skin and Tissue Injury (Mechanical Ventilation, Invasive)  Goal: Absence of Device-Related Skin and Tissue Injury  Outcome: Ongoing, Progressing     Problem: Ventilator-Induced Lung Injury (Mechanical Ventilation, Invasive)  Goal: Absence of Ventilator-Induced Lung Injury  Outcome: Ongoing, Progressing     Problem: Skin and Tissue Injury (Artificial Airway)  Goal: Absence of Device-Related Skin or Tissue Injury  Outcome: Ongoing, Progressing     Problem: Fall Injury Risk  Goal: Absence of Fall and Fall-Related Injury  Outcome: Ongoing, Progressing     Problem: Skin Injury Risk Increased  Goal: Skin Health and Integrity  Outcome: Ongoing, Progressing

## 2023-11-06 NOTE — NURSING
Nurses Note -- 4 Eyes      11/6/2023   4:42 PM      Skin assessed during: Transfer      [x] No Altered Skin Integrity Present    []Prevention Measures Documented      [] Yes- Altered Skin Integrity Present or Discovered   [] LDA Added if Not in Epic (Describe Wound)   [] New Altered Skin Integrity was Present on Admit and Documented in LDA   [] Wound Image Taken    Wound Care Consulted? No    Attending Nurse:  Nick Dill RN/Staff Member:  Joel

## 2023-11-06 NOTE — PROGRESS NOTES
11/06/23 0925   Pre Exercise Vitals   /79   Pulse 79   Supplemental O2? Yes   O2 Device nasal cannula   O2 Flow (L/min) 4   SpO2 96 %   During Exercise Vitals   Pulse 86   Supplemental O2? Yes   O2 Device nasal cannula   O2 Flow (L/min) 4   SpO2 (!) 86 %   Distance Walked 60 feet   Post Exercise Vitals   /79   Pulse 79   Supplemental O2? Yes   O2 Device nasal cannula   O2 Flow (L/min) 4   SpO2 93 %   Modality   Modality   (rollator)     Sitting up in recliner, 02 cont, purewick cont.  Mod assist x2 (me and pt's nurse) to get from sitting to standing. Gait slow with rollator, fatigues easily.  02 sat quickly increased to 93% with rest on 4l/nc.  Cleaned of incontinent urine, purewick replaced.  Encouraged increased activity and use of IS.

## 2023-11-06 NOTE — NURSING
Nurses Note -- 4 Eyes      11/5/2023   11:37 PM      Skin assessed during: Daily Assessment      [] No Altered Skin Integrity Present    [x]Prevention Measures Documented      [x] Yes- Altered Skin Integrity Present or Discovered   [] LDA Added if Not in Epic (Describe Wound)   [] New Altered Skin Integrity was Present on Admit and Documented in LDA   [] Wound Image Taken    Wound Care Consulted? No    Attending Nurse:  Diamond Dill RN/Staff Member:  FRANCISCA Limon    Patient has no redness/breakdown to sacrum.

## 2023-11-06 NOTE — NURSING
Nurses Note -- 4 Eyes      11/6/2023   9:13 AM      Skin assessed during: Admit      [x] No Altered Skin Integrity Present    []Prevention Measures Documented      [] Yes- Altered Skin Integrity Present or Discovered   [] LDA Added if Not in Epic (Describe Wound)   [] New Altered Skin Integrity was Present on Admit and Documented in LDA   [] Wound Image Taken    Wound Care Consulted? No    Attending Nurse:   Nick Porter RN    Second RN/Staff Member:  Joel Hayes RN

## 2023-11-06 NOTE — PT/OT/SLP PROGRESS
Physical Therapy Treatment    Patient Name:  Geno Barry   MRN:  [id number]    Recommendations:     Discharge therapy intensity: high intensity   Discharge Equipment Recommendations: to be determined by next level of care  Barriers to discharge: Impaired mobility and Ongoing medical needs    Assessment:     Geno Barry is a 72 y.o. female admitted with a medical diagnosis of  severe CAD s/p CABG x 2, CVA, PAD, HTN, DM, GENNA.  She presents with the following impairments/functional limitations: weakness, impaired endurance, impaired self care skills, impaired functional mobility, gait instability, impaired balance, impaired cardiopulmonary response to activity. Pt's activity tolerance limited by fatigue and SOB. Pt's O2 saturations decreased to 77% upon initiation of gait on 4L O2.     Rehab Prognosis: Fair; patient would benefit from acute skilled PT services to address these deficits and reach maximum level of function.    Recent Surgery: Procedure(s) (LRB):  CORONARY ARTERY BYPASS GRAFT (CABG) (N/A)  SURGICAL PROCUREMENT, VEIN, ENDOSCOPIC (N/A) 4 Days Post-Op    Plan:     During this hospitalization, patient to be seen 6 x/week to address the identified rehab impairments via gait training, therapeutic activities, therapeutic exercises, neuromuscular re-education and progress toward the following goals:    Plan of Care Expires:  12/05/23    Subjective     Chief Complaint: fatigue  Patient/Family Comments/goals: get stronger   Pain/Comfort:  Pain Rating 1: 0/10      Objective:     Communicated with nurse prior to session.  Patient found up in chair with telemetry, pulse ox (continuous), peripheral IV, oxygen, wound vac, PureWick upon PT entry to room.     General Precautions: Standard, sternal  Orthopedic Precautions: N/A  Braces: N/A  Respiratory Status: Nasal cannula, flow 4 L/min  Blood Pressure:   Skin Integrity:  Prevena wound vac in place      Functional Mobility:  Transfers:     Sit to Stand:  maximal  assistance with rolling walker  Gait: 5ft with RW min A. Short step length with poor foot clearance  Balance: Static sitting balance CGA, Supported standing CGA    Therapeutic Activities/Exercises:  Seated LE there-ex performed 1x5 for THIERRY Les. Upon standing pt easily fatigued with decreased O2 saturations. Pt slow to recover and overall reporting significant fatigue. Pt requesting to get in bed to rest but no bed present in room due to pt recently being transferred from ICU    Education:  Patient provided with verbal education and demonstrations education regarding sternal precautions and application to functional transfers.  Understanding was verbalized, however additional teaching warranted.     Patient left up in chair with all lines intact, call button in reach, and nurse notified. Confirmed with nurse, bed ordered for room but has not arrived.    GOALS:   Multidisciplinary Problems       Physical Therapy Goals          Problem: Physical Therapy    Goal Priority Disciplines Outcome Goal Variances Interventions   Physical Therapy Goal     PT, PT/OT Ongoing, Progressing     Description: Goals to be met by: 12/5/23     Patient will increase functional independence with mobility by performing:    Supine to sit with Modified [place]  Sit to stand transfer with Modified [place]  Gait  x 200 feet with Modified [place] using Rolling Walker.                          Time Tracking:     PT Received On: 11/06/23  PT Start Time: 1233     PT Stop Time: 1247  PT Total Time (min): 14 min     Billable Minutes: Therapeutic Activity 14 min    Treatment Type: Treatment  PT/PTA: PT     Number of PTA visits since last PT visit: 1 11/06/2023

## 2023-11-07 LAB
ALBUMIN SERPL-MCNC: 3.1 G/DL (ref 3.4–4.8)
ALBUMIN/GLOB SERPL: 0.9 RATIO (ref 1.1–2)
ALP SERPL-CCNC: 93 UNIT/L (ref 40–150)
ALT SERPL-CCNC: 21 UNIT/L (ref 0–55)
AST SERPL-CCNC: 22 UNIT/L (ref 5–34)
BASOPHILS # BLD AUTO: 0.03 X10(3)/MCL
BASOPHILS NFR BLD AUTO: 0.3 %
BILIRUB SERPL-MCNC: 0.7 MG/DL
BUN SERPL-MCNC: 15.7 MG/DL (ref 9.8–20.1)
CALCIUM SERPL-MCNC: 9 MG/DL (ref 8.4–10.2)
CHLORIDE SERPL-SCNC: 105 MMOL/L (ref 98–107)
CO2 SERPL-SCNC: 26 MMOL/L (ref 23–31)
CREAT SERPL-MCNC: 1.04 MG/DL (ref 0.55–1.02)
EOSINOPHIL # BLD AUTO: 0.07 X10(3)/MCL (ref 0–0.9)
EOSINOPHIL NFR BLD AUTO: 0.7 %
ERYTHROCYTE [DISTWIDTH] IN BLOOD BY AUTOMATED COUNT: 15.3 % (ref 11.5–17)
GFR SERPLBLD CREATININE-BSD FMLA CKD-EPI: 57 MLS/MIN/1.73/M2
GLOBULIN SER-MCNC: 3.4 GM/DL (ref 2.4–3.5)
GLUCOSE SERPL-MCNC: 397 MG/DL (ref 82–115)
HCT VFR BLD AUTO: 35 % (ref 37–47)
HGB BLD-MCNC: 11 G/DL (ref 12–16)
IMM GRANULOCYTES # BLD AUTO: 0.04 X10(3)/MCL (ref 0–0.04)
IMM GRANULOCYTES NFR BLD AUTO: 0.4 %
LYMPHOCYTES # BLD AUTO: 2.26 X10(3)/MCL (ref 0.6–4.6)
LYMPHOCYTES NFR BLD AUTO: 21.8 %
MAGNESIUM SERPL-MCNC: 1.9 MG/DL (ref 1.6–2.6)
MCH RBC QN AUTO: 28.2 PG (ref 27–31)
MCHC RBC AUTO-ENTMCNC: 31.4 G/DL (ref 33–36)
MCV RBC AUTO: 89.7 FL (ref 80–94)
MONOCYTES # BLD AUTO: 0.78 X10(3)/MCL (ref 0.1–1.3)
MONOCYTES NFR BLD AUTO: 7.5 %
NEUTROPHILS # BLD AUTO: 7.2 X10(3)/MCL (ref 2.1–9.2)
NEUTROPHILS NFR BLD AUTO: 69.3 %
NRBC BLD AUTO-RTO: 0 %
PLATELET # BLD AUTO: 323 X10(3)/MCL (ref 130–400)
PMV BLD AUTO: 9.6 FL (ref 7.4–10.4)
POCT GLUCOSE: 231 MG/DL (ref 70–110)
POCT GLUCOSE: 298 MG/DL (ref 70–110)
POCT GLUCOSE: 382 MG/DL (ref 70–110)
POTASSIUM SERPL-SCNC: 3.8 MMOL/L (ref 3.5–5.1)
PROT SERPL-MCNC: 6.5 GM/DL (ref 5.8–7.6)
RBC # BLD AUTO: 3.9 X10(6)/MCL (ref 4.2–5.4)
SODIUM SERPL-SCNC: 142 MMOL/L (ref 136–145)
WBC # SPEC AUTO: 10.38 X10(3)/MCL (ref 4.5–11.5)

## 2023-11-07 PROCEDURE — 80053 COMPREHEN METABOLIC PANEL: CPT | Performed by: NURSE PRACTITIONER

## 2023-11-07 PROCEDURE — 25000003 PHARM REV CODE 250: Performed by: INTERNAL MEDICINE

## 2023-11-07 PROCEDURE — 25000003 PHARM REV CODE 250: Performed by: PHYSICIAN ASSISTANT

## 2023-11-07 PROCEDURE — 97530 THERAPEUTIC ACTIVITIES: CPT | Mod: CQ

## 2023-11-07 PROCEDURE — 83735 ASSAY OF MAGNESIUM: CPT | Performed by: NURSE PRACTITIONER

## 2023-11-07 PROCEDURE — 97116 GAIT TRAINING THERAPY: CPT | Mod: CQ

## 2023-11-07 PROCEDURE — 25000003 PHARM REV CODE 250

## 2023-11-07 PROCEDURE — 21400001 HC TELEMETRY ROOM

## 2023-11-07 PROCEDURE — 25000003 PHARM REV CODE 250: Performed by: NURSE PRACTITIONER

## 2023-11-07 PROCEDURE — 85025 COMPLETE CBC W/AUTO DIFF WBC: CPT | Performed by: NURSE PRACTITIONER

## 2023-11-07 PROCEDURE — 27000221 HC OXYGEN, UP TO 24 HOURS

## 2023-11-07 PROCEDURE — 99024 POSTOP FOLLOW-UP VISIT: CPT | Mod: POP,,, | Performed by: PHYSICIAN ASSISTANT

## 2023-11-07 PROCEDURE — 99024 PR POST-OP FOLLOW-UP VISIT: ICD-10-PCS | Mod: POP,,, | Performed by: PHYSICIAN ASSISTANT

## 2023-11-07 PROCEDURE — 94760 N-INVAS EAR/PLS OXIMETRY 1: CPT

## 2023-11-07 PROCEDURE — 63600175 PHARM REV CODE 636 W HCPCS: Performed by: STUDENT IN AN ORGANIZED HEALTH CARE EDUCATION/TRAINING PROGRAM

## 2023-11-07 PROCEDURE — 63600175 PHARM REV CODE 636 W HCPCS: Performed by: PHYSICIAN ASSISTANT

## 2023-11-07 PROCEDURE — 97110 THERAPEUTIC EXERCISES: CPT

## 2023-11-07 RX ORDER — METOPROLOL TARTRATE 25 MG/1
25 TABLET, FILM COATED ORAL 2 TIMES DAILY
Status: DISCONTINUED | OUTPATIENT
Start: 2023-11-07 | End: 2023-11-09

## 2023-11-07 RX ADMIN — ASPIRIN 81 MG: 81 TABLET, COATED ORAL at 09:11

## 2023-11-07 RX ADMIN — METOPROLOL TARTRATE 25 MG: 25 TABLET, FILM COATED ORAL at 09:11

## 2023-11-07 RX ADMIN — LACTULOSE 20 G: 10 SOLUTION ORAL at 03:11

## 2023-11-07 RX ADMIN — ATORVASTATIN CALCIUM 40 MG: 40 TABLET, FILM COATED ORAL at 09:11

## 2023-11-07 RX ADMIN — SUCRALFATE 1 G: 1 TABLET ORAL at 12:11

## 2023-11-07 RX ADMIN — AMIODARONE HYDROCHLORIDE 400 MG: 200 TABLET ORAL at 09:11

## 2023-11-07 RX ADMIN — METOPROLOL TARTRATE 12.5 MG: 25 TABLET, FILM COATED ORAL at 09:11

## 2023-11-07 RX ADMIN — FOLIC ACID 1 MG: 1 TABLET ORAL at 09:11

## 2023-11-07 RX ADMIN — FAMOTIDINE 20 MG: 10 INJECTION, SOLUTION INTRAVENOUS at 09:11

## 2023-11-07 RX ADMIN — SUCRALFATE 1 G: 1 TABLET ORAL at 09:11

## 2023-11-07 RX ADMIN — INSULIN ASPART 3 UNITS: 100 INJECTION, SOLUTION INTRAVENOUS; SUBCUTANEOUS at 05:11

## 2023-11-07 RX ADMIN — INSULIN ASPART 5 UNITS: 100 INJECTION, SOLUTION INTRAVENOUS; SUBCUTANEOUS at 12:11

## 2023-11-07 RX ADMIN — MUPIROCIN: 20 OINTMENT TOPICAL at 09:11

## 2023-11-07 RX ADMIN — ENOXAPARIN SODIUM 40 MG: 40 INJECTION SUBCUTANEOUS at 05:11

## 2023-11-07 RX ADMIN — INSULIN ASPART 5 UNITS: 100 INJECTION, SOLUTION INTRAVENOUS; SUBCUTANEOUS at 05:11

## 2023-11-07 RX ADMIN — SUCRALFATE 1 G: 1 TABLET ORAL at 05:11

## 2023-11-07 RX ADMIN — AMLODIPINE BESYLATE 10 MG: 5 TABLET ORAL at 09:11

## 2023-11-07 RX ADMIN — EZETIMIBE 10 MG: 10 TABLET ORAL at 09:11

## 2023-11-07 NOTE — PROGRESS NOTES
11/07/23 0915   Pre Exercise Vitals   /80   Pulse 91  (SR)   Supplemental O2? Yes   O2 Device nasal cannula   O2 Flow (L/min) 2.5   SpO2 97 %   During Exercise Vitals   Pulse 99   Supplemental O2? Yes   O2 Device nasal cannula   O2 Flow (L/min) 2   SpO2 (!) 89 %  (89-94% during ambulation)   Distance Walked 60 feet   Post Exercise Vitals   /76   Pulse 97   Supplemental O2? Yes   O2 Device nasal cannula   O2 Flow (L/min) 2   SpO2 96 %   Modality   Modality   (rollator)     Communicated with nurse prior to activity.   Up in chair pre and post activity.   Prevena cont, purewick cont, 02 cont.  Mod assist sit to stand maintaining sternal precautions.   Gait slow but steady with rollator.  Morning weight obtained and charted.  Encouraged increased activity and use of IS.

## 2023-11-07 NOTE — PT/OT/SLP PROGRESS
Physical Therapy Treatment    Patient Name:  Geno Barry   MRN:  [id number]    Recommendations:     Discharge therapy intensity: high intensity   Discharge Equipment Recommendations: to be determined by next level of care  Barriers to discharge: Impaired mobility and Ongoing medical needs    Assessment:     Geno Barry is a 72 y.o. female admitted with a medical diagnosis of <principal problem not specified>.  She presents with the following impairments/functional limitations: weakness, impaired endurance, impaired self care skills, impaired functional mobility, gait instability, impaired balance, impaired cardiopulmonary response to activity .    Rehab Prognosis: Good; patient would benefit from acute skilled PT services to address these deficits and reach maximum level of function.    Recent Surgery: Procedure(s) (LRB):  CORONARY ARTERY BYPASS GRAFT (CABG) (N/A)  SURGICAL PROCUREMENT, VEIN, ENDOSCOPIC (N/A) 5 Days Post-Op    Plan:     During this hospitalization, patient to be seen 6 x/week to address the identified rehab impairments via gait training, therapeutic activities, therapeutic exercises, neuromuscular re-education and progress toward the following goals:    Plan of Care Expires:  12/05/23    Subjective     Chief Complaint: having BP taken at 3 AM, followed by being placed in recliner.  Patient/Family Comments/goals: strong enough to go home  Pain/Comfort:  Pain Rating Post-Intervention 1: 0/10      Objective:     Communicated with pts nurse prior to session.  Patient found HOB elevated with oxygen, PureWick, peripheral IV, pulse ox (continuous), telemetry, wound vac upon PT entry to room.     General Precautions: Standard, sternal  Orthopedic Precautions: N/A  Braces: N/A  Respiratory Status: Nasal cannula, flow 2 L/min  Blood Pressure: 149/77  Skin Integrity: surgical incision on sternum w/ wound vac      Functional Mobility:  Bed Mobility:     Rolling Right: minimum assistance and moderate  assistance  Scooting: contact guard assistance and minimum assistance  Supine to Sit: minimum assistance and moderate assistance  Transfers:     Sit to Stand:  minimum assistance with rolling walker  Bed to Chair: minimum assistance with  rolling walker  using  Step Transfer  Gait: pt ambulated 120 ft w/ RW CGA, slow gait, demonstrating fair+ foot clearance and heel strike  Balance: SBA for static standing and CGA for dynamic w/ walker  Pt very slow for all activities but responds well to cues and reports decrease pain at surgery site w/ activity.    Education:  Patient provided with verbal education and demonstrations education regarding transfers and bed mobility strategies maintaining sternal precautions. .  Understanding was verbalized.     Patient left up in chair with all lines intact, call button in reach, and lunch set up ..    GOALS:   Multidisciplinary Problems       Physical Therapy Goals          Problem: Physical Therapy    Goal Priority Disciplines Outcome Goal Variances Interventions   Physical Therapy Goal     PT, PT/OT Ongoing, Progressing     Description: Goals to be met by: 12/5/23     Patient will increase functional independence with mobility by performing:    Supine to sit with Modified [place]  Sit to stand transfer with Modified [place]  Gait  x 200 feet with Modified [place] using Rolling Walker.                          Time Tracking:     PT Received On: 11/07/23  PT Start Time: 1104     PT Stop Time: 1136  PT Total Time (min): 32 min     Billable Minutes: Gait Training 12 and Therapeutic Activity 20    Treatment Type: Treatment  PT/PTA: PTA     Number of PTA visits since last PT visit: 2     11/07/2023

## 2023-11-07 NOTE — CARE UPDATE
Pt presented with IPR listing.  Explained the difference between acute rehab and HH.  Pt appears to have support at home and can stay with family if she decides to go with HH.  She states she will decide shortly.

## 2023-11-07 NOTE — PROGRESS NOTES
"[facility]    Cardiology  Progress Note    Patient Name: Geno Barry  MRN: [id number]  Admission Date: 11/2/2023  Hospital Length of Stay: 5 days  Code Status: Full Code   Attending Physician: Fuad Barragan MD   Primary Care Physician: Delmy Montano FNP  Expected Discharge Date:   Principal Problem:<principal problem not specified>    Subjective:     Brief HPI: Ms. Barry is a 73 y/o female who is known to [facility], Dr. Vera. The patient recently was evaluate in [facility] clinic on a routine follow up, however, at this F/U Appointment she reported Progressively Worsening MICHAEL that started about 3 weeks prior to 10.9.23. She was scheduled for a PET Scan in which she underwent with results of High Risk for Future CV Events. She was evaluated in office on 10.31.23 for results and was scheduled on 11.2.23 for LHC with Dr. Vera. 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 On 11.2.23 she underwent a CABG with results of LIMA to LAD and rSVG to Diag 1. She tolerated the procedure well and was admitted to ICU for further management.    Hospital Course:   11.3.23: NAD. Laying in Bed. Denies SOB and Palps. + CP/Incisional. No Pressors. "I want coffee."  11.4.23: NAD. "I am feeling better today." Denies SOB and Palps. + CP/Incisional. SR/ST  11.5.23: NAD. " I am good." Denies SOB and Palps. + CP/Incisional. Sitting in Bedside Chair.   11.6.23: NAD. "I am feeling pretty good." Sitting in Bedside Chair. Denies SOB and Palps. + CP/Incisional.   11.7.23: NAD. "I am okay."Sitting up in chair. + Incisional CP. Denies SOB or palps. Rehab pending.     PMH: PAD, DM II, CAD/CABG, YULI/Bilaterally Mild, HTN  PSH: CABG (11.2.23) LIMA to LAD and rSVG to Diag1, ZACH, R Breast Lumpectomy, Angiogram   Family History: Father, D, 62, Diastolic HF; Mother, D, 63, Breast CA; Brother, L, Colon Cancer; Sister, L, CAD/CABG  Social History: Denies Illicit Drug, ETOH " and Tobacco Use; Former Smoker, 1 ppd fro 22+ Years; Quit in 1992    Previous Diagnostics:   [facility] 11.2.23:  Left Main-luminal irregularities  LAD-mid subtotal occlusion involving large diagonal  LCX-non dominant, luminal irregularities  RCA-dominant, 40% ostial stenosis  LVEDP 18    PET 10.26.23:  This is an abnormal perfusion study.   This scan is suggestive of high risk for future cardiovascular events.   Large partially reversible perfusion abnormality of severe intensity in the anterior apical region. Medium partially reversible perfusion abnormality of severe intensity in the septal region. Medium partially reversible perfusion abnormality of severe intensity in the inferior region. Small partially reversible perfusion abnormality of severe intensity in the apical lateral segment.   The left ventricular cavity is noted to be mildly enlarged on the stress studies. The stress left ventricular ejection fraction was calculated to be 46% and left ventricular global function is mildly reduced. The rest left ventricular cavity is noted to be normal. The rest left ventricular ejection fraction was calculated to be 47% and rest left ventricular global function is mildly reduced.   Persistent hypokinesis of the anterior region and apical inferior segment is noted in both rest and stress studies. When compared to the resting ejection fraction (47%), the stress ejection fraction (46%) has decreased.   The study quality is average.   There was a rise in myocardial blood flow between rest and stress.  Global myocardial blood flow reserve was 2.25.  Myocardial blood flow reserve is reduced in the apical and spetal territories which is suggestive of flow limiting stenosis in these territories.    Carotid US 10.5.23:  The study quality is good.   1-39% stenosis in the proximal left internal carotid artery based on Bluth Criteria.   1-39% stenosis in the proximal right internal carotid artery based on Bluth Criteria.   Antegrade  right vertebral artery flow.   Antegrade left vertebral artery flow.     ECHO 10.11.22:  The study quality is average.   The left ventricle is normal in size. Global left ventricular systolic function is normal. The left ventricular ejection fraction is 55%. The left ventricle diastolic function is impaired (Grade I) with normal left atrial pressure.  Mild (1+) mitral regurgitation.   Mild calcification of the aortic valve is noted with adequate cuspal excursion.  The pulmonary artery systolic pressure is 30 mmHg.      Review of Systems   Constitutional: Negative for malaise/fatigue.   Cardiovascular:  Positive for chest pain (Incisional). Negative for claudication, irregular heartbeat, leg swelling, orthopnea and palpitations.   Respiratory:  Negative for shortness of breath.    All other systems reviewed and are negative.    Objective:     Vital Signs (Most Recent):  Temp: 98.7 °F (37.1 °C) (11/07/23 0722)  Pulse: 84 (11/07/23 0722)  Resp: 20 (11/07/23 0722)  BP: (!) 167/83 (11/07/23 0722)  SpO2: 99 % (11/07/23 0722) Vital Signs (24h Range):  Temp:  [98.2 °F (36.8 °C)-99.4 °F (37.4 °C)] 98.7 °F (37.1 °C)  Pulse:  [76-89] 84  Resp:  [17-20] 20  SpO2:  [91 %-99 %] 99 %  BP: (147-176)/(73-83) 167/83     Weight: 98.1 kg (216 lb 4.3 oz)  Body mass index is 37.12 kg/m².    SpO2: 99 %         Intake/Output Summary (Last 24 hours) at 11/7/2023 0933  Last data filed at 11/6/2023 2154  Gross per 24 hour   Intake 360 ml   Output 1800 ml   Net -1440 ml       Lines/Drains/Airways       Drain  Duration             Female External Urinary Catheter 11/04/23 0830 3 days              Peripheral Intravenous Line  Duration                  Peripheral IV - Single Lumen 11/04/23 1737 20 G Anterior;Left Forearm 2 days                  Significant Labs:   Recent Results (from the past 72 hour(s))   POCT glucose    Collection Time: 11/04/23 11:44 AM   Result Value Ref Range    POCT Glucose 246 (H) 70 - 110 mg/dL   POCT glucose     Collection Time: 11/04/23  5:20 PM   Result Value Ref Range    POCT Glucose 261 (H) 70 - 110 mg/dL   POCT glucose    Collection Time: 11/04/23  8:22 PM   Result Value Ref Range    POCT Glucose 285 (H) 70 - 110 mg/dL   CBC Without Differential    Collection Time: 11/05/23  1:48 AM   Result Value Ref Range    WBC 9.98 4.50 - 11.50 x10(3)/mcL    RBC 3.55 (L) 4.20 - 5.40 x10(6)/mcL    Hgb 10.2 (L) 12.0 - 16.0 g/dL    Hct 31.5 (L) 37.0 - 47.0 %    MCV 88.7 80.0 - 94.0 fL    MCH 28.7 27.0 - 31.0 pg    MCHC 32.4 (L) 33.0 - 36.0 g/dL    RDW 15.2 11.5 - 17.0 %    Platelet 191 130 - 400 x10(3)/mcL    MPV 10.5 (H) 7.4 - 10.4 fL    NRBC% 0.0 %   Comprehensive Metabolic Panel    Collection Time: 11/05/23  1:48 AM   Result Value Ref Range    Sodium Level 141 136 - 145 mmol/L    Potassium Level 4.0 3.5 - 5.1 mmol/L    Chloride 109 (H) 98 - 107 mmol/L    Carbon Dioxide 21 (L) 23 - 31 mmol/L    Glucose Level 249 (H) 82 - 115 mg/dL    Blood Urea Nitrogen 24.3 (H) 9.8 - 20.1 mg/dL    Creatinine 1.03 (H) 0.55 - 1.02 mg/dL    Calcium Level Total 8.4 8.4 - 10.2 mg/dL    Protein Total 5.6 (L) 5.8 - 7.6 gm/dL    Albumin Level 3.0 (L) 3.4 - 4.8 g/dL    Globulin 2.6 2.4 - 3.5 gm/dL    Albumin/Globulin Ratio 1.2 1.1 - 2.0 ratio    Bilirubin Total 0.7 <=1.5 mg/dL    Alkaline Phosphatase 67 40 - 150 unit/L    Alanine Aminotransferase 9 0 - 55 unit/L    Aspartate Aminotransferase 18 5 - 34 unit/L    eGFR 57 mls/min/1.73/m2   POCT glucose    Collection Time: 11/05/23  6:41 AM   Result Value Ref Range    POCT Glucose 228 (H) 70 - 110 mg/dL   POCT glucose    Collection Time: 11/05/23 12:14 PM   Result Value Ref Range    POCT Glucose 240 (H) 70 - 110 mg/dL   POCT glucose    Collection Time: 11/05/23  6:21 PM   Result Value Ref Range    POCT Glucose 264 (H) 70 - 110 mg/dL   POCT glucose    Collection Time: 11/05/23  8:18 PM   Result Value Ref Range    POCT Glucose 254 (H) 70 - 110 mg/dL   Comprehensive Metabolic Panel    Collection Time: 11/06/23   1:28 AM   Result Value Ref Range    Sodium Level 142 136 - 145 mmol/L    Potassium Level 4.2 3.5 - 5.1 mmol/L    Chloride 111 (H) 98 - 107 mmol/L    Carbon Dioxide 22 (L) 23 - 31 mmol/L    Glucose Level 219 (H) 82 - 115 mg/dL    Blood Urea Nitrogen 19.5 9.8 - 20.1 mg/dL    Creatinine 0.82 0.55 - 1.02 mg/dL    Calcium Level Total 8.2 (L) 8.4 - 10.2 mg/dL    Protein Total 5.6 (L) 5.8 - 7.6 gm/dL    Albumin Level 2.8 (L) 3.4 - 4.8 g/dL    Globulin 2.8 2.4 - 3.5 gm/dL    Albumin/Globulin Ratio 1.0 (L) 1.1 - 2.0 ratio    Bilirubin Total 0.6 <=1.5 mg/dL    Alkaline Phosphatase 82 40 - 150 unit/L    Alanine Aminotransferase 15 0 - 55 unit/L    Aspartate Aminotransferase 23 5 - 34 unit/L    eGFR >60 mls/min/1.73/m2   Magnesium    Collection Time: 11/06/23  1:28 AM   Result Value Ref Range    Magnesium Level 2.40 1.60 - 2.60 mg/dL   CBC with Differential    Collection Time: 11/06/23  1:28 AM   Result Value Ref Range    WBC 9.98 4.50 - 11.50 x10(3)/mcL    RBC 3.54 (L) 4.20 - 5.40 x10(6)/mcL    Hgb 10.2 (L) 12.0 - 16.0 g/dL    Hct 32.0 (L) 37.0 - 47.0 %    MCV 90.4 80.0 - 94.0 fL    MCH 28.8 27.0 - 31.0 pg    MCHC 31.9 (L) 33.0 - 36.0 g/dL    RDW 15.5 11.5 - 17.0 %    Platelet 208 130 - 400 x10(3)/mcL    MPV 10.5 (H) 7.4 - 10.4 fL    Neut % 72.8 %    Lymph % 17.1 %    Mono % 8.2 %    Eos % 1.2 %    Basophil % 0.3 %    Lymph # 1.71 0.6 - 4.6 x10(3)/mcL    Neut # 7.26 2.1 - 9.2 x10(3)/mcL    Mono # 0.82 0.1 - 1.3 x10(3)/mcL    Eos # 0.12 0 - 0.9 x10(3)/mcL    Baso # 0.03 <=0.2 x10(3)/mcL    IG# 0.04 0 - 0.04 x10(3)/mcL    IG% 0.4 %    NRBC% 0.0 %   POCT glucose    Collection Time: 11/06/23  5:46 AM   Result Value Ref Range    POCT Glucose 214 (H) 70 - 110 mg/dL   POCT glucose    Collection Time: 11/06/23 10:11 PM   Result Value Ref Range    POCT Glucose 419 (H) 70 - 110 mg/dL   Comprehensive Metabolic Panel    Collection Time: 11/07/23  4:06 AM   Result Value Ref Range    Sodium Level 142 136 - 145 mmol/L    Potassium Level 3.8  3.5 - 5.1 mmol/L    Chloride 105 98 - 107 mmol/L    Carbon Dioxide 26 23 - 31 mmol/L    Glucose Level 397 (H) 82 - 115 mg/dL    Blood Urea Nitrogen 15.7 9.8 - 20.1 mg/dL    Creatinine 1.04 (H) 0.55 - 1.02 mg/dL    Calcium Level Total 9.0 8.4 - 10.2 mg/dL    Protein Total 6.5 5.8 - 7.6 gm/dL    Albumin Level 3.1 (L) 3.4 - 4.8 g/dL    Globulin 3.4 2.4 - 3.5 gm/dL    Albumin/Globulin Ratio 0.9 (L) 1.1 - 2.0 ratio    Bilirubin Total 0.7 <=1.5 mg/dL    Alkaline Phosphatase 93 40 - 150 unit/L    Alanine Aminotransferase 21 0 - 55 unit/L    Aspartate Aminotransferase 22 5 - 34 unit/L    eGFR 57 mls/min/1.73/m2   Magnesium    Collection Time: 11/07/23  4:06 AM   Result Value Ref Range    Magnesium Level 1.90 1.60 - 2.60 mg/dL   CBC with Differential    Collection Time: 11/07/23  4:06 AM   Result Value Ref Range    WBC 10.38 4.50 - 11.50 x10(3)/mcL    RBC 3.90 (L) 4.20 - 5.40 x10(6)/mcL    Hgb 11.0 (L) 12.0 - 16.0 g/dL    Hct 35.0 (L) 37.0 - 47.0 %    MCV 89.7 80.0 - 94.0 fL    MCH 28.2 27.0 - 31.0 pg    MCHC 31.4 (L) 33.0 - 36.0 g/dL    RDW 15.3 11.5 - 17.0 %    Platelet 323 130 - 400 x10(3)/mcL    MPV 9.6 7.4 - 10.4 fL    Neut % 69.3 %    Lymph % 21.8 %    Mono % 7.5 %    Eos % 0.7 %    Basophil % 0.3 %    Lymph # 2.26 0.6 - 4.6 x10(3)/mcL    Neut # 7.20 2.1 - 9.2 x10(3)/mcL    Mono # 0.78 0.1 - 1.3 x10(3)/mcL    Eos # 0.07 0 - 0.9 x10(3)/mcL    Baso # 0.03 <=0.2 x10(3)/mcL    IG# 0.04 0 - 0.04 x10(3)/mcL    IG% 0.4 %    NRBC% 0.0 %     Telemetry: SR    Physical Exam  Constitutional:       General: She is not in acute distress.     Appearance: Normal appearance.   HENT:      Head: Normocephalic.      Nose: Nose normal.      Mouth/Throat:      Mouth: Mucous membranes are moist.   Eyes:      Extraocular Movements: Extraocular movements intact.      Conjunctiva/sclera: Conjunctivae normal.   Cardiovascular:      Rate and Rhythm: Normal rate and regular rhythm.      Pulses: Normal pulses.      Heart sounds: Normal heart  sounds. No murmur heard.  Pulmonary:      Effort: Pulmonary effort is normal.      Breath sounds: Normal breath sounds.   Abdominal:      Palpations: Abdomen is soft.   Musculoskeletal:         General: No swelling. Normal range of motion.   Skin:     General: Skin is warm and dry.      Comments: Midline Sternotomy incision w/ Provena wound vac in place   Neurological:      General: No focal deficit present.      Mental Status: She is alert and oriented to person, place, and time.   Psychiatric:         Mood and Affect: Mood normal.         Behavior: Behavior normal.         Judgment: Judgment normal.       Current Inpatient Medications:    Current Facility-Administered Medications:     acetaminophen oral solution 650 mg, 650 mg, Per OG tube, Q6H PRN, Bi Verduzco PA-C    albumin human 5% bottle 12.5 g, 12.5 g, Intravenous, PRN, Bi Verduzco PA-C, Last Rate: 250 mL/hr at 11/03/23 1128, Rate Verify at 11/03/23 1128    [START ON 11/8/2023] amiodarone tablet 200 mg, 200 mg, Oral, BID, Francisco Porter ANP    [START ON 11/11/2023] amiodarone tablet 200 mg, 200 mg, Oral, Daily, Francisco Porter ANP    amiodarone tablet 400 mg, 400 mg, Oral, BID, Francisco Porter ANP, 400 mg at 11/07/23 0904    amLODIPine tablet 10 mg, 10 mg, Oral, Daily, Puma Vera MD, 10 mg at 11/07/23 0904    aspirin EC tablet 81 mg, 81 mg, Oral, Daily, Puma Vera MD, 81 mg at 11/07/23 0904    atorvastatin tablet 40 mg, 40 mg, Oral, Daily, Francisco Porter ANP, 40 mg at 11/07/23 0903    calcium gluconate 1 g in NS IVPB (premixed), 1 g, Intravenous, PRN, Bi Verduzco PA-C    calcium gluconate 1 g in NS IVPB (premixed), 2 g, Intravenous, PRN, Bi Verduzco PA-C    calcium gluconate 1 g in NS IVPB (premixed), 3 g, Intravenous, PRN, Bi Verduzco PA-C    dextrose 10% bolus 125 mL 125 mL, 12.5 g, Intravenous, PRN, Heriberto Lara IV, MD    dextrose 10% bolus 125 mL 125 mL, 12.5 g, Intravenous, PRN, Cisco Farrell,  MD    dextrose 10% bolus 250 mL 250 mL, 25 g, Intravenous, PRN, Heriberto Lara IV, MD    dextrose 10% bolus 250 mL 250 mL, 25 g, Intravenous, PRN, Cisco Farrell MD    dextrose 5 % and 0.45 % NaCl infusion, , Intravenous, Continuous, Heriberto Lara IV, MD, Stopped at 11/06/23 2000    docusate sodium capsule 100 mg, 100 mg, Oral, BID, Bi Verduzco PA-C, 100 mg at 11/06/23 0807    enoxaparin injection 40 mg, 40 mg, Subcutaneous, Daily, Bi Verduzco PA-C, 40 mg at 11/06/23 1645    ezetimibe tablet 10 mg, 10 mg, Oral, Daily, Puma Vera MD, 10 mg at 11/07/23 0903    famotidine (PF) injection 20 mg, 20 mg, Intravenous, Daily, Bi Verduzco PA-C, 20 mg at 11/07/23 0903    folic acid tablet 1 mg, 1 mg, Oral, Daily, Bi Verduzco PA-C, 1 mg at 11/07/23 0904    glucagon (human recombinant) injection 1 mg, 1 mg, Intramuscular, PRN, Cisco Farrell MD    hydrALAZINE injection 10 mg, 10 mg, Intravenous, Q4H PRN, Puma Vera MD, 10 mg at 11/06/23 2156    HYDROcodone-acetaminophen 5-325 mg per tablet 1 tablet, 1 tablet, Oral, Q4H PRN, Heriberto Lara IV, MD    insulin aspart U-100 injection 0-5 Units, 0-5 Units, Subcutaneous, Q6H PRN, Cisco Farrell MD, 5 Units at 11/07/23 0554    lactulose 10 gram/15 ml solution 20 g, 20 g, Oral, Q6H PRN, Bi Verduzco PA-C, 20 g at 11/07/23 0326    loperamide capsule 2 mg, 2 mg, Oral, Continuous PRN, Bi Verduzco PA-C    magnesium sulfate 2g in water 50mL IVPB (premix), 2 g, Intravenous, PRN, Bi Verduzco PA-C    magnesium sulfate 2g in water 50mL IVPB (premix), 4 g, Intravenous, PRN, Bi Verduzco PA-C    metoclopramide HCl injection 5 mg, 5 mg, Intravenous, Q6H PRN, Bi Verduzco PA-C    metoprolol tartrate (LOPRESSOR) split tablet 12.5 mg, 12.5 mg, Oral, BID, Bi Verduzco PA-C, 12.5 mg at 11/07/23 0904    morphine injection 4 mg, 4 mg, Intravenous, Q4H PRN, Bi Verduzco PA-C, 4 mg at 11/03/23 0004     ondansetron injection 4 mg, 4 mg, Intravenous, Q4H PRN, Bi Verduzco PA-C    oxyCODONE immediate release tablet 10 mg, 10 mg, Oral, Q4H PRN, Bi Verduzco PA-C, 10 mg at 11/05/23 2023    potassium chloride 20 mEq in 100 mL IVPB (FOR CENTRAL LINE ADMINISTRATION ONLY), 20 mEq, Intravenous, PRN, Bi Verduzco PA-C, Stopped at 11/04/23 1117    potassium chloride 20 mEq in 100 mL IVPB (FOR CENTRAL LINE ADMINISTRATION ONLY), 40 mEq, Intravenous, PRN, Bi Verduzco PA-C    sodium phosphate 15 mmol in dextrose 5 % (D5W) 250 mL IVPB, 15 mmol, Intravenous, PRN, Bi Verduzco PA-C    sodium phosphate 20.01 mmol in dextrose 5 % (D5W) 250 mL IVPB, 20.01 mmol, Intravenous, PRN, Bi Verduzco, PA-C    sodium phosphate 30 mmol in dextrose 5 % (D5W) 250 mL IVPB, 30 mmol, Intravenous, PRN, Bi Verduzco PA-C    sucralfate tablet 1 g, 1 g, Oral, QID (AC & HS), Bi Verduzco PA-C, 1 g at 11/06/23 2108    VTE Risk Mitigation (From admission, onward)           Ordered     enoxaparin injection 40 mg  Daily         11/02/23 2120     Place sequential compression device  Until discontinued         11/02/23 2120     IP VTE HIGH RISK PATIENT  Once         11/02/23 1724     Place sequential compression device  Until discontinued         11/02/23 1018                  Assessment:   Abnormal PET Scan     - s/p CABG (11.2.23) - LIMA to LAD and rSVG to Diag 1    - s/p LHC (11.2.23) - Left main-luminal irregularities; LAD-mid subtotal occlusion involving large diagonal; LCX-non dominant, luminal irregularities; RCA-dominant, 40% ostial stenosis  MVCAD s/p CABG  Post Operative PAF with RVR - Now SR/ST    - CHADsVASc - 3 Points - 3.2% Stroke Risk per Year     - ECHO (10.11.22) - LVEF 55%  Acute on Chronic Diastolic HF/EF 55%  YULI/Bilaterally Mild  Hypotension requiring Pressors - Resolved    - Hx of HTN  DM II  PAD/Interventions   Obesity  No Hx of GIB     Plan:   Continue ASA, Statin, Norvasc, Amiodarone Load and BB  (will increase to metoprolol tartrate 25 mg BID).   Start DOAC when OK with CV Surgery Re Stroke Risk Reduction   Accurate I&Os and Daily Weights   Aggressive Mobilization of PT and Q1HR IS  CM on board working on rehab placement  Labs in AM: CBC, CMP and Mg    JONG Mayo  Cardiology  [facility]  11/07/2023       I have seen the patient, reviewed the Nurse Practitioner's note, assessment and plan. I have personally interviewed and examined the patient at bedside and agree with the findings. Medical decision making listed above were done under my guidance.    Physical exam:  Cardiovascular system: regular rhythm, no murmur.  Lungs: Coarse anteriorly  Extremities: No leg edema.  CABG surgical site without erythema or purulent discharge.    Plan:  As above  Continue ASA, statin and amiodarone  Increase Metoprolol to 25 mg BID  Start OAC when ok with CV surgery

## 2023-11-08 PROBLEM — I10 HYPERTENSION: Status: ACTIVE | Noted: 2023-11-08

## 2023-11-08 PROBLEM — E11.9 DIABETES MELLITUS: Status: ACTIVE | Noted: 2023-11-08

## 2023-11-08 PROBLEM — M17.9 OSTEOARTHRITIS OF KNEE: Status: ACTIVE | Noted: 2023-11-08

## 2023-11-08 PROBLEM — E78.5 DYSLIPIDEMIA: Status: ACTIVE | Noted: 2023-11-08

## 2023-11-08 PROBLEM — Z96.651 STATUS POST RIGHT KNEE REPLACEMENT: Status: ACTIVE | Noted: 2023-11-08

## 2023-11-08 PROBLEM — E66.9 OBESITY: Status: ACTIVE | Noted: 2023-11-08

## 2023-11-08 LAB
POCT GLUCOSE: 231 MG/DL (ref 70–110)
POCT GLUCOSE: 245 MG/DL (ref 70–110)
POCT GLUCOSE: 413 MG/DL (ref 70–110)
POCT GLUCOSE: 427 MG/DL (ref 70–110)
POCT GLUCOSE: 450 MG/DL (ref 70–110)

## 2023-11-08 PROCEDURE — 94760 N-INVAS EAR/PLS OXIMETRY 1: CPT

## 2023-11-08 PROCEDURE — 25000003 PHARM REV CODE 250: Performed by: PHYSICIAN ASSISTANT

## 2023-11-08 PROCEDURE — 27000221 HC OXYGEN, UP TO 24 HOURS

## 2023-11-08 PROCEDURE — 63600175 PHARM REV CODE 636 W HCPCS: Performed by: STUDENT IN AN ORGANIZED HEALTH CARE EDUCATION/TRAINING PROGRAM

## 2023-11-08 PROCEDURE — 97530 THERAPEUTIC ACTIVITIES: CPT | Mod: CQ

## 2023-11-08 PROCEDURE — 25000003 PHARM REV CODE 250

## 2023-11-08 PROCEDURE — 99024 PR POST-OP FOLLOW-UP VISIT: ICD-10-PCS | Mod: POP,,, | Performed by: PHYSICIAN ASSISTANT

## 2023-11-08 PROCEDURE — 25000003 PHARM REV CODE 250: Performed by: NURSE PRACTITIONER

## 2023-11-08 PROCEDURE — 63600175 PHARM REV CODE 636 W HCPCS: Performed by: NURSE PRACTITIONER

## 2023-11-08 PROCEDURE — 97116 GAIT TRAINING THERAPY: CPT | Mod: CQ

## 2023-11-08 PROCEDURE — 97110 THERAPEUTIC EXERCISES: CPT

## 2023-11-08 PROCEDURE — 63600175 PHARM REV CODE 636 W HCPCS: Performed by: PHYSICIAN ASSISTANT

## 2023-11-08 PROCEDURE — 99024 POSTOP FOLLOW-UP VISIT: CPT | Mod: POP,,, | Performed by: PHYSICIAN ASSISTANT

## 2023-11-08 PROCEDURE — 21400001 HC TELEMETRY ROOM

## 2023-11-08 PROCEDURE — 25000003 PHARM REV CODE 250: Performed by: INTERNAL MEDICINE

## 2023-11-08 RX ORDER — INSULIN ASPART 100 [IU]/ML
0-10 INJECTION, SOLUTION INTRAVENOUS; SUBCUTANEOUS
Status: DISCONTINUED | OUTPATIENT
Start: 2023-11-08 | End: 2023-11-09 | Stop reason: HOSPADM

## 2023-11-08 RX ORDER — GLIPIZIDE 5 MG/1
5 TABLET, FILM COATED, EXTENDED RELEASE ORAL
Status: DISCONTINUED | OUTPATIENT
Start: 2023-11-08 | End: 2023-11-09 | Stop reason: HOSPADM

## 2023-11-08 RX ORDER — METFORMIN HYDROCHLORIDE 500 MG/1
1000 TABLET ORAL 2 TIMES DAILY WITH MEALS
Status: DISCONTINUED | OUTPATIENT
Start: 2023-11-08 | End: 2023-11-09 | Stop reason: HOSPADM

## 2023-11-08 RX ORDER — GLIPIZIDE 5 MG/1
5 TABLET, FILM COATED, EXTENDED RELEASE ORAL
Status: DISCONTINUED | OUTPATIENT
Start: 2023-11-09 | End: 2023-11-08

## 2023-11-08 RX ADMIN — ASPIRIN 81 MG: 81 TABLET, COATED ORAL at 10:11

## 2023-11-08 RX ADMIN — ENOXAPARIN SODIUM 40 MG: 40 INJECTION SUBCUTANEOUS at 04:11

## 2023-11-08 RX ADMIN — SUCRALFATE 1 G: 1 TABLET ORAL at 05:11

## 2023-11-08 RX ADMIN — EZETIMIBE 10 MG: 10 TABLET ORAL at 10:11

## 2023-11-08 RX ADMIN — FOLIC ACID 1 MG: 1 TABLET ORAL at 10:11

## 2023-11-08 RX ADMIN — INSULIN ASPART 2 UNITS: 100 INJECTION, SOLUTION INTRAVENOUS; SUBCUTANEOUS at 05:11

## 2023-11-08 RX ADMIN — SUCRALFATE 1 G: 1 TABLET ORAL at 04:11

## 2023-11-08 RX ADMIN — SUCRALFATE 1 G: 1 TABLET ORAL at 09:11

## 2023-11-08 RX ADMIN — METOPROLOL TARTRATE 25 MG: 25 TABLET, FILM COATED ORAL at 10:11

## 2023-11-08 RX ADMIN — AMIODARONE HYDROCHLORIDE 200 MG: 200 TABLET ORAL at 10:11

## 2023-11-08 RX ADMIN — ATORVASTATIN CALCIUM 40 MG: 40 TABLET, FILM COATED ORAL at 10:11

## 2023-11-08 RX ADMIN — DOCUSATE SODIUM 100 MG: 100 CAPSULE, LIQUID FILLED ORAL at 09:11

## 2023-11-08 RX ADMIN — INSULIN ASPART 10 UNITS: 100 INJECTION, SOLUTION INTRAVENOUS; SUBCUTANEOUS at 02:11

## 2023-11-08 RX ADMIN — DOCUSATE SODIUM 100 MG: 100 CAPSULE, LIQUID FILLED ORAL at 10:11

## 2023-11-08 RX ADMIN — SUCRALFATE 1 G: 1 TABLET ORAL at 10:11

## 2023-11-08 RX ADMIN — FAMOTIDINE 20 MG: 10 INJECTION, SOLUTION INTRAVENOUS at 10:11

## 2023-11-08 RX ADMIN — METOPROLOL TARTRATE 25 MG: 25 TABLET, FILM COATED ORAL at 09:11

## 2023-11-08 RX ADMIN — AMIODARONE HYDROCHLORIDE 200 MG: 200 TABLET ORAL at 09:11

## 2023-11-08 RX ADMIN — GLIPIZIDE 5 MG: 5 TABLET, FILM COATED, EXTENDED RELEASE ORAL at 04:11

## 2023-11-08 RX ADMIN — METFORMIN HYDROCHLORIDE 1000 MG: 500 TABLET, FILM COATED ORAL at 04:11

## 2023-11-08 RX ADMIN — SITAGLIPTIN 50 MG: 50 TABLET, FILM COATED ORAL at 04:11

## 2023-11-08 RX ADMIN — AMLODIPINE BESYLATE 10 MG: 5 TABLET ORAL at 10:11

## 2023-11-08 NOTE — CONSULTS
[facility] - Rehab Inpatient Services  Inpatient Rehab Prescreen    PATIENT INFORMATION     Assessment date/time: 11/9/23 1049    Name: Geno Barry Phone: [phone]   Address:   [address] [place] [address] [address]  [place] [address] SSN:    YOB: 1950 Age: 72 y.o. Gender: female   Race: Black or    Marital Status:    Advance Directives: Full code     COVERAGE INFORMATION     Patient Medicare #:  [id number]    Primary Insurance Type: MEDICARE/MEDICARE PART A & B / Part A effective 3/1/09, Part B effective 9/1/10 Secondary Insurance Type: [place] /    Policy #:   Policy #:  [id number]   Insurance contact name/number:  Insurance contact name/number:    Authorization #:  Authorization #:    Verified Coverage: Insurance Carrier   Pending Coverage:    Prescription Coverage:  Insurance details/comments:      PHYSICIAN/REFERRAL INFORMATION     Primary Care Physician: Delmy Montano FNP Attending Physician: Salvador Sebastian MD   Consulting physician/specialist:  Referring Physician:    Referring facility:  Referring contact name/phone:    Physician details/comments:      CONTACT INFORMATION   Extended Emergency Contact Information  Primary Emergency Contact: KatyHeaven  Mobile Phone: [phone]  Relation: Relative  Secondary Emergency Contact: ClintNeelamLeticia  [place] Phone: [phone]  Mobile Phone: [phone]  Relation: Friend    PRIOR LIVING SITUATION    Patient lives alone in a single story home with no steps to enter     Bedroom location/setup: single story home    Bathroom location/setup: tub/shower combo without railing, normal height commode without railing   Equipment at home: hurri-cane   Prior device use:  hurri-cane     PRIOR LEVEL OF FUNCTION     Did a helper need to assist with the following activities prior to the current illness, exacerbation, or injury?   Self-care:  Yes, required supervision and setup assist  by sitter    Indoor mobility:  No, modified independent with cane   Stairs: unknown   Functional Cognition: No, independent    Comments: Patient was modified independent for mobility and most ADLs. Patient did require setup/supervision assist for bathing and dressing.    Caregivers providing assistance: Jeny sosa- [phone]   Pre-hospital home care service: Yes Name of Agency: [facility]   Home/personal responsibilities: personal Adls, managing finances, managing medications, and some simple cooking. Sitter assisted with cooking and cleaning   Pre-hospital vocational category: Retired for age   Pre-hospital vocational effort: Retired for age   Occupation/profession:  Return to work/school plan:    Educational history:    Hobbies/leisure activities:  Resources used prior to admission:    Available resources:  Resource information comments:      REHABILITATION DIAGNOSIS     History of present illness: This is a 72 year old female with a PMH of HTN, CAD, cerebrovascular disease, PAD, and DM who presented to [facility] on 11/2/23 and underwent a LHC revealing severe multivessel CAD. CV surgeon was consulted for CABG. Patient then underwent a CABG x2V: LIMA to the LAD; reverse saphenous vein to the 1st diagonal; endoscopic vein harvest of the left greater saphenous vein by Dr. Lara. 1 chest tube was placed. Patient was then transferred to ICU, and extubated. Chest XR showed some pulmonary vascular congestion, no focal infiltrates or pneummothorax. O2 sats in high 90s on 2L/NC. Patient did develop some A-fib with controlled ventricular rate. Remained on low dose epinephrine at 0.05 for hemodynamic support. Patient also on insulin drip. On 11/3, labs showed elevated WBC of 12.97, and low H&H of 10.8 & 33.6. Patient was extubated. Epinephrine stopped. Continued ASA, norvasc, and betablocker. Started atorvastatin 40mg PO Q day. On amiodarone drip. On 11/4, amiodarone drip continued. On 11/5,  Amiodarone drip transitioned to oral. Chest tube removed. PT eval completed with deficits noted. On 11/6, patient on 4L O2/NC with sat decreasing in 70-80s with therapy, and continued IV diuresis of 40mg BID. On 11/7, Labs showed low H&H of 3.90, low H&H of 11.0 & 35.0, low MCHC of 31.4, elevated glucose of 397, elevated Cr of 1.04, low albumin of 3.1. Metoprolol tart 25mg BID. O2 titrated down to 2.5L/NC with sats ranging 89-94% with therapy. On 11/8, O2 was discontinued, Prevena discontinued to surgical site with minimal bloody drainage noted to mid-sternum. Vital signs stable, and HR in SR. Glucose elevated at 231. Nutrition was consulted with recommendation to continue diabetic diet, encourage small and frequent meals, and add Boost Max BID for additional nutrition. Patient complained of sternal pain rating at 7 on 1-10 scale. Last BM on 11/7/23. On 11/9, OT eval completed with deficits noted. Eliquis started at 5mg PO BID. Toprol XL transitioned to 50mg daily. Continued ASA and statin. Prior to hospitalization, patient was living alone in single story home with no steps to enter, has tub/shower combo without railing, and normal height commode without railing. Patient reported she will discharge with cousin after rehab if needed. Patient was modified independent for mobility with use of cane, and most ADLs. Patient did require supervision/setup assist for bathing and dressing with sitter 5 days per week. Patient was managing medications and finances, and sitter would help with cooking, cleaning, and supervision for some ADLs. Currently, patient is AAOx4; setup/ supervision for eating and grooming while seated; minimal assist for bed mobility, minimal assist for transfers with RW, CGA for walking 120ft with RW, max assist lower body dressing, and moderate assist toileting. Pt. Requires acute inpatient rehab admission with 24-hour nursing and active physician oversight to monitor and manage acute medical comorbid  conditions, labs, pain, and functional deficits.  Patient/family will also require teaching and integration of improving functional skills into daily living.  She will also require an individualized, interdisciplinary approach to her care, receiving PT, OT services 3 hours per day, 5 days per week.    Required care cannot be provided at a lower level of care. Patient is anticipated to require approximately 10-12 days LOS with expected discharge home with cousin with HH   Impairment group (IGC):   Cardiac 09 Etiologic diagnosis/description: multivessel CAD s/p CABG x 2V   Date of onset:  11/2/23 Date of surgery: 11/2/23   Allergies: Ace inhibitors, Clopidogrel, and Iodine   Comorbid condition: Anemia, Coronary artery disease, Diabetes, Hypertension, and Peripheral artery disease, carotid artery stenosis, obesity, hyperlipidemia, acute on chronic diastolic heart failure with EF of 55%, A-fib now SR   Medical/functional conditions requiring inpatient rehabilitation: This patient requires medical management/24-hour nursing of complex   comorbidities (multivessel CAD s/p CABG x 2V,  Anemia, Coronary artery disease, Diabetes, Hypertension, and Peripheral artery disease, carotid artery stenosis, obesity, hyperlipidemia, acute on chronic diastolic heart failure with EF of 55%, A-fib now SR), labs, medications (see medications list), pain, sleep   hygiene, anticoagulation, nutrition, hydration, neurological,   pulmonary, cardiac status, and preventive healthcare.      This patient requires intense therapy and an integrated,   interdisciplinary approach to address safety, impaired mobility,   impaired ADL's (dressing, toileting, grooming, showering), surgical wound care, pulmonary insufficiency, bowel/bladder problems,   preventive healthcare, medication management, integration of   improving functional skills into daily living, and home caregiver   support and training.     Risk for medical/clinical complications: This  "patient is at risk for the following complications: DVT/PE, pneumonia,   malnutrition, neurological decline, respiratory insufficiency,   worsening activity intolerance, complications from anticoagulation,   infection, skin breakdown, inadequate sleep, stroke, and constipation.     SPECIAL REHABILITATION NEEDS     IV: 20G left forearm Preferred Language: english         Peripheral IV - Single Lumen 11/04/23 1737 20 G Anterior;Left Forearm (Active)   Site Assessment Clean;Dry;Intact;No redness;No swelling 11/08/23 0839   Extremity Assessment Distal to IV No abnormal discoloration 11/08/23 0839   Line Status Flushed;Saline locked 11/08/23 0839   Dressing Status Clean;Dry;Intact 11/08/23 0839   Dressing Intervention Integrity maintained 11/08/23 0839          PRECAUTIONS     Safety/fall precautions: High fall risk and Sternal precautions: s/p CABG     PAST MEDICAL, SOCIAL, FAMILY HISTORY     Pertinent past medical history:   Past Medical History:   Diagnosis Date    CAD (coronary artery disease)     Diabetes mellitus     Hypertension     PAD (peripheral artery disease)       Has the patient had two or more falls in the past year or any fall with injury in the past year: No    Has the patient had major surgery during the 100 days prior to admission: Yes    Family Medical History: No family history on file.   Substance use history:   Social History     Substance and Sexual Activity   Alcohol Use Not on file     Social History     Tobacco Use   Smoking Status Not on file   Smokeless Tobacco Not on file     Social History     Substance and Sexual Activity   Drug Use Not on file      VITALS   BP:  121/69     Temp: 98.5 °F (36.9 °C)     HR: 80     Resp: 20     SpO2: (!) 92 %     Height: 5' 4" (1.626 m)     Weight: 95.3 kg (210 lb 1.6 oz)     BMI: 36          MED/LABS/DIAGNOSITICS   No current facility-administered medications for this encounter.     No current outpatient medications on file.     Facility-Administered " Medications Ordered in Other Encounters   Medication Dose Route Frequency Provider Last Rate Last Admin    acetaminophen oral solution 650 mg  650 mg Per OG tube Q6H PRN Bi Verduzco PA-C        albumin human 5% bottle 12.5 g  12.5 g Intravenous PRN Bi Verduzco PA-C 250 mL/hr at 11/03/23 1128 Rate Verify at 11/03/23 1128    amiodarone tablet 200 mg  200 mg Oral BID Francisco Porter ANP   200 mg at 11/08/23 1014    [START ON 11/11/2023] amiodarone tablet 200 mg  200 mg Oral Daily Francisco Porter ANP        amLODIPine tablet 10 mg  10 mg Oral Daily Puma Vera MD   10 mg at 11/08/23 1013    aspirin EC tablet 81 mg  81 mg Oral Daily Puma Vera MD   81 mg at 11/08/23 1014    atorvastatin tablet 40 mg  40 mg Oral Daily Francisco Porter ANP   40 mg at 11/08/23 1014    calcium gluconate 1 g in NS IVPB (premixed)  1 g Intravenous PRN Bi Verduzco PA-C        calcium gluconate 1 g in NS IVPB (premixed)  2 g Intravenous PRN Bi Verduzco PA-C        calcium gluconate 1 g in NS IVPB (premixed)  3 g Intravenous PRN Bi Verduzco PA-C        dextrose 10% bolus 125 mL 125 mL  12.5 g Intravenous PRN Heriberto Lara IV, MD        dextrose 10% bolus 125 mL 125 mL  12.5 g Intravenous PRN Cisco Farrell MD        dextrose 10% bolus 250 mL 250 mL  25 g Intravenous PRN Heriberto Lara IV, MD        dextrose 10% bolus 250 mL 250 mL  25 g Intravenous PRN Cisco Farrell MD        dextrose 5 % and 0.45 % NaCl infusion   Intravenous Continuous Heriberto Lara IV, MD   Stopped at 11/06/23 2000    docusate sodium capsule 100 mg  100 mg Oral BID Bi Verduzco PA-C   100 mg at 11/08/23 1014    enoxaparin injection 40 mg  40 mg Subcutaneous Daily Bi Verduzco PA-C   40 mg at 11/07/23 1711    ezetimibe tablet 10 mg  10 mg Oral Daily Puma Vera MD   10 mg at 11/08/23 1015    famotidine (PF) injection 20 mg  20 mg Intravenous Daily Bi Verduzco PA-C   20 mg at 11/08/23 1013     folic acid tablet 1 mg  1 mg Oral Daily Bi Verduzco PA-C   1 mg at 11/08/23 1014    [START ON 11/9/2023] glipiZIDE 24 hr tablet 5 mg  5 mg Oral Daily with breakfast Sally Gillis FNP        glucagon (human recombinant) injection 1 mg  1 mg Intramuscular PRN Cisco Farrell MD        hydrALAZINE injection 10 mg  10 mg Intravenous Q4H PRN Puma Vera MD   10 mg at 11/06/23 2156    HYDROcodone-acetaminophen 5-325 mg per tablet 1 tablet  1 tablet Oral Q4H PRN Heriberto Lara IV, MD        insulin aspart U-100 injection 0-10 Units  0-10 Units Subcutaneous QID (AC + HS) PRN Sally Gillis FNP        lactulose 10 gram/15 ml solution 20 g  20 g Oral Q6H PRN Bi Verduzco PA-C   20 g at 11/07/23 0326    loperamide capsule 2 mg  2 mg Oral Continuous PRN Bi Verduzco PA-C        magnesium sulfate 2g in water 50mL IVPB (premix)  2 g Intravenous PRN Bi Verduzco PA-C        magnesium sulfate 2g in water 50mL IVPB (premix)  4 g Intravenous PRN Bi Verduzco PA-C        metoclopramide HCl injection 5 mg  5 mg Intravenous Q6H PRN Bi Verduzco PA-C        metoprolol tartrate (LOPRESSOR) tablet 25 mg  25 mg Oral BID [address] FNP   25 mg at 11/08/23 1014    morphine injection 4 mg  4 mg Intravenous Q4H PRN Bi Verduzco PA-C   4 mg at 11/03/23 0004    ondansetron injection 4 mg  4 mg Intravenous Q4H PRN Bi Verduzco PA-C        oxyCODONE immediate release tablet 10 mg  10 mg Oral Q4H PRN Bi Verduzco PA-C   10 mg at 11/05/23 2023    potassium chloride 20 mEq in 100 mL IVPB (FOR CENTRAL LINE ADMINISTRATION ONLY)  20 mEq Intravenous PRN Bi Verduzco PA-C   Stopped at 11/04/23 1117    potassium chloride 20 mEq in 100 mL IVPB (FOR CENTRAL LINE ADMINISTRATION ONLY)  40 mEq Intravenous PRN Bi Verduzco PA-C        sodium phosphate 15 mmol in dextrose 5 % (D5W) 250 mL IVPB  15 mmol Intravenous PRN Bi Verduzco PA-C        sodium phosphate 20.01 mmol in  dextrose 5 % (D5W) 250 mL IVPB  20.01 mmol Intravenous PRN Bi Verduzco PA-C        sodium phosphate 30 mmol in dextrose 5 % (D5W) 250 mL IVPB  30 mmol Intravenous PRN Bi Verduzco PA-C        sucralfate tablet 1 g  1 g Oral QID (AC & HS) Bi Verduzco PA-C   1 g at 11/08/23 1014      Pertinent lab results:   Lab Results   Component Value Date    WBC 10.38 11/07/2023    HGB 11.0 (L) 11/07/2023    HCT 35.0 (L) 11/07/2023     11/07/2023     11/07/2023    K 3.8 11/07/2023    BUN 15.7 11/07/2023    CO2 26 11/07/2023      Pertinent diagnostic studies:    Additional labs and diagnostic studies required prior to admission:      QI: GG Care Tool     QI: GG Care Tool  Therapy Evaluation   Swallowing/  Nutritional Status   Diet/Feeding/  Swallowing Setup/ supervision    Eating       Oral Hygiene   Grooming Setup/ supervision    Toileting Hygiene       Shower/Bathe   Bathing    Upper Body Dressing   Dressing Upper    Lower Body Dressing   Dressing Lower Max assist    Putting On/Taking Off Footwear       Toilet Transfer   Toileting Moderate assist    Bladder Continence Status   Bladder     Bowel Continence Status   Bowel     Roll Left and Right   Bed Mobility Rolling Right: minimum assistance and moderate assistance  Scooting: contact guard assistance and minimum assistance  Supine to Sit: minimum assistance and moderate assistance   Sit to Lying       Lying to Sitting on Side of Bed       Sit to Stand       Chair/Bed-to- Chair   Transfers   Sit to Stand:  minimum assistance with rolling walker  Bed to Chair: minimum assistance with  rolling walker  using  Step Transfer   Car Transfer       Walk 10 Feet   Equipment      Walk 50 Feet with Two Turns   Balance SBA for static standing and CGA for dynamic w/ walker   Walk 150 Feet   Endurance    Walk 10 Feet on Uneven Surfaces   Gait  pt ambulated 120 ft w/ RW CGA, slow gait, demonstrating fair+ foot clearance and heel strike   Picking up an Object        Wheel 50 Feet with Two Turns   Wheelchair    Wheel 150 Feet       1 Step (Curb)   Stairs    4 Steps       12 Steps       Expression of Ideas and Wants   Communication Independent    Understanding  Verbal and Non-Verbal Content       Cognitive Patterns   Cognition Independent       Safety Precautions       Lower Extremity      Strength       ROM       Upper Extremity      Strength       ROM      Care Score Value Definitions  6: Independent. [place] provides no assistance with tasks. A device may or may not have been used.  5: Set-up or clean-up assistance. [place] sets up or cleans up, but does not assist with tasks. [place] may have assisted prior to or following the activity.  4: Supervision or touching assistance. [place] provides verbal cues or touching/steadying or contact guard assistance. Assistance may be provided throughout the activity or intermittently.  3: Partial/moderate assistance. [place] does less than half the effort. [place] lifts, holds, or supports trunk or limbs, but provides less than half the effort.  2: Substantial/maximal assistance. [place] does more than half the effort. [place] lifts or holds trunk or limbs, and provides more than half the effort.  1: Dependent. [place] does all of the effort, or the assistance of two or more helpers is required for the patient to complete the activity.  Care Score Activity Not Attempted Value Definitions  7: Patient refused.  9: Not applicable. Not attempted and the patient did not perform this activity prior to the current illness, exacerbation, or injury.  10: Not attempted due to environmental limitations (e.g., lack of equipment, weather constraints).  88: Not attempted due to medical condition or safety concerns.    DISCHARGE GOALS/ANTICPATED INTERVENTIONS/SERVICES     Expected Level of Improvement for Safe Discharge: Patient/caregivers anticipate discharge home at or near baseline level of functioning and/or decreased burden of care.    Patient/Family/Caregiver Goals: Patient desires to increase current level of functioning to modified independence for mobility and all ADLs so that she is able to be discharged home safely at prior level of functioning   Required Treatments/Services: Rehabilitation Nursing, Respiratory Therapy, Dietitian/nutrition, Social , and Case Management   Required Therapy Therapy Type Min/Day Days/Week Duration of Therapy   Physical Therapy 90 5 10-12 days    Occupational Therapy 90 5 10-12 days    Speech and Language Pathology      Prosthetic/Orthotics      Recreational Therapy      Anticipated Services upon Discharge:  home health    Additional rehabilitation needs:  none    Expected Discharge Destination:  home with cousin    Barriers to Discharge: None   Discharge Support: Patient has a caregiver available   Patient/Family/Caregiver Orientation: Patient/family/caregiver oriented and agreeable to inpatient rehabilitation plan   Estimated Length of Stay: 10-12 days    Projected Admission Date: 11/9/23    Medical Prognosis: good   Physicians Review and Admission Determination: I have discussed patient with Nurse Liaison. I have reviewed the prescreen. Patient is in need of, appropriate for and should tolerate and benefit from an aggressive IRF stay

## 2023-11-08 NOTE — PROGRESS NOTES
11/08/23 0900   Pre Exercise Vitals   /73   Pulse 84   Supplemental O2? Yes   O2 Device nasal cannula   O2 Flow (L/min) 2   SpO2 96 %   During Exercise Vitals   Pulse 92   Supplemental O2? No   SpO2 93 %   Distance Walked 120 feet   Post Exercise Vitals   /76   Pulse 90   Supplemental O2? No   SpO2 94 %   Modality   Modality   (rollator)     Communicated with nurse prior to and post activity.   Pt currently in chair, 02 d/c'd, prevena d/c'd (ok per FARZAD Blas).  Minimal bloody drainage noted mid-sternum.  Mod assist sit to stand times 3 trials, maintaining sternal precautions.   Gait steady but slow with rollator.  Mild MICHAEL noted.  Encouraged increased activity and use of IS.

## 2023-11-08 NOTE — PT/OT/SLP PROGRESS
Physical Therapy Treatment    Patient Name:  Geno Barry   MRN:  [id number]    Recommendations:     Discharge therapy intensity: high intensity   Discharge Equipment Recommendations: to be determined by next level of care  Barriers to discharge: None    Assessment:     Geno Barry is a 72 y.o. female admitted with a medical diagnosis of <principal problem not specified>.  She presents with the following impairments/functional limitations: weakness, impaired endurance, impaired functional mobility, gait instability, impaired balance, impaired cardiopulmonary response to activity .    Rehab Prognosis: Good; patient would benefit from acute skilled PT services to address these deficits and reach maximum level of function.    Recent Surgery: Procedure(s) (LRB):  CORONARY ARTERY BYPASS GRAFT (CABG) (N/A)  SURGICAL PROCUREMENT, VEIN, ENDOSCOPIC (N/A) 6 Days Post-Op    Plan:     During this hospitalization, patient to be seen 6 x/week to address the identified rehab impairments via gait training, therapeutic activities, therapeutic exercises, neuromuscular re-education and progress toward the following goals:    Plan of Care Expires:  12/05/23    Subjective     Chief Complaint: none  Patient/Family Comments/goals: wants to go to rehab to return home and be independent again  Pain/Comfort:  Pain Rating Post-Intervention 1: 0/10      Objective:     Communicated with pts nurse prior to session.  Patient found up in chair with PureWick, pulse ox (continuous), telemetry, wound vac upon PT entry to room.     General Precautions: Standard, fall, sternal  Orthopedic Precautions: N/A  Braces: N/A  Respiratory Status: Room air  Blood Pressure: 144/72   Skin Integrity: Visible skin intact      Functional Mobility:  Bed Mobility:     Scooting: contact guard assistance and sitting EOB to side and posterior to prepare for sit to supine  Sit to Supine: minimum assistance  Transfers:     Sit to Stand:  contact guard assistance and  minimum assistance with rolling walker and performed from EOB, BSC and toilet, w/o use of UEs  Chair to mat: contact guard assistance with  rolling walker  using  Step Transfer  Toilet Transfer: contact guard assistance with  rolling walker  using  Step Transfer  Gait: Pt ambulated 140 ft w/ RW,  and short distances in pt room, CGA working on wt shift, step length and heel strike responding well to cues.   Balance: in standing performing cleaning/washing task and obtaining balance prior to reaching for support, coming to stand    Education:  Patient provided with verbal education education regarding PT POC and progression of treatments.  Understanding was verbalized.     Patient left HOB elevated with all lines intact and call button in reach..    GOALS:   Multidisciplinary Problems       Physical Therapy Goals          Problem: Physical Therapy    Goal Priority Disciplines Outcome Goal Variances Interventions   Physical Therapy Goal     PT, PT/OT Ongoing, Progressing     Description: Goals to be met by: 12/5/23     Patient will increase functional independence with mobility by performing:    Supine to sit with Modified [place]  Sit to stand transfer with Modified [place]  Gait  x 200 feet with Modified [place] using Rolling Walker.                          Time Tracking:     PT Received On: 11/08/23  PT Start Time: 1253     PT Stop Time: 1320  PT Total Time (min): 27 min     Billable Minutes: Gait Training 12 and Therapeutic Activity 15    Treatment Type: Treatment  PT/PTA: PTA     Number of PTA visits since last PT visit: 2     11/08/2023

## 2023-11-08 NOTE — PROGRESS NOTES
"[facility]    Cardiology  Progress Note    Patient Name: Geno Barry  MRN: [id number]  Admission Date: 11/2/2023  Hospital Length of Stay: 6 days  Code Status: Full Code   Attending Physician: Fuad Barragan MD   Primary Care Physician: Delmy Montano FNP  Expected Discharge Date:   Principal Problem:<principal problem not specified>    Subjective:   Brief HPI:   Ms. Barry is a 73 y/o female who is known to [facility], Dr. Vera. The patient recently was evaluate in [facility] clinic on a routine follow up, however, at this F/U Appointment she reported Progressively Worsening MICHAEL that started about 3 weeks prior to 10.9.23. She was scheduled for a PET Scan in which she underwent with results of High Risk for Future CV Events. She was evaluated in office on 10.31.23 for results and was scheduled on 11.2.23 for LHC with Dr. Vera. 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 On 11.2.23 she underwent a CABG with results of LIMA to LAD and rSVG to Diag 1. She tolerated the procedure well and was admitted to ICU for further management.    Hospital Course:   11.3.23: NAD. Laying in Bed. Denies SOB and Palps. + CP/Incisional. No Pressors. "I want coffee."  11.4.23: NAD. "I am feeling better today." Denies SOB and Palps. + CP/Incisional. SR/ST  11.5.23: NAD. " I am good." Denies SOB and Palps. + CP/Incisional. Sitting in Bedside Chair.   11.6.23: NAD. "I am feeling pretty good." Sitting in Bedside Chair. Denies SOB and Palps. + CP/Incisional.   11.7.23: NAD. "I am okay."Sitting up in chair. + Incisional CP. Denies SOB or palps. Rehab pending.   11.8.23: NAD Noted. Vitals Stable. SR on Tele. Sitting in chair. Progressing well. Awaiting Rehab Placement.    PMH: PAD, DM II, CAD/CABG, YULI/Bilaterally Mild, HTN  PSH: CABG (11.2.23) LIMA to LAD and rSVG to Diag1, ZACH, R Breast Lumpectomy, Angiogram   Family History: Father, D, 62, Diastolic HF; Mother, " D, 63, Breast CA; Brother, L, Colon Cancer; Sister, L, CAD/CABG  Social History: Denies Illicit Drug, ETOH and Tobacco Use; Former Smoker, 1 ppd fro 22+ Years; Quit in 1992    Previous Diagnostics:   CABG (11.2.23):  Date of service 11/02/2023   Preop diagnosis:  Coronary artery disease  Postop diagnosis:  Same  Procedure:  CABG x2; LIMA to the LAD; reverse saphenous vein to the 1st diagonal; endoscopic vein harvest of the left greater saphenous vein  Surgeon:  Marco  Assistant:  Dax   Anesthesia:  General endotracheal with cardiopulmonary bypass  Drains:  Chest tubes x1     LHC (11.2.23):  Left Main-luminal irregularities  LAD-mid subtotal occlusion involving large diagonal  LCX-non dominant, luminal irregularities  RCA-dominant, 40% ostial stenosis  LVEDP 18    PET (10.26.23):  This is an abnormal perfusion study.   This scan is suggestive of high risk for future cardiovascular events.   Large partially reversible perfusion abnormality of severe intensity in the anterior apical region. Medium partially reversible perfusion abnormality of severe intensity in the septal region. Medium partially reversible perfusion abnormality of severe intensity in the inferior region. Small partially reversible perfusion abnormality of severe intensity in the apical lateral segment.   The left ventricular cavity is noted to be mildly enlarged on the stress studies. The stress left ventricular ejection fraction was calculated to be 46% and left ventricular global function is mildly reduced. The rest left ventricular cavity is noted to be normal. The rest left ventricular ejection fraction was calculated to be 47% and rest left ventricular global function is mildly reduced.   Persistent hypokinesis of the anterior region and apical inferior segment is noted in both rest and stress studies. When compared to the resting ejection fraction (47%), the stress ejection fraction (46%) has decreased.   The study quality is average.    There was a rise in myocardial blood flow between rest and stress.  Global myocardial blood flow reserve was 2.25.  Myocardial blood flow reserve is reduced in the apical and spetal territories which is suggestive of flow limiting stenosis in these territories.    Carotid US (10.5.23):  The study quality is good.   1-39% stenosis in the proximal left internal carotid artery based on Bluth Criteria.   1-39% stenosis in the proximal right internal carotid artery based on Bluth Criteria.   Antegrade right vertebral artery flow.   Antegrade left vertebral artery flow.     ECHO (10.11.22):  The study quality is average.   The left ventricle is normal in size. Global left ventricular systolic function is normal. The left ventricular ejection fraction is 55%. The left ventricle diastolic function is impaired (Grade I) with normal left atrial pressure.  Mild (1+) mitral regurgitation.   Mild calcification of the aortic valve is noted with adequate cuspal excursion.  The pulmonary artery systolic pressure is 30 mmHg.      Review of Systems   Cardiovascular:  Negative for chest pain.   Respiratory:  Negative for shortness of breath.    All other systems reviewed and are negative.    Objective:     Vital Signs (Most Recent):  Temp: 98.5 °F (36.9 °C) (11/08/23 1054)  Pulse: 80 (11/08/23 1054)  Resp: 20 (11/08/23 0100)  BP: 121/69 (11/08/23 1054)  SpO2: (!) 92 % (11/08/23 1149) Vital Signs (24h Range):  Temp:  [97.7 °F (36.5 °C)-98.9 °F (37.2 °C)] 98.5 °F (36.9 °C)  Pulse:  [73-86] 80  Resp:  [20] 20  SpO2:  [92 %-96 %] 92 %  BP: (114-162)/(69-83) 121/69     Weight: 95.3 kg (210 lb 1.6 oz)  Body mass index is 36.06 kg/m².    SpO2: (!) 92 %         Intake/Output Summary (Last 24 hours) at 11/8/2023 1231  Last data filed at 11/7/2023 1330  Gross per 24 hour   Intake 240 ml   Output 400 ml   Net -160 ml       Lines/Drains/Airways       Drain  Duration             Female External Urinary Catheter 11/04/23 0830 4 days               Peripheral Intravenous Line  Duration                  Peripheral IV - Single Lumen 11/04/23 1737 20 G Anterior;Left Forearm 3 days                  Significant Labs:   Recent Results (from the past 72 hour(s))   POCT glucose    Collection Time: 11/05/23  6:21 PM   Result Value Ref Range    POCT Glucose 264 (H) 70 - 110 mg/dL   POCT glucose    Collection Time: 11/05/23  8:18 PM   Result Value Ref Range    POCT Glucose 254 (H) 70 - 110 mg/dL   Comprehensive Metabolic Panel    Collection Time: 11/06/23  1:28 AM   Result Value Ref Range    Sodium Level 142 136 - 145 mmol/L    Potassium Level 4.2 3.5 - 5.1 mmol/L    Chloride 111 (H) 98 - 107 mmol/L    Carbon Dioxide 22 (L) 23 - 31 mmol/L    Glucose Level 219 (H) 82 - 115 mg/dL    Blood Urea Nitrogen 19.5 9.8 - 20.1 mg/dL    Creatinine 0.82 0.55 - 1.02 mg/dL    Calcium Level Total 8.2 (L) 8.4 - 10.2 mg/dL    Protein Total 5.6 (L) 5.8 - 7.6 gm/dL    Albumin Level 2.8 (L) 3.4 - 4.8 g/dL    Globulin 2.8 2.4 - 3.5 gm/dL    Albumin/Globulin Ratio 1.0 (L) 1.1 - 2.0 ratio    Bilirubin Total 0.6 <=1.5 mg/dL    Alkaline Phosphatase 82 40 - 150 unit/L    Alanine Aminotransferase 15 0 - 55 unit/L    Aspartate Aminotransferase 23 5 - 34 unit/L    eGFR >60 mls/min/1.73/m2   Magnesium    Collection Time: 11/06/23  1:28 AM   Result Value Ref Range    Magnesium Level 2.40 1.60 - 2.60 mg/dL   CBC with Differential    Collection Time: 11/06/23  1:28 AM   Result Value Ref Range    WBC 9.98 4.50 - 11.50 x10(3)/mcL    RBC 3.54 (L) 4.20 - 5.40 x10(6)/mcL    Hgb 10.2 (L) 12.0 - 16.0 g/dL    Hct 32.0 (L) 37.0 - 47.0 %    MCV 90.4 80.0 - 94.0 fL    MCH 28.8 27.0 - 31.0 pg    MCHC 31.9 (L) 33.0 - 36.0 g/dL    RDW 15.5 11.5 - 17.0 %    Platelet 208 130 - 400 x10(3)/mcL    MPV 10.5 (H) 7.4 - 10.4 fL    Neut % 72.8 %    Lymph % 17.1 %    Mono % 8.2 %    Eos % 1.2 %    Basophil % 0.3 %    Lymph # 1.71 0.6 - 4.6 x10(3)/mcL    Neut # 7.26 2.1 - 9.2 x10(3)/mcL    Mono # 0.82 0.1 - 1.3 x10(3)/mcL    Eos  # 0.12 0 - 0.9 x10(3)/mcL    Baso # 0.03 <=0.2 x10(3)/mcL    IG# 0.04 0 - 0.04 x10(3)/mcL    IG% 0.4 %    NRBC% 0.0 %   POCT glucose    Collection Time: 11/06/23  5:46 AM   Result Value Ref Range    POCT Glucose 214 (H) 70 - 110 mg/dL   POCT glucose    Collection Time: 11/06/23 10:11 PM   Result Value Ref Range    POCT Glucose 419 (H) 70 - 110 mg/dL   Comprehensive Metabolic Panel    Collection Time: 11/07/23  4:06 AM   Result Value Ref Range    Sodium Level 142 136 - 145 mmol/L    Potassium Level 3.8 3.5 - 5.1 mmol/L    Chloride 105 98 - 107 mmol/L    Carbon Dioxide 26 23 - 31 mmol/L    Glucose Level 397 (H) 82 - 115 mg/dL    Blood Urea Nitrogen 15.7 9.8 - 20.1 mg/dL    Creatinine 1.04 (H) 0.55 - 1.02 mg/dL    Calcium Level Total 9.0 8.4 - 10.2 mg/dL    Protein Total 6.5 5.8 - 7.6 gm/dL    Albumin Level 3.1 (L) 3.4 - 4.8 g/dL    Globulin 3.4 2.4 - 3.5 gm/dL    Albumin/Globulin Ratio 0.9 (L) 1.1 - 2.0 ratio    Bilirubin Total 0.7 <=1.5 mg/dL    Alkaline Phosphatase 93 40 - 150 unit/L    Alanine Aminotransferase 21 0 - 55 unit/L    Aspartate Aminotransferase 22 5 - 34 unit/L    eGFR 57 mls/min/1.73/m2   Magnesium    Collection Time: 11/07/23  4:06 AM   Result Value Ref Range    Magnesium Level 1.90 1.60 - 2.60 mg/dL   CBC with Differential    Collection Time: 11/07/23  4:06 AM   Result Value Ref Range    WBC 10.38 4.50 - 11.50 x10(3)/mcL    RBC 3.90 (L) 4.20 - 5.40 x10(6)/mcL    Hgb 11.0 (L) 12.0 - 16.0 g/dL    Hct 35.0 (L) 37.0 - 47.0 %    MCV 89.7 80.0 - 94.0 fL    MCH 28.2 27.0 - 31.0 pg    MCHC 31.4 (L) 33.0 - 36.0 g/dL    RDW 15.3 11.5 - 17.0 %    Platelet 323 130 - 400 x10(3)/mcL    MPV 9.6 7.4 - 10.4 fL    Neut % 69.3 %    Lymph % 21.8 %    Mono % 7.5 %    Eos % 0.7 %    Basophil % 0.3 %    Lymph # 2.26 0.6 - 4.6 x10(3)/mcL    Neut # 7.20 2.1 - 9.2 x10(3)/mcL    Mono # 0.78 0.1 - 1.3 x10(3)/mcL    Eos # 0.07 0 - 0.9 x10(3)/mcL    Baso # 0.03 <=0.2 x10(3)/mcL    IG# 0.04 0 - 0.04 x10(3)/mcL    IG% 0.4 %     NRBC% 0.0 %   POCT glucose    Collection Time: 11/07/23 11:42 AM   Result Value Ref Range    POCT Glucose 382 (H) 70 - 110 mg/dL   POCT glucose    Collection Time: 11/07/23  5:12 PM   Result Value Ref Range    POCT Glucose 298 (H) 70 - 110 mg/dL   POCT glucose    Collection Time: 11/07/23  8:16 PM   Result Value Ref Range    POCT Glucose 231 (H) 70 - 110 mg/dL   POCT glucose    Collection Time: 11/08/23  5:26 AM   Result Value Ref Range    POCT Glucose 231 (H) 70 - 110 mg/dL   POCT glucose    Collection Time: 11/08/23 12:16 PM   Result Value Ref Range    POCT Glucose 427 (H) 70 - 110 mg/dL     Telemetry: Sinus Rhythm    Physical Exam  Constitutional:       General: She is not in acute distress.     Appearance: Normal appearance.   HENT:      Head: Normocephalic.      Nose: Nose normal.      Mouth/Throat:      Mouth: Mucous membranes are moist.   Eyes:      Extraocular Movements: Extraocular movements intact.      Conjunctiva/sclera: Conjunctivae normal.   Cardiovascular:      Rate and Rhythm: Normal rate and regular rhythm.      Pulses: Normal pulses.      Heart sounds: Normal heart sounds. No murmur heard.  Pulmonary:      Effort: Pulmonary effort is normal.      Breath sounds: Normal breath sounds.   Abdominal:      Palpations: Abdomen is soft.   Musculoskeletal:         General: No swelling. Normal range of motion.   Skin:     General: Skin is warm and dry.      Comments: Midline Sternotomy Stable   Neurological:      General: No focal deficit present.      Mental Status: She is alert and oriented to person, place, and time.   Psychiatric:         Mood and Affect: Mood normal.         Behavior: Behavior normal.         Judgment: Judgment normal.       Current Inpatient Medications:    Current Facility-Administered Medications:     acetaminophen oral solution 650 mg, 650 mg, Per OG tube, Q6H PRN, Bi Verduzco PA-C    albumin human 5% bottle 12.5 g, 12.5 g, Intravenous, PRN, Bi Verduzco PA-C, Last Rate:  250 mL/hr at 11/03/23 1128, Rate Verify at 11/03/23 1128    amiodarone tablet 200 mg, 200 mg, Oral, BID, Francisco Porter ANP, 200 mg at 11/08/23 1014    [START ON 11/11/2023] amiodarone tablet 200 mg, 200 mg, Oral, Daily, Francisco Porter ANP    amLODIPine tablet 10 mg, 10 mg, Oral, Daily, Puma Vera MD, 10 mg at 11/08/23 1013    aspirin EC tablet 81 mg, 81 mg, Oral, Daily, Puma Vera MD, 81 mg at 11/08/23 1014    atorvastatin tablet 40 mg, 40 mg, Oral, Daily, Francisco Porter ANP, 40 mg at 11/08/23 1014    calcium gluconate 1 g in NS IVPB (premixed), 1 g, Intravenous, PRN, Bi Verduzco PA-C    calcium gluconate 1 g in NS IVPB (premixed), 2 g, Intravenous, PRN, Bi Verduzco PA-C    calcium gluconate 1 g in NS IVPB (premixed), 3 g, Intravenous, PRN, Bi Verduzco PA-C    dextrose 10% bolus 125 mL 125 mL, 12.5 g, Intravenous, PRN, Heriberto Lara IV, MD    dextrose 10% bolus 125 mL 125 mL, 12.5 g, Intravenous, PRN, Cisco Farrell MD    dextrose 10% bolus 250 mL 250 mL, 25 g, Intravenous, PRNMarco Victor E IV, MD    dextrose 10% bolus 250 mL 250 mL, 25 g, Intravenous, Jami CHANG Royce B, MD    dextrose 5 % and 0.45 % NaCl infusion, , Intravenous, Continuous, Heriberto Lara IV, MD, Stopped at 11/06/23 2000    docusate sodium capsule 100 mg, 100 mg, Oral, BID, Bi Verduzco PA-C, 100 mg at 11/08/23 1014    enoxaparin injection 40 mg, 40 mg, Subcutaneous, Daily, Bi Verduzco PA-C, 40 mg at 11/07/23 1711    ezetimibe tablet 10 mg, 10 mg, Oral, Daily, Puma Vera MD, 10 mg at 11/08/23 1015    famotidine (PF) injection 20 mg, 20 mg, Intravenous, Daily, Bi Verduzco PA-C, 20 mg at 11/08/23 1013    folic acid tablet 1 mg, 1 mg, Oral, Daily, Bi Verduzco PA-C, 1 mg at 11/08/23 1014    glucagon (human recombinant) injection 1 mg, 1 mg, Intramuscular, PRN, Cisco Farrell MD    hydrALAZINE injection 10 mg, 10 mg, Intravenous, Q4H PRN, Puma Vera  MD CHANTELL, 10 mg at 11/06/23 2156    HYDROcodone-acetaminophen 5-325 mg per tablet 1 tablet, 1 tablet, Oral, Q4H PRN, Heriberto Lara IV, MD    insulin aspart U-100 injection 0-5 Units, 0-5 Units, Subcutaneous, Q6H PRN, Cisco Farrell MD, 2 Units at 11/08/23 0530    lactulose 10 gram/15 ml solution 20 g, 20 g, Oral, Q6H PRN, Bi Verduzco PA-C, 20 g at 11/07/23 0326    loperamide capsule 2 mg, 2 mg, Oral, Continuous PRN, Bi Verduzco PA-C    magnesium sulfate 2g in water 50mL IVPB (premix), 2 g, Intravenous, PRN, Bi Verduzco PA-C    magnesium sulfate 2g in water 50mL IVPB (premix), 4 g, Intravenous, PRN, Bi Verduzco PA-C    metoclopramide HCl injection 5 mg, 5 mg, Intravenous, Q6H PRN, Bi Verduzco PA-C    metoprolol tartrate (LOPRESSOR) tablet 25 mg, 25 mg, Oral, BID, Keturah Way FNP, 25 mg at 11/08/23 1014    morphine injection 4 mg, 4 mg, Intravenous, Q4H PRN, Bi Verduzco PA-C, 4 mg at 11/03/23 0004    ondansetron injection 4 mg, 4 mg, Intravenous, Q4H PRN, Bi Verduzco PA-C    oxyCODONE immediate release tablet 10 mg, 10 mg, Oral, Q4H PRN, Bi Verduzco PA-C, 10 mg at 11/05/23 2023    potassium chloride 20 mEq in 100 mL IVPB (FOR CENTRAL LINE ADMINISTRATION ONLY), 20 mEq, Intravenous, PRN, Bi Verduzco PA-C, Stopped at 11/04/23 1117    potassium chloride 20 mEq in 100 mL IVPB (FOR CENTRAL LINE ADMINISTRATION ONLY), 40 mEq, Intravenous, PRN, Bi Verduzco PA-C    sodium phosphate 15 mmol in dextrose 5 % (D5W) 250 mL IVPB, 15 mmol, Intravenous, PRN, Bi Verduzco PA-C    sodium phosphate 20.01 mmol in dextrose 5 % (D5W) 250 mL IVPB, 20.01 mmol, Intravenous, PRN, Bi Verduzco PA-C    sodium phosphate 30 mmol in dextrose 5 % (D5W) 250 mL IVPB, 30 mmol, Intravenous, PRN, Bi Verduzco PA-C    sucralfate tablet 1 g, 1 g, Oral, QID (AC & HS), Bi Verduzco PA-C, 1 g at 11/08/23 1014    VTE Risk Mitigation (From admission, onward)            Ordered     enoxaparin injection 40 mg  Daily         11/02/23 2120     Place sequential compression device  Until discontinued         11/02/23 2120     IP VTE HIGH RISK PATIENT  Once         11/02/23 1724     Place sequential compression device  Until discontinued         11/02/23 1018                  Assessment:   CAD (Multivessel)    - s/p CABG (11.2.23) - LIMA to LAD and rSVG to Diag 1    - s/p LHC (11.2.23) - Left main-luminal irregularities; LAD-mid subtotal occlusion involving large diagonal; LCX-non dominant, luminal irregularities; RCA-dominant, 40% ostial stenosis  AF (New Onset) with RVR - Now SR    - CHADsVASc - 3 Points - 3.2% Stroke Risk per Year     - ECHO (10.11.22) - LVEF 55%    - On Amiodarone  Acute on Chronic Diastolic HF/EF 55% (Compensated)  YULI/Bilaterally Mild  History of Hypertension (BP Stable)  Hyperlipidemia    - On Statin/Zetia  DM II  PAD/Interventions   Elevated BMI/Obesity  No History of GI Bleed     Plan:   Continue Current Cardiac Medications  Continue Oral Amiodarone Tapered Dosing Regimen  Start Eliquis 5 Mg PO BID once cleared by Surgical Team Re: AF/Stroke Risk Reduction  Plan Discharge to Inpatient Rehab once accepted.  Glucose level above goal- Resume Home Oral Agents & Increase Sliding Scale to High Dose. If needed will ask Hospital Medicine to Assist with glycemic control.    Sally Glilis, P  Cardiology  [facility]  11/08/2023     I have seen the patient, reviewed the Nurse Practitioner's note, assessment and plan. I have personally interviewed and examined the patient at bedside and agree with the findings. Medical decision making listed above were done under my guidance.    Physical exam:  Cardiovascular system: regular rhythm, no murmur.  Lungs: Coarse anteriorly  Extremities: No leg edema.  CABG surgical site without erythema or purulent discharge.    Plan:  As above  Continue amio taper  Start Eliquis , cleared to be used by CV surgery team  Awaiting  inpatient rehab placement

## 2023-11-08 NOTE — CONSULTS
Chief Complaint  S/p CABG    Reason for Consultation  Physiatry    History of Present Illness  Admit MD: Salvador Sebastian MD  Consult Physiatry: Patricio Daniel MD  HPI: 73 yo F with a PMH of HTN, CAD, cerebrovascular disease, PAD, and DM presented to [facility] on 11/2/23 and underwent a LHC revealing severe multivessel CAD. CV surgeon was consulted for CABG. Patient then underwent a CABG x2V: LIMA to the LAD; reverse saphenous vein to the 1st diagonal; endoscopic vein harvest of the left greater saphenous vein by Dr. Lara. 1 chest tube was placed. Patient was then transferred to ICU, and extubated. Chest XR showed some pulmonary vascular congestion, no focal infiltrates or pneummothorax. O2 sats in high 90s on 2L/NC. Patient did develop some A-fib with controlled ventricular rate. Remained on low dose epinephrine at 0.05 for hemodynamic support. Patient also on insulin drip. On 11/3, labs showed elevated WBC of 12.97, and low H&H of 10.8 & 33.6. Epinephrine stopped. Continued ASA, norvasc, and betablocker. Started atorvastatin 40mg PO Q day. On amiodarone drip on 11/4. On 11/5, Amiodarone drip transitioned to oral. Chest tube removed. PT eval completed with deficits noted. On 11/6, patient on 4L O2/NC with sat decreasing in 70-80s with therapy, and continued IV diuresis of 40mg BID. On 11/7, Labs showed low H&H of 3.90, low H&H of 11.0 & 35.0, low MCHC of 31.4, elevated glucose of 397, elevated Cr of 1.04, low albumin of 3.1. Metoprolol tart 25mg BID. O2 titrated down to 2.5L/NC with sats ranging 89-94% with therapy. On 11/8, O2 was discontinued, Prevena discontinued to surgical site with minimal bloody drainage noted to mid-sternum. Vital signs stable, and HR in SR. Glucose elevated at 231. Nutrition was consulted with recommendation to continue diabetic diet, encourage small and frequent meals, and add Boost Max BID for additional nutrition. Patient complained of sternal pain rating at 7 on 1-10 scale. Last BM on  11/7. On 11/9, OT tisha completed with deficits noted. Eliquis started at 5mg PO BID. Toprol XL transitioned to 50mg daily. Continued ASA and statin. Patient is AAOx4.   Participating with therapy. Functional status includes setup/ supervision needed for eating and grooming while seated; minimal assist for bed mobility, minimal assist for transfers with RW, CGA for walking 120ft with RW, max assist lower body dressing, and moderate assist toileting.  Patient was evaluated, accepted, and admitted to inpatient rehab to improve functional status. Transferred to [facility] on 11/9 without incident.      11/10: Seen in patient room, seated in WC. Reports good sleep overnight. Appetite improving. Bowels moving daily. Denies diarrhea. Denies current pain, but admits that her incision hurts with coughing. She keeps her cardiac bear close. Ready for therapy evaluations. No complaints. VSSAF.           Review of Systems  Barriers to Discharge:  Social: Completed 2nd grade. She has no  history. Pt. Used to [employer] for a living.  The patient is . Lives alone. She has a sitter 5 days per week from 9AM - 2PM through [employer]. The sitter was helping with bathing, dressing, cooking, driving, and cleaning. Pt. Plans to stay with her cousin after rehab until she recovers.  Children (0).    Medical:   Multivessel CAD s/p CABG x2 on 11/02/2023, HLD, paroxysmal atrial fibrillation, normocytic anemia, HTN, dm type 2, GERD, osteoarthritis, obesity, PAD, carotid artery stenosis, HFpEF 55%    Functional:   Prior Level of Fx: Pt. Required supervision for bathing and dressing per sitter. She did some simple cooking. Pt. Was modified independent with a cane for mobility and most ADLs. The sitter helps with cooking, cleaning, and driving. Pt. Was managing her own finances and her own medications. [place] so does not have to worry about yard work.    Residence: Pt. [place] in [place]  in a single-story home with no steps to enter the residence. She has a tub/shower combination with grab bars and HHS. She does not have an elevated toilet. She has grab bars.    DME: Straight cane and rollator(broken).    Psychiatric:  Denied mental health or substance abuse history   Depression/Anxiety: no current complaints. Flat affect     ALPRAZolam tablet 0.25 mg TID PRN Anxiety  Pain: denies currently. Incisional with cough     acetaminophen tablet 650 mg TID  acetaminophen tablet 650 mg q8h PRN mild pain  methocarbamoL tablet 500 mg TID PRN spasm/tightness  HYDROcodone-acetaminophen 7.5-325 mg 1 tab TID PRN mod/severe pain  Bowels/Bladder: last BM 11/7     Appetite: getting better     Sleep: good      hydrOXYzine pamoate capsule 50 mg qHS        Physical Exam  General: well-developed, well-nourished, in no acute distress, flat  Eye: EOMI, clear conjunctiva, eyelids normal  HENT: normocephalic, oropharynx and nasal mucosal surfaces moist  Neck: full range of motion, supple  Respiratory: equal chest rise, no SOB, no audible wheeze  Cardiovascular: regular rate and rhythm, no edema  Gastrointestinal: soft, non-tender, non-distended   Musculoskeletal: full range of motion of all extremities/spine with generalized weakness  Integumentary: no rashes or skin lesions present, midsternal muswbywk-BSU-e/d/I, with chest tube [id number]-swdgml-SGV-c/d/I, LLE EVH site [id number]-ONP-o/d/i  Neurologic: cranial nerves intact, no signs of peripheral neurological deficit, motor/sensory function intact  *MD performed and documented physical examination         Labs:   Latest Reference Range & Units 11/10/23 05:33   WBC 4.50 - 11.50 x10(3)/mcL 11.74 (H)   RBC 4.20 - 5.40 x10(6)/mcL 3.41 (L)   Hemoglobin 12.0 - 16.0 g/dL 9.8 (L)   Hematocrit 37.0 - 47.0 % 31.5 (L)   MCV 80.0 - 94.0 fL 92.4   MCH 27.0 - 31.0 pg 28.7   MCHC 33.0 - 36.0 g/dL 31.1 (L)   RDW 11.5 - 17.0 % 15.3   Platelet Count 130 - 400 x10(3)/mcL 392   MPV 7.4 - 10.4 fL 9.1    Neut % % 67.8   LYMPH % % 22.9   Mono % % 6.2   Eosinophil % % 2.1   Basophil % % 0.2   Immature Granulocytes % 0.8   Neut # 2.1 - 9.2 x10(3)/mcL 7.96   Lymph # 0.6 - 4.6 x10(3)/mcL 2.69   Mono # 0.1 - 1.3 x10(3)/mcL 0.73   Eos # 0 - 0.9 x10(3)/mcL 0.25   Baso # <=0.2 x10(3)/mcL 0.02   Immature Grans (Abs) 0 - 0.04 x10(3)/mcL 0.09 (H)   nRBC % 0.0   Sodium 136 - 145 mmol/L 145   Potassium 3.5 - 5.1 mmol/L 3.7   Chloride 98 - 107 mmol/L 107   CO2 23 - 31 mmol/L 28   BUN 9.8 - 20.1 mg/dL 14.0   Creatinine 0.55 - 1.02 mg/dL 1.12 (H)   eGFR mls/min/1.73/m2 52   Glucose 82 - 115 mg/dL 165 (H)   Calcium 8.4 - 10.2 mg/dL 9.4   Phosphorus Level 2.3 - 4.7 mg/dL 2.3   Magnesium  1.60 - 2.60 mg/dL 1.80   ALP 40 - 150 unit/L 69   PROTEIN TOTAL 5.8 - 7.6 gm/dL 6.3   Albumin 3.4 - 4.8 g/dL 2.6 (L)   Albumin/Globulin Ratio 1.1 - 2.0 ratio 0.7 (L)   Prealbumin 14.0 - 37.0 mg/dL 11.3 (L)   BILIRUBIN TOTAL <=1.5 mg/dL 0.5   AST 5 - 34 unit/L 14   ALT 0 - 55 unit/L 13   Globulin, Total 2.4 - 3.5 gm/dL 3.7 (H)   (H): Data is abnormally high  (L): Data is abnormally low          Diagnostics:  XR CHEST AP PORTABLE  FINDINGS:  The cardiomediastinal silhouette is prominent.  No focal opacification.  No pleural effusion or pneumothorax.  No acute osseous abnormality.  Date:                                            11/02/2023  Time:                                           11:04      Transthoracic echo (TTE) complete    Left Ventricle: regional wall motion abnormalities present.  The distal apex, apical inferior and distal septal walls are hypokinetic There is moderately reduced systolic function with a visually estimated ejection fraction of 35 - 40%. Grade I diastolic dysfunction.    Right Ventricle: Normal right ventricular cavity size. Wall thickness is normal. Right ventricle wall motion  is normal. Systolic function is normal.    Mitral Valve: There is mild regurgitation.  Date of Study:  11/2/23         XR CHEST AP  PORTABLE  FINDINGS:  Cardiomegaly with postsurgical changes of median sternotomy.  Mediastinal drain remains in place.  Interval removal of right-sided central line.  Small left effusion is noted.  Date:                                            11/05/2023  Time:                                           08:26          Assessment/Plan  Hospital   Coronary artery disease involving native coronary artery of native heart   S/P CABG (coronary artery bypass graft)     Non-Hospital   Hypertension   Diabetes mellitus   Dyslipidemia   Obesity   Osteoarthritis of knee   Status post right knee replacement   PAD (peripheral artery disease)   CAD (coronary artery disease)       Wounds: midsternal bglvluyb-QBO-i/d/I, with chest tube ogah-wxveke-WDI-c/d/I, LLE EVH site snvzktcy-PGY-p/d/i  S/p CABG x2V: LIMA to the LAD; reverse saphenous vein to the 1st diagonal; endoscopic vein harvest of the left greater saphenous vein on 11/2 by Dr. Lara. 1 chest tube was placed.  Precautions: sternal   Bracing: cardiac bear for splinting  Swallowing: Diabetic Diet  Function: Tolerating therapy. Continue PT/OT  VTE Prophylaxis:   apixaban tablet 5 mg BID  Code Status: FULL CODE   Discharge: Lives alone in [place] in a single-story home with no steps to enter the residence. Completed 2nd grade. She has no  history. Pt. Used to babysit for a living.  The patient is . Lives alone. She has a sitter 5 days per week from 9AM - 2PM through [employer]. The sitter was helping with bathing, dressing, cooking, driving, and cleaning. Pt. Plans to stay with her cousin after rehab until she recovers.  Children (0). Date pending.   Dos 11/8/23  I have evaluated the patient on initial IRF admit. I have been involved in rehab prescreen. I have reviewed records.I have reviewed admit orders.I have discussed case with IM team.  I find the patient appropriate for, in need of and should tolerate an aggressive IRF program with  good potential for functional improvement.  I am in agreement with initial Plan of Care  I have been involved in the creation of this initial PM&R consult with Radha Johnson NP, conducted additional independent physical examination and assisted with medical documentation.

## 2023-11-08 NOTE — PROGRESS NOTES
"Inpatient Nutrition Evaluation    Admit Date: 11/2/2023   Total duration of encounter: 6 days    Nutrition Recommendation/Prescription     -Continue diabetic diet.   -Encourage small and frequent meals.  -Add Boost Max (provides 160 kcal, 30 g protein per serving) BID for additional nutrition.   -Medical management of hyperglycemia.     Nutrition Assessment     Chart Review    Reason Seen: length of stay    Malnutrition Screening Tool Results                Diagnosis:  CAD s/p CABG and LHC 11/2/23  Atrial fibrillation  Acute on Chronic heart failure    Relevant Medical History: PAD, DM II, CAD/CABG, YULI/Bilaterally Mild, HTN    Nutrition-Related Medications: docusate sodium, famotidine, folic acid, glipizide, sucralfate. PRN: SSI, lactulose     Nutrition-Related Labs:  11/7/23 Crea 1.04, eGFR 57, Gluc 397, Alb 3.1    Diet Order: Diet diabetic  Oral Supplement Order: none  Appetite/Oral Intake: fair/50-75% of meals  Factors Affecting Nutritional Intake: decreased appetite  Food/Buddhism/Cultural Preferences: none reported  Food Allergies: none reported    Skin Integrity: bruised (ecchymotic), incision  Wound(s):       Comments    11/8/23 Pt working with therapy. Noted she ate about 50% of lunch-sweet potato and chicken tenders. Previous EMR records indicate 25% intake. Will add low-CHO, high protein oral supplement BID to encourage increased po intake. No weight loss noted-wts ranging from 190-220lbs x1 year. Will follow-up early.     Anthropometrics    Height: 5' 4" (162.6 cm)    Last Weight: 95.3 kg (210 lb 1.6 oz) (11/08/23 0637) Weight Method: Standard Scale  BMI (Calculated): 36  BMI Classification: obese grade II (BMI 35-39.9)     Ideal Body Weight (IBW), Female: 120 lb     % Ideal Body Weight, Female (lb): 152.24 %                             Usual Weight Provided By: EMR weight history    Wt Readings from Last 5 Encounters:   11/08/23 95.3 kg (210 lb 1.6 oz)   10/13/22 99.8 kg (220 lb)   10/24/17 58 kg (127 " lb 13.9 oz)     Weight Change(s) Since Admission:  Admit Weight: 86.5 kg (190 lb 11.2 oz) (11/02/23 0641)    Patient Education    Not applicable.    Monitoring & Evaluation     Dietitian will monitor energy intake, weight change, electrolyte/renal panel, and glucose/endocrine profile.  Nutrition Risk/Follow-Up: low (follow-up in 5-7 days)  Patients assigned 'low nutrition risk' status do not qualify for a full nutritional assessment but will be monitored and re-evaluated in a 5-7 day time period. Please consult if re-evaluation needed sooner.

## 2023-11-09 ENCOUNTER — HOSPITAL ENCOUNTER (INPATIENT)
Facility: HOSPITAL | Age: 73
LOS: 12 days | Discharge: HOME HEALTH | DRG: 302 | End: 2023-11-21
Attending: INTERNAL MEDICINE | Admitting: INTERNAL MEDICINE
Payer: MEDICARE

## 2023-11-09 VITALS
OXYGEN SATURATION: 92 % | WEIGHT: 210.13 LBS | DIASTOLIC BLOOD PRESSURE: 73 MMHG | TEMPERATURE: 99 F | HEART RATE: 75 BPM | BODY MASS INDEX: 35.87 KG/M2 | RESPIRATION RATE: 18 BRPM | SYSTOLIC BLOOD PRESSURE: 133 MMHG | HEIGHT: 64 IN

## 2023-11-09 DIAGNOSIS — Z95.1 S/P CABG (CORONARY ARTERY BYPASS GRAFT): Primary | ICD-10-CM

## 2023-11-09 DIAGNOSIS — R06.00 DYSPNEA, UNSPECIFIED TYPE: ICD-10-CM

## 2023-11-09 DIAGNOSIS — D64.9 ANEMIA, UNSPECIFIED TYPE: ICD-10-CM

## 2023-11-09 PROBLEM — I25.10 CORONARY ARTERY DISEASE INVOLVING NATIVE CORONARY ARTERY OF NATIVE HEART: Status: ACTIVE | Noted: 2023-11-09

## 2023-11-09 LAB
POCT GLUCOSE: 169 MG/DL (ref 70–110)
POCT GLUCOSE: 195 MG/DL (ref 70–110)
POCT GLUCOSE: 208 MG/DL (ref 70–110)
POCT GLUCOSE: 229 MG/DL (ref 70–110)

## 2023-11-09 PROCEDURE — 97110 THERAPEUTIC EXERCISES: CPT

## 2023-11-09 PROCEDURE — 25000003 PHARM REV CODE 250: Performed by: NURSE PRACTITIONER

## 2023-11-09 PROCEDURE — 11800000 HC REHAB PRIVATE ROOM

## 2023-11-09 PROCEDURE — 63600175 PHARM REV CODE 636 W HCPCS: Performed by: NURSE PRACTITIONER

## 2023-11-09 PROCEDURE — 25000003 PHARM REV CODE 250: Performed by: INTERNAL MEDICINE

## 2023-11-09 PROCEDURE — 99223 PR INITIAL HOSPITAL CARE,LEVL III: ICD-10-PCS | Mod: ,,, | Performed by: NURSE PRACTITIONER

## 2023-11-09 PROCEDURE — 94799 UNLISTED PULMONARY SVC/PX: CPT

## 2023-11-09 PROCEDURE — 97530 THERAPEUTIC ACTIVITIES: CPT | Mod: CQ

## 2023-11-09 PROCEDURE — 97110 THERAPEUTIC EXERCISES: CPT | Mod: CQ

## 2023-11-09 PROCEDURE — 99223 1ST HOSP IP/OBS HIGH 75: CPT | Mod: ,,, | Performed by: NURSE PRACTITIONER

## 2023-11-09 PROCEDURE — 97166 OT EVAL MOD COMPLEX 45 MIN: CPT

## 2023-11-09 PROCEDURE — 25000003 PHARM REV CODE 250: Performed by: PHYSICIAN ASSISTANT

## 2023-11-09 PROCEDURE — 94760 N-INVAS EAR/PLS OXIMETRY 1: CPT

## 2023-11-09 PROCEDURE — 25000003 PHARM REV CODE 250

## 2023-11-09 RX ORDER — MELOXICAM 7.5 MG/1
15 TABLET ORAL DAILY
Status: DISCONTINUED | OUTPATIENT
Start: 2023-11-10 | End: 2023-11-10

## 2023-11-09 RX ORDER — AMIODARONE HYDROCHLORIDE 200 MG/1
200 TABLET ORAL DAILY
Status: DISCONTINUED | OUTPATIENT
Start: 2023-11-12 | End: 2023-11-21 | Stop reason: HOSPADM

## 2023-11-09 RX ORDER — AMLODIPINE BESYLATE 5 MG/1
10 TABLET ORAL DAILY
Status: CANCELLED | OUTPATIENT
Start: 2023-11-09

## 2023-11-09 RX ORDER — INSULIN ASPART 100 [IU]/ML
0-10 INJECTION, SOLUTION INTRAVENOUS; SUBCUTANEOUS
Status: DISCONTINUED | OUTPATIENT
Start: 2023-11-09 | End: 2023-11-21 | Stop reason: HOSPADM

## 2023-11-09 RX ORDER — DOCUSATE SODIUM 100 MG/1
100 CAPSULE, LIQUID FILLED ORAL 2 TIMES DAILY
Status: CANCELLED | OUTPATIENT
Start: 2023-11-09

## 2023-11-09 RX ORDER — DOCUSATE SODIUM 100 MG/1
100 CAPSULE, LIQUID FILLED ORAL 2 TIMES DAILY
Status: DISCONTINUED | OUTPATIENT
Start: 2023-11-09 | End: 2023-11-21 | Stop reason: HOSPADM

## 2023-11-09 RX ORDER — GLUCAGON 1 MG
1 KIT INJECTION
Status: DISCONTINUED | OUTPATIENT
Start: 2023-11-09 | End: 2023-11-21 | Stop reason: HOSPADM

## 2023-11-09 RX ORDER — LACTULOSE 10 G/15ML
20 SOLUTION ORAL EVERY 6 HOURS PRN
Status: CANCELLED | OUTPATIENT
Start: 2023-11-09

## 2023-11-09 RX ORDER — AMIODARONE HYDROCHLORIDE 200 MG/1
200 TABLET ORAL 2 TIMES DAILY
Status: CANCELLED | OUTPATIENT
Start: 2023-11-09 | End: 2023-11-11

## 2023-11-09 RX ORDER — LOPERAMIDE HYDROCHLORIDE 2 MG/1
2 CAPSULE ORAL CONTINUOUS PRN
Status: CANCELLED | OUTPATIENT
Start: 2023-11-09

## 2023-11-09 RX ORDER — CHOLECALCIFEROL (VITAMIN D3) 10 MCG
400 TABLET ORAL DAILY
Status: DISCONTINUED | OUTPATIENT
Start: 2023-11-10 | End: 2023-11-15

## 2023-11-09 RX ORDER — ATORVASTATIN CALCIUM 40 MG/1
40 TABLET, FILM COATED ORAL DAILY
Status: CANCELLED | OUTPATIENT
Start: 2023-11-10

## 2023-11-09 RX ORDER — LABETALOL HCL 20 MG/4 ML
10 SYRINGE (ML) INTRAVENOUS EVERY 4 HOURS PRN
Status: DISCONTINUED | OUTPATIENT
Start: 2023-11-09 | End: 2023-11-21 | Stop reason: HOSPADM

## 2023-11-09 RX ORDER — METOPROLOL TARTRATE 25 MG/1
25 TABLET, FILM COATED ORAL ONCE
Status: DISCONTINUED | OUTPATIENT
Start: 2023-11-09 | End: 2023-11-09 | Stop reason: HOSPADM

## 2023-11-09 RX ORDER — CHOLECALCIFEROL (VITAMIN D3) 10 MCG
400 TABLET ORAL DAILY
Status: CANCELLED | OUTPATIENT
Start: 2023-11-09

## 2023-11-09 RX ORDER — ASPIRIN 81 MG/1
81 TABLET ORAL DAILY
Status: DISCONTINUED | OUTPATIENT
Start: 2023-11-10 | End: 2023-11-21 | Stop reason: HOSPADM

## 2023-11-09 RX ORDER — HYDROCODONE BITARTRATE AND ACETAMINOPHEN 5; 325 MG/1; MG/1
1 TABLET ORAL EVERY 4 HOURS PRN
Status: CANCELLED | OUTPATIENT
Start: 2023-11-09

## 2023-11-09 RX ORDER — BISACODYL 10 MG
10 SUPPOSITORY, RECTAL RECTAL DAILY PRN
Status: DISCONTINUED | OUTPATIENT
Start: 2023-11-09 | End: 2023-11-21 | Stop reason: HOSPADM

## 2023-11-09 RX ORDER — GLIPIZIDE 5 MG/1
5 TABLET, FILM COATED, EXTENDED RELEASE ORAL
Status: CANCELLED | OUTPATIENT
Start: 2023-11-09

## 2023-11-09 RX ORDER — AMIODARONE HYDROCHLORIDE 200 MG/1
200 TABLET ORAL 2 TIMES DAILY
Status: COMPLETED | OUTPATIENT
Start: 2023-11-09 | End: 2023-11-10

## 2023-11-09 RX ORDER — ASPIRIN 81 MG/1
81 TABLET ORAL DAILY
Status: CANCELLED | OUTPATIENT
Start: 2023-11-09

## 2023-11-09 RX ORDER — OXYCODONE HYDROCHLORIDE 5 MG/1
10 TABLET ORAL EVERY 4 HOURS PRN
Status: CANCELLED | OUTPATIENT
Start: 2023-11-09

## 2023-11-09 RX ORDER — FOLIC ACID 1 MG/1
1 TABLET ORAL DAILY
Status: CANCELLED | OUTPATIENT
Start: 2023-11-10

## 2023-11-09 RX ORDER — HYDROCODONE BITARTRATE AND ACETAMINOPHEN 7.5; 325 MG/1; MG/1
1 TABLET ORAL 3 TIMES DAILY PRN
Status: DISCONTINUED | OUTPATIENT
Start: 2023-11-09 | End: 2023-11-10

## 2023-11-09 RX ORDER — METOPROLOL SUCCINATE 50 MG/1
50 TABLET, EXTENDED RELEASE ORAL DAILY
Status: DISCONTINUED | OUTPATIENT
Start: 2023-11-10 | End: 2023-11-09 | Stop reason: HOSPADM

## 2023-11-09 RX ORDER — AMIODARONE HYDROCHLORIDE 200 MG/1
200 TABLET ORAL DAILY
Status: CANCELLED | OUTPATIENT
Start: 2023-11-11

## 2023-11-09 RX ORDER — ATORVASTATIN CALCIUM 40 MG/1
40 TABLET, FILM COATED ORAL DAILY
Qty: 90 TABLET | Refills: 3 | Status: ON HOLD | OUTPATIENT
Start: 2023-11-10 | End: 2023-11-21 | Stop reason: SDUPTHER

## 2023-11-09 RX ORDER — ASPIRIN 81 MG/1
81 TABLET ORAL DAILY
Status: CANCELLED | OUTPATIENT
Start: 2023-11-10

## 2023-11-09 RX ORDER — AMIODARONE HYDROCHLORIDE 200 MG/1
200 TABLET ORAL 2 TIMES DAILY
Qty: 60 TABLET | Refills: 11 | Status: ON HOLD | OUTPATIENT
Start: 2023-11-09 | End: 2023-11-21 | Stop reason: HOSPADM

## 2023-11-09 RX ORDER — EZETIMIBE 10 MG/1
10 TABLET ORAL DAILY
Status: CANCELLED | OUTPATIENT
Start: 2023-11-09

## 2023-11-09 RX ORDER — BENZONATATE 100 MG/1
100 CAPSULE ORAL 3 TIMES DAILY PRN
Status: DISCONTINUED | OUTPATIENT
Start: 2023-11-09 | End: 2023-11-21 | Stop reason: HOSPADM

## 2023-11-09 RX ORDER — ALPRAZOLAM 0.25 MG/1
0.25 TABLET ORAL 3 TIMES DAILY PRN
Status: DISCONTINUED | OUTPATIENT
Start: 2023-11-09 | End: 2023-11-21 | Stop reason: HOSPADM

## 2023-11-09 RX ORDER — IBUPROFEN 200 MG
24 TABLET ORAL
Status: DISCONTINUED | OUTPATIENT
Start: 2023-11-09 | End: 2023-11-21 | Stop reason: HOSPADM

## 2023-11-09 RX ORDER — METOPROLOL SUCCINATE 50 MG/1
50 TABLET, EXTENDED RELEASE ORAL DAILY
Status: CANCELLED | OUTPATIENT
Start: 2023-11-10

## 2023-11-09 RX ORDER — IBUPROFEN 200 MG
16 TABLET ORAL
Status: DISCONTINUED | OUTPATIENT
Start: 2023-11-09 | End: 2023-11-21 | Stop reason: HOSPADM

## 2023-11-09 RX ORDER — METOPROLOL SUCCINATE 50 MG/1
50 TABLET, EXTENDED RELEASE ORAL DAILY
Qty: 30 TABLET | Refills: 11 | Status: ON HOLD | OUTPATIENT
Start: 2023-11-10 | End: 2023-11-21 | Stop reason: SDUPTHER

## 2023-11-09 RX ORDER — METOPROLOL TARTRATE 1 MG/ML
10 INJECTION, SOLUTION INTRAVENOUS
Status: DISCONTINUED | OUTPATIENT
Start: 2023-11-09 | End: 2023-11-21 | Stop reason: HOSPADM

## 2023-11-09 RX ORDER — AMLODIPINE BESYLATE 5 MG/1
10 TABLET ORAL DAILY
Status: DISCONTINUED | OUTPATIENT
Start: 2023-11-10 | End: 2023-11-21 | Stop reason: HOSPADM

## 2023-11-09 RX ORDER — HYDRALAZINE HYDROCHLORIDE 20 MG/ML
10 INJECTION INTRAMUSCULAR; INTRAVENOUS EVERY 4 HOURS PRN
Status: DISCONTINUED | OUTPATIENT
Start: 2023-11-09 | End: 2023-11-21 | Stop reason: HOSPADM

## 2023-11-09 RX ORDER — POLYETHYLENE GLYCOL 3350 17 G/17G
17 POWDER, FOR SOLUTION ORAL EVERY 12 HOURS PRN
Status: DISCONTINUED | OUTPATIENT
Start: 2023-11-09 | End: 2023-11-21 | Stop reason: HOSPADM

## 2023-11-09 RX ORDER — NITROGLYCERIN 0.4 MG/1
0.4 TABLET SUBLINGUAL EVERY 5 MIN PRN
Status: DISCONTINUED | OUTPATIENT
Start: 2023-11-09 | End: 2023-11-21 | Stop reason: HOSPADM

## 2023-11-09 RX ORDER — MELOXICAM 7.5 MG/1
15 TABLET ORAL DAILY
Status: CANCELLED | OUTPATIENT
Start: 2023-11-09

## 2023-11-09 RX ORDER — HYDROCODONE BITARTRATE AND ACETAMINOPHEN 7.5; 325 MG/1; MG/1
1 TABLET ORAL 3 TIMES DAILY PRN
Status: CANCELLED | OUTPATIENT
Start: 2023-11-09

## 2023-11-09 RX ORDER — AMIODARONE HYDROCHLORIDE 200 MG/1
200 TABLET ORAL DAILY
Qty: 30 TABLET | Refills: 11 | Status: ON HOLD | OUTPATIENT
Start: 2023-11-11 | End: 2023-11-21 | Stop reason: SDUPTHER

## 2023-11-09 RX ORDER — GLIPIZIDE 5 MG/1
5 TABLET, FILM COATED, EXTENDED RELEASE ORAL
Status: DISCONTINUED | OUTPATIENT
Start: 2023-11-10 | End: 2023-11-21 | Stop reason: HOSPADM

## 2023-11-09 RX ORDER — GLIPIZIDE 5 MG/1
5 TABLET, FILM COATED, EXTENDED RELEASE ORAL
Status: CANCELLED | OUTPATIENT
Start: 2023-11-10

## 2023-11-09 RX ORDER — ONDANSETRON 4 MG/1
4 TABLET, ORALLY DISINTEGRATING ORAL EVERY 6 HOURS PRN
Status: DISCONTINUED | OUTPATIENT
Start: 2023-11-09 | End: 2023-11-21 | Stop reason: HOSPADM

## 2023-11-09 RX ORDER — ACETAMINOPHEN 325 MG/1
650 TABLET ORAL EVERY 4 HOURS PRN
Status: DISCONTINUED | OUTPATIENT
Start: 2023-11-09 | End: 2023-11-10

## 2023-11-09 RX ORDER — AMLODIPINE BESYLATE 5 MG/1
10 TABLET ORAL DAILY
Status: CANCELLED | OUTPATIENT
Start: 2023-11-10

## 2023-11-09 RX ORDER — METOPROLOL SUCCINATE 50 MG/1
50 TABLET, EXTENDED RELEASE ORAL DAILY
Status: DISCONTINUED | OUTPATIENT
Start: 2023-11-11 | End: 2023-11-21 | Stop reason: HOSPADM

## 2023-11-09 RX ORDER — EZETIMIBE 10 MG/1
10 TABLET ORAL DAILY
Status: CANCELLED | OUTPATIENT
Start: 2023-11-10

## 2023-11-09 RX ORDER — HYDROXYZINE PAMOATE 50 MG/1
50 CAPSULE ORAL NIGHTLY
Status: DISCONTINUED | OUTPATIENT
Start: 2023-11-09 | End: 2023-11-21 | Stop reason: HOSPADM

## 2023-11-09 RX ORDER — ATORVASTATIN CALCIUM 40 MG/1
40 TABLET, FILM COATED ORAL DAILY
Status: DISCONTINUED | OUTPATIENT
Start: 2023-11-11 | End: 2023-11-21 | Stop reason: HOSPADM

## 2023-11-09 RX ORDER — METFORMIN HYDROCHLORIDE 500 MG/1
1000 TABLET ORAL 2 TIMES DAILY WITH MEALS
Status: CANCELLED | OUTPATIENT
Start: 2023-11-09

## 2023-11-09 RX ORDER — NITROGLYCERIN 0.4 MG/1
0.4 TABLET SUBLINGUAL EVERY 5 MIN PRN
Qty: 15 TABLET | Refills: 1 | Status: ON HOLD | OUTPATIENT
Start: 2023-11-09 | End: 2023-11-21 | Stop reason: HOSPADM

## 2023-11-09 RX ORDER — METFORMIN HYDROCHLORIDE 500 MG/1
1000 TABLET ORAL 2 TIMES DAILY WITH MEALS
Status: DISCONTINUED | OUTPATIENT
Start: 2023-11-09 | End: 2023-11-21 | Stop reason: HOSPADM

## 2023-11-09 RX ORDER — PROMETHAZINE HYDROCHLORIDE 25 MG/1
25 TABLET ORAL EVERY 6 HOURS PRN
Status: DISCONTINUED | OUTPATIENT
Start: 2023-11-09 | End: 2023-11-21 | Stop reason: HOSPADM

## 2023-11-09 RX ORDER — FAMOTIDINE 20 MG/1
40 TABLET, FILM COATED ORAL ONCE
Status: CANCELLED | OUTPATIENT
Start: 2023-11-09 | End: 2023-11-09

## 2023-11-09 RX ORDER — FAMOTIDINE 20 MG/1
40 TABLET, FILM COATED ORAL ONCE
Status: COMPLETED | OUTPATIENT
Start: 2023-11-09 | End: 2023-11-09

## 2023-11-09 RX ORDER — ATORVASTATIN CALCIUM 40 MG/1
40 TABLET, FILM COATED ORAL NIGHTLY
Status: CANCELLED | OUTPATIENT
Start: 2023-11-09

## 2023-11-09 RX ORDER — SUCRALFATE 1 G/1
1 TABLET ORAL
Status: CANCELLED | OUTPATIENT
Start: 2023-11-09

## 2023-11-09 RX ORDER — EZETIMIBE 10 MG/1
10 TABLET ORAL DAILY
Status: DISCONTINUED | OUTPATIENT
Start: 2023-11-10 | End: 2023-11-21 | Stop reason: HOSPADM

## 2023-11-09 RX ADMIN — FAMOTIDINE 20 MG: 10 INJECTION, SOLUTION INTRAVENOUS at 08:11

## 2023-11-09 RX ADMIN — HYDROXYZINE PAMOATE 50 MG: 50 CAPSULE ORAL at 08:11

## 2023-11-09 RX ADMIN — ASPIRIN 81 MG: 81 TABLET, COATED ORAL at 08:11

## 2023-11-09 RX ADMIN — METFORMIN HYDROCHLORIDE 1000 MG: 500 TABLET, FILM COATED ORAL at 08:11

## 2023-11-09 RX ADMIN — EZETIMIBE 10 MG: 10 TABLET ORAL at 08:11

## 2023-11-09 RX ADMIN — ATORVASTATIN CALCIUM 40 MG: 40 TABLET, FILM COATED ORAL at 08:11

## 2023-11-09 RX ADMIN — FAMOTIDINE 40 MG: 20 TABLET ORAL at 04:11

## 2023-11-09 RX ADMIN — INSULIN ASPART 2 UNITS: 100 INJECTION, SOLUTION INTRAVENOUS; SUBCUTANEOUS at 08:11

## 2023-11-09 RX ADMIN — AMIODARONE HYDROCHLORIDE 200 MG: 200 TABLET ORAL at 08:11

## 2023-11-09 RX ADMIN — AMLODIPINE BESYLATE 10 MG: 5 TABLET ORAL at 08:11

## 2023-11-09 RX ADMIN — SUCRALFATE 1 G: 1 TABLET ORAL at 08:11

## 2023-11-09 RX ADMIN — APIXABAN 5 MG: 5 TABLET, FILM COATED ORAL at 08:11

## 2023-11-09 RX ADMIN — METFORMIN HYDROCHLORIDE 1000 MG: 500 TABLET, FILM COATED ORAL at 04:11

## 2023-11-09 RX ADMIN — DOCUSATE SODIUM 100 MG: 100 CAPSULE, LIQUID FILLED ORAL at 08:11

## 2023-11-09 RX ADMIN — INSULIN ASPART 4 UNITS: 100 INJECTION, SOLUTION INTRAVENOUS; SUBCUTANEOUS at 05:11

## 2023-11-09 RX ADMIN — METOPROLOL TARTRATE 25 MG: 25 TABLET, FILM COATED ORAL at 08:11

## 2023-11-09 RX ADMIN — SITAGLIPTIN 50 MG: 50 TABLET, FILM COATED ORAL at 08:11

## 2023-11-09 RX ADMIN — APIXABAN 5 MG: 5 TABLET, FILM COATED ORAL at 10:11

## 2023-11-09 RX ADMIN — FOLIC ACID 1 MG: 1 TABLET ORAL at 08:11

## 2023-11-09 NOTE — H&P
[facility] ([facility])  Rehab Admission H&P    Patient Name: Geno Barry  MRN: [id number]  Age: 72 y.o. Sex: female  : 1950  Hospital Length of Stay: 1 days  Date of Service: 11/10/2023   Chief Complaint:  Multivessel CAD s/p CABG x2 on 2023    Subjective:   History of Present Illness   72-year-old  female presented to [facility] for scheduled CABG on 2023.  PMH significant for PID, DM type 2, CAD, and carotid artery stenosis.  Tolerated CABG x2 on  without perioperative complications.  Aspirin, Norvasc, and beta-blocker continued.  Lipitor 40 mg daily initiated on .  Chest tube discontinued on .  Amiodarone initiated due to postoperative atrial fibrillation.  Eliquis 5 mg b.i.d. initiated on .  Tolerated transfer to [facility] inpatient rehab unit on  without incident.    Sitting up in chair.  Reports good sleep and appetite.  Last BM 11/10.  Vital signs at goal with no recorded fevers.  H&H 9.8/31.5-trending down.  CMP unremarkable.  Recent imaging reported below.    Past Medical History: Multivessel CAD s/p CABG x2 on 2023, HLD, paroxysmal atrial fibrillation, normocytic anemia, HTN, dm type 2, GERD, osteoarthritis, obesity, PAD, carotid artery stenosis, HFpEF 55%  Procedure history:  CABG x2 on 2023, right breast lumpectomy, total abdominal hysterectomy  Family History:  Father:  CHF + at age 62.  Mother:  Breast cancer + at age 63.  Brother:  Colon cancer +still living.  Sister:  CAD s/p CABG +still living.    Social History:   (-) TOB.  Former smoker with 22 pack years.  Quit     (-) ETOH.   (-) Illicit drug use.   Completed 2nd grade.  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  .  Lives alone.  Never had children.  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  She did some simple kicking.  Modified independent with cane for mobility and most ADLs.  Sitter helps  with cooking, cleaning, and driving.  Manages her own finances and her own medications.  [place] so she does not have to worry about yd work.  Residence:  Lives alone in a single-story home with no steps to enter the residence.  She has a tub/shower combination with grab bars and hand-held shower.  She does not have elevated toilet.  She has grab bars adjacent.  DME:  Straight cane, hand-held shower, Rollator (broken)  Anticipated discharge destination:  Home with home health    Review of patient's allergies indicates:   Allergen Reactions    Ace inhibitors Swelling    Clopidogrel Swelling    Iodine         Current Facility-Administered Medications:     acetaminophen tablet 650 mg, 650 mg, Oral, Q4H PRN, Duglas, Isidoro A, FNP    ALPRAZolam tablet 0.25 mg, 0.25 mg, Oral, TID PRN, Duglas, Isidoro A, FNP    amiodarone tablet 200 mg, 200 mg, Oral, BID, Duglas, Isidoro A, FNP, 200 mg at 11/09/23 2041    [START ON 11/12/2023] amiodarone tablet 200 mg, 200 mg, Oral, Daily, Duglas, Isidoro A, FNP    amLODIPine tablet 10 mg, 10 mg, Oral, Daily, Duglas, Isidoro A, FNP    apixaban tablet 5 mg, 5 mg, Oral, BID, Duglas, Isidoro A, FNP, 5 mg at 11/09/23 2041    aspirin EC tablet 81 mg, 81 mg, Oral, Daily, Duglas, Isidoro A, FNP    [START ON 11/11/2023] atorvastatin tablet 40 mg, 40 mg, Oral, Daily, Duglas, Isidoro A, FNP    benzonatate capsule 100 mg, 100 mg, Oral, TID PRN, Duglas, Isidoro A, FNP    bisacodyL suppository 10 mg, 10 mg, Rectal, Daily PRN, Duglas, Isidoro A, FNP    cholecalciferol (vitamin D3) capsule/tablet 400 Units, 400 Units, Oral, Daily, Duglas, Isidoro A, FNP    dextrose 10% bolus 125 mL 125 mL, 12.5 g, Intravenous, PRN, Duglas, Isidoro A, FNP    dextrose 10% bolus 250 mL 250 mL, 25 g, Intravenous, PRN, Isidoro Ardon, JODIP    docusate sodium capsule 100 mg, 100 mg, Oral, BID, Isidoro Ardon FNP    ezetimibe tablet 10 mg, 10 mg, Oral,  "Daily, Duglas, Isidoro A, FNP    glipiZIDE 24 hr tablet 5 mg, 5 mg, Oral, Daily with breakfast, Duglas, Isidoro A, FNP    glucagon (human recombinant) injection 1 mg, 1 mg, Intramuscular, PRN, Duglas, Isidoro A, FNP    glucose chewable tablet 16 g, 16 g, Oral, PRN, Duglas, Isidoro A, FNP    glucose chewable tablet 24 g, 24 g, Oral, PRN, Duglas, Isidoro A, FNP    hydrALAZINE injection 10 mg, 10 mg, Intravenous, Q4H PRN, Duglas, Isidoro A, FNP    HYDROcodone-acetaminophen 7.5-325 mg per tablet 1 tablet, 1 tablet, Oral, TID PRN, Duglas, Isidoro A, FNP    hydrOXYzine pamoate capsule 50 mg, 50 mg, Oral, QHS, Duglas, Isidoro A, FNP, 50 mg at 11/09/23 2042    insulin aspart U-100 injection 0-10 Units, 0-10 Units, Subcutaneous, QID (AC + HS) PRN, Duglas, Isidoro A, FNP, 2 Units at 11/10/23 0546    labetalol 20 mg/4 mL (5 mg/mL) IV syring, 10 mg, Intravenous, Q4H PRN, Duglas, Isidoro A, FNP    meloxicam tablet 15 mg, 15 mg, Oral, Daily, Duglas, Isidoro A, FNP    metFORMIN tablet 1,000 mg, 1,000 mg, Oral, BID WM, Duglas, Isidoro A, FNP, 1,000 mg at 11/09/23 1658    metoprolol injection 10 mg, 10 mg, Intravenous, Q2H PRN, Duglas, Isidoro A, FNP    [START ON 11/11/2023] metoprolol succinate (TOPROL-XL) 24 hr tablet 50 mg, 50 mg, Oral, Daily, Duglas, Isidoro A, FNP    nitroGLYCERIN SL tablet 0.4 mg, 0.4 mg, Sublingual, Q5 Min PRN, Duglas, Isidoro A, FNP    ondansetron disintegrating tablet 4 mg, 4 mg, Oral, Q6H PRN, Duglas, Isidoro A, FNP    polyethylene glycol packet 17 g, 17 g, Oral, Q12H PRN, Duglas, Isidoro A, FNP    promethazine tablet 25 mg, 25 mg, Oral, Q6H PRN, Duglas, Isidoro A, FNP    SITagliptin phosphate tablet 100 mg, 100 mg, Oral, Daily, Duglas, Isidoro A, FNP     Review of Systems   Complete 12-point review of symptoms negative except for what's mentioned in HPI     Objective:     /71   Pulse 81   Temp 98.9 °F (37.2 °C) (Oral)   Resp 20   Ht 5' 4" " (1.626 m)   Wt 98.2 kg (216 lb 7.9 oz)   SpO2 96%   Breastfeeding No   BMI 37.16 kg/m²     Physical Exam  Constitutional:       Appearance: Normal appearance.   HENT:      Head: Normocephalic.      Mouth/Throat:      Mouth: Mucous membranes are moist.   Eyes:      Pupils: Pupils are equal, round, and reactive to light.   Cardiovascular:      Rate and Rhythm: Normal rate and regular rhythm.      Heart sounds: Normal heart sounds.      Comments: Sternal incision clean and intact.  Chest tube sutures intact  Pulmonary:      Effort: Pulmonary effort is normal.      Breath sounds: Normal breath sounds.   Abdominal:      General: Bowel sounds are normal.      Palpations: Abdomen is soft.      Comments: Obese   Musculoskeletal:      Cervical back: Neck supple.      Comments: Left lower extremity trace edema   Skin:     General: Skin is warm and dry.      Comments: Left leg harvest site intact   Neurological:      Mental Status: She is alert and oriented to person, place, and time.      Motor: Weakness present.   Psychiatric:         Mood and Affect: Mood normal.         Behavior: Behavior normal.         Thought Content: Thought content normal.         Judgment: Judgment normal.     *MD performed and documented physical examination       Labs  Admission on 11/09/2023   Component Date Value Ref Range Status    POCT Glucose 11/09/2023 208 (H)  70 - 110 mg/dL Final    POCT Glucose 11/09/2023 229 (H)  70 - 110 mg/dL Final    Sodium Level 11/10/2023 145  136 - 145 mmol/L Final    Potassium Level 11/10/2023 3.7  3.5 - 5.1 mmol/L Final    Chloride 11/10/2023 107  98 - 107 mmol/L Final    Carbon Dioxide 11/10/2023 28  23 - 31 mmol/L Final    Glucose Level 11/10/2023 165 (H)  82 - 115 mg/dL Final    Blood Urea Nitrogen 11/10/2023 14.0  9.8 - 20.1 mg/dL Final    Creatinine 11/10/2023 1.12 (H)  0.55 - 1.02 mg/dL Final    Calcium Level Total 11/10/2023 9.4  8.4 - 10.2 mg/dL Final    Protein Total 11/10/2023 6.3  5.8 - 7.6 gm/dL  Final    Albumin Level 11/10/2023 2.6 (L)  3.4 - 4.8 g/dL Final    Globulin 11/10/2023 3.7 (H)  2.4 - 3.5 gm/dL Final    Albumin/Globulin Ratio 11/10/2023 0.7 (L)  1.1 - 2.0 ratio Final    Bilirubin Total 11/10/2023 0.5  <=1.5 mg/dL Final    Alkaline Phosphatase 11/10/2023 69  40 - 150 unit/L Final    Alanine Aminotransferase 11/10/2023 13  0 - 55 unit/L Final    Aspartate Aminotransferase 11/10/2023 14  5 - 34 unit/L Final    eGFR 11/10/2023 52  mls/min/1.73/m2 Final    Magnesium Level 11/10/2023 1.80  1.60 - 2.60 mg/dL Final    Phosphorus Level 11/10/2023 2.3  2.3 - 4.7 mg/dL Final    WBC 11/10/2023 11.74 (H)  4.50 - 11.50 x10(3)/mcL Final    RBC 11/10/2023 3.41 (L)  4.20 - 5.40 x10(6)/mcL Final    Hgb 11/10/2023 9.8 (L)  12.0 - 16.0 g/dL Final    Hct 11/10/2023 31.5 (L)  37.0 - 47.0 % Final    MCV 11/10/2023 92.4  80.0 - 94.0 fL Final    MCH 11/10/2023 28.7  27.0 - 31.0 pg Final    MCHC 11/10/2023 31.1 (L)  33.0 - 36.0 g/dL Final    RDW 11/10/2023 15.3  11.5 - 17.0 % Final    Platelet 11/10/2023 392  130 - 400 x10(3)/mcL Final    MPV 11/10/2023 9.1  7.4 - 10.4 fL Final    Neut % 11/10/2023 67.8  % Final    Lymph % 11/10/2023 22.9  % Final    Mono % 11/10/2023 6.2  % Final    Eos % 11/10/2023 2.1  % Final    Basophil % 11/10/2023 0.2  % Final    Lymph # 11/10/2023 2.69  0.6 - 4.6 x10(3)/mcL Final    Neut # 11/10/2023 7.96  2.1 - 9.2 x10(3)/mcL Final    Mono # 11/10/2023 0.73  0.1 - 1.3 x10(3)/mcL Final    Eos # 11/10/2023 0.25  0 - 0.9 x10(3)/mcL Final    Baso # 11/10/2023 0.02  <=0.2 x10(3)/mcL Final    IG# 11/10/2023 0.09 (H)  0 - 0.04 x10(3)/mcL Final    IG% 11/10/2023 0.8  % Final    NRBC% 11/10/2023 0.0  % Final    POCT Glucose 11/10/2023 167 (H)  70 - 110 mg/dL Final       Radiology  CXR one view on 11/05/2023, IMPRESSION: Cardiomegaly with postsurgical changes of median sternotomy.  Mediastinal drain remains in place.  Interval removal of right-sided central line.  Small left effusion is  noted.  Radiology  Transthoracic echo on 11/02/2023, IMPRESSION: Left Ventricle: regional wall motion abnormalities present.  The distal apex, apical inferior and distal septal walls are hypokinetic There is moderately reduced systolic function with a visually estimated ejection fraction of 35 - 40%. Grade I diastolic dysfunction.  Right Ventricle: Normal right ventricular cavity size. Wall thickness is normal. Right ventricle wall motion  is normal. Systolic function is normal.  Mitral Valve: There is mild regurgitation.  Diagnostic studies   LHC on 11/02/2023, IMPRESSION: Left Main-luminal irregularities. LAD-mid subtotal occlusion involving large diagonal. LCX-non dominant, luminal irregularities. RCA-dominant, 40% ostial stenosis. LVEDP 18    Assessment/Plan:     72 y.o. AAF admitted on 11/9/2023    Multivessel CAD  - s/p CABG x2 on 11/02/2023 (LIMA to LAD and rSVG to Diag 1)  - denies recent chest pain or discomfort  - continue    Aspirin 81 mg daily    Lipitor 40 mg daily    Metoprolol succinate 50 mg daily     Norco 7.5 mg/325 mg q.8 hours p.r.n.   - ECG and Nitro PRN  - not on ACEI or Plavix  - continue cardiac diet  - to follow-up with cardiology outpatient    HLD  - cholesterol 129, HDL 38, total cholesterol 3, triglycerides 42, LDL 83, VLDL 8 on 11/02/2023  - continue    Lipitor 40 mg daily     Zetia 10 mg daily    HFpEF  - LVEF 55% on 10/11/2022  - continue    Metoprolol succinate 50 mg daily  - monitor weights daily  - to follow-up with cardiology outpatient    PAD  - stable  - continue    Aspirin 81 mg daily    Lipitor 40 mg daily    Eliquis 5 mg b.i.d.  - to follow-up with vascular surgery outpatient    Bilateral carotid artery stenosis  - moderate-no interventions at this time  - continue    Aspirin 81 mg daily    Lipitor 40 mg daily    Eliquis 5 mg b.i.d.  - to follow-up with vascular surgery outpatient    Paroxysmal atrial fibrillation  - no evidence of bleeding  - rate controlled  -  continue    Amiodarone 200 mg b.i.d. (to end on 11/10)    Amiodarone daily to begin on 11/11    Eliquis 5 mg b.i.d.    Metoprolol succinate 50 mg daily   - to follow-up with cardiology outpatient    Normocytic anemia  - asymptomatic  - H/H trending down  - iron 33, TIBC 159, iron binding capacity 126, transferrin 141, iron saturation 21 on 11/10/2023  - initiate     Ferrous sulfate 325 mg daily (initiated 11/10)  - no evidence of active bleeds  - will closely monitor and transfuse if needed     HTN  - BP at goal  - continue    Metoprolol succinate 50 mg daily    Norvasc 10 mg daily      Hydralazine 10 mg every 2 hours as needed for BP > 160/90    Labetalol 10 mg every 2 hours as needed for BP > 160/90  - low sodium diet    DM type II  - HgA1c 9.3 on 11/02/2023  - continue    Metformin 1000 mg twice daily    Glipizide 5 mg with breakfast    Januvia 100 mg daily    ISS   - CBGs AC/HS    GERD  - Avoid spicy foods, and nothing to eat or drink within x2 hours of bedtime or laying flat (water is ok)   - Avoid NSAIDs (Advil, ibuprofen, naproxen...) and andrade-2 inhibitors (Mobic, Celebrex)    - continue    Pepcid 40 mg daily     Osteoarthritis  - stable  - continue    Meloxicam 15 mg daily     Vitamin-D 3 400 units daily    MEDICAL PLAN:  - Admit to [facility]  - Medical reconciliation completed and included in the electronic health record  - Draw admit labs including CBC, CMP, and Prealbumin  - Maintain weightbearing status as ordered  - Monitor postoperative surgical incision changing dressing daily  - Teach skin protection and wound prevention techniques  - Initiate fall precaution  - Initiate bowel training program  - Initiate bladder training as indicated  - Pain management  - IS q2 hours while awake    THERAPEUTIC PLAN:  - Physical therapy 60-90 minutes 5 to week to increase functional mobility, maintain weightbearing precautions, increase lower extremity restrengthening,  increase overall activity tolerance, teach balance and safety measures as well as fall precautions, make equipment and orthotic recommendations, be involved in family training and discharge planning, monitor pain levels and vital signs during therapy adjusting treatment accordingly  - Occupational therapy 60-90 minutes 5 times a week to increase functional independence with activities of daily living, maintain weightbearing precautions, teach use of adaptive equipment, increase upper extremity restrengthening, increase overall activity tolerance, teach balance and safety measures as well as fall precautions, make equipment and orthotic recommendations, be involved in family training and discharge planning, monitor pain levels and vital signs during therapy adjusting treatment accordingly  - Speech and language pathology will do an in-depth evaluation of patient's cognition, phonation, and swallowing. They will initiate therapy 30- 60 minutes 5 times a week as indicated  - Recreational therapy will incorporate all patient's functional abilities  into recreational activities that will require visual, spatial, and perceptual abilities; gross and fine motor coordination skills; the cognition to follow multistep commands; dynamic and static balance and reaching abilities. They will also incorporate animal assisted therapy into their weekly program  - Rehabilitation nursing will act as patient family physician liaison. They will integrate patient's functional abilities, after discussions with therapist, into everyday activities with the CNA's,  LPN's and RNs that will include but are not limited to dressing, bathing, feeding, grooming, toileting, transfers, hygiene etc. They will administer medications watching for wanted as well as unwanted effects. They will initiate DVT/VTE prophylaxis as ordered. Will monitor vital signs, lab values, and pain levels, making appropriate physician notification. They will monitor skin  integrity including postop incision, making daily dressing changes. They will teach skin protection and wound prevention techniques. They will initiate bowel training They will initiate bladder training as indicated. They will initiate fall precautions  - Rehabilitation counseling/case management will act as patient and family liaison. They will set up family conferences, family training, aid in discharge planning, and aid in the procurement of assistive devices  - Patient will have 24 hour availability to physiatric care  - Patient will have nutritional services involved, monitoring oral input and visceral protein stores. They will make dietary and supplement recommendations and follow-up as necessary  - Patient will have weekly interdisciplinary team conferences  - Patient will have initial family conference and follow up conferences as indicated  - Patient will have family training prior to discharge     Estimated length of stay - 14-17 days  Anticipated discharge destination - Home with   Medical status - stabilizing postoperatively; will need to monitor pain levels, postoperative skin integrity, watch for evidence of deep vein thrombosis, monitor postoperative H&H, monitor vital signs closely.  Medical prognosis - Good for post-op healing and functional improvement  Previous functional Status:  Supervision to mod independent  Present Functional Status:  Min to mod assist    VTE Prophylaxis:  Eliquis 5 mg b.i.d.  COVID-19 testing:  Unknown  COVID-19 vaccination status:  Vaccinated ([facility]arcel):  03/05/2021 and (Pfizer):  11/09/2021 and 06/22/2022    POA: No  Living will: No  Contacts: Heaven Burns ([facility]) [phone]    CODE STATUS: Full Code  Internal Medicine (attending): Salvador Sebastian MD  Physiatry (consulting):  Patricio Daniel MD    OUTPATIENT PROVIDERS  PCP:  JONG Clifton  Cardiology:  Clay chen MD  CV surgery:  Heriberto Lara MD    DISPOSITION: Condition stable. Tolerated  transfer.  Recent labs and imaging reviewed.  Rehab admission orders initiated.  Med reconciliation completed.  Consult physiatry for rehab and pain management.  PT/OT/RT/ST to eval and treat.  Sleep hygiene, bowel maintenance, and appetite at goal.  Vital signs at goal with no recorded fevers.  H&H trending down.  CMP unremarkable.  Overall looks great on air.  Left lower extremity trace edema.  Initiate ferrous sulfate 325 mg daily.  Continue to monitor weights closely.  Monitor closely.  Notify of acute changes.    PHYSICIAN ATTESTATION: I have been involved in the patient's rehabilitation prescreening process and I have now completed the post admission physician evaluation. Patient is in need of, appropriate for, and should tolerate the above outlined inpatient rehabilitation plan. I believe this is necessary to reach maximal postoperative healing and maximal functional abilities. I do not believe this would be possible in a timely fashion in a less intensive setting      Jeff Ardon NP conducted independent physical examination and assisted with medical documentation.    Total time spent on this encounter including chart review and direct MD + NP 1-on-1 patient interaction: 96 minutes   Over 50% of this time was spent in counseling and coordination of care

## 2023-11-09 NOTE — PROGRESS NOTES
"[facility]    Cardiology  Progress Note    Patient Name: Geno Barry  MRN: [id number]  Admission Date: 11/2/2023  Hospital Length of Stay: 7 days  Code Status: Full Code   Attending Physician: Fuad Barragan MD   Primary Care Physician: Delmy Montano FNP  Expected Discharge Date: 11/9/2023  Principal Problem:<principal problem not specified>    Subjective:   Brief HPI:   Ms. Barry is a 73 y/o female who is known to [facility], Dr. Vera. The patient recently was evaluate in [facility] clinic on a routine follow up, however, at this F/U Appointment she reported Progressively Worsening MICHAEL that started about 3 weeks prior to 10.9.23. She was scheduled for a PET Scan in which she underwent with results of High Risk for Future CV Events. She was evaluated in office on 10.31.23 for results and was scheduled on 11.2.23 for LHC with Dr. Vera. 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 On 11.2.23 she underwent a CABG with results of LIMA to LAD and rSVG to Diag 1. She tolerated the procedure well and was admitted to ICU for further management.    Hospital Course:   11.3.23: NAD. Laying in Bed. Denies SOB and Palps. + CP/Incisional. No Pressors. "I want coffee."  11.4.23: NAD. "I am feeling better today." Denies SOB and Palps. + CP/Incisional. SR/ST  11.5.23: NAD. " I am good." Denies SOB and Palps. + CP/Incisional. Sitting in Bedside Chair.   11.6.23: NAD. "I am feeling pretty good." Sitting in Bedside Chair. Denies SOB and Palps. + CP/Incisional.   11.7.23: NAD. "I am okay."Sitting up in chair. + Incisional CP. Denies SOB or palps. Rehab pending.   11.8.23: NAD Noted. Vitals Stable. SR on Tele. Sitting in chair. Progressing well. Awaiting Rehab Placement.  11.9.23: NAD Noted. Vitals Stable. SR on Tele.     PMH: PAD, DM II, CAD/CABG, YULI/Bilaterally Mild, HTN  PSH: CABG (11.2.23) LIMA to LAD and rSVG to Diag1, ZACH, R Breast Lumpectomy, " Angiogram   Family History: Father, D, 62, Diastolic HF; Mother, D, 63, Breast CA; Brother, L, Colon Cancer; Sister, L, CAD/CABG  Social History: Denies Illicit Drug, ETOH and Tobacco Use; Former Smoker, 1 ppd fro 22+ Years; Quit in 1992    Previous Diagnostics:   CABG (11.2.23):  Date of service 11/02/2023   Preop diagnosis:  Coronary artery disease  Postop diagnosis:  Same  Procedure:  CABG x2; LIMA to the LAD; reverse saphenous vein to the 1st diagonal; endoscopic vein harvest of the left greater saphenous vein  Surgeon:  Marco  Assistant:  Dax   Anesthesia:  General endotracheal with cardiopulmonary bypass  Drains:  Chest tubes x1     Echocardiogram (11.2.23):  Left Ventricle: regional wall motion abnormalities present.  The distal apex, apical inferior and distal septal walls are hypokinetic There is moderately reduced systolic function with a visually estimated ejection fraction of 35 - 40%. Grade I diastolic dysfunction.  Right Ventricle: Normal right ventricular cavity size. Wall thickness is normal. Right ventricle wall motion  is normal. Systolic function is normal.  Mitral Valve: There is mild regurgitation.    LHC (11.2.23):  Left Main-luminal irregularities  LAD-mid subtotal occlusion involving large diagonal  LCX-non dominant, luminal irregularities  RCA-dominant, 40% ostial stenosis  LVEDP 18    PET (10.26.23):  This is an abnormal perfusion study.   This scan is suggestive of high risk for future cardiovascular events.   Large partially reversible perfusion abnormality of severe intensity in the anterior apical region. Medium partially reversible perfusion abnormality of severe intensity in the septal region. Medium partially reversible perfusion abnormality of severe intensity in the inferior region. Small partially reversible perfusion abnormality of severe intensity in the apical lateral segment.   The left ventricular cavity is noted to be mildly enlarged on the stress studies. The stress  left ventricular ejection fraction was calculated to be 46% and left ventricular global function is mildly reduced. The rest left ventricular cavity is noted to be normal. The rest left ventricular ejection fraction was calculated to be 47% and rest left ventricular global function is mildly reduced.   Persistent hypokinesis of the anterior region and apical inferior segment is noted in both rest and stress studies. When compared to the resting ejection fraction (47%), the stress ejection fraction (46%) has decreased.   The study quality is average.   There was a rise in myocardial blood flow between rest and stress.  Global myocardial blood flow reserve was 2.25.  Myocardial blood flow reserve is reduced in the apical and spetal territories which is suggestive of flow limiting stenosis in these territories.    Carotid US (10.5.23):  The study quality is good.   1-39% stenosis in the proximal left internal carotid artery based on Bluth Criteria.   1-39% stenosis in the proximal right internal carotid artery based on Bluth Criteria.   Antegrade right vertebral artery flow.   Antegrade left vertebral artery flow.     ECHO (10.11.22):  The study quality is average.   The left ventricle is normal in size. Global left ventricular systolic function is normal. The left ventricular ejection fraction is 55%. The left ventricle diastolic function is impaired (Grade I) with normal left atrial pressure.  Mild (1+) mitral regurgitation.   Mild calcification of the aortic valve is noted with adequate cuspal excursion.  The pulmonary artery systolic pressure is 30 mmHg.      Review of Systems   Cardiovascular:  Negative for chest pain.   Respiratory:  Negative for shortness of breath.    All other systems reviewed and are negative.    Objective:     Vital Signs (Most Recent):  Temp: 98.6 °F (37 °C) (11/09/23 0734)  Pulse: 77 (11/09/23 0734)  Resp: 18 (11/09/23 0734)  BP: 131/70 (11/09/23 0734)  SpO2: (!) 89 % (11/09/23 0734) Vital  Signs (24h Range):  Temp:  [98.1 °F (36.7 °C)-99.4 °F (37.4 °C)] 98.6 °F (37 °C)  Pulse:  [74-85] 77  Resp:  [17-20] 18  SpO2:  [89 %-94 %] 89 %  BP: (111-151)/(64-78) 131/70     Weight: 95.3 kg (210 lb 1.6 oz)  Body mass index is 36.06 kg/m².    SpO2: (!) 89 %         Intake/Output Summary (Last 24 hours) at 11/9/2023 0952  Last data filed at 11/8/2023 1428  Gross per 24 hour   Intake 480 ml   Output 600 ml   Net -120 ml       Lines/Drains/Airways       Drain  Duration             Female External Urinary Catheter 11/04/23 0830 5 days              Peripheral Intravenous Line  Duration                  Peripheral IV - Single Lumen 11/04/23 1737 20 G Anterior;Left Forearm 4 days                  Significant Labs:   Recent Results (from the past 72 hour(s))   POCT glucose    Collection Time: 11/06/23 10:11 PM   Result Value Ref Range    POCT Glucose 419 (H) 70 - 110 mg/dL   Comprehensive Metabolic Panel    Collection Time: 11/07/23  4:06 AM   Result Value Ref Range    Sodium Level 142 136 - 145 mmol/L    Potassium Level 3.8 3.5 - 5.1 mmol/L    Chloride 105 98 - 107 mmol/L    Carbon Dioxide 26 23 - 31 mmol/L    Glucose Level 397 (H) 82 - 115 mg/dL    Blood Urea Nitrogen 15.7 9.8 - 20.1 mg/dL    Creatinine 1.04 (H) 0.55 - 1.02 mg/dL    Calcium Level Total 9.0 8.4 - 10.2 mg/dL    Protein Total 6.5 5.8 - 7.6 gm/dL    Albumin Level 3.1 (L) 3.4 - 4.8 g/dL    Globulin 3.4 2.4 - 3.5 gm/dL    Albumin/Globulin Ratio 0.9 (L) 1.1 - 2.0 ratio    Bilirubin Total 0.7 <=1.5 mg/dL    Alkaline Phosphatase 93 40 - 150 unit/L    Alanine Aminotransferase 21 0 - 55 unit/L    Aspartate Aminotransferase 22 5 - 34 unit/L    eGFR 57 mls/min/1.73/m2   Magnesium    Collection Time: 11/07/23  4:06 AM   Result Value Ref Range    Magnesium Level 1.90 1.60 - 2.60 mg/dL   CBC with Differential    Collection Time: 11/07/23  4:06 AM   Result Value Ref Range    WBC 10.38 4.50 - 11.50 x10(3)/mcL    RBC 3.90 (L) 4.20 - 5.40 x10(6)/mcL    Hgb 11.0 (L) 12.0  - 16.0 g/dL    Hct 35.0 (L) 37.0 - 47.0 %    MCV 89.7 80.0 - 94.0 fL    MCH 28.2 27.0 - 31.0 pg    MCHC 31.4 (L) 33.0 - 36.0 g/dL    RDW 15.3 11.5 - 17.0 %    Platelet 323 130 - 400 x10(3)/mcL    MPV 9.6 7.4 - 10.4 fL    Neut % 69.3 %    Lymph % 21.8 %    Mono % 7.5 %    Eos % 0.7 %    Basophil % 0.3 %    Lymph # 2.26 0.6 - 4.6 x10(3)/mcL    Neut # 7.20 2.1 - 9.2 x10(3)/mcL    Mono # 0.78 0.1 - 1.3 x10(3)/mcL    Eos # 0.07 0 - 0.9 x10(3)/mcL    Baso # 0.03 <=0.2 x10(3)/mcL    IG# 0.04 0 - 0.04 x10(3)/mcL    IG% 0.4 %    NRBC% 0.0 %   POCT glucose    Collection Time: 11/07/23 11:42 AM   Result Value Ref Range    POCT Glucose 382 (H) 70 - 110 mg/dL   POCT glucose    Collection Time: 11/07/23  5:12 PM   Result Value Ref Range    POCT Glucose 298 (H) 70 - 110 mg/dL   POCT glucose    Collection Time: 11/07/23  8:16 PM   Result Value Ref Range    POCT Glucose 231 (H) 70 - 110 mg/dL   POCT glucose    Collection Time: 11/08/23  5:26 AM   Result Value Ref Range    POCT Glucose 231 (H) 70 - 110 mg/dL   POCT glucose    Collection Time: 11/08/23 12:16 PM   Result Value Ref Range    POCT Glucose 427 (H) 70 - 110 mg/dL   POCT glucose    Collection Time: 11/08/23  2:34 PM   Result Value Ref Range    POCT Glucose 450 (HH) 70 - 110 mg/dL   POCT glucose    Collection Time: 11/08/23  4:23 PM   Result Value Ref Range    POCT Glucose 413 (H) 70 - 110 mg/dL   POCT glucose    Collection Time: 11/08/23  9:17 PM   Result Value Ref Range    POCT Glucose 245 (H) 70 - 110 mg/dL     Telemetry: Sinus Rhythm    Physical Exam  Constitutional:       General: She is not in acute distress.     Appearance: Normal appearance.   HENT:      Head: Normocephalic.      Nose: Nose normal.      Mouth/Throat:      Mouth: Mucous membranes are moist.   Eyes:      Extraocular Movements: Extraocular movements intact.      Conjunctiva/sclera: Conjunctivae normal.   Cardiovascular:      Rate and Rhythm: Normal rate and regular rhythm.      Pulses: Normal pulses.       Heart sounds: Normal heart sounds. No murmur heard.  Pulmonary:      Effort: Pulmonary effort is normal.      Breath sounds: Normal breath sounds.   Abdominal:      Palpations: Abdomen is soft.   Musculoskeletal:         General: No swelling. Normal range of motion.   Skin:     General: Skin is warm and dry.      Comments: Midline Sternotomy Stable   Neurological:      General: No focal deficit present.      Mental Status: She is alert and oriented to person, place, and time.   Psychiatric:         Mood and Affect: Mood normal.         Behavior: Behavior normal.         Judgment: Judgment normal.       Current Inpatient Medications:    Current Facility-Administered Medications:     acetaminophen oral solution 650 mg, 650 mg, Per OG tube, Q6H PRN, Bi Verduzco PA-C    albumin human 5% bottle 12.5 g, 12.5 g, Intravenous, PRN, Bi Verduzco PA-C, Last Rate: 250 mL/hr at 11/03/23 1128, Rate Verify at 11/03/23 1128    amiodarone tablet 200 mg, 200 mg, Oral, BID, Francisco Porter ANP, 200 mg at 11/09/23 0852    [START ON 11/11/2023] amiodarone tablet 200 mg, 200 mg, Oral, Daily, Francisco Porter ANP    amLODIPine tablet 10 mg, 10 mg, Oral, Daily, Puma Vera MD, 10 mg at 11/09/23 0852    apixaban tablet 5 mg, 5 mg, Oral, BID, Sally Gillis FNP    aspirin EC tablet 81 mg, 81 mg, Oral, Daily, Puma Vera MD, 81 mg at 11/09/23 0851    atorvastatin tablet 40 mg, 40 mg, Oral, Daily, Francisco Porter ANP, 40 mg at 11/09/23 0852    calcium gluconate 1 g in NS IVPB (premixed), 1 g, Intravenous, PRN, Bi Verduzco PA-C    calcium gluconate 1 g in NS IVPB (premixed), 2 g, Intravenous, PRN, Bi Verduzco PA-C    calcium gluconate 1 g in NS IVPB (premixed), 3 g, Intravenous, PRN, Bi Verduzco PA-C    dextrose 10% bolus 125 mL 125 mL, 12.5 g, Intravenous, PRN, Heriberto Lara IV, MD    dextrose 10% bolus 125 mL 125 mL, 12.5 g, Intravenous, PRN, Cisco Farrell MD    dextrose 10% bolus 250 mL  250 mL, 25 g, Intravenous, PRN, Heriberto Lara IV, MD    dextrose 10% bolus 250 mL 250 mL, 25 g, Intravenous, PRN, Cisco Farrell MD    docusate sodium capsule 100 mg, 100 mg, Oral, BID, Bi Verduzco PA-C, 100 mg at 11/09/23 0852    ezetimibe tablet 10 mg, 10 mg, Oral, Daily, Puma Vera MD, 10 mg at 11/09/23 0852    famotidine (PF) injection 20 mg, 20 mg, Intravenous, Daily, Bi Verduzco PA-C, 20 mg at 11/09/23 0852    folic acid tablet 1 mg, 1 mg, Oral, Daily, Bi Verduzco PA-C, 1 mg at 11/09/23 0852    glipiZIDE 24 hr tablet 5 mg, 5 mg, Oral, Daily with dinner, Sally Gillis, FNP, 5 mg at 11/08/23 1625    glucagon (human recombinant) injection 1 mg, 1 mg, Intramuscular, PRN, Cisco Farrell MD    hydrALAZINE injection 10 mg, 10 mg, Intravenous, Q4H PRN, Puma Vera MD, 10 mg at 11/06/23 2156    HYDROcodone-acetaminophen 5-325 mg per tablet 1 tablet, 1 tablet, Oral, Q4H PRN, Heriberto Lara IV, MD    insulin aspart U-100 injection 0-10 Units, 0-10 Units, Subcutaneous, QID (AC + HS) PRN, Sally Gillis, FNP, 10 Units at 11/08/23 1434    lactulose 10 gram/15 ml solution 20 g, 20 g, Oral, Q6H PRN, Bi Verduzco PA-C, 20 g at 11/07/23 0326    loperamide capsule 2 mg, 2 mg, Oral, Continuous PRN, Bi Verduzco PA-C    magnesium sulfate 2g in water 50mL IVPB (premix), 2 g, Intravenous, PRN, Bi Verduzco PA-C    magnesium sulfate 2g in water 50mL IVPB (premix), 4 g, Intravenous, PRN, Bi Verduzco PA-C    SITagliptin phosphate tablet 50 mg, 50 mg, Oral, BID WM, 50 mg at 11/09/23 0852 **AND** metFORMIN tablet 1,000 mg, 1,000 mg, Oral, BID WM, Sally Gillis, FNP, 1,000 mg at 11/09/23 0851    metoclopramide HCl injection 5 mg, 5 mg, Intravenous, Q6H PRN, Bi Verduzco, PA-C    metoprolol tartrate (LOPRESSOR) tablet 25 mg, 25 mg, Oral, BID, Keturah Way, FNP, 25 mg at 11/09/23 0852    morphine injection 4 mg, 4 mg, Intravenous, Q4H PRN, Bi Verduzco,  PA-KRAIG, 4 mg at 11/03/23 0004    ondansetron injection 4 mg, 4 mg, Intravenous, Q4H PRN, Bi Verduzco PA-C    oxyCODONE immediate release tablet 10 mg, 10 mg, Oral, Q4H PRN, Bi Verduzco PA-C, 10 mg at 11/05/23 2023    potassium chloride 20 mEq in 100 mL IVPB (FOR CENTRAL LINE ADMINISTRATION ONLY), 20 mEq, Intravenous, PRN, Bi Verduzco PA-C, Stopped at 11/04/23 1117    potassium chloride 20 mEq in 100 mL IVPB (FOR CENTRAL LINE ADMINISTRATION ONLY), 40 mEq, Intravenous, PRN, Bi Verduzco PA-C    sodium phosphate 15 mmol in dextrose 5 % (D5W) 250 mL IVPB, 15 mmol, Intravenous, PRN, Bi Verduzco, PA-C    sodium phosphate 20.01 mmol in dextrose 5 % (D5W) 250 mL IVPB, 20.01 mmol, Intravenous, PRN, Bi Verduzco, PA-C    sodium phosphate 30 mmol in dextrose 5 % (D5W) 250 mL IVPB, 30 mmol, Intravenous, PRN, Bi Verduzco, PA-C    sucralfate tablet 1 g, 1 g, Oral, QID (AC & HS), Bi Verduzco PA-KRAIG, 1 g at 11/09/23 0852    VTE Risk Mitigation (From admission, onward)           Ordered     apixaban tablet 5 mg  2 times daily         11/09/23 0859     Place sequential compression device  Until discontinued         11/02/23 2120     IP VTE HIGH RISK PATIENT  Once         11/02/23 1724     Place sequential compression device  Until discontinued         11/02/23 1018                  Assessment:   CAD (Multivessel)    - s/p CABG (11.2.23) - LIMA to LAD and SVG to Diagional 1    - s/p LHC (11.2.23) - Left main-luminal irregularities; LAD-mid subtotal occlusion involving large diagonal; LCX-non dominant, luminal irregularities; RCA-dominant, 40% ostial stenosis  AF (New Onset) with RVR - Now SR    - CHADsVASc - 6    - ECHO (10.11.22) - LVEF 35-40%    - On Amiodarone/Eliquis  Ischemic Cardiomyopathy    - EF 35-40%  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/EF 35-40% & Grade I Diastolic Dysfunction (Compensated)  YULI/Bilaterally Mild  History of Hypertension (BP Stable)  Hyperlipidemia    - On  Statin/Zetia  DM II  PAD/Interventions   Elevated BMI/Obesity  No History of GI Bleed     Plan:   Continue Current Cardiac Medications  Continue Oral Amiodarone Tapered Dosing Regimen  Start Eliquis 5 Mg PO BID Re: AF/Stroke Risk Reduction (Cleared by CT Surgery)  Transition to Toprol XL 50 Mg Daily  Continue Aspirin/Statin  Unable to Add ACEI/ARB/ARNI due to History of Angioedema with ACE Inhibitors  Consider adding Jardiance/Farxiga on outpatient basis.  Plan Discharge to Inpatient Rehab once accepted.    JONG Richardson  Cardiology  [facility]  11/09/2023

## 2023-11-09 NOTE — PLAN OF CARE
11/09/23 1349   Final Note   Assessment Type Final Discharge Note   Anticipated Discharge Disposition Rehab  ([facility].)   What phone number can be called within the next 1-3 days to see how you are doing after discharge? [phone]   Hospital Resources/Appts/Education Provided Post-Acute resouces added to AVS     Pt is aware of discharge.  She states she will let her sister know of discharge.    Pt transported via [place] at 330pm

## 2023-11-09 NOTE — PLAN OF CARE
Problem: Occupational Therapy  Goal: Occupational Therapy Goal  Description: LTG: Pt will perform basic ADLs and ADL transfers with Modified independence and good compliance to sternal precautions using LRAD by discharge.    STG: to be met by 1 month    Pt will complete grooming standing at sink with LRAD with SPV.  Pt will complete UB dressing with SPV.  Pt will complete LB dressing with SBA using LRAD and AE prn.  Pt will complete toileting with SBA using LRAD.  Pt will complete functional mobility to/from toilet and toilet transfer with SBA using LRAD.   Pt will independently verbalize sternal precautions with >90% accuracy.     Outcome: Ongoing, Progressing

## 2023-11-09 NOTE — PROGRESS NOTES
11/09/23 0910   Pre Exercise Vitals   /72   Pulse 79   Supplemental O2? No   SpO2 93 %   During Exercise Vitals   Pulse 86   Supplemental O2? No   Distance Walked 50 feet   Post Exercise Vitals   /78   Pulse 89   Supplemental O2? No   SpO2 93 %   Modality   Modality   (rollator)     Communicated with nurse prior to and post activity.   Mod assist sit to stand x2 attempts, maintaining sternal precautions.   Assist to restroom for BM, assisted with clean then back to recliner.  Encouraged increased activity and use of IS.   Awaiting rehab placement.

## 2023-11-09 NOTE — PT/OT/SLP PROGRESS
Physical Therapy Treatment    Patient Name:  Geno Barry   MRN:  [id number]    Recommendations:     Discharge therapy intensity: High Intensity Therapy   Discharge Equipment Recommendations: to be determined by next level of care  Barriers to discharge: Impaired mobility    Assessment:     Geno Barry is a 72 y.o. female admitted with a medical diagnosis of Coronary artery disease involving native coronary artery of native heart.  She presents with the following impairments/functional limitations: weakness, impaired endurance, impaired self care skills, impaired functional mobility, gait instability, impaired balance, decreased upper extremity function .    Rehab Prognosis: Good; patient would benefit from acute skilled PT services to address these deficits and reach maximum level of function.    Recent Surgery: Procedure(s) (LRB):  CORONARY ARTERY BYPASS GRAFT (CABG) (N/A)  SURGICAL PROCUREMENT, VEIN, ENDOSCOPIC (N/A) 7 Days Post-Op    Plan:     During this hospitalization, patient to be seen 6 x/week to address the identified rehab impairments via gait training, therapeutic activities, therapeutic exercises, neuromuscular re-education and progress toward the following goals:    Plan of Care Expires:  12/05/23    Subjective     Chief Complaint: tired today  Patient/Family Comments/goals: to return home independent.  Pain/Comfort:  Pain Rating 1: 0/10      Objective:     Communicated with pts nurse prior to session.  Patient found up in chair with telemetry, pulse ox (continuous) upon PT entry to room.     General Precautions: Standard, fall, sternal  Orthopedic Precautions: N/A  Braces: N/A  Respiratory Status: Room air  Blood Pressure: 133/70  Skin Integrity: Visible skin intact      Functional Mobility:  Bed Mobility:     Scooting: minimum assistance, moderate assistance, and supine to head of bed  Sit to Supine: minimum assistance and to lift LE's into bed  Transfers:     Sit to Stand:  minimum assistance  with rolling walker and x5 from BSC w/ emphasis on forward lean and use of momentum, and x5 from EOB, elevating surface to decrease difficulty    Therapeutic Activities/Exercises:  Pt directed in reclined, seated and standing LE strengthening exercises, x 10 reps each, all planes    Education:  Patient provided with verbal education education regarding strategies to improve bed mobility.  Understanding was verbalized.     Patient left HOB elevated with all lines intact and call button in reach..    GOALS:   Multidisciplinary Problems       Physical Therapy Goals          Problem: Physical Therapy    Goal Priority Disciplines Outcome Goal Variances Interventions   Physical Therapy Goal     PT, PT/OT Ongoing, Progressing     Description: Goals to be met by: 12/5/23     Patient will increase functional independence with mobility by performing:    Supine to sit with Modified Wells  Sit to stand transfer with Modified Wells  Gait  x 200 feet with Modified Wells using Rolling Walker.                          Time Tracking:     PT Received On: 11/09/23  PT Start Time: 1025     PT Stop Time: 1052  PT Total Time (min): 27 min     Billable Minutes: Therapeutic Activity 10 and Therapeutic Exercise 17    Treatment Type: Treatment  PT/PTA: PTA     Number of PTA visits since last PT visit: 3     11/09/2023

## 2023-11-09 NOTE — DISCHARGE SUMMARY
" [facility] - [facility] Medical Telemetry  Cardiology  Discharge Summary      Patient Name: Geno Barry  MRN: [id number]  Admission Date: 11/2/2023  Hospital Length of Stay: 7 days  Discharge Date and Time:  11/09/2023 12:23 PM  Attending Physician: Fuad Barragan MD    Discharging Provider: JONG Richardson  Primary Care Physician: Delmy Montano FNP    HPI: Please see Progress Note from 11.9.23 as same day DC Summary.  No notes on file    Procedure(s) (LRB):  CORONARY ARTERY BYPASS GRAFT (CABG) (N/A)  SURGICAL PROCUREMENT, VEIN, ENDOSCOPIC (N/A)     Indwelling Lines/Drains at time of discharge:  Lines/Drains/Airways       Drain  Duration             Female External Urinary Catheter 11/04/23 0830 5 days                    Hospital Course:  No notes on file    Goals of Care Treatment Preferences:  Code Status: Full Code      Consults:   Consults (From admission, onward)          Status Ordering Provider     Inpatient consult to Social Work/Case Management  Once        Provider:  (Not yet assigned)    Acknowledged HERIBERTO LING IV     Inpatient consult to Cardiothoracic Surgery  Once        Provider:  Heriberto Ling IV, MD    Acknowledged MARIELLE ROBINS            Significant Diagnostic Studies: Labs:   BMP: No results for input(s): "GLU", "NA", "K", "CL", "CO2", "BUN", "CREATININE", "CALCIUM", "MG" in the last 48 hours., CMP No results for input(s): "NA", "K", "CL", "CO2", "GLU", "BUN", "CREATININE", "CALCIUM", "PROT", "ALBUMIN", "BILITOT", "ALKPHOS", "AST", "ALT", "ANIONGAP", "ESTGFRAFRICA", "EGFRNONAA" in the last 48 hours., CBC No results for input(s): "WBC", "HGB", "HCT", "PLT" in the last 48 hours., INR   Lab Results   Component Value Date    INR 1.6 (H) 11/02/2023    INR 1.6 (H) 11/02/2023    INR 1.0 04/11/2021    PROTIME 18.4 (H) 11/02/2023    PROTIME 18.9 (H) 11/02/2023    PROTIME 12.9 04/11/2021   , Lipid Panel   Lab Results   Component Value Date    CHOL 129 11/03/2023    " "HDL 38 11/03/2023    TRIG 42 11/03/2023   , Troponin No results for input(s): "TROPONINI" in the last 168 hours., A1C:   Recent Labs   Lab 11/02/23  1343   HGBA1C 9.3*    and All labs within the past 24 hours have been reviewed    Pending Diagnostic Studies:       None            Final Active Diagnoses:    Diagnosis Date Noted POA    PRINCIPAL PROBLEM:  Coronary artery disease involving native coronary artery of native heart [I25.10] 11/09/2023 Yes      Problems Resolved During this Admission:     No new Assessment & Plan notes have been filed under this hospital service since the last note was generated.  Service: Cardiology      Discharged Condition: good    Disposition: Rehab Facility    Follow Up:   Follow-up Information       [facility] - Follow up.    Contact information:  [address] [facility] [id number] [phone]               Puma Vera MD Follow up in 2 week(s).    Specialty: Cardiology  Why: Hospital Follow Up  Contact information:  [address]  [facility] [id number] [phone]                           Patient Instructions:      Diet diabetic     Diet Cardiac     SUBSEQUENT HOME HEALTH ORDERS   Order Comments: Skilled Nursing  Evaluate for PT and OT.     Order Specific Question Answer Comments   What Home Health Agency is the patient currently using? Other/External      Lifting restrictions   Order Comments: Sternal Precautions     Notify your health care provider if you experience any of the following:  redness, tenderness, or signs of infection (pain, swelling, redness, odor or green/yellow discharge around incision site)     Notify your health care provider if you experience any of the following:  severe uncontrolled pain     Activity as tolerated     Medications:  Reconciled Home Medications:      Medication List        START taking these medications      * amiodarone 200 MG Tab  Commonly known " as: PACERONE  Take 1 tablet (200 mg total) by mouth 2 (two) times daily.     * amiodarone 200 MG Tab  Commonly known as: PACERONE  Take 1 tablet (200 mg total) by mouth once daily.  Start taking on: November 11, 2023     apixaban 5 mg Tab  Commonly known as: ELIQUIS  Take 1 tablet (5 mg total) by mouth 2 (two) times daily.     atorvastatin 40 MG tablet  Commonly known as: LIPITOR  Take 1 tablet (40 mg total) by mouth once daily.  Start taking on: November 10, 2023     metoprolol succinate 50 MG 24 hr tablet  Commonly known as: TOPROL-XL  Take 1 tablet (50 mg total) by mouth once daily.  Start taking on: November 10, 2023     nitroGLYCERIN 0.4 MG SL tablet  Commonly known as: NITROSTAT  Place 1 tablet (0.4 mg total) under the tongue every 5 (five) minutes as needed for Chest pain.           * This list has 2 medication(s) that are the same as other medications prescribed for you. Read the directions carefully, and ask your doctor or other care provider to review them with you.                CONTINUE taking these medications      amLODIPine 10 MG tablet  Commonly known as: NORVASC  Take 10 mg by mouth once daily.     aspirin 81 MG EC tablet  Commonly known as: ECOTRIN  Take 81 mg by mouth once daily.     ezetimibe 10 mg tablet  Commonly known as: ZETIA  Take 10 mg by mouth once daily.     famotidine 40 MG tablet  Commonly known as: PEPCID  Take 40 mg by mouth once. Take 1 tab at 6pm, midnight, and 6am morning of procedure     glipiZIDE 10 MG Tr24  Commonly known as: GLUCOTROL  Take 5 mg by mouth daily with breakfast.     HYDROcodone-acetaminophen 7.5-325 mg per tablet  Commonly known as: NORCO  Take 1 tablet by mouth 3 (three) times daily as needed for Pain.     meloxicam 15 MG tablet  Commonly known as: MOBIC  Take 15 mg by mouth once daily.     SITagliptan-metformin 50-1,000 mg per tablet  Commonly known as: JANUMET  Take 1 tablet by mouth 2 (two) times daily with meals.     VITAMIN D3 10 mcg (400 unit) Cap  capsule  Generic drug: cholecalciferol (vitamin D3)  Take 400 Units by mouth once daily.              Time spent on the discharge of patient: 31 minutes    JONG Richardson  Cardiology  [facility] Medical Telemetry

## 2023-11-09 NOTE — PT/OT/SLP EVAL
Occupational Therapy  Evaluation    Name: Geno Barry  MRN: [id number]  Admitting Diagnosis: CABG  Recent Surgery: Procedure(s) (LRB):  CORONARY ARTERY BYPASS GRAFT (CABG) (N/A)  SURGICAL PROCUREMENT, VEIN, ENDOSCOPIC (N/A) 7 Days Post-Op    Recommendations:     Discharge therapy intensity: High Intensity Therapy   Discharge Equipment Recommendations:  to be determined by next level of care  Barriers to discharge:       Assessment:     Geno Barry is a 72 y.o. female with a medical diagnosis of CAD s/p CABG 11/2 and C 11/2.  She presents with the following performance deficits affecting function: weakness, impaired endurance, impaired self care skills, impaired functional mobility, gait instability, impaired balance, decreased upper extremity function.   Pt tolerated initial evaluation very well. Pt required Max A for LB dressing and Mod A for toilet transfer. Sit >stands improving to Min A with education on forward leaning for momentum and with multiple trials from chair (x2). Verbal education regarding sternal pxns and good adherence to pxns throughout session. Pt educated on breathing techniques with good carryover into activity.    Rehab Prognosis: Good; patient would benefit from acute skilled OT services to address these deficits and reach maximum level of function.       Plan:     Patient to be seen 3 x/week to address the above listed problems via self-care/home management, therapeutic activities, therapeutic exercises  Plan of Care Expires: 12/08/23  Plan of Care Reviewed with: patient    Subjective     Chief Complaint: none  Patient/Family Comments/goals: to get stronger    Occupational Profile:  Living Environment: lives alone, but upon dc pt will be going stay with her cousin in a [facility] with no steps to enter; tub shower with no DME  Previous level of function: pt had caregiver 5 days/wk that assisted with bathing, dressing, cooking, driving, cleaning  Equipment Used at Home: cane, straight,  rollator  Assistance upon Discharge: from family    Pain/Comfort:  Pain Rating 1: 0/10    Patients cultural, spiritual, Shinto conflicts given the current situation: no    Objective:     OT communicated with RN prior to session.      Patient was found up in chair with pulse ox (continuous), telemetry upon OT entry to room.    General Precautions: Standard, fall, sternal  Orthopedic Precautions: N/A  Braces: N/A    Vital Signs: HR: 79  Sp02: 94  Respiratory Status: on room air  With Activity: Sp02 remaining 94-97%      Functional Mobility/Transfers:  Patient completed Toilet Transfer Step Transfer technique with moderate assistance with  rolling walker  Functional Mobility: CGA during functional mobility using RW with no LOB    Activities of Daily Living:  Lower Body Dressing: maximal assistance    Toileting: moderate assistance      Functional Cognition:  Intact    Upper Extremity Function:  Right Upper Extremity:   Strength: WFL    Left Upper Extremity:  Strength: WFL    Balance:   Static standing balance:CGA at RW  Dynamic standing balance:CGA at RW    Therapeutic Positioning  Risk for acquired pressure injuries is decreased due to ability to get to BSC/toilet with assist.    Patient Education:  Patient provided with verbal education education regarding OT role/goals/POC, post op precautions, fall prevention, safety awareness, and Discharge/DME recommendations.  Understanding was verbalized.     Patient left up in chair with all lines intact, call button in reach, and RN notified    GOALS:   Multidisciplinary Problems       Occupational Therapy Goals          Problem: Occupational Therapy    Goal Priority Disciplines Outcome Interventions   Occupational Therapy Goal     OT, PT/OT Ongoing, Progressing    Description: LTG: Pt will perform basic ADLs and ADL transfers with Modified independence and good compliance to sternal precautions using LRAD by discharge.    STG: to be met by 1 month    Pt will complete  grooming standing at sink with LRAD with SPV.  Pt will complete UB dressing with SPV.  Pt will complete LB dressing with SBA using LRAD and AE prn.  Pt will complete toileting with SBA using LRAD.  Pt will complete functional mobility to/from toilet and toilet transfer with SBA using LRAD.   Pt will independently verbalize sternal precautions with >90% accuracy.                          History:     Past Medical History:   Diagnosis Date    CAD (coronary artery disease)     Diabetes mellitus     Hypertension     PAD (peripheral artery disease)          Past Surgical History:   Procedure Laterality Date    CORONARY ARTERY BYPASS GRAFT (CABG) N/A 11/2/2023    Procedure: CORONARY ARTERY BYPASS GRAFT (CABG);  Surgeon: Heriberto Lara IV, MD;  Location: [facility] OR;  Service: Cardiothoracic;  Laterality: N/A;    ENDOSCOPIC HARVEST OF VEIN N/A 11/2/2023    Procedure: SURGICAL PROCUREMENT, VEIN, ENDOSCOPIC;  Surgeon: Heriberto Lara IV, MD;  Location: [facility] OR;  Service: Cardiothoracic;  Laterality: N/A;    HYSTERECTOMY      LEFT HEART CATHETERIZATION Left 11/2/2023    Procedure: Left heart cath;  Surgeon: Puma Vera MD;  Location: [facility] CATH LAB;  Service: Cardiology;  Laterality: Left;  LHC +/- PCI VIA RRA    LUMPECTOMY, BREAST Right        Time Tracking:     OT Date of Treatment: 11/09/23  OT Start Time: 0957  OT Stop Time: 1014  OT Total Time (min): 17 min    Billable Minutes:Evaluation Moderate complexity    11/9/2023

## 2023-11-10 LAB
ALBUMIN SERPL-MCNC: 2.6 G/DL (ref 3.4–4.8)
ALBUMIN/GLOB SERPL: 0.7 RATIO (ref 1.1–2)
ALP SERPL-CCNC: 69 UNIT/L (ref 40–150)
ALT SERPL-CCNC: 13 UNIT/L (ref 0–55)
AST SERPL-CCNC: 14 UNIT/L (ref 5–34)
BASOPHILS # BLD AUTO: 0.02 X10(3)/MCL
BASOPHILS NFR BLD AUTO: 0.2 %
BILIRUB SERPL-MCNC: 0.5 MG/DL
BUN SERPL-MCNC: 14 MG/DL (ref 9.8–20.1)
CALCIUM SERPL-MCNC: 9.4 MG/DL (ref 8.4–10.2)
CHLORIDE SERPL-SCNC: 107 MMOL/L (ref 98–107)
CO2 SERPL-SCNC: 28 MMOL/L (ref 23–31)
CREAT SERPL-MCNC: 1.12 MG/DL (ref 0.55–1.02)
EOSINOPHIL # BLD AUTO: 0.25 X10(3)/MCL (ref 0–0.9)
EOSINOPHIL NFR BLD AUTO: 2.1 %
ERYTHROCYTE [DISTWIDTH] IN BLOOD BY AUTOMATED COUNT: 15.3 % (ref 11.5–17)
FERRITIN SERPL-MCNC: 494.12 NG/ML (ref 4.63–204)
GFR SERPLBLD CREATININE-BSD FMLA CKD-EPI: 52 MLS/MIN/1.73/M2
GLOBULIN SER-MCNC: 3.7 GM/DL (ref 2.4–3.5)
GLUCOSE SERPL-MCNC: 165 MG/DL (ref 82–115)
HCT VFR BLD AUTO: 31.5 % (ref 37–47)
HGB BLD-MCNC: 9.8 G/DL (ref 12–16)
IMM GRANULOCYTES # BLD AUTO: 0.09 X10(3)/MCL (ref 0–0.04)
IMM GRANULOCYTES NFR BLD AUTO: 0.8 %
IRON SATN MFR SERPL: 21 % (ref 20–50)
IRON SERPL-MCNC: 33 UG/DL (ref 50–170)
LYMPHOCYTES # BLD AUTO: 2.69 X10(3)/MCL (ref 0.6–4.6)
LYMPHOCYTES NFR BLD AUTO: 22.9 %
MAGNESIUM SERPL-MCNC: 1.8 MG/DL (ref 1.6–2.6)
MCH RBC QN AUTO: 28.7 PG (ref 27–31)
MCHC RBC AUTO-ENTMCNC: 31.1 G/DL (ref 33–36)
MCV RBC AUTO: 92.4 FL (ref 80–94)
MONOCYTES # BLD AUTO: 0.73 X10(3)/MCL (ref 0.1–1.3)
MONOCYTES NFR BLD AUTO: 6.2 %
NEUTROPHILS # BLD AUTO: 7.96 X10(3)/MCL (ref 2.1–9.2)
NEUTROPHILS NFR BLD AUTO: 67.8 %
NRBC BLD AUTO-RTO: 0 %
PHOSPHATE SERPL-MCNC: 2.3 MG/DL (ref 2.3–4.7)
PLATELET # BLD AUTO: 392 X10(3)/MCL (ref 130–400)
PMV BLD AUTO: 9.1 FL (ref 7.4–10.4)
POCT GLUCOSE: 149 MG/DL (ref 70–110)
POCT GLUCOSE: 167 MG/DL (ref 70–110)
POCT GLUCOSE: 170 MG/DL (ref 70–110)
POTASSIUM SERPL-SCNC: 3.7 MMOL/L (ref 3.5–5.1)
PREALB SERPL-MCNC: 11.3 MG/DL (ref 14–37)
PROT SERPL-MCNC: 6.3 GM/DL (ref 5.8–7.6)
RBC # BLD AUTO: 3.41 X10(6)/MCL (ref 4.2–5.4)
SODIUM SERPL-SCNC: 145 MMOL/L (ref 136–145)
TIBC SERPL-MCNC: 126 UG/DL (ref 70–310)
TIBC SERPL-MCNC: 159 UG/DL (ref 250–450)
TRANSFERRIN SERPL-MCNC: 141 MG/DL (ref 173–360)
WBC # SPEC AUTO: 11.74 X10(3)/MCL (ref 4.5–11.5)

## 2023-11-10 PROCEDURE — 85025 COMPLETE CBC W/AUTO DIFF WBC: CPT | Performed by: NURSE PRACTITIONER

## 2023-11-10 PROCEDURE — 25000003 PHARM REV CODE 250: Performed by: NURSE PRACTITIONER

## 2023-11-10 PROCEDURE — 83735 ASSAY OF MAGNESIUM: CPT | Performed by: NURSE PRACTITIONER

## 2023-11-10 PROCEDURE — 11800000 HC REHAB PRIVATE ROOM

## 2023-11-10 PROCEDURE — 94799 UNLISTED PULMONARY SVC/PX: CPT

## 2023-11-10 PROCEDURE — 97116 GAIT TRAINING THERAPY: CPT

## 2023-11-10 PROCEDURE — 82728 ASSAY OF FERRITIN: CPT | Performed by: NURSE PRACTITIONER

## 2023-11-10 PROCEDURE — 83540 ASSAY OF IRON: CPT | Performed by: NURSE PRACTITIONER

## 2023-11-10 PROCEDURE — 63600175 PHARM REV CODE 636 W HCPCS: Performed by: NURSE PRACTITIONER

## 2023-11-10 PROCEDURE — 97162 PT EVAL MOD COMPLEX 30 MIN: CPT

## 2023-11-10 PROCEDURE — 84134 ASSAY OF PREALBUMIN: CPT | Performed by: NURSE PRACTITIONER

## 2023-11-10 PROCEDURE — 97166 OT EVAL MOD COMPLEX 45 MIN: CPT

## 2023-11-10 PROCEDURE — 80053 COMPREHEN METABOLIC PANEL: CPT | Performed by: NURSE PRACTITIONER

## 2023-11-10 PROCEDURE — 97110 THERAPEUTIC EXERCISES: CPT

## 2023-11-10 PROCEDURE — 84100 ASSAY OF PHOSPHORUS: CPT | Performed by: NURSE PRACTITIONER

## 2023-11-10 PROCEDURE — 97530 THERAPEUTIC ACTIVITIES: CPT

## 2023-11-10 PROCEDURE — 97535 SELF CARE MNGMENT TRAINING: CPT

## 2023-11-10 RX ORDER — HYDROCODONE BITARTRATE AND ACETAMINOPHEN 7.5; 325 MG/1; MG/1
1 TABLET ORAL 3 TIMES DAILY PRN
Status: DISCONTINUED | OUTPATIENT
Start: 2023-11-10 | End: 2023-11-21 | Stop reason: HOSPADM

## 2023-11-10 RX ORDER — METHOCARBAMOL 500 MG/1
500 TABLET, FILM COATED ORAL 3 TIMES DAILY PRN
Status: DISCONTINUED | OUTPATIENT
Start: 2023-11-10 | End: 2023-11-21 | Stop reason: HOSPADM

## 2023-11-10 RX ORDER — ACETAMINOPHEN 325 MG/1
650 TABLET ORAL 3 TIMES DAILY
Status: DISCONTINUED | OUTPATIENT
Start: 2023-11-10 | End: 2023-11-10

## 2023-11-10 RX ORDER — LANOLIN ALCOHOL/MO/W.PET/CERES
1 CREAM (GRAM) TOPICAL DAILY
Status: DISCONTINUED | OUTPATIENT
Start: 2023-11-10 | End: 2023-11-21 | Stop reason: HOSPADM

## 2023-11-10 RX ORDER — ACETAMINOPHEN 325 MG/1
650 TABLET ORAL EVERY 8 HOURS PRN
Status: DISCONTINUED | OUTPATIENT
Start: 2023-11-10 | End: 2023-11-21 | Stop reason: HOSPADM

## 2023-11-10 RX ADMIN — Medication 400 UNITS: at 07:11

## 2023-11-10 RX ADMIN — METFORMIN HYDROCHLORIDE 1000 MG: 500 TABLET, FILM COATED ORAL at 05:11

## 2023-11-10 RX ADMIN — INSULIN ASPART 2 UNITS: 100 INJECTION, SOLUTION INTRAVENOUS; SUBCUTANEOUS at 05:11

## 2023-11-10 RX ADMIN — EZETIMIBE 10 MG: 10 TABLET ORAL at 07:11

## 2023-11-10 RX ADMIN — AMIODARONE HYDROCHLORIDE 200 MG: 200 TABLET ORAL at 07:11

## 2023-11-10 RX ADMIN — INSULIN ASPART 2 UNITS: 100 INJECTION, SOLUTION INTRAVENOUS; SUBCUTANEOUS at 04:11

## 2023-11-10 RX ADMIN — HYDROXYZINE PAMOATE 50 MG: 50 CAPSULE ORAL at 09:11

## 2023-11-10 RX ADMIN — MELOXICAM 15 MG: 7.5 TABLET ORAL at 07:11

## 2023-11-10 RX ADMIN — DOCUSATE SODIUM 100 MG: 100 CAPSULE, LIQUID FILLED ORAL at 09:11

## 2023-11-10 RX ADMIN — GLIPIZIDE 5 MG: 5 TABLET, FILM COATED, EXTENDED RELEASE ORAL at 07:11

## 2023-11-10 RX ADMIN — ASPIRIN 81 MG: 81 TABLET, COATED ORAL at 07:11

## 2023-11-10 RX ADMIN — METFORMIN HYDROCHLORIDE 1000 MG: 500 TABLET, FILM COATED ORAL at 07:11

## 2023-11-10 RX ADMIN — APIXABAN 5 MG: 5 TABLET, FILM COATED ORAL at 09:11

## 2023-11-10 RX ADMIN — FERROUS SULFATE TAB 325 MG (65 MG ELEMENTAL FE) 1 EACH: 325 (65 FE) TAB at 09:11

## 2023-11-10 RX ADMIN — AMLODIPINE BESYLATE 10 MG: 5 TABLET ORAL at 07:11

## 2023-11-10 RX ADMIN — DOCUSATE SODIUM 100 MG: 100 CAPSULE, LIQUID FILLED ORAL at 07:11

## 2023-11-10 RX ADMIN — APIXABAN 5 MG: 5 TABLET, FILM COATED ORAL at 07:11

## 2023-11-10 RX ADMIN — SITAGLIPTIN 100 MG: 50 TABLET, FILM COATED ORAL at 07:11

## 2023-11-10 RX ADMIN — AMIODARONE HYDROCHLORIDE 200 MG: 200 TABLET ORAL at 09:11

## 2023-11-10 RX ADMIN — ACETAMINOPHEN 650 MG: 325 TABLET ORAL at 09:11

## 2023-11-10 NOTE — PT/OT/SLP EVAL
Physical Therapy Rehab Evaluation    Patient Name:  Geno Barry   MRN:  [id number]    Recommendations:     Discharge Recommendations:  Low Intensity Therapy   Discharge Equipment Recommendations: walker, rolling   Barriers to discharge: Impaired functional mobility , decreased safety awareness    Assessment:     Geno Barry is a 72 y.o. female admitted with a medical diagnosis of S/P CABG (coronary artery bypass graft).  She presents with the following impairments/functional limitations:  weakness, impaired endurance, impaired functional mobility, impaired balance, decreased coordination, decreased lower extremity function, pain, decreased safety awareness, impaired coordination, edema     SPT note: patient needs VC's often before sitting or standing to not break sternal precautions. Patient is looking to get back to her house with her sitters during the day and does not go outside without supervision. Patient would like to get back to keeping her garden up outside her house.    Rehab Diagnosis: Multivessel CAD s/p CABG x 2    General Precautions: Standard, sternal, fall     Orthopedic Precautions: N/A     Braces: N/A    Rehab Prognosis: Fair; patient would benefit from acute skilled PT services to address these deficits and reach maximum level of function.      History:     Past Medical History:   Diagnosis Date    CAD (coronary artery disease)     Diabetes mellitus     Hypertension     PAD (peripheral artery disease)        Past Surgical History:   Procedure Laterality Date    CORONARY ARTERY BYPASS GRAFT (CABG) N/A 11/2/2023    Procedure: CORONARY ARTERY BYPASS GRAFT (CABG);  Surgeon: Heriberto Lara IV, MD;  Location: [facility];  Service: Cardiothoracic;  Laterality: N/A;    ENDOSCOPIC HARVEST OF VEIN N/A 11/2/2023    Procedure: SURGICAL PROCUREMENT, VEIN, ENDOSCOPIC;  Surgeon: Heriberto Lara IV, MD;  Location: [facility];  Service: Cardiothoracic;  Laterality: N/A;    HYSTERECTOMY      LEFT HEART  "CATHETERIZATION Left 11/2/2023    Procedure: Left heart cath;  Surgeon: Puma Vera MD;  Location: [facility] CATH LAB;  Service: Cardiology;  Laterality: Left;  LHC +/- PCI VIA RRA    LUMPECTOMY, BREAST Right        Subjective     Patient Comments: " I get tired easy"    Patients cultural, spiritual, Jain conflicts given the current situation: no       Living Environment  People in Home: alone (but has caregivers from 10AM-230PM M-F. Pt's neighbors and nephews/neices help her outside of sitter hours)  Living Arrangements: house  Home Accessibility: wheelchair accessible  Number of Stairs, Main Entrance: none  Home Layout: Able to live on 1st floor  Transportation Anticipated: family or friend will provide  Equipment Currently Used at Home: rollator    Prior Level of Function  Ambulation Skills: needs device  Stairs: unable to perform  Transfer Skills: needs device  ADL Skills: needs device  Cognitive Communication: independent    Equipment used at home: rollator, cane, straight.  DME owned (not currently used): rolling walker because its broken  Patient uses her neighbors rollator.    Upon discharge, patient will have assistance from sitters during the day.    Objective:     Communicated with GIULIA morin, prior to session.  Patient found up in chair with peripheral IV  upon PT entry to room.    Vitals   Vital before tx  BP (!) 144/75   HR 87   O2 Sat     Pain       Vitals after tx  /67   HR 88   O2 Sat     Pain       Respiratory Status: Room air    Exams  Sensation:          -       WNL  RLE Strength:          -       WFL  LLE Strength:          -       WFL    Functional Mobility      GGs   Admit Current   Status Goal   Functional Area: Care Score: Care Score: Care Score:   Roll Left and Right 2 2  Max A to both sides with mild pain  Independent   Sit to Lying 2 2  Max A with trunk and BLE and needs VC's to not break sternal precautions Independent   Lying to Sitting on Side of Bed 2 2  Max A with trunk " and mod A with BLE. Patient needs VC's to not break sternal precautions and needs VC's to do it correctly Independent   Sit to Stand 2 2  Max A with RW. Patient does not transfer her weight forward enough and tends to push back and would fall back into chair if not given max A. Patient needs VC's to stand and sit to not break precautions Independent   Chair/Bed-to-Chair Transfer 2 2  Max A with RW stand step. Patient does not transfer her weight forward enough and tends to push back and would fall back into chair if not given max A. Patient needs VC's to stand and sit to not break precautions Independent   Car Transfer 1 1  Dependent d/t inability to stand out of car when it is low. Patient required max A x1 and min A x1 to stand out of car. Patient requires mod A for BLE getting into the car and min A for BLE getting out of the car. Independent   Walk 10 Feet 4 4  CGA/sup with RW Independent   Walk 50 Feet with Two Turns 4 4  CGA/Sup with RW for 75 ft limited by endurance. Patient does well not putting weight into the RW with her BUE Independent   Walk 150 Feet 88 88  D/t endurance Supervision or touching assistance   Walk 10 Feet Uneven Surface 4 4  Supervision/CGA with RW on gaurav on the ground for 10ft  Supervision or touching assistance   1 Step (Curb) 88 88  safety Supervision or touching assistance   4 Steps 9 9  She didn't go up steps before Not applicable   12 Steps 9 9  She didn't go up steps before Not applicable   Picking Up Object 88 88  Safety  Supervision or touching assistance   Wheel 50 Feet with Two Turns 88 88  precautions Not attempted due to medical/safety concerns   Wheel 150 Feet 88 88  precautions Not attempted due to medical/safety concerns     Therapeutic Activities and Exercises:  Patient educated on safety precautions and sternal precautions.  Patient given verbal instructions on how to safely get into and out of the bed and other bed mobility without breaking precautions.  Patient  educated on poor endurance and importance of moving around once out of IRF    Activity Tolerance: Poor    Patient left up in chair with all lines intact and call button in reach.    Education Provided: roles and goals of PT/PTA, transfer training, bed mob, gait training, safety awareness, body mechanics, assistive device, strengthening exercises, and sternal pxns    Expected compliance: Moderate compliance and Low compliance      Plan:     During this hospitalization, patient to be seen 5 x/week (5-7x/wk) to address the identified rehab impairments via gait training, therapeutic activities, therapeutic exercises, neuromuscular re-education and progress toward the following goals:    GOALS:   Multidisciplinary Problems       Physical Therapy Goals          Problem: Physical Therapy    Goal Priority Disciplines Outcome Goal Variances Interventions   Physical Therapy Goal     PT, PT/OT Ongoing, Progressing     Description: Bed Mobility:  Roll left and right with partial/moderate assist.  Sit to supine transfer with partial/moderate assist.  Supine to sit transfer with partial/moderate assist.    Transfers:  Sit to stand transfer with partial/moderate assist using RW.  Bed to chair transfer with partial/moderate assist Stand Step  using RW.  Car transfer with substantial/maximal assist using RW.   an object from the ground in standing position with substantial/maximal assist using RW.    Mobility:  Ambulate 125 feet with supervision/touching assist using RW.  Ambulate 30 feet on uneven surfaces/ramps with supervision/touching assist using RW.  Pt ascended/descended a 4 inch curb with partial/moderate assist using RW.  Ascend/descend 2 stairs with substantial/maximal assist using no handrails.                       Plan of Care Expires:  12/01/23  PT Next Visit Date: 11/16/23  Plan of Care reviewed with: patient      Additional Infomation:           Time Tracking:     Therapy Time   PT Start Time: 1100  PT Stop  Time: 1200  PT Total Time (min): 60 min  PT Individual: 60  Missed Time:    Time Missed due to:      Billable Minutes: Evaluation 30 and Therapeutic Activity 30    11/10/2023

## 2023-11-10 NOTE — PT/OT/SLP EVAL
"Occupational Therapy Inpatient Rehab Evaluation    Name: Geno Barry  MRN: [id number]    Recommendations:     Discharge Recommendations:  Low Intensity Therapy   Discharge Equipment Recommendations:  (pending)   Barriers to discharge: None    Assessment:  Geno Barry is a 72 y.o. female admitted with a medical diagnosis of S/P CABG (coronary artery bypass graft).  She presents with the following impairments/functional limitations:  impaired endurance, weakness, impaired self care skills, impaired functional mobility, impaired balance, gait instability.    General Precautions: Standard, sternal, fall     Orthopedic Precautions:N/A     Braces: N/A    Rehab Prognosis: Good; patient would benefit from acute skilled OT services to address these deficits and reach maximum level of function.      History:     Past Medical History:   Diagnosis Date    CAD (coronary artery disease)     Diabetes mellitus     Hypertension     PAD (peripheral artery disease)        Past Surgical History:   Procedure Laterality Date    CORONARY ARTERY BYPASS GRAFT (CABG) N/A 11/2/2023    Procedure: CORONARY ARTERY BYPASS GRAFT (CABG);  Surgeon: Heriberto Lara IV, MD;  Location: [facility] OR;  Service: Cardiothoracic;  Laterality: N/A;    ENDOSCOPIC HARVEST OF VEIN N/A 11/2/2023    Procedure: SURGICAL PROCUREMENT, VEIN, ENDOSCOPIC;  Surgeon: Heriberto Lara IV, MD;  Location: [facility] OR;  Service: Cardiothoracic;  Laterality: N/A;    HYSTERECTOMY      LEFT HEART CATHETERIZATION Left 11/2/2023    Procedure: Left heart cath;  Surgeon: Puma Vera MD;  Location: [facility] CATH LAB;  Service: Cardiology;  Laterality: Left;  LHC +/- PCI VIA RRA    LUMPECTOMY, BREAST Right        Subjective     Orientation: Oriented x4    Chief Complaint: generalized endurance and weakness deficits    Patient/Family Comments/goals: "To move around better."    Vitals  Vitals at Rest  /69   HR 86   O2 Sat     Pain Pain Rating 1: 0/10     Vitals With " Activity  /65   HR 89   O2 Sat     Pain Pain Rating 1: 0/10     Respiratory Status: Room air    Patients cultural, spiritual, Church conflicts given the current situation: no       Living Environment   Living Environment  People in Home: alone (but has caregivers from 10AM-230PM M-F. Pt's neighbors and nephews/neices help her outside of sitter hours)  Living Arrangements: house  Home Accessibility: wheelchair accessible  Number of Stairs, Main Entrance: none  Home Layout: Able to live on 1st floor  Transportation Anticipated: family or friend will provide  Equipment Currently Used at Home: rollator  Shower Setup: Tub/Shower combo    Prior Level of Function  BADL: Assist needed  IADL: Assist needed    Equipment used at home: rollator, which is borrowed from her neighbor daily     Upon discharge, patient will have assistance from sitters who provide assistance M-F from 10AM-2:30PM. Pt's neighbors and family assist outside of caregiver hours    Objective:     Patient found up in chair with peripheral IV (cardiac bear)  upon OT entry to room.    Mobility   Patient completed:  Sit to Stand Transfer with moderate assistance with rolling walker  Stand to Sit Transfer with moderate assistance with rolling walker  Toilet Transfer Step Transfer technique with moderate assistance with  rolling walker and BSC over toilet  Tub Transfer Step Transfer technique with moderate assistance with rolling walker and TTB    Functional Mobility   FM into and out of bathroom with RW and CGA for safety; however, no LOB noted.    ADLs   Current Status   Eating 5 assist required to open packages and setup meal. Pt reports her family, neighbors, and caregivers cook, fix, and give her meals, snacks and coffee in the morning   Oral Hygiene 4 at wheelchair level. Pt reported completing oral hygiene sitting on Rollator prior to admit   Shower, Bathe Self 2   Upper Body Dressing 3 assist to don bra, which pt reports her caregiver does for  her   Lower Body Dressing 1 assist required to don briefs and pants. Pt reports her caregiver would assist her with this task   Toileting Hygiene 1 assist with 3/3 tasks; pt voices her sitter would assist with posterior shabana hygiene   Toilet Transfer 3   Putting On, Taking Off Footwear 1 assist to doff socks and don B shoes     Limiting Factors for ADLs: endurance, weakness, and body habitus    Exams     ROM:          -       WFL    Hand Dominance: Right    ROM Hand  Left Hand: WFL  Right Hand: WFL    Strength  Overall Strength: not formally assessed however, WFL within sternal pxns    Sensation  Left:          -       WNL  Right:          -       WNL    Coordination:      -       Intact    Tone  Left: WNL  Right: WNL    Visual/Perceptual  Intact    Cognition:   WFL    Additional Treatments   For improved cardiovascular endurance, pt tolerated two 5-minute trials sitting unsupported in wheelchair to propel UE ergometer forward. Prolonged sitting break following each trial due to increased fatigue    LifeStyle Change and Education     Patient left up in chair with call button in reach.    Education provided: Roles and goals of OT, ADLs, transfer training, bed mobility, body mechanics, assistive device, wheelchair precautions, modified goals, sequencing, safety precautions, fall prevention, post-op precautions, equipment recommendations, and home safety    Multidisciplinary Problems       Occupational Therapy Goals          Problem: Occupational Therapy    Goal Priority Disciplines Outcome Interventions   Occupational Therapy Goal     OT, PT/OT Ongoing, Progressing    Description: ADLs:  Pt to perform grooming tasks with Indep at seated level  Pt to perform feeding tasks with setup assist   Pt to perform UB dressing with mod A    Pt to perform LB dressing with mod A    Pt to perform putting on/off footwear task with max A  Pt to perform toileting with mod A  Pt to perform bathing with mod A    Functional  Transfers:  Pt to perform toilet transfers with min A and BSC  Pt to perform a tub transfer with min A and TTB                        Plan     During this hospitalization, patient to be seen 5 x/week to address the identified rehab impairments via self-care/home management, therapeutic activities, therapeutic exercises and progress toward the following goals:    Plan of Care Expires:  11/16/23    Time Tracking     OT Received On: 11/10/23  Time In 0830     Time Out 1000  Total Time 90 min  Therapy Time: OT Individual: 90  Missed Time:    Missed Time Reason:      Billable Minutes: Evaluation 30, Self Care/Home Management 45, and Therapeutic Exercise 15    11/10/2023

## 2023-11-10 NOTE — DISCHARGE INSTRUCTIONS
If you are interested in reviewing options for advanced directives, please go to the following link, which can also be found on the [facility] web page.  Here, you can find useful information about assigning a Power of , preparing a living will, or completing a [place]POST ([place] Physician Orders for Scope of Treatment):    [email or web address]       YOUR REHAB TEAM HAS RECOMMENDED YOU BORROW OR PURCHASE:  tub transfer bench and bedside commode (not covered by insurance).

## 2023-11-10 NOTE — PLAN OF CARE
Problem: Occupational Therapy  Goal: Occupational Therapy Goal  Description:   ADLs:  Pt to perform grooming tasks with Indep at seated level  Pt to perform feeding tasks with setup assist   Pt to perform UB dressing with mod A    Pt to perform LB dressing with mod A    Pt to perform putting on/off footwear task with max A  Pt to perform toileting with mod A  Pt to perform bathing with mod A    Functional Transfers:  Pt to perform toilet transfers with min A and BSC  Pt to perform a tub transfer with min A and TTB   Outcome: Ongoing, Progressing

## 2023-11-10 NOTE — PROGRESS NOTES
"   11/10/23 1030   Rec Therapy Time Calculation   Date of Treatment 11/10/23   Rec Start Time 1030   Rec Stop Time 1100   Rec Total Time (min) 30 min   Time   Treatment time 2 units   Charges   $Therapeutic Exercise 2 units   Precautions   General Precautions fall;sternal   Orthopedic Precautions  N/A   Braces N/A   Pain/Comfort   Pain Rating 1 no pain   Vital Signs   Pulse 84   /75   OTHER   Treatment Diagnosis Multivessel CAD, s/p CABG x2, HTN, CAD, cerebrovascular disease, PAD, DM   Rehab identified problem list/impairments weakness;impaired endurance;impaired functional mobility;gait instability;decreased safety awareness   Values/Beliefs/Spiritual Care   Spiritual, Cultural Beliefs, Confucianism Practices, Values that Affect Care no   Prior Living/ADLs   Admit Date 11/09/23   People in Home alone   Living Arrangements house   Patient responsibilites: Meal preparation;Leisure/play/hobbies;Retired   Number of Children 0   Occupation Retired: [employer]   Previous Hand Domiance Right   Current Hand Dominance Right   Overall Level of Functioning   Activity Tolerance Mod Indep   Dynamic Sitting Balance/Reaching Mod Indep   Dynamic Standing Balance/Reaching Mod A   Right UE Coodination/Dexterity Independent   Left UE Coordination/Dexterity Independent   Problem Solving/Sequencing Skills Mod Indep   Memory Recall Mod Indep   R/L Neglect/Inattention Does not occur   Attention Span Mod Indep   Social Interaction Independent   Patient Goals   Patient Goal 1 'Get my strength back."   Recreational Therapy Short Term Goals   Short Term Goal 1 Will increase sit to stand to min   Short Term Goal 1 Progression Initiated   Short Term Goal 2 Will improve dynamic standing balance/reaching to supervision   Short Term Goal 2 Progression Initiated   Recreational Therapy Long Term Goals   Long Term Goal 1 Will increase standing balance/reaching to 5 minutes   Long Term Goal 1 Progression Initiated   Long Term Goal 2 Will improve " dynamic standing balance/reaching to setup   Long Term Goal 2 Progression Initiated   Plan   Patient to be seen Daily   Planned Duration 2 weeks   Treatments Planned Energy conservation training;Safety education   Treatment plan/goals estblished with Patient/Caregiver Yes

## 2023-11-10 NOTE — PT/OT/SLP EVAL
Recreational Therapy Evaluation      Date of Treatment: 11/10/23  Start Time: 1030  Stop Time: 1100  Total Time: 30 min  Missed Time:     Assessment      Geno Barry is a 72 y.o. female admitted with a medical diagnosis of S/P CABG (coronary artery bypass graft).  She presents with the following impairments/functional limitations:  weakness, impaired endurance, impaired functional mobility, gait instability, decreased safety awareness .    Rehab Diagnosis:     Recent Surgery:     General Precautions: Standard, sternal, fall     Orthopedic Precautions:N/A     Braces: N/A    Rehab Prognosis: Good; patient would benefit from acute skilled Recreational Therapy services to address these deficits and reach maximum level of function.      Impairments: Coordination deficits, Endurance deficits, Mobility deficits, Safety awareness deficits, and Strength deficits  Rehab Potential: Good  Treatment Recommendations: Continue with Skill TR Service to facilitate functional independence and address impairments/limitations   Treatment Diagnosis: Multivessel CAD, s/p CABG x2, HTN, CAD, cerebrovascular disease, PAD, DM  Orientation: Oriented x4  Affect/Behavior: Appropriate and Cooperative  Safety/Judgement: intact   Basic Command Following: intact  Spiritual Cultural: no        History     Past Medical History:   Diagnosis Date    CAD (coronary artery disease)     Diabetes mellitus     Hypertension     PAD (peripheral artery disease)        Past Surgical History:   Procedure Laterality Date    CORONARY ARTERY BYPASS GRAFT (CABG) N/A 11/2/2023    Procedure: CORONARY ARTERY BYPASS GRAFT (CABG);  Surgeon: Heriberto Lara IV, MD;  Location: [facility];  Service: Cardiothoracic;  Laterality: N/A;    ENDOSCOPIC HARVEST OF VEIN N/A 11/2/2023    Procedure: SURGICAL PROCUREMENT, VEIN, ENDOSCOPIC;  Surgeon: Heriberto Lara IV, MD;  Location: [facility] OR;  Service: Cardiothoracic;  Laterality: N/A;    HYSTERECTOMY      LEFT HEART  "CATHETERIZATION Left 11/2/2023    Procedure: Left heart cath;  Surgeon: Puma Vera MD;  Location: [facility] CATH LAB;  Service: Cardiology;  Laterality: Left;  LHC +/- PCI VIA RRA    LUMPECTOMY, BREAST Right        Home Environment     Admit Date: 11/09/23  Living Situation  People in Home: alone  Lives in: house  Patients Responsibilities: Meal preparation, Leisure/play/hobbies, Retired  Number of Children: 0  Occupation:Retired:     Instrumental Activities of Daily Living     Previous Hand Dominance: Right Current Hand Dominance: Right     Other iADL Information:        Cognitive Skills Building         Cognitive Observation Activity Assist Position Equipment Response            Comment:      Dynamic Activities      Activity Assist Position Equipment Response   Activity 1 Bowling moderate assistance Standing Rolling walker and Rubber bowling balls good   Comment: Sit to stand was mod.  Dynamic standing balance/reaching were mod/min.  Standing tolerance was 5 minutes. RUE coordination was I.  Problem solving skills and sequencing skills were setup.  Said this was the 1st time to bowl.  Flat but cooperative       Fine Motor Activities      Activity Assist Position Equipment Response           Comment:        Goals     Patient Goals  Patient Goal 1: 'Get my strength back."    Short Term Goals    Goal  Goal Status   Will increase sit to stand to min Initiated   Will improve dynamic standing balance/reaching to supervision Initiated                 Long Term Goals    Goal Goal Status   Will increase standing balance/reaching to 5 minutes Initiated   Will improve dynamic standing balance/reaching to setup Initiated                     Plan       Patient to be seen: Daily  Duration: 2 weeks  Treatments planned: Energy conservation training, Safety education  Treatment plan/goals established with Patient/Caregiver: Yes     "

## 2023-11-10 NOTE — PT/OT/SLP PROGRESS
Physical Therapy Inpatient Rehab Treatment    Patient Name:  Geno Barry   MRN:  [id number]    Recommendations:     Discharge Recommendations:  Low Intensity Therapy   Discharge Equipment Recommendations:     Barriers to discharge: Impaired functional mobility , decreased safety awareness, and decreased strength    Assessment:     Geno Barry is a 72 y.o. female admitted with a medical diagnosis of S/P CABG (coronary artery bypass graft).  She presents with the following impairments/functional limitations:  weakness, impaired endurance, impaired functional mobility, impaired balance, decreased coordination, decreased lower extremity function, pain, decreased safety awareness, impaired coordination, edema     SPT Note: patient requires max VC's to not break pxn's. Patient improved sit to stands with VC's  to put nose over toes and lean forward since eval this morning.    Rehab Diagnosis: Multivessel CAD s/p CABG x 2    General Precautions: Standard, sternal, fall     Orthopedic Precautions:N/A     Braces: N/A    Rehab Prognosis: Fair; patient would benefit from acute skilled PT services to address these deficits and reach maximum level of function.      History:     Past Medical History:   Diagnosis Date    CAD (coronary artery disease)     Diabetes mellitus     Hypertension     PAD (peripheral artery disease)        Past Surgical History:   Procedure Laterality Date    CORONARY ARTERY BYPASS GRAFT (CABG) N/A 11/2/2023    Procedure: CORONARY ARTERY BYPASS GRAFT (CABG);  Surgeon: Heriberto Lara IV, MD;  Location: [facility] OR;  Service: Cardiothoracic;  Laterality: N/A;    ENDOSCOPIC HARVEST OF VEIN N/A 11/2/2023    Procedure: SURGICAL PROCUREMENT, VEIN, ENDOSCOPIC;  Surgeon: Heriberto Lara IV, MD;  Location: [facility] OR;  Service: Cardiothoracic;  Laterality: N/A;    HYSTERECTOMY      LEFT HEART CATHETERIZATION Left 11/2/2023    Procedure: Left heart cath;  Surgeon: Puma Vera MD;  Location: [facility] CATH  "LAB;  Service: Cardiology;  Laterality: Left;  LHC +/- PCI VIA RRA    LUMPECTOMY, BREAST Right        Subjective     Patient comments: "I want to lay in bed"    Respiratory Status: Room air    Patients cultural, spiritual, Adventist conflicts given the current situation: no    Objective:     Patient found up in chair with peripheral IV  and cardiac bear upon PT entry to room.    Pt is Alert, Cooperative, and Motivated.    Vitals   Vitals before tx  /75   HR 84   O2 Sat    Pain      Vitals after tx  BP (!) 152/79   HR 90   O2 Sat     Pain         Functional Mobility:        Current   Status  Discharge   Goal   Functional Area: Care Score:    Sit to Lying 2  Max A with trunk and BLE. Needs VC's to not break pxn's Independent   Sit to Stand 2  Max A with RW and max VC's to lean her nose over toes and move her weight ant secondary to her posterior lean causing her to have difficulty to stand. Patient was able to stand partial/mod A one time with correction of technique Independent   Chair/Bed-to-Chair Transfer 2  Max A with RW and max VC's to lean her nose over toes and move her weight ant secondary to her posterior lean causing her to have difficulty to stand. Patient still needs VC's to hold bear to sit down and not break pxn's Independent   Walk 10 Feet 4  Supervision with RW Independent   Walk 50 Feet with Two Turns 4  Supervision with RW for 75ft. Patient needed VC to not put weight through her UE on the walker  Independent       Therapeutic Activities and Exercises:  -Seated x10: march, hip abd with red theraband, hip add with ball, knee extension and knee flexion with red theraband  -sit to stands x5. Patient improved with VC's    Activity Tolerance: Fair and Poor    Patient left supine with all lines intact and call button in reach.    Education provided: transfer training, bed mob, gait training, balance training, safety awareness, body mechanics, assistive device, strengthening exercises, and sternal " pxn    Expected compliance: Moderate compliance and Low compliance    Plan:     During this hospitalization, patient to be seen 5 x/week (5-7x/wk) to address the identified rehab impairments via gait training, therapeutic activities, therapeutic exercises, neuromuscular re-education and progress toward the following goals:    GOALS:   Multidisciplinary Problems       Physical Therapy Goals          Problem: Physical Therapy    Goal Priority Disciplines Outcome Goal Variances Interventions   Physical Therapy Goal     PT, PT/OT Ongoing, Progressing     Description: Bed Mobility:  Roll left and right with partial/moderate assist.  Sit to supine transfer with partial/moderate assist.  Supine to sit transfer with partial/moderate assist.    Transfers:  Sit to stand transfer with partial/moderate assist using RW.  Bed to chair transfer with partial/moderate assist Stand Step  using RW.  Car transfer with substantial/maximal assist using RW.   an object from the ground in standing position with substantial/maximal assist using RW.    Mobility:  Ambulate 125 feet with supervision/touching assist using RW.  Ambulate 30 feet on uneven surfaces/ramps with supervision/touching assist using RW.  Pt ascended/descended a 4 inch curb with partial/moderate assist using RW.  Ascend/descend 2 stairs with substantial/maximal assist using no handrails.                       Plan of Care Expires:  12/01/23  PT Next Visit Date: 11/16/23  Plan of Care reviewed with: patient    Additional Information:         Time Tracking:     Therapy Time  PT Start Time: 1400  PT Stop Time: 1430  PT Total Time (min): 30 min   PT Individual: 30  Missed Time:    Time Missed due to:      Billable Minutes: Gait Training 15 and Therapeutic Exercise 15    11/10/2023

## 2023-11-10 NOTE — PLAN OF CARE
"Admission PHQ-2 completed (score=0 negative).  Patient is motivated and positive.    She indicated that she will stay at her cousin Feliciano ([address] [place] [address]).  Lauryn owns an at-home  so will be available to assist pt as needed.    Pt has none of her own children but has a niece, Heaven, who is very supportive and "spoils" her.      Discussed my role in CM and discharge planning.  "

## 2023-11-10 NOTE — PLAN OF CARE
Geno Barry age 72 *sternal precautions     Dx: Multivessel CAD s/p CABG x 2 11/2/2023. Pt. Has a past medical history of HTN, CAD, cerebrovascular disease, PAD, and DM. *See chart for full medical history     Hx mental health/substance abuse:  Denied mental health or substance abuse history     Is there evidence of an acute change in mental status from the patients baseline? No      Insurance: Medicare Part A&B/Medicaid     Education//Work Hx: Pt. Completed 2nd grade. She has no  history. Pt. Used to babysit for a living.      The patient is . Pt. Lives alone. She has a sitter 5 days per week from 9AM - 2PM through [employer]. The sitter was helping with bathing, dressing, cooking, driving, and cleaning. Pt. Plans to stay with her cousin, Lauryn, after rehab until she recovers.      Children (0) Friends/Family: 1) Price Burns, relative ([phone]) 2) Leticia Clint, friend ([phone] 3) Lauryn, cousin ([phone])      Power of  (no)/Living Will (no)     Prior Level of Fx: Pt. Required supervision for bathing and dressing per sitter. She did some simple cooking. Pt. Was modified independent with a cane for mobility and most ADLs. The sitter helps with cooking, cleaning, and driving. Pt. Was managing her own finances and her own medications. [place] so does not have to worry about yard work.      Residence: Pt. Lives alone in [place] in a single-story home with no steps to enter the residence. She has a tub/shower combination with grab bars and HHS. She does not have an elevated toilet. She has grab bars.      DME: Straight cane and rollator(broken). Pt. Will use [facility] for DME.      HH/OP tx: Pt. Is current with [facility]. Will use [facility] for HH     Pharmacy: [facility] / (has prescription drug coverage)      FOC obtained for [facility] for DME and [facility] for HH.     MD(s): PCP:  Delmy Montano RETA ([phone])

## 2023-11-10 NOTE — CONSULTS
Inpatient Nutrition Assessment    Admit Date: 11/9/2023   Total duration of encounter: 1 day   Patient Age: 72 y.o.    Nutrition Recommendation/Prescription     Continue with current diabetic diet. Honor food preferences.  Weekly weights  Monitor need for ONS  SLP eval 2/2 c/o coughing when swallowing.  Medical management of glucose  Will provide diabetic education prior to discharge    Communication of Recommendations: reviewed with patient    Nutrition Assessment     Malnutrition Assessment/Nutrition-Focused Physical Exam    Does not meet criteria     Chart Review    Reason Seen: physician consult for nutrition    Malnutrition Screening Tool Results   Have you recently lost weight without trying?: No  Have you been eating poorly because of a decreased appetite?: No   MST Score: 0   Diagnosis:  Multi vessel CAD s/p CABG x 2 on 11/2/23    Relevant Medical History: PAD, DM, HTN, CAD    Nutrition-Related Medications: amlodipine, atorvastatin, vitamin D3, colace, zetia, ferrous sulfate, glipizide, metformin, sitagliptin phosphate  Calorie Containing IV Medications: no significant kcals from medications at this time    Nutrition-Related Labs:  (11/10) WBC 11.74(H)  (L) H/H 9.8L(L)/31.5(L) Iron 33(L) TIBC 159(L) Transferrin 141(L) Ferritin 494.12(H) Cr 1.12(H) Glu 165(H) Alb 2.6(L) PAB 11.3(L)   (11/2)  A1C 9.3% (H)  CBGs elevated    Nutrition Orders:   Diet diabetic      Appetite/Oral Intake: fair/50-75% of meals    Factors Affecting Nutritional Intake:  Pt stated intake varies due to food preferences     Food/Protestant/Cultural Preferences: none reported    Food Allergies: none reported    Wound(s):   Intact    Last Bowel Movement: 11/09/23    Comments    (11/10) Pt stated her appetite and intake are fair. Consumes % of meals. Pt states her intake depends on what is served. Denied N/V/C/D. No issues chewing. Pt reports she does have some trouble swallowing. Stated she coughs when eating at times. #  "was about 2 weeks ago. Pt with a wt gain of 19# (9.6%). She is able to feed self with set-up.     Anthropometrics    Height: 5' 4" (162.6 cm) Height Method: Stated  Last Weight: 98.2 kg (216 lb 7.9 oz) (23 1648) Weight Method: Standard Scale  BMI (Calculated): 37.1  BMI Classification: obese grade II (BMI 35-39.9)     Ideal Body Weight (IBW), Female: 120 lb     % Ideal Body Weight, Female (lb): 180.41 %                    Usual Body Weight (UBW), k.2 kg  % Usual Body Weight: 100.21     Usual Weight Provided By: patient    Wt Readings from Last 5 Encounters:   23 98.2 kg (216 lb 7.9 oz)   23 95.3 kg (210 lb 1.6 oz)   10/13/22 99.8 kg (220 lb)   10/24/17 58 kg (127 lb 13.9 oz)     Weight Change(s) Since Admission:   Wt Readings from Last 1 Encounters:   23 164 98.2 kg (216 lb 7.9 oz)   Admit Weight: 98.2 kg (216 lb 7.9 oz) (23 164), Weight Method: Standard Scale    Estimated Needs    Weight Used For Calorie Calculations: 98.2 kg (216 lb 7.9 oz)  Energy Calorie Requirements (kcal): 1467 kcal (MSJ)  Energy Need Method: Screven-St Jeor  Weight Used For Protein Calculations: 98.2 kg (216 lb 7.9 oz)  Protein Requirements: 78 gm (0.8 gm/kg)  Fluid Requirements (mL): 1467 mL (1 ml/kcal)  Temp (24hrs), Av.8 °F (37.1 °C), Min:98.7 °F (37.1 °C), Max:98.9 °F (37.2 °C)       Enteral Nutrition    Patient not receiving enteral nutrition at this time.    Parenteral Nutrition    Patient not receiving parenteral nutrition support at this time.    Evaluation of Received Nutrient Intake    Calories: not meeting estimated needs  Protein: not meeting estimated needs    Patient Education    Not applicable.    Nutrition Diagnosis     PES: Altered nutrition-related laboratory values related to  uncontrolled diabetes 2/2 excessive intake of CHO foods  as evidenced by A1C of 9.3(H). (new)    Interventions/Goals     Intervention(s): general/healthful diet and collaboration with other providers    Goal: " Meet greater than 75% of nutritional needs by follow-up. (new)    Monitoring & Evaluation     Dietitian will monitor food and beverage intake, weight, electrolyte/renal panel, glucose/endocrine profile, and gastrointestinal profile.    Nutrition Risk/Follow-Up: moderate (follow-up in 3-5 days)   Please consult if re-assessment needed sooner.

## 2023-11-10 NOTE — PLAN OF CARE
Problem: Physical Therapy  Goal: Physical Therapy Goal  Description: Bed Mobility:  Roll left and right with partial/moderate assist.  Sit to supine transfer with partial/moderate assist.  Supine to sit transfer with partial/moderate assist.    Transfers:  Sit to stand transfer with partial/moderate assist using RW.  Bed to chair transfer with partial/moderate assist Stand Step  using RW.  Car transfer with substantial/maximal assist using RW.   an object from the ground in standing position with substantial/maximal assist using RW.    Mobility:  Ambulate 125 feet with supervision/touching assist using RW.  Ambulate 30 feet on uneven surfaces/ramps with supervision/touching assist using RW.  Pt ascended/descended a 4 inch curb with partial/moderate assist using RW.  Ascend/descend 2 stairs with substantial/maximal assist using no handrails.  Outcome: Ongoing, Progressing

## 2023-11-11 LAB
POCT GLUCOSE: 106 MG/DL (ref 70–110)
POCT GLUCOSE: 112 MG/DL (ref 70–110)
POCT GLUCOSE: 115 MG/DL (ref 70–110)
POCT GLUCOSE: 118 MG/DL (ref 70–110)

## 2023-11-11 PROCEDURE — 97116 GAIT TRAINING THERAPY: CPT | Mod: CQ

## 2023-11-11 PROCEDURE — 97530 THERAPEUTIC ACTIVITIES: CPT

## 2023-11-11 PROCEDURE — 94799 UNLISTED PULMONARY SVC/PX: CPT

## 2023-11-11 PROCEDURE — 25000003 PHARM REV CODE 250: Performed by: NURSE PRACTITIONER

## 2023-11-11 PROCEDURE — 11800000 HC REHAB PRIVATE ROOM

## 2023-11-11 PROCEDURE — 97530 THERAPEUTIC ACTIVITIES: CPT | Mod: CQ

## 2023-11-11 PROCEDURE — 97110 THERAPEUTIC EXERCISES: CPT

## 2023-11-11 PROCEDURE — 97535 SELF CARE MNGMENT TRAINING: CPT

## 2023-11-11 PROCEDURE — 97110 THERAPEUTIC EXERCISES: CPT | Mod: CQ

## 2023-11-11 RX ADMIN — ASPIRIN 81 MG: 81 TABLET, COATED ORAL at 07:11

## 2023-11-11 RX ADMIN — GLIPIZIDE 5 MG: 5 TABLET, FILM COATED, EXTENDED RELEASE ORAL at 07:11

## 2023-11-11 RX ADMIN — DOCUSATE SODIUM 100 MG: 100 CAPSULE, LIQUID FILLED ORAL at 07:11

## 2023-11-11 RX ADMIN — AMLODIPINE BESYLATE 10 MG: 5 TABLET ORAL at 07:11

## 2023-11-11 RX ADMIN — METOPROLOL SUCCINATE 50 MG: 50 TABLET, FILM COATED, EXTENDED RELEASE ORAL at 07:11

## 2023-11-11 RX ADMIN — HYDROXYZINE PAMOATE 50 MG: 50 CAPSULE ORAL at 08:11

## 2023-11-11 RX ADMIN — DOCUSATE SODIUM 100 MG: 100 CAPSULE, LIQUID FILLED ORAL at 08:11

## 2023-11-11 RX ADMIN — METFORMIN HYDROCHLORIDE 1000 MG: 500 TABLET, FILM COATED ORAL at 04:11

## 2023-11-11 RX ADMIN — EZETIMIBE 10 MG: 10 TABLET ORAL at 07:11

## 2023-11-11 RX ADMIN — APIXABAN 5 MG: 5 TABLET, FILM COATED ORAL at 07:11

## 2023-11-11 RX ADMIN — APIXABAN 5 MG: 5 TABLET, FILM COATED ORAL at 08:11

## 2023-11-11 RX ADMIN — ATORVASTATIN CALCIUM 40 MG: 40 TABLET, FILM COATED ORAL at 07:11

## 2023-11-11 RX ADMIN — METFORMIN HYDROCHLORIDE 1000 MG: 500 TABLET, FILM COATED ORAL at 07:11

## 2023-11-11 RX ADMIN — SITAGLIPTIN 100 MG: 50 TABLET, FILM COATED ORAL at 07:11

## 2023-11-11 RX ADMIN — FERROUS SULFATE TAB 325 MG (65 MG ELEMENTAL FE) 1 EACH: 325 (65 FE) TAB at 07:11

## 2023-11-11 NOTE — PROGRESS NOTES
[facility] ([facility])  Rehab Progress Note    Patient Name: Geno Barry  MRN: [id number]  Age: 72 y.o. Sex: female  : 1950  Hospital Length of Stay: 2 days  Date of Service: 2023   Chief Complaint: Multivessel CAD s/p CABG x2 on 2023    Subjective:     Basic Information  Admit Information: 72-year-old  female presented to [facility] for scheduled CABG on 2023.  PMH significant for PID, DM type 2, CAD, and carotid artery stenosis.  Tolerated CABG x2 on  without perioperative complications.  Aspirin, Norvasc, and beta-blocker continued.  Lipitor 40 mg daily initiated on .  Chest tube discontinued on .  Amiodarone initiated due to postoperative atrial fibrillation.  Eliquis 5 mg b.i.d. initiated on .  Tolerated transfer to [facility] inpatient rehab unit on  without incident.  Today's Information: No acute events overnight.  No acute events overnight.  Sitting in chair comfortably.  Reports good sleep and appetite.  Last BM .  Vital signs at goal with no recent recorded fevers.  No labs or imaging today.    Review of patient's allergies indicates:   Allergen Reactions    Ace inhibitors Swelling    Clopidogrel Swelling    Iodine         Current Facility-Administered Medications:     acetaminophen tablet 650 mg, 650 mg, Oral, Q8H PRN, [place], Marina A, FNP    ALPRAZolam tablet 0.25 mg, 0.25 mg, Oral, TID PRN, Duglas, Isidoro A, FNP    [START ON 2023] amiodarone tablet 200 mg, 200 mg, Oral, Daily, Duglas, Isidoro A, FNP    amLODIPine tablet 10 mg, 10 mg, Oral, Daily, Duglas, Isidoro A, FNP, 10 mg at 23 0710    apixaban tablet 5 mg, 5 mg, Oral, BID, Duglas, Isidoro A, FNP, 5 mg at 23 0710    aspirin EC tablet 81 mg, 81 mg, Oral, Daily, Duglas, Isidoro A, FNP, 81 mg at 23 0710    atorvastatin tablet 40 mg, 40 mg, Oral, Daily, Duglas, Isidoro A, FNP, 40 mg at 23 3510    benzonatate  capsule 100 mg, 100 mg, Oral, TID PRN, Duglas, Isidoro A, FNP    bisacodyL suppository 10 mg, 10 mg, Rectal, Daily PRN, Duglas, Isidoro A, FNP    cholecalciferol (vitamin D3) capsule/tablet 400 Units, 400 Units, Oral, Daily, Duglas, Isidoro A, FNP, 400 Units at 11/10/23 0748    dextrose 10% bolus 125 mL 125 mL, 12.5 g, Intravenous, PRN, Duglas, Isidoro A, FNP    dextrose 10% bolus 250 mL 250 mL, 25 g, Intravenous, PRN, Duglas, Isidoro A, FNP    docusate sodium capsule 100 mg, 100 mg, Oral, BID, Duglas, Isidoro A, FNP, 100 mg at 11/11/23 0710    ezetimibe tablet 10 mg, 10 mg, Oral, Daily, Duglas, Isidoro A, FNP, 10 mg at 11/11/23 0710    ferrous sulfate tablet 1 each, 1 tablet, Oral, Daily, Duglas, Isidoro A, FNP, 1 each at 11/11/23 0710    glipiZIDE 24 hr tablet 5 mg, 5 mg, Oral, Daily with breakfast, Duglas, Isidoro A, FNP, 5 mg at 11/11/23 0710    glucagon (human recombinant) injection 1 mg, 1 mg, Intramuscular, PRN, Duglas, Isidoro A, FNP    glucose chewable tablet 16 g, 16 g, Oral, PRN, Duglas, Isidoro A, FNP    glucose chewable tablet 24 g, 24 g, Oral, PRN, Duglas, Isidoro A, FNP    hydrALAZINE injection 10 mg, 10 mg, Intravenous, Q4H PRN, Duglas, Isidoro A, FNP    HYDROcodone-acetaminophen 7.5-325 mg per tablet 1 tablet, 1 tablet, Oral, TID PRN, [place], Marina A, FNP    hydrOXYzine pamoate capsule 50 mg, 50 mg, Oral, QHS, Duglas, Isidoro A, FNP, 50 mg at 11/10/23 2124    insulin aspart U-100 injection 0-10 Units, 0-10 Units, Subcutaneous, QID (AC + HS) PRN, Duglas, Isidoro A, FNP, 2 Units at 11/10/23 1603    labetalol 20 mg/4 mL (5 mg/mL) IV syring, 10 mg, Intravenous, Q4H PRN, Isidoro Ardon FNP    metFORMIN tablet 1,000 mg, 1,000 mg, Oral, BID WM, Isidoro Ardon FNP, 1,000 mg at 11/11/23 0710    methocarbamoL tablet 500 mg, 500 mg, Oral, TID PRN, Marina Johnson FNP    metoprolol injection 10 mg, 10 mg, Intravenous, Q2H PRN, Isidoro Ardon,  "FNP    metoprolol succinate (TOPROL-XL) 24 hr tablet 50 mg, 50 mg, Oral, Daily, Duglas, Isidoro A, FNP, 50 mg at 11/11/23 0710    nitroGLYCERIN SL tablet 0.4 mg, 0.4 mg, Sublingual, Q5 Min PRN, Duglas, Isidoro A, FNP    ondansetron disintegrating tablet 4 mg, 4 mg, Oral, Q6H PRN, Duglas, Isidoro A, FNP    polyethylene glycol packet 17 g, 17 g, Oral, Q12H PRN, Duglas, Isidoro A, FNP    promethazine tablet 25 mg, 25 mg, Oral, Q6H PRN, Duglas, Isidoro A, FNP    SITagliptin phosphate tablet 100 mg, 100 mg, Oral, Daily, Duglas, Isidoro A, FNP, 100 mg at 11/11/23 0710     Review of Systems   Complete 12-point review of symptoms negative except for what's mentioned in HPI     Objective:     /74   Pulse 95   Temp 97.9 °F (36.6 °C) (Oral)   Resp 16   Ht 5' 4" (1.626 m)   Wt 98.3 kg (216 lb 11.4 oz)   SpO2 (!) 91%   Breastfeeding No   BMI 37.20 kg/m²      Physical Exam  Vitals reviewed.   Eyes:      Pupils: Pupils are equal, round, and reactive to light.   Cardiovascular:      Rate and Rhythm: Normal rate and regular rhythm.      Heart sounds: Normal heart sounds.   Pulmonary:      Effort: Pulmonary effort is normal.      Breath sounds: Normal breath sounds.   Abdominal:      General: Bowel sounds are normal.      Comments: obese   Musculoskeletal:         General: Normal range of motion.   Skin:     General: Skin is warm.      Comments: Sternal incision clean and intact.  Chest tube sutures intact. Left leg harvest site intact.  Left lower extremity trace edema   Neurological:      General: No focal deficit present.      Mental Status: She is alert and oriented to person, place, and time.      Motor: Weakness present.   Psychiatric:         Mood and Affect: Mood normal.         Lines/Drains/Airways       None                   Labs  Recent Results (from the past 24 hour(s))   POCT glucose    Collection Time: 11/10/23  4:00 PM   Result Value Ref Range    POCT Glucose 170 (H) 70 - 110 mg/dL "   POCT glucose    Collection Time: 11/10/23  9:27 PM   Result Value Ref Range    POCT Glucose 149 (H) 70 - 110 mg/dL   POCT glucose    Collection Time: 11/11/23  6:25 AM   Result Value Ref Range    POCT Glucose 106 70 - 110 mg/dL     Radiology  CXR one view on 11/05/2023, IMPRESSION: Cardiomegaly with postsurgical changes of median sternotomy.  Mediastinal drain remains in place.  Interval removal of right-sided central line.  Small left effusion is noted.  Radiology  Transthoracic echo on 11/02/2023, IMPRESSION: Left Ventricle: regional wall motion abnormalities present.  The distal apex, apical inferior and distal septal walls are hypokinetic There is moderately reduced systolic function with a visually estimated ejection fraction of 35 - 40%. Grade I diastolic dysfunction.  Right Ventricle: Normal right ventricular cavity size. Wall thickness is normal. Right ventricle wall motion  is normal. Systolic function is normal.  Mitral Valve: There is mild regurgitation.  Diagnostic studies   LHC on 11/02/2023, IMPRESSION: Left Main-luminal irregularities. LAD-mid subtotal occlusion involving large diagonal. LCX-non dominant, luminal irregularities. RCA-dominant, 40% ostial stenosis. LVEDP 18    Assessment/Plan:     72 y.o. AAF admitted on 11/9/2023     Multivessel CAD  - s/p CABG x2 on 11/02/2023 (LIMA to LAD and rSVG to Diag 1)  - denies recent chest pain or discomfort  - continue                Aspirin 81 mg daily                Lipitor 40 mg daily                Metoprolol succinate 50 mg daily                 Norco 7.5 mg/325 mg q.8 hours p.r.n.   - ECG and Nitro PRN  - not on ACEI or Plavix  - continue cardiac diet  - to follow-up with cardiology outpatient     HLD  - cholesterol 129, HDL 38, total cholesterol 3, triglycerides 42, LDL 83, VLDL 8 on 11/02/2023  - continue                Lipitor 40 mg daily                 Zetia 10 mg daily     HFpEF  - LVEF 55% on 10/11/2022  - continue                Metoprolol  succinate 50 mg daily  - monitor weights daily  - to follow-up with cardiology outpatient     PAD  - stable  - continue                Aspirin 81 mg daily                Lipitor 40 mg daily                Eliquis 5 mg b.i.d.  - to follow-up with vascular surgery outpatient     Bilateral carotid artery stenosis  - moderate-no interventions at this time  - continue                Aspirin 81 mg daily                Lipitor 40 mg daily                Eliquis 5 mg b.i.d.  - to follow-up with vascular surgery outpatient     Paroxysmal atrial fibrillation  - no evidence of bleeding  - rate controlled  - continue                Amiodarone 200 mg b.i.d. (to end on 11/10)                Amiodarone daily to begin on 11/11                Eliquis 5 mg b.i.d.                Metoprolol succinate 50 mg daily   - to follow-up with cardiology outpatient     Normocytic anemia  - asymptomatic  - H/H trending down  - iron 33, TIBC 159, iron binding capacity 126, transferrin 141, iron saturation 21 on 11/10/2023  - continue                Ferrous sulfate 325 mg daily (initiated 11/10)  - no evidence of active bleeds  - will closely monitor and transfuse if needed      HTN  - BP at goal  - continue                Metoprolol succinate 50 mg daily                Norvasc 10 mg daily                  Hydralazine 10 mg every 2 hours as needed for BP > 160/90                Labetalol 10 mg every 2 hours as needed for BP > 160/90  - low sodium diet     DM type II  - HgA1c 9.3 on 11/02/2023  - continue                Metformin 1000 mg twice daily                Glipizide 5 mg with breakfast                Januvia 100 mg daily                ISS   - CBGs AC/HS     GERD  - Avoid spicy foods, and nothing to eat or drink within x2 hours of bedtime or laying flat (water is ok)   - Avoid NSAIDs (Advil, ibuprofen, naproxen...) and andrade-2 inhibitors (Mobic, Celebrex)    - continue                Pepcid 40 mg daily      Osteoarthritis  - stable  -  continue                Meloxicam 15 mg daily                 Vitamin-D 3 400 units daily     VTE Prophylaxis:  Eliquis 5 mg b.i.d.  COVID-19 testing:  Unknown  COVID-19 vaccination status:  Vaccinated ([facility]):  03/05/2021 and ([employer]):  11/09/2021 and 06/22/2022     POA: No  Living will: No  Contacts: Heaven Burns ([facility]) [phone]     CODE STATUS: Full Code  Internal Medicine (attending): Salvador Sebastian MD  Physiatry (consulting):  Patricio Daniel MD     OUTPATIENT PROVIDERS  PCP:  JONG Clifton  Cardiology:  Clay chen MD  CV surgery:  Heriberto Lara MD     DISPOSITION:  Vital signs at goal.  No labs today.  Bowel management, sleep hygiene, and appetite at goal.  Good glycemic control.  Aggressively mobilize as tolerated with therapies.  Monitor closely.  Notify of any acute changes.

## 2023-11-11 NOTE — PT/OT/SLP PROGRESS
Physical Therapy Inpatient Rehab Treatment    Patient Name:  Geno Barry   MRN:  [id number]    Recommendations:     Discharge Recommendations:  Low Intensity Therapy   Discharge Equipment Recommendations: walker, rolling   Barriers to discharge:       Assessment:     Geno Barry is a 72 y.o. female admitted with a medical diagnosis of S/P CABG (coronary artery bypass graft).  She presents with the following impairments/functional limitations:     .    Rehab Diagnosis: Multivessel CAD s/p CABG x 2    Recent Surgery: * No surgery found *      General Precautions: Standard, sternal     Orthopedic Precautions:N/A     Braces: N/A    Rehab Prognosis: Good; patient would benefit from acute skilled PT services to address these deficits and reach maximum level of function.      History:     Past Medical History:   Diagnosis Date    CAD (coronary artery disease)     Diabetes mellitus     Hypertension     PAD (peripheral artery disease)        Past Surgical History:   Procedure Laterality Date    CORONARY ARTERY BYPASS GRAFT (CABG) N/A 11/2/2023    Procedure: CORONARY ARTERY BYPASS GRAFT (CABG);  Surgeon: Heriberto Lara IV, MD;  Location: [facility] OR;  Service: Cardiothoracic;  Laterality: N/A;    ENDOSCOPIC HARVEST OF VEIN N/A 11/2/2023    Procedure: SURGICAL PROCUREMENT, VEIN, ENDOSCOPIC;  Surgeon: Heriberto Lara IV, MD;  Location: [facility] OR;  Service: Cardiothoracic;  Laterality: N/A;    HYSTERECTOMY      LEFT HEART CATHETERIZATION Left 11/2/2023    Procedure: Left heart cath;  Surgeon: Puma Vera MD;  Location: [facility] CATH LAB;  Service: Cardiology;  Laterality: Left;  LHC +/- PCI VIA RRA    LUMPECTOMY, BREAST Right        Subjective     Chief Complaint: Pt reports no pain at this time, reports she is very sleepy.    Respiratory Status: Room air    Patients cultural, spiritual, Jewish conflicts given the current situation: no      Objective:     Communicated with Nsg prior to session.  Patient found up in  "chair with    upon PT entry to room.    Pt is Oriented x3 and Alert, Cooperative, and Motivated.    Vitals   Vitals at Rest  /64   HR 73   O2 Sat    Pain      Vitals With Activity  /67   HR 75   O2 Sat    Pain        Functional Mobility:   Transfers:     Sit to Stand: Substantial/maximal assistance  with rolling walker  Bed to Chair: Substantial/maximal assistance  with rolling walker  using  Step Transfer  Toilet Transfer: Partial/moderate assistance  with  rolling walker  using  Step Transfer  Gait: Pt ambulates 75' x3 trials with seated rest between due to poor enduance  with RW with Supervision or touching assistance .      Current   Status  Discharge   Goal   Functional Area: Care Score:    Roll Left and Right   Independent   Sit to Lying   Independent   Lying to Sitting on Side of Bed   Independent   Sit to Stand 2 Independent   Chair/Bed-to-Chair Transfer 2 Independent   Car Transfer   Independent   Walk 10 Feet 4 Independent   Walk 50 Feet with Two Turns 4 Independent   Walk 150 Feet   Supervision or touching assistance   Walk 10 Feet Uneven Surface   Supervision or touching assistance   1 Step (Curb)   Supervision or touching assistance   4 Steps   Not applicable   12 Steps   Not applicable   Picking Up Object   Supervision or touching assistance   Wheel 50 Feet with Two Turns   Not attempted due to medical/safety concerns   Wheel 150 Feet   Not attempted due to medical/safety concerns       Therapeutic Activities and Exercises:  Seated therex 1.5#, RTB 2x15      Hip flexion, LAQ, ADD ball squeeze, ABD, HSC    Sit to stand from w/c x6 trials - VC to avoid posterior lean and focus on "nose over toes" with standing motion    Activity Tolerance: Good    Patient left  in bathroom  with call button in reach and CNA notified.    Education provided: transfer training, bed mob, gait training, and strengthening exercises    Expected compliance: High compliance    GOALS:   Multidisciplinary Problems       " Physical Therapy Goals          Problem: Physical Therapy    Goal Priority Disciplines Outcome Goal Variances Interventions   Physical Therapy Goal     PT, PT/OT Ongoing, Progressing     Description: Bed Mobility:  Roll left and right with partial/moderate assist.  Sit to supine transfer with partial/moderate assist.  Supine to sit transfer with partial/moderate assist.    Transfers:  Sit to stand transfer with partial/moderate assist using RW.  Bed to chair transfer with partial/moderate assist Stand Step  using RW.  Car transfer with substantial/maximal assist using RW.   an object from the ground in standing position with substantial/maximal assist using RW.    Mobility:  Ambulate 125 feet with supervision/touching assist using RW.  Ambulate 30 feet on uneven surfaces/ramps with supervision/touching assist using RW.  Pt ascended/descended a 4 inch curb with partial/moderate assist using RW.  Ascend/descend 2 stairs with substantial/maximal assist using no handrails.                       Plan:     During this hospitalization, patient to be seen 5 x/week (5-7x/wk) to address the identified rehab impairments via gait training, therapeutic activities, therapeutic exercises, neuromuscular re-education and progress toward the following goals:    Plan of Care Expires:  12/01/23  PT Next Visit Date: 11/16/23  Plan of Care reviewed with: patient    Additional Information:         Time Tracking:     Therapy Time  PT Start Time: 0940  PT Stop Time: 1110  PT Total Time (min): 90 min   PT Individual: 90  Missed Time:    Time Missed due to:      Billable Minutes: Gait Training 25, Therapeutic Activity 30, and Therapeutic Exercise 35    11/11/2023

## 2023-11-11 NOTE — PT/OT/SLP PROGRESS
"Occupational Therapy Inpatient Rehab Treatment    Name: Geno Barry  MRN: [id number]    Assessment:  Geno Barry is a 72 y.o. female admitted with a medical diagnosis of S/P CABG (coronary artery bypass graft).  She presents with the following impairments/functional limitations:  weakness, impaired endurance, impaired sensation, impaired self care skills, impaired functional mobility, gait instability, impaired balance, decreased upper extremity function, decreased safety awareness, pain, edema, impaired cardiopulmonary response to activity.    General Precautions: Standard, sternal     Orthopedic Precautions:N/A     Braces: N/A    Rehab Prognosis: Good; patient would benefit from acute skilled OT services to address these deficits and reach maximum level of function.      History:     Past Medical History:   Diagnosis Date    CAD (coronary artery disease)     Diabetes mellitus     Hypertension     PAD (peripheral artery disease)        Past Surgical History:   Procedure Laterality Date    CORONARY ARTERY BYPASS GRAFT (CABG) N/A 11/2/2023    Procedure: CORONARY ARTERY BYPASS GRAFT (CABG);  Surgeon: Heriberto Lara IV, MD;  Location: [facility] OR;  Service: Cardiothoracic;  Laterality: N/A;    ENDOSCOPIC HARVEST OF VEIN N/A 11/2/2023    Procedure: SURGICAL PROCUREMENT, VEIN, ENDOSCOPIC;  Surgeon: Heriberto Lara IV, MD;  Location: [facility] OR;  Service: Cardiothoracic;  Laterality: N/A;    HYSTERECTOMY      LEFT HEART CATHETERIZATION Left 11/2/2023    Procedure: Left heart cath;  Surgeon: Puma Vera MD;  Location: [facility] CATH LAB;  Service: Cardiology;  Laterality: Left;  LHC +/- PCI VIA RRA    LUMPECTOMY, BREAST Right        Subjective     Orientation: Oriented x4    Chief Complaint: none    Patient/Family Comments/goals: "I slept really good last night"    Vitals   Vitals at Rest  /71   HR 91     Vitals With Activity  /76   HR 71     Respiratory Status: Room air    Patients cultural, spiritual, " Rastafarian conflicts given the current situation: no       Objective:     Patient found up in chair with peripheral IV  upon OT entry to room.    Mobility   Patient completed:  Sit to Stand Transfer with moderate assistance with rolling walker  Stand to Sit Transfer with moderate assistance with rolling walker  Toilet Transfer Step Transfer technique with minimum assistance with  rolling walker onto BSC over toilet times 2 trials. OT exchanged BSC to fit pt better.    Functional Mobility  In room mobility with Min-Mod assist with RW for toileting.    Reviewed sternal precautions with pt. Pt was able to recall most precautions with cues.    ADLs   Current Status   Toileting Hygiene 2 Able to assist with pulling down garment   Toilet Transfer 3 Min assist with RW especially when turning in tight quarters in front of toilet   Putting On, Taking Off Footwear 4 Pt introduced to sock aid, reacher and long handled shoe horn. Once shown how to use equipment, pt able to perform with SPV and verbal cues. Extra time needed.     Limiting Factors for ADLs: motor, endurance, limited ROM, balance, weakness, and safety awareness     IADLs: Pt performed laundry activity - folded 10 items of clothing with basket placed on table in front of pt and used reacher to retrieve items out of basket while adhering to sternal precautions.    Therapeutic Activities  Pt performed two activities (Connect 4 and Toss Tac Toe) that focused on strengthening core in unsupported sitting while forward reaching lower than shoulder height. Pt tolerated well by maintaining sitting for ~5 mins at a time with each activity with a total of 20 mins of unsupported sitting.    Therapeutic Exercise  To improved cardiovascular endurance, pt tolerated UBE for  2 trials of propelling 5 mins forward in unsupported sitting with one rest break between. Educated on energy conservation and pursed lip breathing to improve endurance.    Patient left up in chair with all  lines intact, call button in reach, and chair alarm on.     Education provided: Roles and goals of OT, ADLs, transfer training, sequencing, safety precautions, fall prevention, and post-op precautions    Multidisciplinary Problems       Occupational Therapy Goals          Problem: Occupational Therapy    Goal Priority Disciplines Outcome Interventions   Occupational Therapy Goal     OT, PT/OT Ongoing, Progressing    Description: ADLs:  Pt to perform grooming tasks with Indep at seated level  Pt to perform feeding tasks with setup assist   Pt to perform UB dressing with mod A    Pt to perform LB dressing with mod A    Pt to perform putting on/off footwear task with max A  Pt to perform toileting with mod A  Pt to perform bathing with mod A    Functional Transfers:  Pt to perform toilet transfers with min A and BSC  Pt to perform a tub transfer with min A and TTB                        Time Tracking     OT Received On: 11/11/23  Time In 0730     Time Out 0900  Total Time 90 min  Therapy Time: OT Individual: 90  Missed Time:    Missed Time Reason:      Billable Minutes: Self Care/Home Management 45, Therapeutic Activity 30, and Therapeutic Exercise 15    11/11/2023

## 2023-11-11 NOTE — NURSING
Reminded the patient to use her incentive spirometer. She said that she was coughing up yellow mucous.

## 2023-11-11 NOTE — NURSING
Attempted to remove mid line CT suture. Was unable to remove entire suture. Suture too tightly sewn. Will pass onto next shift.

## 2023-11-12 LAB
POCT GLUCOSE: 147 MG/DL (ref 70–110)
POCT GLUCOSE: 150 MG/DL (ref 70–110)
POCT GLUCOSE: 169 MG/DL (ref 70–110)
POCT GLUCOSE: 88 MG/DL (ref 70–110)

## 2023-11-12 PROCEDURE — 94799 UNLISTED PULMONARY SVC/PX: CPT

## 2023-11-12 PROCEDURE — 11800000 HC REHAB PRIVATE ROOM

## 2023-11-12 PROCEDURE — 25000003 PHARM REV CODE 250: Performed by: NURSE PRACTITIONER

## 2023-11-12 PROCEDURE — 63600175 PHARM REV CODE 636 W HCPCS: Performed by: NURSE PRACTITIONER

## 2023-11-12 RX ADMIN — SITAGLIPTIN 100 MG: 50 TABLET, FILM COATED ORAL at 08:11

## 2023-11-12 RX ADMIN — FERROUS SULFATE TAB 325 MG (65 MG ELEMENTAL FE) 1 EACH: 325 (65 FE) TAB at 08:11

## 2023-11-12 RX ADMIN — APIXABAN 5 MG: 5 TABLET, FILM COATED ORAL at 08:11

## 2023-11-12 RX ADMIN — HYDROXYZINE PAMOATE 50 MG: 50 CAPSULE ORAL at 08:11

## 2023-11-12 RX ADMIN — AMLODIPINE BESYLATE 10 MG: 5 TABLET ORAL at 08:11

## 2023-11-12 RX ADMIN — Medication 400 UNITS: at 08:11

## 2023-11-12 RX ADMIN — ATORVASTATIN CALCIUM 40 MG: 40 TABLET, FILM COATED ORAL at 08:11

## 2023-11-12 RX ADMIN — METFORMIN HYDROCHLORIDE 1000 MG: 500 TABLET, FILM COATED ORAL at 08:11

## 2023-11-12 RX ADMIN — METFORMIN HYDROCHLORIDE 1000 MG: 500 TABLET, FILM COATED ORAL at 04:11

## 2023-11-12 RX ADMIN — INSULIN ASPART 2 UNITS: 100 INJECTION, SOLUTION INTRAVENOUS; SUBCUTANEOUS at 04:11

## 2023-11-12 RX ADMIN — EZETIMIBE 10 MG: 10 TABLET ORAL at 08:11

## 2023-11-12 RX ADMIN — ASPIRIN 81 MG: 81 TABLET, COATED ORAL at 08:11

## 2023-11-12 RX ADMIN — METOPROLOL SUCCINATE 50 MG: 50 TABLET, FILM COATED, EXTENDED RELEASE ORAL at 08:11

## 2023-11-12 RX ADMIN — DOCUSATE SODIUM 100 MG: 100 CAPSULE, LIQUID FILLED ORAL at 08:11

## 2023-11-12 RX ADMIN — AMIODARONE HYDROCHLORIDE 200 MG: 200 TABLET ORAL at 08:11

## 2023-11-12 RX ADMIN — GLIPIZIDE 5 MG: 5 TABLET, FILM COATED, EXTENDED RELEASE ORAL at 08:11

## 2023-11-12 NOTE — PROGRESS NOTES
[facility] ([facility])  Rehab Progress Note    Patient Name: Geno Barry  MRN: [id number]  Age: 72 y.o. Sex: female  : 1950  Hospital Length of Stay: 3 days  Date of Service: 2023   Chief Complaint: Multivessel CAD s/p CABG x2 on 2023    Subjective:     Basic Information  Admit Information: 72-year-old  female presented to [facility] for scheduled CABG on 2023.  PMH significant for PID, DM type 2, CAD, and carotid artery stenosis.  Tolerated CABG x2 on  without perioperative complications.  Aspirin, Norvasc, and beta-blocker continued.  Lipitor 40 mg daily initiated on .  Chest tube discontinued on .  Amiodarone initiated due to postoperative atrial fibrillation.  Eliquis 5 mg b.i.d. initiated on .  Tolerated transfer to [facility] inpatient rehab unit on  without incident.  Today's Information: No acute events overnight.  No acute events overnight.  Sitting in chair comfortably.  Reports good sleep and appetite.  Last BM .  Vital signs at goal with no recent recorded fevers.  No labs or imaging today.    Review of patient's allergies indicates:   Allergen Reactions    Ace inhibitors Swelling    Clopidogrel Swelling    Iodine         Current Facility-Administered Medications:     acetaminophen tablet 650 mg, 650 mg, Oral, Q8H PRN, Elizabeth, Marina A, FNP    ALPRAZolam tablet 0.25 mg, 0.25 mg, Oral, TID PRN, Duglas, Isidoro A, FNP    amiodarone tablet 200 mg, 200 mg, Oral, Daily, Duglas, Isidoro A, FNP, 200 mg at 23 08    amLODIPine tablet 10 mg, 10 mg, Oral, Daily, Duglas, Isidoro A, FNP, 10 mg at 23 08    apixaban tablet 5 mg, 5 mg, Oral, BID, Duglas, Isidoro A, FNP, 5 mg at 23 08    aspirin EC tablet 81 mg, 81 mg, Oral, Daily, Duglas, Isidoro A, FNP, 81 mg at 23 08    atorvastatin tablet 40 mg, 40 mg, Oral, Daily, Duglas, Isidoro A, FNP, 40 mg at 23 0845     benzonatate capsule 100 mg, 100 mg, Oral, TID PRN, Duglas, Isidoro A, FNP    bisacodyL suppository 10 mg, 10 mg, Rectal, Daily PRN, Duglas, Isidoro A, FNP    cholecalciferol (vitamin D3) capsule/tablet 400 Units, 400 Units, Oral, Daily, Duglas, Isidoro A, FNP, 400 Units at 11/12/23 0800    dextrose 10% bolus 125 mL 125 mL, 12.5 g, Intravenous, PRN, Duglas, Isidoro A, FNP    dextrose 10% bolus 250 mL 250 mL, 25 g, Intravenous, PRN, Duglas, Isidoro A, FNP    docusate sodium capsule 100 mg, 100 mg, Oral, BID, Duglas, Isidoro A, FNP, 100 mg at 11/12/23 0813    ezetimibe tablet 10 mg, 10 mg, Oral, Daily, Duglas, Isidoro A, FNP, 10 mg at 11/12/23 0814    ferrous sulfate tablet 1 each, 1 tablet, Oral, Daily, Duglas, Isidoro A, FNP, 1 each at 11/12/23 0814    glipiZIDE 24 hr tablet 5 mg, 5 mg, Oral, Daily with breakfast, Duglas, Isidoro A, FNP, 5 mg at 11/12/23 0813    glucagon (human recombinant) injection 1 mg, 1 mg, Intramuscular, PRN, Duglas, Isidoro A, FNP    glucose chewable tablet 16 g, 16 g, Oral, PRN, Duglas, Isidoro A, FNP    glucose chewable tablet 24 g, 24 g, Oral, PRN, Duglas, Isidoro A, FNP    hydrALAZINE injection 10 mg, 10 mg, Intravenous, Q4H PRN, Duglas, Isidoro A, FNP    HYDROcodone-acetaminophen 7.5-325 mg per tablet 1 tablet, 1 tablet, Oral, TID PRN, Elizabeth, Marina A, FNP    hydrOXYzine pamoate capsule 50 mg, 50 mg, Oral, QHS, Duglas, Isidoro A, FNP, 50 mg at 11/11/23 2017    insulin aspart U-100 injection 0-10 Units, 0-10 Units, Subcutaneous, QID (AC + HS) PRN, Duglas, Isidoro A, FNP, 2 Units at 11/10/23 1603    labetalol 20 mg/4 mL (5 mg/mL) IV syring, 10 mg, Intravenous, Q4H PRN, Isidoro Ardon, FNP    metFORMIN tablet 1,000 mg, 1,000 mg, Oral, BID WM, Isidoro Ardon FNP, 1,000 mg at 11/12/23 0814    methocarbamoL tablet 500 mg, 500 mg, Oral, TID PRN, Marina Johnson, FNP    metoprolol injection 10 mg, 10 mg, Intravenous, Q2H PRN,  "Duglas, Isidoro A, FNP    metoprolol succinate (TOPROL-XL) 24 hr tablet 50 mg, 50 mg, Oral, Daily, Duglas, Isidoro A, FNP, 50 mg at 11/12/23 0813    nitroGLYCERIN SL tablet 0.4 mg, 0.4 mg, Sublingual, Q5 Min PRN, Duglas, Isidoro A, FNP    ondansetron disintegrating tablet 4 mg, 4 mg, Oral, Q6H PRN, Duglas, Isidoro A, FNP    polyethylene glycol packet 17 g, 17 g, Oral, Q12H PRN, Duglas, Isidoro A, FNP    promethazine tablet 25 mg, 25 mg, Oral, Q6H PRN, Duglas, Isidoro A, FNP    SITagliptin phosphate tablet 100 mg, 100 mg, Oral, Daily, Duglas, Isidoro A, FNP, 100 mg at 11/12/23 0813     Review of Systems   Complete 12-point review of symptoms negative except for what's mentioned in HPI     Objective:     BP (!) 143/76   Pulse 83   Temp 98 °F (36.7 °C) (Oral)   Resp 18   Ht 5' 4" (1.626 m)   Wt 92.9 kg (204 lb 12.9 oz)   SpO2 95%   Breastfeeding No   BMI 35.16 kg/m²      Physical Exam  Vitals reviewed.   Eyes:      Pupils: Pupils are equal, round, and reactive to light.   Cardiovascular:      Rate and Rhythm: Normal rate and regular rhythm.      Heart sounds: Normal heart sounds.   Pulmonary:      Effort: Pulmonary effort is normal.      Breath sounds: Normal breath sounds.   Abdominal:      General: Bowel sounds are normal.      Comments: obese   Musculoskeletal:         General: Normal range of motion.   Skin:     General: Skin is warm.      Comments: Sternal incision clean and intact.  Chest tube sutures intact. Left leg harvest site intact.  Left lower extremity trace edema   Neurological:      General: No focal deficit present.      Mental Status: She is alert and oriented to person, place, and time.      Motor: Weakness present.   Psychiatric:         Mood and Affect: Mood normal.         Lines/Drains/Airways       None                   Labs  Recent Results (from the past 24 hour(s))   POCT glucose    Collection Time: 11/11/23 11:42 AM   Result Value Ref Range    POCT Glucose 115 (H) " 70 - 110 mg/dL   POCT glucose    Collection Time: 11/11/23  4:29 PM   Result Value Ref Range    POCT Glucose 112 (H) 70 - 110 mg/dL   POCT glucose    Collection Time: 11/11/23  7:58 PM   Result Value Ref Range    POCT Glucose 118 (H) 70 - 110 mg/dL   POCT glucose    Collection Time: 11/12/23  6:00 AM   Result Value Ref Range    POCT Glucose 88 70 - 110 mg/dL     Radiology  CXR one view on 11/05/2023, IMPRESSION: Cardiomegaly with postsurgical changes of median sternotomy.  Mediastinal drain remains in place.  Interval removal of right-sided central line.  Small left effusion is noted.  Radiology  Transthoracic echo on 11/02/2023, IMPRESSION: Left Ventricle: regional wall motion abnormalities present.  The distal apex, apical inferior and distal septal walls are hypokinetic There is moderately reduced systolic function with a visually estimated ejection fraction of 35 - 40%. Grade I diastolic dysfunction.  Right Ventricle: Normal right ventricular cavity size. Wall thickness is normal. Right ventricle wall motion  is normal. Systolic function is normal.  Mitral Valve: There is mild regurgitation.  Diagnostic studies   C on 11/02/2023, IMPRESSION: Left Main-luminal irregularities. LAD-mid subtotal occlusion involving large diagonal. LCX-non dominant, luminal irregularities. RCA-dominant, 40% ostial stenosis. LVEDP 18    Assessment/Plan:     72 y.o. AAF admitted on 11/9/2023     Multivessel CAD  - s/p CABG x2 on 11/02/2023 (LIMA to LAD and rSVG to Diag 1)  - denies recent chest pain or discomfort  - continue                Aspirin 81 mg daily                Lipitor 40 mg daily                Metoprolol succinate 50 mg daily                 Norco 7.5 mg/325 mg q.8 hours p.r.n.   - ECG and Nitro PRN  - not on ACEI or Plavix  - continue cardiac diet  - to follow-up with cardiology outpatient     HLD  - cholesterol 129, HDL 38, total cholesterol 3, triglycerides 42, LDL 83, VLDL 8 on 11/02/2023  - continue                 Lipitor 40 mg daily                 Zetia 10 mg daily     HFpEF  - LVEF 55% on 10/11/2022  - continue                Metoprolol succinate 50 mg daily  - monitor weights daily  - to follow-up with cardiology outpatient     PAD  - stable  - continue                Aspirin 81 mg daily                Lipitor 40 mg daily                Eliquis 5 mg b.i.d.  - to follow-up with vascular surgery outpatient     Bilateral carotid artery stenosis  - moderate-no interventions at this time  - continue                Aspirin 81 mg daily                Lipitor 40 mg daily                Eliquis 5 mg b.i.d.  - to follow-up with vascular surgery outpatient     Paroxysmal atrial fibrillation  - no evidence of bleeding  - rate controlled  - continue                Amiodarone 200 mg b.i.d. (to end on 11/10)                Amiodarone daily to begin on 11/11                Eliquis 5 mg b.i.d.                Metoprolol succinate 50 mg daily   - to follow-up with cardiology outpatient     Normocytic anemia  - asymptomatic  - H/H trending down  - iron 33, TIBC 159, iron binding capacity 126, transferrin 141, iron saturation 21 on 11/10/2023  - continue                Ferrous sulfate 325 mg daily (initiated 11/10)  - no evidence of active bleeds  - will closely monitor and transfuse if needed      HTN  - BP at goal  - continue                Metoprolol succinate 50 mg daily                Norvasc 10 mg daily                  Hydralazine 10 mg every 2 hours as needed for BP > 160/90                Labetalol 10 mg every 2 hours as needed for BP > 160/90  - low sodium diet     DM type II  - HgA1c 9.3 on 11/02/2023  - continue                Metformin 1000 mg twice daily                Glipizide 5 mg with breakfast                Januvia 100 mg daily                ISS   - CBGs AC/HS     GERD  - Avoid spicy foods, and nothing to eat or drink within x2 hours of bedtime or laying flat (water is ok)   - Avoid NSAIDs (Advil, ibuprofen, naproxen...)  and andrade-2 inhibitors (Mobic, Celebrex)    - continue                Pepcid 40 mg daily      Osteoarthritis  - stable  - continue                Meloxicam 15 mg daily                 Vitamin-D 3 400 units daily     VTE Prophylaxis:  Eliquis 5 mg b.i.d.  COVID-19 testing:  Unknown  COVID-19 vaccination status:  Vaccinated ([employer]):  03/05/2021 and ([employer]):  11/09/2021 and 06/22/2022     POA: No  Living will: No  Contacts: Heaven Burns ([facility]) [phone]     CODE STATUS: Full Code  Internal Medicine (attending): Salvador Sebastian MD  Physiatry (consulting):  Patricio Daniel MD     OUTPATIENT PROVIDERS  PCP:  JONG Clifton  Cardiology:  Clay chen MD  CV surgery:  Heriberto Lara MD     DISPOSITION:  Vital signs at goal.  No labs today.  Bowel management, sleep hygiene, and appetite at goal.  Good glycemic control.  Aggressively mobilize as tolerated with therapies.  Monitor closely.  Notify of any acute changes.

## 2023-11-13 LAB
ALBUMIN SERPL-MCNC: 2.8 G/DL (ref 3.4–4.8)
ALBUMIN/GLOB SERPL: 0.7 RATIO (ref 1.1–2)
ALP SERPL-CCNC: 59 UNIT/L (ref 40–150)
ALT SERPL-CCNC: 16 UNIT/L (ref 0–55)
AST SERPL-CCNC: 19 UNIT/L (ref 5–34)
BASOPHILS # BLD AUTO: 0.02 X10(3)/MCL
BASOPHILS NFR BLD AUTO: 0.2 %
BILIRUB SERPL-MCNC: 0.5 MG/DL
BUN SERPL-MCNC: 15.9 MG/DL (ref 9.8–20.1)
CALCIUM SERPL-MCNC: 9.5 MG/DL (ref 8.4–10.2)
CHLORIDE SERPL-SCNC: 108 MMOL/L (ref 98–107)
CO2 SERPL-SCNC: 29 MMOL/L (ref 23–31)
CREAT SERPL-MCNC: 1.09 MG/DL (ref 0.55–1.02)
EOSINOPHIL # BLD AUTO: 0.19 X10(3)/MCL (ref 0–0.9)
EOSINOPHIL NFR BLD AUTO: 1.5 %
ERYTHROCYTE [DISTWIDTH] IN BLOOD BY AUTOMATED COUNT: 15.3 % (ref 11.5–17)
GFR SERPLBLD CREATININE-BSD FMLA CKD-EPI: 53 MLS/MIN/1.73/M2
GLOBULIN SER-MCNC: 3.8 GM/DL (ref 2.4–3.5)
GLUCOSE SERPL-MCNC: 145 MG/DL (ref 82–115)
HCT VFR BLD AUTO: 31.6 % (ref 37–47)
HGB BLD-MCNC: 9.7 G/DL (ref 12–16)
IMM GRANULOCYTES # BLD AUTO: 0.05 X10(3)/MCL (ref 0–0.04)
IMM GRANULOCYTES NFR BLD AUTO: 0.4 %
LYMPHOCYTES # BLD AUTO: 2.31 X10(3)/MCL (ref 0.6–4.6)
LYMPHOCYTES NFR BLD AUTO: 17.9 %
MAGNESIUM SERPL-MCNC: 1.5 MG/DL (ref 1.6–2.6)
MCH RBC QN AUTO: 28.3 PG (ref 27–31)
MCHC RBC AUTO-ENTMCNC: 30.7 G/DL (ref 33–36)
MCV RBC AUTO: 92.1 FL (ref 80–94)
MONOCYTES # BLD AUTO: 0.86 X10(3)/MCL (ref 0.1–1.3)
MONOCYTES NFR BLD AUTO: 6.6 %
NEUTROPHILS # BLD AUTO: 9.51 X10(3)/MCL (ref 2.1–9.2)
NEUTROPHILS NFR BLD AUTO: 73.4 %
NRBC BLD AUTO-RTO: 0 %
PHOSPHATE SERPL-MCNC: 3.1 MG/DL (ref 2.3–4.7)
PLATELET # BLD AUTO: 546 X10(3)/MCL (ref 130–400)
PMV BLD AUTO: 8.8 FL (ref 7.4–10.4)
POCT GLUCOSE: 111 MG/DL (ref 70–110)
POCT GLUCOSE: 145 MG/DL (ref 70–110)
POCT GLUCOSE: 148 MG/DL (ref 70–110)
POCT GLUCOSE: 186 MG/DL (ref 70–110)
POTASSIUM SERPL-SCNC: 4.4 MMOL/L (ref 3.5–5.1)
PREALB SERPL-MCNC: 15.5 MG/DL (ref 14–37)
PROT SERPL-MCNC: 6.6 GM/DL (ref 5.8–7.6)
RBC # BLD AUTO: 3.43 X10(6)/MCL (ref 4.2–5.4)
SODIUM SERPL-SCNC: 145 MMOL/L (ref 136–145)
WBC # SPEC AUTO: 12.94 X10(3)/MCL (ref 4.5–11.5)

## 2023-11-13 PROCEDURE — 97116 GAIT TRAINING THERAPY: CPT

## 2023-11-13 PROCEDURE — 97110 THERAPEUTIC EXERCISES: CPT | Mod: CQ

## 2023-11-13 PROCEDURE — 97530 THERAPEUTIC ACTIVITIES: CPT

## 2023-11-13 PROCEDURE — 97535 SELF CARE MNGMENT TRAINING: CPT

## 2023-11-13 PROCEDURE — 97110 THERAPEUTIC EXERCISES: CPT

## 2023-11-13 PROCEDURE — 25000003 PHARM REV CODE 250: Performed by: NURSE PRACTITIONER

## 2023-11-13 PROCEDURE — 80053 COMPREHEN METABOLIC PANEL: CPT | Performed by: NURSE PRACTITIONER

## 2023-11-13 PROCEDURE — 83735 ASSAY OF MAGNESIUM: CPT | Performed by: NURSE PRACTITIONER

## 2023-11-13 PROCEDURE — 94799 UNLISTED PULMONARY SVC/PX: CPT

## 2023-11-13 PROCEDURE — 11800000 HC REHAB PRIVATE ROOM

## 2023-11-13 PROCEDURE — 84134 ASSAY OF PREALBUMIN: CPT | Performed by: NURSE PRACTITIONER

## 2023-11-13 PROCEDURE — 99900035 HC TECH TIME PER 15 MIN (STAT)

## 2023-11-13 PROCEDURE — 63600175 PHARM REV CODE 636 W HCPCS: Performed by: NURSE PRACTITIONER

## 2023-11-13 PROCEDURE — 99233 PR SUBSEQUENT HOSPITAL CARE,LEVL III: ICD-10-PCS | Mod: ,,, | Performed by: NURSE PRACTITIONER

## 2023-11-13 PROCEDURE — 99233 SBSQ HOSP IP/OBS HIGH 50: CPT | Mod: ,,, | Performed by: NURSE PRACTITIONER

## 2023-11-13 PROCEDURE — 84100 ASSAY OF PHOSPHORUS: CPT | Performed by: NURSE PRACTITIONER

## 2023-11-13 PROCEDURE — 85025 COMPLETE CBC W/AUTO DIFF WBC: CPT | Performed by: NURSE PRACTITIONER

## 2023-11-13 RX ORDER — LANOLIN ALCOHOL/MO/W.PET/CERES
800 CREAM (GRAM) TOPICAL 2 TIMES DAILY
Status: COMPLETED | OUTPATIENT
Start: 2023-11-13 | End: 2023-11-15

## 2023-11-13 RX ADMIN — METOPROLOL SUCCINATE 50 MG: 50 TABLET, FILM COATED, EXTENDED RELEASE ORAL at 08:11

## 2023-11-13 RX ADMIN — Medication 800 MG: at 08:11

## 2023-11-13 RX ADMIN — AMIODARONE HYDROCHLORIDE 200 MG: 200 TABLET ORAL at 08:11

## 2023-11-13 RX ADMIN — INSULIN ASPART 2 UNITS: 100 INJECTION, SOLUTION INTRAVENOUS; SUBCUTANEOUS at 11:11

## 2023-11-13 RX ADMIN — METFORMIN HYDROCHLORIDE 1000 MG: 500 TABLET, FILM COATED ORAL at 04:11

## 2023-11-13 RX ADMIN — HYDROCODONE BITARTRATE AND ACETAMINOPHEN 1 TABLET: 7.5; 325 TABLET ORAL at 11:11

## 2023-11-13 RX ADMIN — EZETIMIBE 10 MG: 10 TABLET ORAL at 08:11

## 2023-11-13 RX ADMIN — FERROUS SULFATE TAB 325 MG (65 MG ELEMENTAL FE) 1 EACH: 325 (65 FE) TAB at 08:11

## 2023-11-13 RX ADMIN — APIXABAN 5 MG: 5 TABLET, FILM COATED ORAL at 08:11

## 2023-11-13 RX ADMIN — ASPIRIN 81 MG: 81 TABLET, COATED ORAL at 08:11

## 2023-11-13 RX ADMIN — HYDROXYZINE PAMOATE 50 MG: 50 CAPSULE ORAL at 08:11

## 2023-11-13 RX ADMIN — DOCUSATE SODIUM 100 MG: 100 CAPSULE, LIQUID FILLED ORAL at 08:11

## 2023-11-13 RX ADMIN — GLIPIZIDE 5 MG: 5 TABLET, FILM COATED, EXTENDED RELEASE ORAL at 08:11

## 2023-11-13 RX ADMIN — ATORVASTATIN CALCIUM 40 MG: 40 TABLET, FILM COATED ORAL at 08:11

## 2023-11-13 RX ADMIN — AMLODIPINE BESYLATE 10 MG: 5 TABLET ORAL at 08:11

## 2023-11-13 RX ADMIN — METFORMIN HYDROCHLORIDE 1000 MG: 500 TABLET, FILM COATED ORAL at 09:11

## 2023-11-13 RX ADMIN — Medication 800 MG: at 09:11

## 2023-11-13 RX ADMIN — SITAGLIPTIN 100 MG: 50 TABLET, FILM COATED ORAL at 08:11

## 2023-11-13 NOTE — PT/OT/SLP PROGRESS
Physical Therapy Inpatient Rehab Treatment    Patient Name:  Geno Barry   MRN:  [id number]    Recommendations:     Discharge Recommendations:  Low Intensity Therapy   Discharge Equipment Recommendations:     Barriers to discharge: Impaired functional mobility , impaired safety awareness, impaired strength    Assessment:     Geno Barry is a 72 y.o. female admitted with a medical diagnosis of S/P CABG (coronary artery bypass graft).  She presents with the following impairments/functional limitations:  weakness, impaired endurance, impaired functional mobility, gait instability, impaired balance, decreased lower extremity function, decreased safety awareness     SPT Note: patient has improved her sit to stand with VC's and needs VC's for sternal precautions still but is improving. Patient did look like she had a mild opening of the inferior part of her incision but we checked with NSG and they said it was like that this morning and everything is fine.    Rehab Diagnosis: Multivessel CAD s/p CABG x 2    General Precautions: Standard, sternal     Orthopedic Precautions:N/A     Braces: N/A    Rehab Prognosis: Fair; patient would benefit from acute skilled PT services to address these deficits and reach maximum level of function.      History:     Past Medical History:   Diagnosis Date    CAD (coronary artery disease)     Diabetes mellitus     Hypertension     PAD (peripheral artery disease)        Past Surgical History:   Procedure Laterality Date    CORONARY ARTERY BYPASS GRAFT (CABG) N/A 11/2/2023    Procedure: CORONARY ARTERY BYPASS GRAFT (CABG);  Surgeon: Heriberto Lara IV, MD;  Location: [facility];  Service: Cardiothoracic;  Laterality: N/A;    ENDOSCOPIC HARVEST OF VEIN N/A 11/2/2023    Procedure: SURGICAL PROCUREMENT, VEIN, ENDOSCOPIC;  Surgeon: Heriberto Lara IV, MD;  Location: [facility];  Service: Cardiothoracic;  Laterality: N/A;    HYSTERECTOMY      LEFT HEART CATHETERIZATION Left 11/2/2023     "Procedure: Left heart cath;  Surgeon: Puma Vera MD;  Location: [facility] CATH LAB;  Service: Cardiology;  Laterality: Left;  LHC +/- PCI VIA RRA    LUMPECTOMY, BREAST Right        Subjective     Patient comments: "I am sore but feel better"    Respiratory Status: Room air    Patients cultural, spiritual, Jewish conflicts given the current situation: no    Objective:     Patient found up in chair with peripheral IV  upon PT entry to room.    Pt is Alert, Cooperative, and Motivated.    Vitals   Vitals before tx  /65   HR 67   O2 Sat    Pain      Vitals after tx  BP (!) 151/74   HR 83   O2 Sat     Pain         Functional Mobility:        Current   Status  Discharge   Goal   Functional Area: Care Score:    Sit to Lying 2  Max A with BLE and trunk to not break sternal precautions. Patient wants to use a leg  when she goes home so we practiced with it and told her how she needs to use both her hands to pull to not break sternal precautions. Independent   Lying to Sitting on Side of Bed 2  Max A with BLE and trunk to not break sternal precautions. Patient wants to use a leg  when she goes home so we practiced with it and told her how she needs to use both her hands to pull to not break sternal precautions. Independent   Sit to Stand 3  Mod A with rollator. Patient improved with repetition and VC's but still needs reminders of nose over toes to compensate for posterior lean   Independent   Chair/Bed-to-Chair Transfer 3  Mod A with rollator stand step. Patient improved with repetition and VC's but still needs reminders of nose over toes to compensate for posterior lean Independent   Walk 10 Feet 4  Supervision with Rollator Independent   Walk 50 Feet with Two Turns 4  Supervision with rollator for 129,123,125 ft limited by fatigue. Independent       Therapeutic Activities and Exercises:  -patient was educated on supine to sit and sit to supine and practiced it 2 times, once with a leg  and " once without it. Patient still needs max A with bed mobility but seems to be improving slightly.  -supine: abdominal squeeze x10  -seated 2x10ea: 3# march and 3# knee ext, hip add with ball, hip abd with green theraband, knee flex with grn TB  -seated: mod situp with 3 pillow and wedge 2x10. Side crunch with 2pillow on side 1x10  - STS x3 at each height: 24inches, 22.5inches,21.5 inches.  [id number] at 20.5inches and patient failed on the last rep due to fatigue.      Activity Tolerance: Good and Fair    Patient left up in chair with all lines intact and call button in reach.    Education provided: transfer training, bed mob, gait training, balance training, safety awareness, body mechanics, assistive device, and strengthening exercises, and sternal precautions    Expected compliance: Moderate compliance and Low compliance    Plan:     During this hospitalization, patient to be seen 5 x/week (5-7x/wk) to address the identified rehab impairments via gait training, therapeutic activities, therapeutic exercises, neuromuscular re-education and progress toward the following goals:    GOALS:   Multidisciplinary Problems       Physical Therapy Goals          Problem: Physical Therapy    Goal Priority Disciplines Outcome Goal Variances Interventions   Physical Therapy Goal     PT, PT/OT Ongoing, Progressing     Description: Bed Mobility:  Roll left and right with partial/moderate assist.  Sit to supine transfer with partial/moderate assist.  Supine to sit transfer with partial/moderate assist.    Transfers:  Sit to stand transfer with partial/moderate assist using RW.  Bed to chair transfer with partial/moderate assist Stand Step  using RW.  Car transfer with substantial/maximal assist using RW.   an object from the ground in standing position with substantial/maximal assist using RW.    Mobility:  Ambulate 125 feet with supervision/touching assist using RW.  Ambulate 30 feet on uneven surfaces/ramps with  supervision/touching assist using RW.  Pt ascended/descended a 4 inch curb with partial/moderate assist using RW.  Ascend/descend 2 stairs with substantial/maximal assist using no handrails.                       Plan of Care Expires:  12/01/23  PT Next Visit Date: 11/16/23  Plan of Care reviewed with: patient    Additional Information:         Time Tracking:     Therapy Time  PT Start Time: 1300  PT Stop Time: 1430  PT Total Time (min): 90 min   PT Individual: 90  Missed Time:    Time Missed due to:      Billable Minutes: Gait Training 15, Therapeutic Activity 45, and Therapeutic Exercise 30    11/13/2023

## 2023-11-13 NOTE — PT/OT/SLP PROGRESS
Physical Therapy Inpatient Rehab Treatment    Patient Name:  Geno Barry   MRN:  [id number]    Recommendations:     Discharge Recommendations:  Low Intensity Therapy   Discharge Equipment Recommendations:     Barriers to discharge: Impaired functional mobility     Assessment:     Geno Barry is a 72 y.o. female admitted with a medical diagnosis of S/P CABG (coronary artery bypass graft).  She presents with the following impairments/functional limitations:  weakness, impaired endurance, impaired functional mobility, gait instability, impaired balance, decreased lower extremity function, decreased safety awareness .    Rehab Diagnosis: Multivessel CAD s/p CABG x 2    General Precautions: Standard, sternal     Orthopedic Precautions:N/A     Braces: N/A    Rehab Prognosis: Good; patient would benefit from acute skilled PT services to address these deficits and reach maximum level of function.      History:     Past Medical History:   Diagnosis Date    CAD (coronary artery disease)     Diabetes mellitus     Hypertension     PAD (peripheral artery disease)        Past Surgical History:   Procedure Laterality Date    CORONARY ARTERY BYPASS GRAFT (CABG) N/A 11/2/2023    Procedure: CORONARY ARTERY BYPASS GRAFT (CABG);  Surgeon: Heriberto Lara IV, MD;  Location: [facility] OR;  Service: Cardiothoracic;  Laterality: N/A;    ENDOSCOPIC HARVEST OF VEIN N/A 11/2/2023    Procedure: SURGICAL PROCUREMENT, VEIN, ENDOSCOPIC;  Surgeon: Heriberto Lara IV, MD;  Location: [facility] OR;  Service: Cardiothoracic;  Laterality: N/A;    HYSTERECTOMY      LEFT HEART CATHETERIZATION Left 11/2/2023    Procedure: Left heart cath;  Surgeon: Puma Vera MD;  Location: [facility] CATH LAB;  Service: Cardiology;  Laterality: Left;  LHC +/- PCI VIA RRA    LUMPECTOMY, BREAST Right        Subjective     Patient comments: n/a    Respiratory Status: Room air    Patients cultural, spiritual, Protestant conflicts given the current situation:  no    Objective:      Patient found up in chair with peripheral IV  upon PT entry to room.      Vitals   Vitals at Rest  /68   HR 70   O2 Sat    Pain      Vitals With Activity  /67   HR 66   O2 Sat     Pain         Functional Mobility:        Current   Status  Discharge   Goal   Functional Area: Care Score:    Roll Left and Right   Independent   Sit to Lying   Independent   Lying to Sitting on Side of Bed   Independent   Sit to Stand 2  Required max assist and vc to lean forward.  Independent   Chair/Bed-to-Chair Transfer 4  W/ Rolator  Independent   Car Transfer   Independent   Walk 10 Feet 4 Independent   Walk 50 Feet with Two Turns 4 Independent   Walk 150 Feet 4  Pt amb 120ft and 200ft w/ Rolator. Pt required seated rest breaks due to being fatigued.  Supervision or touching assistance   Walk 10 Feet Uneven Surface   Supervision or touching assistance   1 Step (Curb)   Supervision or touching assistance   4 Steps   Not applicable   12 Steps   Not applicable   Picking Up Object   Supervision or touching assistance   Wheel 50 Feet with Two Turns   Not attempted due to medical/safety concerns   Wheel 150 Feet   Not attempted due to medical/safety concerns       Therapeutic Activities and Exercises:  Patient educated on safety while in hospital including calling nurse for mobility  Patient performed 2 set(s) of 10 repetitions of the following seated exercises: ankle pumps, long arc quads, marches, and hip adduction for bilateral LE. Patient required skilled PT for instruction of exercises and appropriate cues to perform exercises safely and appropriately.    Activity Tolerance: Good    Patient left up in chair with all lines intact and call button in reach.    Education provided: gait training and strengthening exercises    Expected compliance: Moderate compliance    Plan:     During this hospitalization, patient to be seen 5 x/week (5-7x/wk) to address the identified rehab impairments via gait training,  therapeutic activities, therapeutic exercises, neuromuscular re-education and progress toward the following goals:    GOALS:   Multidisciplinary Problems       Physical Therapy Goals          Problem: Physical Therapy    Goal Priority Disciplines Outcome Goal Variances Interventions   Physical Therapy Goal     PT, PT/OT Ongoing, Progressing     Description: Bed Mobility:  Roll left and right with partial/moderate assist.  Sit to supine transfer with partial/moderate assist.  Supine to sit transfer with partial/moderate assist.    Transfers:  Sit to stand transfer with partial/moderate assist using RW.  Bed to chair transfer with partial/moderate assist Stand Step  using RW.  Car transfer with substantial/maximal assist using RW.   an object from the ground in standing position with substantial/maximal assist using RW.    Mobility:  Ambulate 125 feet with supervision/touching assist using RW.  Ambulate 30 feet on uneven surfaces/ramps with supervision/touching assist using RW.  Pt ascended/descended a 4 inch curb with partial/moderate assist using RW.  Ascend/descend 2 stairs with substantial/maximal assist using no handrails.                       Plan of Care Expires:  12/01/23  PT Next Visit Date: 11/16/23  Plan of Care reviewed with: patient    Additional Information:         Time Tracking:     Therapy Time  PT Received On: 11/13/23  PT Start Time: 1500  PT Stop Time: 1530  PT Total Time (min): 30 min   PT Individual: 30  Missed Time:    Time Missed due to:      Billable Minutes: Gait Training 15 and Therapeutic Exercise 15    11/13/2023

## 2023-11-13 NOTE — PROGRESS NOTES
[facility] ([facility])  Rehab Progress Note    Patient Name: Geno Barry  MRN: [id number]  Age: 72 y.o. Sex: female  : 1950  Hospital Length of Stay: 4 days  Date of Service: 2023   Chief Complaint: Multivessel CAD s/p CABG x2 on 2023    Subjective:     Basic Information  Admit Information: 72-year-old  female presented to [facility] for scheduled CABG on 2023.  PMH significant for PID, DM type 2, CAD, and carotid artery stenosis.  Tolerated CABG x2 on  without perioperative complications.  Aspirin, Norvasc, and beta-blocker continued.  Lipitor 40 mg daily initiated on .  Chest tube discontinued on .  Amiodarone initiated due to postoperative atrial fibrillation.  Eliquis 5 mg b.i.d. initiated on .  Tolerated transfer to [facility] inpatient rehab unit on  without incident.  Today's Information: No acute events overnight.  No acute events overnight.  Sitting in chair comfortably.  Reports good sleep and appetite.  Last BM .  Vital signs at goal with no recent recorded fevers.  CBC unremarkable.  Magnesium 1.5. Good glycemic control.  Monitor closely.  No imaging today.      Review of patient's allergies indicates:   Allergen Reactions    Ace inhibitors Swelling    Clopidogrel Swelling    Iodine         Current Facility-Administered Medications:     acetaminophen tablet 650 mg, 650 mg, Oral, Q8H PRN, Elizabeth, Marina A, FNP    ALPRAZolam tablet 0.25 mg, 0.25 mg, Oral, TID PRN, Duglas, Isidoro A, FNP    amiodarone tablet 200 mg, 200 mg, Oral, Daily, Duglas, Isidoro A, FNP, 200 mg at 2328    amLODIPine tablet 10 mg, 10 mg, Oral, Daily, Duglas, Isidoro A, FNP, 10 mg at 23    apixaban tablet 5 mg, 5 mg, Oral, BID, Duglas, Isidoro A, FNP, 5 mg at 23    aspirin EC tablet 81 mg, 81 mg, Oral, Daily, Duglas, Isidoro A, FNP, 81 mg at 23    atorvastatin tablet 40 mg, 40 mg, Oral,  Daily, Duglas, Isidoro A, FNP, 40 mg at 11/13/23 0828    benzonatate capsule 100 mg, 100 mg, Oral, TID PRN, Duglas, Isidoro A, FNP    bisacodyL suppository 10 mg, 10 mg, Rectal, Daily PRN, Duglas, Isidoro A, FNP    cholecalciferol (vitamin D3) capsule/tablet 400 Units, 400 Units, Oral, Daily, Duglas, Isidoro A, FNP, 400 Units at 11/12/23 0800    dextrose 10% bolus 125 mL 125 mL, 12.5 g, Intravenous, PRN, Duglas, Isidoro A, FNP    dextrose 10% bolus 250 mL 250 mL, 25 g, Intravenous, PRN, Duglas, Isidoro A, FNP    docusate sodium capsule 100 mg, 100 mg, Oral, BID, Duglas, Isidoro A, FNP, 100 mg at 11/13/23 0828    ezetimibe tablet 10 mg, 10 mg, Oral, Daily, Duglas, Isidoro A, FNP, 10 mg at 11/13/23 0828    ferrous sulfate tablet 1 each, 1 tablet, Oral, Daily, Duglas, Isidoro A, FNP, 1 each at 11/13/23 0828    glipiZIDE 24 hr tablet 5 mg, 5 mg, Oral, Daily with breakfast, Duglas, Isidoro A, FNP, 5 mg at 11/13/23 0828    glucagon (human recombinant) injection 1 mg, 1 mg, Intramuscular, PRN, Duglas, Isidoro A, FNP    glucose chewable tablet 16 g, 16 g, Oral, PRN, Duglas, Isidoro A, FNP    glucose chewable tablet 24 g, 24 g, Oral, PRN, Duglas, Isidoro A, FNP    hydrALAZINE injection 10 mg, 10 mg, Intravenous, Q4H PRN, Duglas, Isidoro A, FNP    HYDROcodone-acetaminophen 7.5-325 mg per tablet 1 tablet, 1 tablet, Oral, TID PRN, [place], Marina A, FNP    hydrOXYzine pamoate capsule 50 mg, 50 mg, Oral, QHS, Duglas, Isidoro A, FNP, 50 mg at 11/12/23 2014    insulin aspart U-100 injection 0-10 Units, 0-10 Units, Subcutaneous, QID (AC + HS) PRN, Duglas, Isidoro A, FNP, 2 Units at 11/12/23 1654    labetalol 20 mg/4 mL (5 mg/mL) IV syring, 10 mg, Intravenous, Q4H PRN, Duglas, Isidoro A, FNP    magnesium oxide tablet 800 mg, 800 mg, Oral, BID, Duglas, Isidoro A, FNP    metFORMIN tablet 1,000 mg, 1,000 mg, Oral, BID WM, Duglas, Isidoro A, FNP, 1,000 mg at 11/12/23 1654     "methocarbamoL tablet 500 mg, 500 mg, Oral, TID PRN, Marina Johnson A, FNP    metoprolol injection 10 mg, 10 mg, Intravenous, Q2H PRN, Duglas, Isidoro A, FNP    metoprolol succinate (TOPROL-XL) 24 hr tablet 50 mg, 50 mg, Oral, Daily, Duglas, Isidoro A, FNP, 50 mg at 11/13/23 0828    nitroGLYCERIN SL tablet 0.4 mg, 0.4 mg, Sublingual, Q5 Min PRN, Duglas, Isidoro A, FNP    ondansetron disintegrating tablet 4 mg, 4 mg, Oral, Q6H PRN, Duglas, Isidoro A, FNP    polyethylene glycol packet 17 g, 17 g, Oral, Q12H PRN, Duglas, Isidoro A, FNP    promethazine tablet 25 mg, 25 mg, Oral, Q6H PRN, Duglas, Isidoro A, FNP    SITagliptin phosphate tablet 100 mg, 100 mg, Oral, Daily, Duglas, Isidoro A, FNP, 100 mg at 11/13/23 0828     Review of Systems   Complete 12-point review of symptoms negative except for what's mentioned in HPI     Objective:     /72   Pulse 77   Temp 97.9 °F (36.6 °C) (Oral)   Resp 18   Ht 5' 4" (1.626 m)   Wt 92.4 kg (203 lb 11.3 oz)   SpO2 95%   Breastfeeding No   BMI 34.97 kg/m²      Physical Exam  Vitals reviewed.   Eyes:      Pupils: Pupils are equal, round, and reactive to light.   Cardiovascular:      Rate and Rhythm: Normal rate and regular rhythm.      Heart sounds: Normal heart sounds.   Pulmonary:      Effort: Pulmonary effort is normal.      Breath sounds: Normal breath sounds.   Abdominal:      General: Bowel sounds are normal.      Comments: obese   Musculoskeletal:         General: Normal range of motion.   Skin:     General: Skin is warm.      Comments: Sternal incision clean and intact.  Chest tube sutures intact. Left leg harvest site intact.  Left lower extremity trace edema   Neurological:      General: No focal deficit present.      Mental Status: She is alert and oriented to person, place, and time.      Motor: Weakness present.   Psychiatric:         Mood and Affect: Mood normal.     *MD performed and documented physical examination   "     Lines/Drains/Airways       None                 Labs  Recent Results (from the past 24 hour(s))   POCT glucose    Collection Time: 11/12/23 11:15 AM   Result Value Ref Range    POCT Glucose 150 (H) 70 - 110 mg/dL   POCT glucose    Collection Time: 11/12/23  4:51 PM   Result Value Ref Range    POCT Glucose 147 (H) 70 - 110 mg/dL   POCT glucose    Collection Time: 11/12/23  8:13 PM   Result Value Ref Range    POCT Glucose 169 (H) 70 - 110 mg/dL   Prealbumin    Collection Time: 11/13/23  5:11 AM   Result Value Ref Range    Prealbumin 15.5 14.0 - 37.0 mg/dL   Comprehensive Metabolic Panel    Collection Time: 11/13/23  5:11 AM   Result Value Ref Range    Sodium Level 145 136 - 145 mmol/L    Potassium Level 4.4 3.5 - 5.1 mmol/L    Chloride 108 (H) 98 - 107 mmol/L    Carbon Dioxide 29 23 - 31 mmol/L    Glucose Level 145 (H) 82 - 115 mg/dL    Blood Urea Nitrogen 15.9 9.8 - 20.1 mg/dL    Creatinine 1.09 (H) 0.55 - 1.02 mg/dL    Calcium Level Total 9.5 8.4 - 10.2 mg/dL    Protein Total 6.6 5.8 - 7.6 gm/dL    Albumin Level 2.8 (L) 3.4 - 4.8 g/dL    Globulin 3.8 (H) 2.4 - 3.5 gm/dL    Albumin/Globulin Ratio 0.7 (L) 1.1 - 2.0 ratio    Bilirubin Total 0.5 <=1.5 mg/dL    Alkaline Phosphatase 59 40 - 150 unit/L    Alanine Aminotransferase 16 0 - 55 unit/L    Aspartate Aminotransferase 19 5 - 34 unit/L    eGFR 53 mls/min/1.73/m2   Magnesium    Collection Time: 11/13/23  5:11 AM   Result Value Ref Range    Magnesium Level 1.50 (L) 1.60 - 2.60 mg/dL   Phosphorus    Collection Time: 11/13/23  5:11 AM   Result Value Ref Range    Phosphorus Level 3.1 2.3 - 4.7 mg/dL   CBC with Differential    Collection Time: 11/13/23  5:11 AM   Result Value Ref Range    WBC 12.94 (H) 4.50 - 11.50 x10(3)/mcL    RBC 3.43 (L) 4.20 - 5.40 x10(6)/mcL    Hgb 9.7 (L) 12.0 - 16.0 g/dL    Hct 31.6 (L) 37.0 - 47.0 %    MCV 92.1 80.0 - 94.0 fL    MCH 28.3 27.0 - 31.0 pg    MCHC 30.7 (L) 33.0 - 36.0 g/dL    RDW 15.3 11.5 - 17.0 %    Platelet 546 (H) 130 - 400  x10(3)/mcL    MPV 8.8 7.4 - 10.4 fL    Neut % 73.4 %    Lymph % 17.9 %    Mono % 6.6 %    Eos % 1.5 %    Basophil % 0.2 %    Lymph # 2.31 0.6 - 4.6 x10(3)/mcL    Neut # 9.51 (H) 2.1 - 9.2 x10(3)/mcL    Mono # 0.86 0.1 - 1.3 x10(3)/mcL    Eos # 0.19 0 - 0.9 x10(3)/mcL    Baso # 0.02 <=0.2 x10(3)/mcL    IG# 0.05 (H) 0 - 0.04 x10(3)/mcL    IG% 0.4 %    NRBC% 0.0 %   POCT glucose    Collection Time: 11/13/23  5:36 AM   Result Value Ref Range    POCT Glucose 145 (H) 70 - 110 mg/dL     Radiology  CXR 2 view on 11/10/2023, IMPRESSION: Bilateral pleural effusions.   Increased left retrocardiac density and silhouetting of the left hemidiaphragm which might be related to pleural fluid, however, infiltrate/atelectasis cannot be completely excluded. Compressive atelectatic changes in both bases, however, superimposed infiltrates cannot be completely excluded   Radiology  Transthoracic echo on 11/02/2023, IMPRESSION: Left Ventricle: regional wall motion abnormalities present.  The distal apex, apical inferior and distal septal walls are hypokinetic There is moderately reduced systolic function with a visually estimated ejection fraction of 35 - 40%. Grade I diastolic dysfunction.  Right Ventricle: Normal right ventricular cavity size. Wall thickness is normal. Right ventricle wall motion  is normal. Systolic function is normal.  Mitral Valve: There is mild regurgitation.  Diagnostic studies   [facility] on 11/02/2023, IMPRESSION: Left Main-luminal irregularities. LAD-mid subtotal occlusion involving large diagonal. LCX-non dominant, luminal irregularities. RCA-dominant, 40% ostial stenosis. LVEDP 18    Assessment/Plan:     72 y.o. AAF admitted on 11/9/2023     Multivessel CAD  - s/p CABG x2 on 11/02/2023 (LIMA to LAD and rSVG to Diag 1)  - denies recent chest pain or discomfort  - continue                Aspirin 81 mg daily                Lipitor 40 mg daily                Metoprolol succinate 50 mg daily                 Norco 7.5  mg/325 mg q.8 hours p.r.n.   - ECG and Nitro PRN  - not on ACEI or Plavix  - continue cardiac diet  - to follow-up with cardiology outpatient     HLD  - cholesterol 129, HDL 38, total cholesterol 3, triglycerides 42, LDL 83, VLDL 8 on 11/02/2023  - continue                Lipitor 40 mg daily                 Zetia 10 mg daily     HFpEF  - LVEF 55% on 10/11/2022  - continue                Metoprolol succinate 50 mg daily  - monitor weights daily  - to follow-up with cardiology outpatient     PAD  - stable  - continue                Aspirin 81 mg daily                Lipitor 40 mg daily                Eliquis 5 mg b.i.d.  - to follow-up with vascular surgery outpatient     Bilateral carotid artery stenosis  - moderate-no interventions at this time  - continue                Aspirin 81 mg daily                Lipitor 40 mg daily                Eliquis 5 mg b.i.d.  - to follow-up with vascular surgery outpatient     Paroxysmal atrial fibrillation  - no evidence of bleeding  - rate controlled  - continue                Amiodarone 200 mg b.i.d. (to end on 11/10)                Amiodarone daily to begin on 11/11                Eliquis 5 mg b.i.d.                Metoprolol succinate 50 mg daily   - to follow-up with cardiology outpatient     Normocytic anemia  - asymptomatic  - H/H trending down  - iron 33, TIBC 159, iron binding capacity 126, transferrin 141, iron saturation 21 on 11/10/2023  - continue                Ferrous sulfate 325 mg daily (initiated 11/10)  - no evidence of active bleeds  - will closely monitor and transfuse if needed      HTN  - BP at goal  - continue                Metoprolol succinate 50 mg daily                Norvasc 10 mg daily                  Hydralazine 10 mg every 2 hours as needed for BP > 160/90                Labetalol 10 mg every 2 hours as needed for BP > 160/90  - low sodium diet     DM type II  - HgA1c 9.3 on 11/02/2023  - continue                Metformin 1000 mg twice daily                 Glipizide 5 mg with breakfast                Januvia 100 mg daily                ISS   - CBGs AC/HS     GERD  - Avoid spicy foods, and nothing to eat or drink within x2 hours of bedtime or laying flat (water is ok)   - Avoid NSAIDs (Advil, ibuprofen, naproxen...) and andrade-2 inhibitors (Mobic, Celebrex)    - continue                Pepcid 40 mg daily      Osteoarthritis  - stable  - continue                Meloxicam 15 mg daily                 Vitamin-D 3 400 units daily     VTE Prophylaxis:  Eliquis 5 mg b.i.d.  COVID-19 testing:  Unknown  COVID-19 vaccination status:  Vaccinated ([employer]):  03/05/2021 and ([employer]):  11/09/2021 and 06/22/2022     POA: No  Living will: No  Contacts: Heaven Burns ([facility]) [phone]     CODE STATUS: Full Code  Internal Medicine (attending): Salvador Sebastian MD  Physiatry (consulting):  Patricio Daniel MD     OUTPATIENT PROVIDERS  PCP:  JONG Clifton  Cardiology:  Clay chen MD  CV surgery:  Heriberto Lara MD     DISPOSITION:  Sleep hygiene, bowel maintenance, and appetite at goal.  Vital signs at goal with no recorded fevers. Good glycemic control.  CBC unremarkable.  Mag level 1.5- replace.  Good glycemic control.  Continue current POC.  Monitor closely.  Notify of acute changes.    Staffing 11/13/2023: Continent of bowel and bladder.  Incisions look good today. RT: Overall mod to supervision.  Limited by activity intolerance, but very motivated.  Appetite is good if she likes the food. Received food from family OTW.  PT: Supervision to independent overall.  Max with bed mobility.  Ambulating 75 feet with RW.  Needs cueing to keep sternal precautions.  Max to mod assist with ADLs. Supervision now. Sit to stand is her biggest obstacle.  Projected discharge 11/21.     Jeff Ardon NP conducted independent physical examination and assisted with medical documentation.    Total time spent on this encounter including chart review and direct MD + NP 1-on-1  patient interaction: 52 minutes   Over 50% of this time was spent in counseling and coordination of care

## 2023-11-13 NOTE — PT/OT/SLP PROGRESS
Recreational Therapy Treatment    Date of Treatment: 11/13/23  Start Time: 1030  Stop Time: 1100  Total Time: 30 min  Missed Time:   due to      General Precautions: fall, sternal  Ortho Precautions: N/A  Braces: N/A    Vitals   Vitals at Rest  /75   HR 75   O2 Sat    Pain      Vitals With Activity  /75   HR 71   O2 Sat    Pain        Treatment     Cognitive Skills Building   Cognitive Observation Activity Assist Position Equipment Response            Comment:          Dynamic Activities   Activity Assist Position Equipment Response   Activity 1 Bean bag toos supervision and maximal assistance Standing Rolling walker, Bean bags good   Comment: Sit to stand was max.  Dynamic standing balance/reaching was setup.  Standing tolerance was 10 minutes.  UE coordination was I as were sequencing skills.  Cooperative         Comment:          Additional Info: Pt progress, plan of care and discharge plans were discussed during staffing at 0930    Goals     Short Term Goals    Goal  Goal Status   Will increase sit to stand to min Progressing   Will improve dynamic standing balance/reaching to supervision Met                 Long Term Goals    Goal Goal Status   Will increase standing balance/reaching to 5 minutes Met   Will improve dynamic standing balance/reaching to setup Met

## 2023-11-13 NOTE — PLAN OF CARE
Met with pt and discussed updates from team conference and discharge planned for Tues, 11/21/23.  She confirmed that she will be staying at her cousin Lauryn's house.  We called Lauryn ([phone]) and scheduled family training 11/21 at 1000, and they will leave immediately afterward so Lauryn can relieve her in-home .      Briefly discussed with patient the likely DME items she will need.  She will ask someone about borrowing a TTB and BSC.  I will order whichever walker she needs and a rental w/c (only if needed).    Also briefly discussed STAT HH plans.

## 2023-11-13 NOTE — PT/OT/SLP PROGRESS
Occupational Therapy Inpatient Rehab Treatment    Name: Geno Barry  MRN: [id number]    Assessment:  Gneo Barry is a 72 y.o. female admitted with a medical diagnosis of S/P CABG (coronary artery bypass graft).  She presents with the following impairments/functional limitations:  weakness, impaired endurance.    General Precautions: Standard, fall, sternal     Orthopedic Precautions:N/A     Braces: N/A    Rehab Prognosis: Good; patient would benefit from acute skilled OT services to address these deficits and reach maximum level of function.      History:     Past Medical History:   Diagnosis Date    CAD (coronary artery disease)     Diabetes mellitus     Hypertension     PAD (peripheral artery disease)        Past Surgical History:   Procedure Laterality Date    CORONARY ARTERY BYPASS GRAFT (CABG) N/A 11/2/2023    Procedure: CORONARY ARTERY BYPASS GRAFT (CABG);  Surgeon: Heriberto Lara IV, MD;  Location: [facility];  Service: Cardiothoracic;  Laterality: N/A;    ENDOSCOPIC HARVEST OF VEIN N/A 11/2/2023    Procedure: SURGICAL PROCUREMENT, VEIN, ENDOSCOPIC;  Surgeon: Heriberto Lara IV, MD;  Location: [facility];  Service: Cardiothoracic;  Laterality: N/A;    HYSTERECTOMY      LEFT HEART CATHETERIZATION Left 11/2/2023    Procedure: Left heart cath;  Surgeon: Puma Vera MD;  Location: [facility] CATH LAB;  Service: Cardiology;  Laterality: Left;  LHC +/- PCI VIA RRA    LUMPECTOMY, BREAST Right        Subjective     Orientation: Oriented x4    Chief Complaint: Generalized endurance deficits    Patient/Family Comments/goals: To gain strength     Respiratory Status: Room air    Patients cultural, spiritual, Jew conflicts given the current situation: no       Objective:     Patient found up in chair with peripheral IV  upon OT entry to room.    Mobility   Patient completed:  Sit to Stand Transfer with moderate assistance with rolling walker  Stand to Sit Transfer with contact guard assistance with rolling  walker  Tub Transfer Step Transfer technique with contact guard assistance with rolling walker and TTB    Functional Mobility  Pt ambulated in/out bathroom with RW and SBA. Following ADL, pt ambulated from room 403 > PT gym with RW and SBA. No loss of balance noted throughout.    ADLs   Current Status   Oral Hygiene 6   Shower, Bathe Self 4   Upper Body Dressing 3   Lower Body Dressing 4   Putting On, Taking Off Footwear 6     Limiting Factors for ADLs: motor, endurance, weakness, body habitus, and sternal precautions      Therapeutic Exercise  Pt completed 5 sit <> stands from PT mat. Pt required min A with 1 of the 5. Pt also required verbal cueing on technique while adhering to sternal precautions.    LifeStyle Change and Education:     Patient left up in chair with call button in reach.     Education provided: Roles and goals of OT, ADLs, transfer training, body mechanics, assistive device, wheelchair precautions, modified goals, sequencing, safety precautions, fall prevention, post-op precautions, equipment recommendations, and home safety    Multidisciplinary Problems       Occupational Therapy Goals          Problem: Occupational Therapy    Goal Priority Disciplines Outcome Interventions   Occupational Therapy Goal     OT, PT/OT Ongoing, Progressing    Description: ADLs:  Pt to perform grooming tasks with Indep at seated level  Pt to perform feeding tasks with setup assist   Pt to perform UB dressing with mod A    Pt to perform LB dressing with mod A    Pt to perform putting on/off footwear task with max A  Pt to perform toileting with mod A  Pt to perform bathing with mod A    Functional Transfers:  Pt to perform toilet transfers with min A and BSC  Pt to perform a tub transfer with min A and TTB                        Time Tracking     OT Received On: 11/13/23  Time In 0830     Time Out 0930  Total Time 60 min  Therapy Time: OT Individual: 60  Missed Time:    Missed Time Reason:      Billable Minutes: Self  Care/Home Management 45 and Therapeutic Activity 15    11/13/2023

## 2023-11-13 NOTE — PROGRESS NOTES
11/13/23 1030   Rec Therapy Time Calculation   Date of Treatment 11/13/23   Rec Start Time 1030   Rec Stop Time 1100   Rec Total Time (min) 30 min   Time   Treatment time 2 units   Charges   $Therapeutic Exercise 2 units   Precautions   General Precautions fall;sternal   Orthopedic Precautions  N/A   Braces N/A   Pain/Comfort   Pain Rating 1 8/10   Location - Side 1 Right   Location - Orientation 1 lower   Location 1 hip   Pain Addressed 1 Reposition   Vital Signs   Pulse 75   /75   OTHER   Rehab identified problem list/impairments weakness;impaired endurance;impaired functional mobility;gait instability;decreased safety awareness   Values/Beliefs/Spiritual Care   Spiritual, Cultural Beliefs, Bahai Practices, Values that Affect Care no   Overall Level of Functioning   Activity Tolerance Mod Indep   Dynamic Sitting Balance/Reaching Independent   Dynamic Standing Balance/Reaching Standby Assist   Right UE Coodination/Dexterity Independent   Left UE Coordination/Dexterity Independent   Problem Solving/Sequencing Skills Independent   Memory Recall Independent   R/L Neglect/Inattention Does not occur   Attention Span Mod Indep   Social Interaction Independent   Recreational Therapy Short Term Goals   Short Term Goal 1 Progression Progressing   Short Term Goal 2 Progression Met   Recreational Therapy Long Term Goals   Long Term Goal 1 Progression Met   Long Term Goal 2 Progression Met   Plan   Patient to be seen Daily   Planned Duration 2 weeks   Treatments Planned Energy conservation training   Treatment plan/goals estblished with Patient/Caregiver Yes

## 2023-11-13 NOTE — PLAN OF CARE
Problem: Rehabilitation (IRF) Plan of Care  Goal: Plan of Care Review  Outcome: Ongoing, Progressing  Goal: Patient-Specific Goal (Individualized)  Outcome: Ongoing, Progressing  Goal: Absence of New-Onset Illness or Injury  Outcome: Ongoing, Progressing  Goal: Optimal Comfort and Wellbeing  Outcome: Ongoing, Progressing  Goal: Readiness for Transition of Care  Outcome: Ongoing, Progressing     Problem: Diabetes Comorbidity  Goal: Blood Glucose Level Within Targeted Range  Outcome: Ongoing, Progressing     Problem: Skin Injury Risk Increased  Goal: Skin Health and Integrity  Outcome: Ongoing, Progressing     Problem: Fall Injury Risk  Goal: Absence of Fall and Fall-Related Injury  Outcome: Ongoing, Progressing

## 2023-11-13 NOTE — PROGRESS NOTES
Subjective  HPI: 73 yo F with a PMH of HTN, CAD, cerebrovascular disease, PAD, and DM presented to [facility] on 11/2/23 and underwent a LHC revealing severe multivessel CAD. CV surgeon was consulted for CABG. Patient then underwent a CABG x2V: LIMA to the LAD; reverse saphenous vein to the 1st diagonal; endoscopic vein harvest of the left greater saphenous vein by Dr. Lara. 1 chest tube was placed. Patient was then transferred to ICU, and extubated. Chest XR showed some pulmonary vascular congestion, no focal infiltrates or pneummothorax. O2 sats in high 90s on 2L/NC. Patient did develop some A-fib with controlled ventricular rate. Remained on low dose epinephrine at 0.05 for hemodynamic support. Patient also on insulin drip. On 11/3, labs showed elevated WBC of 12.97, and low H&H of 10.8 & 33.6. Epinephrine stopped. Continued ASA, norvasc, and betablocker. Started atorvastatin 40mg PO Q day. On amiodarone drip on 11/4. On 11/5, Amiodarone drip transitioned to oral. Chest tube removed. PT eval completed with deficits noted. On 11/6, patient on 4L O2/NC with sat decreasing in 70-80s with therapy, and continued IV diuresis of 40mg BID. On 11/7, Labs showed low H&H of 3.90, low H&H of 11.0 & 35.0, low MCHC of 31.4, elevated glucose of 397, elevated Cr of 1.04, low albumin of 3.1. Metoprolol tart 25mg BID. O2 titrated down to 2.5L/NC with sats ranging 89-94% with therapy. On 11/8, O2 was discontinued, Prevena discontinued to surgical site with minimal bloody drainage noted to mid-sternum. Vital signs stable, and HR in SR. Glucose elevated at 231. Nutrition was consulted with recommendation to continue diabetic diet, encourage small and frequent meals, and add Boost Max BID for additional nutrition. Patient complained of sternal pain rating at 7 on 1-10 scale. Last BM on 11/7. On 11/9, OT eval completed with deficits noted. Eliquis started at 5mg PO BID. Toprol XL transitioned to 50mg daily. Continued ASA and statin.  Patient is AAOx4.   Participating with therapy. Functional status includes setup/ supervision needed for eating and grooming while seated; minimal assist for bed mobility, minimal assist for transfers with RW, CGA for walking 120ft with RW, max assist lower body dressing, and moderate assist toileting.  Patient was evaluated, accepted, and admitted to inpatient rehab to improve functional status. Transferred to [facility] on 11/9 without incident.      11/13: Seen with PT, Amb w/Rollator. Joking with staff. Tolerating therapy with a little added persuasion from therapists. No current complaints. VSSAF.           Review of Systems  Depression/Anxiety: no current complaints.   ALPRAZolam tablet 0.25 mg TID PRN Anxiety  Pain: denies currently. Incisional with cough     acetaminophen tablet 650 mg TID  acetaminophen tablet 650 mg q8h PRN mild pain  methocarbamoL tablet 500 mg TID PRN spasm/tightness  HYDROcodone-acetaminophen 7.5-325 mg 1 tab TID PRN mod/severe pain  Bowels/Bladder: last BM 11/12 x 4     Appetite: getting better     Sleep: good      hydrOXYzine pamoate capsule 50 mg qHS        Physical Exam  General: well-developed, well-nourished, in no acute distress, flat  Respiratory: equal chest rise, no SOB, no audible wheeze  Cardiovascular: regular rate and rhythm, no edema  Gastrointestinal: soft, non-tender, non-distended   Musculoskeletal: full range of motion of all extremities/spine with generalized weakness  Integumentary: no rashes or skin lesions present, midsternal uxepvypj-QVO-b/d/I, with chest tube tpoe-ptzcox-IIH-c/d/I, LLE EVH site lwnohypr-CEV-r/d/i  Neurologic: cranial nerves intact, no signs of peripheral neurological deficit, motor/sensory function intact          Labs:   Latest Reference Range & Units 11/13/23 05:11   WBC 4.50 - 11.50 x10(3)/mcL 12.94 (H)   RBC 4.20 - 5.40 x10(6)/mcL 3.43 (L)   Hemoglobin 12.0 - 16.0 g/dL 9.7 (L)   Hematocrit 37.0 - 47.0 % 31.6 (L)   MCV 80.0 - 94.0 fL 92.1   MCH  27.0 - 31.0 pg 28.3   MCHC 33.0 - 36.0 g/dL 30.7 (L)   RDW 11.5 - 17.0 % 15.3   Platelet Count 130 - 400 x10(3)/mcL 546 (H)   MPV 7.4 - 10.4 fL 8.8   Neut % % 73.4   LYMPH % % 17.9   Mono % % 6.6   Eosinophil % % 1.5   Basophil % % 0.2   Immature Granulocytes % 0.4   Neut # 2.1 - 9.2 x10(3)/mcL 9.51 (H)   Lymph # 0.6 - 4.6 x10(3)/mcL 2.31   Mono # 0.1 - 1.3 x10(3)/mcL 0.86   Eos # 0 - 0.9 x10(3)/mcL 0.19   Baso # <=0.2 x10(3)/mcL 0.02   Immature Grans (Abs) 0 - 0.04 x10(3)/mcL 0.05 (H)   nRBC % 0.0   Sodium 136 - 145 mmol/L 145   Potassium 3.5 - 5.1 mmol/L 4.4   Chloride 98 - 107 mmol/L 108 (H)   CO2 23 - 31 mmol/L 29   BUN 9.8 - 20.1 mg/dL 15.9   Creatinine 0.55 - 1.02 mg/dL 1.09 (H)   eGFR mls/min/1.73/m2 53   Glucose 82 - 115 mg/dL 145 (H)   Calcium 8.4 - 10.2 mg/dL 9.5   Phosphorus Level 2.3 - 4.7 mg/dL 3.1   Magnesium  1.60 - 2.60 mg/dL 1.50 (L)   ALP 40 - 150 unit/L 59   PROTEIN TOTAL 5.8 - 7.6 gm/dL 6.6   Albumin 3.4 - 4.8 g/dL 2.8 (L)   Albumin/Globulin Ratio 1.1 - 2.0 ratio 0.7 (L)   Prealbumin 14.0 - 37.0 mg/dL 15.5   BILIRUBIN TOTAL <=1.5 mg/dL 0.5   AST 5 - 34 unit/L 19   ALT 0 - 55 unit/L 16   Globulin, Total 2.4 - 3.5 gm/dL 3.8 (H)   (H): Data is abnormally high  (L): Data is abnormally low          Diagnostics:  XR CHEST AP PORTABLE  Impression:  Bilateral pleural effusions.  Increased left retrocardiac density and silhouetting of the left hemidiaphragm which might be related to pleural fluid, however, infiltrate/atelectasis cannot be completely excluded.  Compressive atelectatic changes in both bases, however, superimposed infiltrates cannot be completely excluded  Date:                                            11/10/2023  Time:                                           13:57        Assessment/Plan  Hospital   Coronary artery disease involving native coronary artery of native heart   S/P CABG (coronary artery bypass graft)     Non-Hospital   Hypertension   Diabetes mellitus   Dyslipidemia   Obesity    Osteoarthritis of knee   Status post right knee replacement   PAD (peripheral artery disease)   CAD (coronary artery disease)       Wounds: midsternal aknwupab-XJD-i/d/I, with chest tube xngg-cbtclf-HUC-c/d/I, LLE EVH site xptllmlj-OXZ-h/d/i  S/p CABG x2V: LIMA to the LAD; reverse saphenous vein to the 1st diagonal; endoscopic vein harvest of the left greater saphenous vein on 11/2 by Dr. Lara. 1 chest tube was placed.  Precautions: sternal   Bracing: cardiac bear for splinting  Swallowing: Diabetic Diet  Function: Tolerating therapy. Continue PT/OT  VTE Prophylaxis:   apixaban tablet 5 mg BID  Code Status: FULL CODE   Discharge: Lives alone in [place] in a single-story home with no steps to enter the residence. Completed 2nd grade. She has no  history. Pt. Used to babysit for a living.  The patient is . Lives alone. She has a sitter 5 days per week from 9AM - 2PM through [employer]. The sitter was helping with bathing, dressing, cooking, driving, and cleaning. Pt. Plans to stay with her cousin after rehab until she recovers.  Children (0). Date 11/21 Tuesday.

## 2023-11-14 LAB
POCT GLUCOSE: 115 MG/DL (ref 70–110)
POCT GLUCOSE: 131 MG/DL (ref 70–110)
POCT GLUCOSE: 134 MG/DL (ref 70–110)
POCT GLUCOSE: 94 MG/DL (ref 70–110)

## 2023-11-14 PROCEDURE — 97110 THERAPEUTIC EXERCISES: CPT

## 2023-11-14 PROCEDURE — 99900031 HC PATIENT EDUCATION (STAT)

## 2023-11-14 PROCEDURE — 97116 GAIT TRAINING THERAPY: CPT

## 2023-11-14 PROCEDURE — 25000003 PHARM REV CODE 250: Performed by: NURSE PRACTITIONER

## 2023-11-14 PROCEDURE — 97535 SELF CARE MNGMENT TRAINING: CPT

## 2023-11-14 PROCEDURE — 97530 THERAPEUTIC ACTIVITIES: CPT

## 2023-11-14 PROCEDURE — 99233 PR SUBSEQUENT HOSPITAL CARE,LEVL III: ICD-10-PCS | Mod: ,,, | Performed by: NURSE PRACTITIONER

## 2023-11-14 PROCEDURE — 99233 SBSQ HOSP IP/OBS HIGH 50: CPT | Mod: ,,, | Performed by: NURSE PRACTITIONER

## 2023-11-14 PROCEDURE — 11800000 HC REHAB PRIVATE ROOM

## 2023-11-14 PROCEDURE — 94799 UNLISTED PULMONARY SVC/PX: CPT

## 2023-11-14 RX ADMIN — GLIPIZIDE 5 MG: 5 TABLET, FILM COATED, EXTENDED RELEASE ORAL at 08:11

## 2023-11-14 RX ADMIN — ASPIRIN 81 MG: 81 TABLET, COATED ORAL at 08:11

## 2023-11-14 RX ADMIN — METFORMIN HYDROCHLORIDE 1000 MG: 500 TABLET, FILM COATED ORAL at 08:11

## 2023-11-14 RX ADMIN — ATORVASTATIN CALCIUM 40 MG: 40 TABLET, FILM COATED ORAL at 08:11

## 2023-11-14 RX ADMIN — APIXABAN 5 MG: 5 TABLET, FILM COATED ORAL at 08:11

## 2023-11-14 RX ADMIN — Medication 800 MG: at 08:11

## 2023-11-14 RX ADMIN — METOPROLOL SUCCINATE 50 MG: 50 TABLET, FILM COATED, EXTENDED RELEASE ORAL at 08:11

## 2023-11-14 RX ADMIN — EZETIMIBE 10 MG: 10 TABLET ORAL at 08:11

## 2023-11-14 RX ADMIN — HYDROXYZINE PAMOATE 50 MG: 50 CAPSULE ORAL at 08:11

## 2023-11-14 RX ADMIN — DOCUSATE SODIUM 100 MG: 100 CAPSULE, LIQUID FILLED ORAL at 08:11

## 2023-11-14 RX ADMIN — SITAGLIPTIN 100 MG: 50 TABLET, FILM COATED ORAL at 08:11

## 2023-11-14 RX ADMIN — AMLODIPINE BESYLATE 10 MG: 5 TABLET ORAL at 08:11

## 2023-11-14 RX ADMIN — AMIODARONE HYDROCHLORIDE 200 MG: 200 TABLET ORAL at 08:11

## 2023-11-14 RX ADMIN — METFORMIN HYDROCHLORIDE 1000 MG: 500 TABLET, FILM COATED ORAL at 05:11

## 2023-11-14 RX ADMIN — FERROUS SULFATE TAB 325 MG (65 MG ELEMENTAL FE) 1 EACH: 325 (65 FE) TAB at 08:11

## 2023-11-14 NOTE — PLAN OF CARE
Sent order for rollator to [place] via [facility] and requested delivery to room 11/21 by 1030.    Sent orders for HH PT/OT/nursing with [facility] via [facility].

## 2023-11-14 NOTE — PROGRESS NOTES
Dos 11/14/23  I have personally evaluated the patient during therapy today. Have discussed progress and problems with patient. Have reviewed barriers to progress as well as response to therapies with therapists. I have reviewed medical issues and plan of care with IM team. I met with  to review d/c plans and barriers. I have discussed skin integrity and B/B status with nursing. I am in agreement with present IRF plan of care.  I have been involved in  formation of this note with Radha Johnson.  Myke Daniel MD  Subjective  HPI: 71 yo F with a PMH of HTN, CAD, cerebrovascular disease, PAD, and DM presented to [place] [facility] on 11/2/23 and underwent a LHC revealing severe multivessel CAD. CV surgeon was consulted for CABG. Patient then underwent a CABG x2V: LIMA to the LAD; reverse saphenous vein to the 1st diagonal; endoscopic vein harvest of the left greater saphenous vein by Dr. Lara. 1 chest tube was placed. Patient was then transferred to ICU, and extubated. Chest XR showed some pulmonary vascular congestion, no focal infiltrates or pneummothorax. O2 sats in high 90s on 2L/NC. Patient did develop some A-fib with controlled ventricular rate. Remained on low dose epinephrine at 0.05 for hemodynamic support. Patient also on insulin drip. On 11/3, labs showed elevated WBC of 12.97, and low H&H of 10.8 & 33.6. Epinephrine stopped. Continued ASA, norvasc, and betablocker. Started atorvastatin 40mg PO Q day. On amiodarone drip on 11/4. On 11/5, Amiodarone drip transitioned to oral. Chest tube removed. PT eval completed with deficits noted. On 11/6, patient on 4L O2/NC with sat decreasing in 70-80s with therapy, and continued IV diuresis of 40mg BID. On 11/7, Labs showed low H&H of 3.90, low H&H of 11.0 & 35.0, low MCHC of 31.4, elevated glucose of 397, elevated Cr of 1.04, low albumin of 3.1. Metoprolol tart 25mg BID. O2 titrated down to 2.5L/NC with sats ranging 89-94% with therapy. On 11/8, O2 was discontinued,  Prevena discontinued to surgical site with minimal bloody drainage noted to mid-sternum. Vital signs stable, and HR in SR. Glucose elevated at 231. Nutrition was consulted with recommendation to continue diabetic diet, encourage small and frequent meals, and add Boost Max BID for additional nutrition. Patient complained of sternal pain rating at 7 on 1-10 scale. Last BM on 11/7. On 11/9, OT eval completed with deficits noted. Eliquis started at 5mg PO BID. Toprol XL transitioned to 50mg daily. Continued ASA and statin. Patient is AAOx4.   Participating with therapy. Functional status includes setup/ supervision needed for eating and grooming while seated; minimal assist for bed mobility, minimal assist for transfers with RW, CGA for walking 120ft with RW, max assist lower body dressing, and moderate assist toileting.  Patient was evaluated, accepted, and admitted to inpatient rehab to improve functional status. Transferred to [facility] on 11/9 without incident.      11/14: Seen with PT, working on STS from mat and lower surface each time. Patient states that she hates a low bed, and plans to make hers higher at her cousin's house where she plans to DC to. She is doing really well with it today. Progressing with therapy without complaint. VSSAF.         Review of Systems  Depression/Anxiety: no current complaints.   ALPRAZolam tablet 0.25 mg TID PRN Anxiety  Pain: denies currently. Incisional with cough     acetaminophen tablet 650 mg TID  acetaminophen tablet 650 mg q8h PRN mild pain  methocarbamoL tablet 500 mg TID PRN spasm/tightness  HYDROcodone-acetaminophen 7.5-325 mg 1 tab TID PRN mod/severe pain  Bowels/Bladder: last BM 11/13 x 2     Appetite: getting better     Sleep: good      hydrOXYzine pamoate capsule 50 mg qHS        Physical Exam  General: well-developed, well-nourished, in no acute distress, flat  Respiratory: equal chest rise, no SOB, no audible wheeze  Cardiovascular: regular rate and rhythm, no  edema  Gastrointestinal: soft, non-tender, non-distended   Musculoskeletal: full range of motion of all extremities/spine with generalized weakness  Integumentary: no rashes or skin lesions present, midsternal vlezegda-ABD-e/d/I, with chest tube xjwr-icfrlt-KIS-c/d/I, LLE EVH site effltwqp-VNU-w/d/i  Neurologic: cranial nerves intact, no signs of peripheral neurological deficit, motor/sensory function intact  *MD performed and documented physical examination             Assessment/Plan  Hospital   Coronary artery disease involving native coronary artery of native heart   S/P CABG (coronary artery bypass graft)     Non-Hospital   Hypertension   Diabetes mellitus   Dyslipidemia   Obesity   Osteoarthritis of knee   Status post right knee replacement   PAD (peripheral artery disease)   CAD (coronary artery disease)       Wounds: midsternal hutmdoew-TNN-r/d/I, with chest tube qsqi-vceoch-LHS-c/d/I, LLE EVH site jjbttvzs-PON-r/d/i  S/p CABG x2V: LIMA to the LAD; reverse saphenous vein to the 1st diagonal; endoscopic vein harvest of the left greater saphenous vein on 11/2 by Dr. Lara. 1 chest tube was placed.  Precautions: sternal   Bracing: cardiac bear for splinting  Swallowing: Diabetic Diet  Function: Tolerating therapy. Continue PT/OT  VTE Prophylaxis:   apixaban tablet 5 mg BID  Code Status: FULL CODE   Discharge: Lives alone in [place] in a single-story home with no steps to enter the residence. Completed 2nd grade. She has no  history. Pt. Used to babysit for a living.  The patient is . Lives alone. She has a sitter 5 days per week from 9AM - 2PM through [employer]. The sitter was helping with bathing, dressing, cooking, driving, and cleaning. Pt. Plans to stay with her cousin after rehab until she recovers.  Children (0). Date 11/21 Tuesday.               Marina Johnson NP, conducted additional independent physical examination and assisted with medical documentation.

## 2023-11-14 NOTE — PT/OT/SLP PROGRESS
"Occupational Therapy Inpatient Rehab Treatment    Name: Geno Barry  MRN: [id number]    Assessment:  Geno Barry is a 72 y.o. female admitted with a medical diagnosis of S/P CABG (coronary artery bypass graft).  She presents with the following impairments/functional limitations:  weakness, impaired endurance.    General Precautions: Standard, fall, sternal     Orthopedic Precautions:N/A     Braces: N/A    Rehab Prognosis: Good; patient would benefit from acute skilled OT services to address these deficits and reach maximum level of function.      History:     Past Medical History:   Diagnosis Date    CAD (coronary artery disease)     Diabetes mellitus     Hypertension     PAD (peripheral artery disease)        Past Surgical History:   Procedure Laterality Date    CORONARY ARTERY BYPASS GRAFT (CABG) N/A 11/2/2023    Procedure: CORONARY ARTERY BYPASS GRAFT (CABG);  Surgeon: Heriberto Lara IV, MD;  Location: [facility];  Service: Cardiothoracic;  Laterality: N/A;    ENDOSCOPIC HARVEST OF VEIN N/A 11/2/2023    Procedure: SURGICAL PROCUREMENT, VEIN, ENDOSCOPIC;  Surgeon: Heriberto Lara IV, MD;  Location: [facility];  Service: Cardiothoracic;  Laterality: N/A;    HYSTERECTOMY      LEFT HEART CATHETERIZATION Left 11/2/2023    Procedure: Left heart cath;  Surgeon: Puma Vera MD;  Location: [facility] CATH LAB;  Service: Cardiology;  Laterality: Left;  LHC +/- PCI VIA RRA    LUMPECTOMY, BREAST Right        Subjective     Orientation: Oriented x4    Chief Complaint: "I feel like I'm in [place]"    Patient/Family Comments/goals: To gain independence with ADLs and functional transfers.    Respiratory Status: Room air    Patients cultural, spiritual, Spiritism conflicts given the current situation: no       Objective:     Patient found up in chair with peripheral IV (cardiac bear)  upon OT entry to room.    Mobility   Patient completed:  Sit to Stand Transfer with maximal assistance with rolling walker  Stand to Sit " Transfer with minimum assistance with rolling walker  Toilet Transfer Step Transfer technique with moderate assistance with  rolling walker    Functional Mobility  Pt ambulated in/out bathroom with Rollator and SBA. No loss of balance noted.    ADLs  Toileting Hygiene 4 + void, urine   Toilet Transfer 3     Limiting Factors for ADLs: endurance and weakness     Therapeutic Activities  Pt participated in three trials total (2 of them standing with one UE support on table with SBA) to play Schedulicity. Pt sat unsupported in wheelchair for 1 of the 3 trials. Pt able to flip the cards, announce the kind of card, and search for the matching card on her playing card with each trial. Pt required sitting breaks following completion of each game.     Therapeutic Exercise  For improved activity tolerance/endurance, pt tolerated four standing trials using one UE support on RW while using opposing UE to reach contralaterally, across midline, to reach for washers and toss towards target. Pt required intermittent sitting breaks due to fatigue.    For continued UE endurance, pt tolerated two 5-min trials of propelling UE ergometer forward with prolonged rest breaks following each trial. Pt sat unsupported in wheelchair throughout.    LifeStyle Change and Education:     Patient left up in chair with call button in reach.     Education provided: Roles and goals of OT, ADLs, transfer training, body mechanics, assistive device, wheelchair precautions, modified goals, sequencing, safety precautions, fall prevention, post-op precautions, equipment recommendations, and home safety    Multidisciplinary Problems       Occupational Therapy Goals          Problem: Occupational Therapy    Goal Priority Disciplines Outcome Interventions   Occupational Therapy Goal     OT, PT/OT Ongoing, Progressing    Description: ADLs:  Pt to perform grooming tasks with Indep at seated level  Pt to perform feeding tasks with setup assist   Pt to perform UB dressing  with mod A    Pt to perform LB dressing with mod A    Pt to perform putting on/off footwear task with max A  Pt to perform toileting with mod A  Pt to perform bathing with mod A    Functional Transfers:  Pt to perform toilet transfers with min A and BSC  Pt to perform a tub transfer with min A and TTB                        Time Tracking     OT Received On: 11/14/23  Time In 1030     Time Out 1200  Total Time 90 min  Therapy Time: OT Individual: 90  Missed Time:    Missed Time Reason:      Billable Minutes: Self Care/Home Management 15, Therapeutic Activity 30, and Therapeutic Exercise 45    11/14/2023

## 2023-11-14 NOTE — PROGRESS NOTES
[facility] ([facility])  Rehab Progress Note    Patient Name: Geno Barry  MRN: [id number]  Age: 72 y.o. Sex: female  : 1950  Hospital Length of Stay: 5 days  Date of Service: 2023   Chief Complaint: Multivessel CAD s/p CABG x2 on 2023    Subjective:     Basic Information  Admit Information: 72-year-old  female presented to [facility] for scheduled CABG on 2023.  PMH significant for PID, DM type 2, CAD, and carotid artery stenosis.  Tolerated CABG x2 on  without perioperative complications.  Aspirin, Norvasc, and beta-blocker continued.  Lipitor 40 mg daily initiated on .  Chest tube discontinued on .  Amiodarone initiated due to postoperative atrial fibrillation.  Eliquis 5 mg b.i.d. initiated on .  Tolerated transfer to [facility] inpatient rehab unit on  without incident.  Today's Information: No acute events overnight.  Sitting in chair comfortably working with therapy.  Reports good sleep and appetite.  Last BM .  Vital signs at goal with no recent recorded fevers.  Good glycemic control.  No labs or imaging today.    Review of patient's allergies indicates:   Allergen Reactions    Ace inhibitors Swelling    Clopidogrel Swelling    Iodine         Current Facility-Administered Medications:     acetaminophen tablet 650 mg, 650 mg, Oral, Q8H PRN, Elizabeth, Marina A, FNP    ALPRAZolam tablet 0.25 mg, 0.25 mg, Oral, TID PRN, Duglas, Isidoro A, FNP    amiodarone tablet 200 mg, 200 mg, Oral, Daily, Duglas, Isidoro A, FNP, 200 mg at 23 0816    amLODIPine tablet 10 mg, 10 mg, Oral, Daily, Duglas, Isidoro A, FNP, 10 mg at 23 0815    apixaban tablet 5 mg, 5 mg, Oral, BID, Duglas, Isidoro A, FNP, 5 mg at 23 0815    aspirin EC tablet 81 mg, 81 mg, Oral, Daily, Duglas, Isidoro A, FNP, 81 mg at 23 0815    atorvastatin tablet 40 mg, 40 mg, Oral, Daily, Duglas, Isidoro A, FNP, 40 mg at 23  0815    benzonatate capsule 100 mg, 100 mg, Oral, TID PRN, Duglas, Isidoro A, FNP    bisacodyL suppository 10 mg, 10 mg, Rectal, Daily PRN, Duglas, Isidoro A, FNP    cholecalciferol (vitamin D3) capsule/tablet 400 Units, 400 Units, Oral, Daily, Duglas, Isidoro A, FNP, 400 Units at 11/12/23 0800    dextrose 10% bolus 125 mL 125 mL, 12.5 g, Intravenous, PRN, Duglas, Isidoro A, FNP    dextrose 10% bolus 250 mL 250 mL, 25 g, Intravenous, PRN, Duglas, Isidoro A, FNP    docusate sodium capsule 100 mg, 100 mg, Oral, BID, Duglas, Isidoro A, FNP, 100 mg at 11/14/23 0815    ezetimibe tablet 10 mg, 10 mg, Oral, Daily, Duglas, Isidoro A, FNP, 10 mg at 11/14/23 0815    ferrous sulfate tablet 1 each, 1 tablet, Oral, Daily, Duglas, Isidoro A, FNP, 1 each at 11/14/23 0815    glipiZIDE 24 hr tablet 5 mg, 5 mg, Oral, Daily with breakfast, Duglas, Isidoro A, FNP, 5 mg at 11/14/23 0815    glucagon (human recombinant) injection 1 mg, 1 mg, Intramuscular, PRN, Duglas, Isidoro A, FNP    glucose chewable tablet 16 g, 16 g, Oral, PRN, Duglas, Isidoro A, FNP    glucose chewable tablet 24 g, 24 g, Oral, PRN, Duglas, Isidoro A, FNP    hydrALAZINE injection 10 mg, 10 mg, Intravenous, Q4H PRN, Duglas, Isidoro A, FNP    HYDROcodone-acetaminophen 7.5-325 mg per tablet 1 tablet, 1 tablet, Oral, TID PRN, Elizabeth, Marina A, FNP, 1 tablet at 11/13/23 1113    hydrOXYzine pamoate capsule 50 mg, 50 mg, Oral, QHS, Duglas, Isidoro A, FNP, 50 mg at 11/13/23 2010    insulin aspart U-100 injection 0-10 Units, 0-10 Units, Subcutaneous, QID (AC + HS) PRN, Duglas, Isidoro A, FNP, 2 Units at 11/13/23 1114    labetalol 20 mg/4 mL (5 mg/mL) IV syring, 10 mg, Intravenous, Q4H PRN, Duglas, Isidoro A, FNP    magnesium oxide tablet 800 mg, 800 mg, Oral, BID, Duglas, Isidoro A, FNP, 800 mg at 11/14/23 0815    metFORMIN tablet 1,000 mg, 1,000 mg, Oral, BID WM, Duglas, Isidoro A, FNP, 1,000 mg at 11/14/23 0815     "methocarbamoL tablet 500 mg, 500 mg, Oral, TID PRN, Marina Johnson A, FNP    metoprolol injection 10 mg, 10 mg, Intravenous, Q2H PRN, Duglas, Isidoro A, FNP    metoprolol succinate (TOPROL-XL) 24 hr tablet 50 mg, 50 mg, Oral, Daily, Duglas, Isidoro A, FNP, 50 mg at 11/14/23 0815    nitroGLYCERIN SL tablet 0.4 mg, 0.4 mg, Sublingual, Q5 Min PRN, Duglas, Isidoro A, FNP    ondansetron disintegrating tablet 4 mg, 4 mg, Oral, Q6H PRN, Duglas, Isidoro A, FNP    polyethylene glycol packet 17 g, 17 g, Oral, Q12H PRN, Duglas, Isidoro A, FNP    promethazine tablet 25 mg, 25 mg, Oral, Q6H PRN, Duglas, Isidoro A, FNP    SITagliptin phosphate tablet 100 mg, 100 mg, Oral, Daily, Duglas, Isidoro A, FNP, 100 mg at 11/14/23 0815     Review of Systems   Complete 12-point review of symptoms negative except for what's mentioned in HPI     Objective:     /60   Pulse 79   Temp 97.8 °F (36.6 °C) (Oral)   Resp 18   Ht 5' 4" (1.626 m)   Wt 92.4 kg (203 lb 11.3 oz)   SpO2 (!) 93%   Breastfeeding No   BMI 34.97 kg/m²      Physical Exam  Vitals reviewed.   Eyes:      Pupils: Pupils are equal, round, and reactive to light.   Cardiovascular:      Rate and Rhythm: Normal rate and regular rhythm.      Heart sounds: Normal heart sounds.   Pulmonary:      Effort: Pulmonary effort is normal.      Breath sounds: Normal breath sounds.   Abdominal:      General: Bowel sounds are normal.      Comments: obese   Musculoskeletal:         General: Normal range of motion.   Skin:     General: Skin is warm.      Comments: Sternal incision clean and intact.  Chest tube sutures intact. Left leg harvest site intact.  Left lower extremity trace edema   Neurological:      General: No focal deficit present.      Mental Status: She is alert and oriented to person, place, and time.      Motor: Weakness present.   Psychiatric:         Mood and Affect: Mood normal.     *MD performed and documented physical examination   "     Lines/Drains/Airways       None                 Labs  Recent Results (from the past 24 hour(s))   POCT glucose    Collection Time: 11/13/23 11:12 AM   Result Value Ref Range    POCT Glucose 186 (H) 70 - 110 mg/dL   POCT glucose    Collection Time: 11/13/23  4:29 PM   Result Value Ref Range    POCT Glucose 111 (H) 70 - 110 mg/dL   POCT glucose    Collection Time: 11/13/23  8:18 PM   Result Value Ref Range    POCT Glucose 148 (H) 70 - 110 mg/dL   POCT glucose    Collection Time: 11/14/23  5:15 AM   Result Value Ref Range    POCT Glucose 115 (H) 70 - 110 mg/dL     Radiology  CXR 2 view on 11/10/2023, IMPRESSION: Bilateral pleural effusions.   Increased left retrocardiac density and silhouetting of the left hemidiaphragm which might be related to pleural fluid, however, infiltrate/atelectasis cannot be completely excluded. Compressive atelectatic changes in both bases, however, superimposed infiltrates cannot be completely excluded   Radiology  Transthoracic echo on 11/02/2023, IMPRESSION: Left Ventricle: regional wall motion abnormalities present.  The distal apex, apical inferior and distal septal walls are hypokinetic There is moderately reduced systolic function with a visually estimated ejection fraction of 35 - 40%. Grade I diastolic dysfunction.  Right Ventricle: Normal right ventricular cavity size. Wall thickness is normal. Right ventricle wall motion  is normal. Systolic function is normal.  Mitral Valve: There is mild regurgitation.  Diagnostic studies   [facility] on 11/02/2023, IMPRESSION: Left Main-luminal irregularities. LAD-mid subtotal occlusion involving large diagonal. LCX-non dominant, luminal irregularities. RCA-dominant, 40% ostial stenosis. LVEDP 18    Assessment/Plan:     72 y.o. AAF admitted on 11/9/2023     Multivessel CAD  - s/p CABG x2 on 11/02/2023 (LIMA to LAD and rSVG to Diag 1)  - denies recent chest pain or discomfort  - continue                Aspirin 81 mg daily                Lipitor 40  mg daily                Metoprolol succinate 50 mg daily                 Norco 7.5 mg/325 mg q.8 hours p.r.n.   - ECG and Nitro PRN  - not on ACEI or Plavix  - continue cardiac diet  - to follow-up with cardiology outpatient     HLD  - cholesterol 129, HDL 38, total cholesterol 3, triglycerides 42, LDL 83, VLDL 8 on 11/02/2023  - continue                Lipitor 40 mg daily                 Zetia 10 mg daily     HFpEF  - LVEF 55% on 10/11/2022  - continue                Metoprolol succinate 50 mg daily  - monitor weights daily  - to follow-up with cardiology outpatient     PAD  - stable  - continue                Aspirin 81 mg daily                Lipitor 40 mg daily                Eliquis 5 mg b.i.d.  - to follow-up with vascular surgery outpatient     Bilateral carotid artery stenosis  - moderate-no interventions at this time  - continue                Aspirin 81 mg daily                Lipitor 40 mg daily                Eliquis 5 mg b.i.d.  - to follow-up with vascular surgery outpatient     Paroxysmal atrial fibrillation  - no evidence of bleeding  - rate controlled  - continue                Amiodarone 200 mg b.i.d. (to end on 11/10)                Amiodarone daily to begin on 11/11                Eliquis 5 mg b.i.d.                Metoprolol succinate 50 mg daily   - to follow-up with cardiology outpatient     Normocytic anemia  - asymptomatic  - H/H trending down  - iron 33, TIBC 159, iron binding capacity 126, transferrin 141, iron saturation 21 on 11/10/2023  - continue                Ferrous sulfate 325 mg daily (initiated 11/10)  - no evidence of active bleeds  - will closely monitor and transfuse if needed      HTN  - BP at goal  - continue                Metoprolol succinate 50 mg daily                Norvasc 10 mg daily                  Hydralazine 10 mg every 2 hours as needed for BP > 160/90                Labetalol 10 mg every 2 hours as needed for BP > 160/90  - low sodium diet     DM type II  -  HgA1c 9.3 on 11/02/2023  - continue                Metformin 1000 mg twice daily                Glipizide 5 mg with breakfast                Januvia 100 mg daily                ISS   - CBGs AC/HS     GERD  - Avoid spicy foods, and nothing to eat or drink within x2 hours of bedtime or laying flat (water is ok)   - Avoid NSAIDs (Advil, ibuprofen, naproxen...) and andrade-2 inhibitors (Mobic, Celebrex)    - continue                Pepcid 40 mg daily      Osteoarthritis  - stable  - continue                Meloxicam 15 mg daily                 Vitamin-D 3 400 units daily     VTE Prophylaxis:  Eliquis 5 mg b.i.d.  COVID-19 testing:  Unknown  COVID-19 vaccination status:  Vaccinated (fuseSPORT):  03/05/2021 and (Pfizer):  11/09/2021 and 06/22/2022     POA: No  Living will: No  Contacts: Heaven Burns ([facility]) [phone]     CODE STATUS: Full Code  Internal Medicine (attending): Salvador Sebastian MD  Physiatry (consulting):  Patricio Daniel MD     OUTPATIENT PROVIDERS  PCP:  JONG Clifton  Cardiology:  Clay chen MD  CV surgery:  Heriberto Lara MD     DISPOSITION:  Vital signs at goal.  No labs today.  Good glycemic control.  Bowel management, sleep hygiene, and appetite at goal.  Progressing well with therapies.  Monitor closely.  Notify of any acute changes.    Staffing 11/13/2023: Continent of bowel and bladder.  Incisions look good today. RT: Overall mod to supervision.  Limited by activity intolerance, but very motivated.  Appetite is good if she likes the food. Received food from family OTW.  PT: Supervision to independent overall.  Max with bed mobility.  Ambulating 75 feet with RW.  Needs cueing to keep sternal precautions.  Max to mod assist with ADLs. Supervision now. Sit to stand is her biggest obstacle.  Projected discharge 11/21.     Jesi Driscoll NP conducted independent physical examination and assisted with medical documentation.    Total time spent on this encounter including chart review  and direct MD + NP 1-on-1 patient interaction: 38 minutes   Over 50% of this time was spent in counseling and coordination of care

## 2023-11-14 NOTE — PLAN OF CARE
11/13/23 2000   Skin   Skin WDL ex   Skin Integrity incision        Incision/Site 11/02/23 1927 Chest   Date First Assessed/Time First Assessed: 11/02/23 1927   Location: Chest   Dressing Appearance Open to air   Drainage Amount None   Appearance Intact   Wound Edges Approximated        Incision/Site 11/02/23 1927 Left Leg   Date First Assessed/Time First Assessed: 11/02/23 1927   Side: Left  Location: Leg   Dressing Appearance Open to air   Drainage Amount None        Incision/Site 11/09/23 1700 Upper quadrant midline midline   Date First Assessed/Time First Assessed: 11/09/23 1700   Present Prior to Hospital Arrival?: Yes  Location: Upper quadrant  Orientation: midline  Incision Type: midline  Closure Method: Sutures   Dressing Appearance Open to air   Drainage Amount None   Appearance Intact   Skin Interventions   Pressure Reduction Techniques frequent weight shift encouraged;heels elevated off bed

## 2023-11-14 NOTE — PROGRESS NOTES
11/14/23 1031   Rec Therapy Time Calculation   Date of Treatment 11/14/23   Rec Start Time 1031   Rec Stop Time 1100   Rec Total Time (min) 29 min   Time   Treatment time 2 units   Charges   $Therapeutic Activity 1unit   Precautions   General Precautions fall;sternal   Orthopedic Precautions  N/A   Braces N/A   Pain/Comfort   Pain Rating 1 no pain   OTHER   Rehab identified problem list/impairments weakness;impaired endurance;impaired functional mobility;gait instability;decreased lower extremity function   Values/Beliefs/Spiritual Care   Spiritual, Cultural Beliefs, [employer] Practices, Values that Affect Care no   Overall Level of Functioning   Activity Tolerance Independent   Dynamic Sitting Balance/Reaching Independent   Dynamic Standing Balance/Reaching Min A   Right UE Coodination/Dexterity Independent   Left UE Coordination/Dexterity Independent   Problem Solving/Sequencing Skills Independent   Memory Recall Independent   R/L Neglect/Inattention Does not occur   Attention Span Independent   Social Interaction Independent   Recreational Therapy Short Term Goals   Short Term Goal 1 Progression Progressing   Short Term Goal 2 Progression Progressing   Recreational Therapy Long Term Goals   Long Term Goal 1 Progression Met   Long Term Goal 2 Progression Met   Plan   Patient to be seen Daily   Planned Duration 1 week   Treatments Planned Energy conservation training   Treatment plan/goals estblished with Patient/Caregiver Yes        Patient: Maria Esther Lowe    Procedure(s):  Pelvic Washings, Davinci Assisted Laparoscopic Total Hysterectomy, Bilateral Salpingo Oophorectomy, cystoscopy - Wound Class: I-Clean    Diagnosis: Bilateral Ovarian Cyst, Fluid In The Endometrial Cavity   Diagnosis Additional Information: No value filed.    Anesthesia Type:   General     Note:  Airway :Nasal Cannula  Patient transferred to:PACU  Handoff Report: Identifed the Patient, Identified the Reponsible Provider, Reviewed the pertinent medical history, Discussed the surgical course, Reviewed Intra-OP anesthesia mangement and issues during anesthesia, Set expectations for post-procedure period and Allowed opportunity for questions and acknowledgement of understanding      Vitals: (Last set prior to Anesthesia Care Transfer)    CRNA VITALS  9/11/2018 1243 - 9/11/2018 1323      9/11/2018             EKG: Sinus rhythm                Electronically Signed By: LENNIE Hernadez CRNA  September 11, 2018  1:23 PM

## 2023-11-14 NOTE — PT/OT/SLP PROGRESS
Recreational Therapy Treatment    Date of Treatment: 11/14/23  Start Time: 1031  Stop Time: 1100  Total Time: 29 min  Missed Time    General Precautions: fall, sternal  Ortho Precautions: N/A  Braces: N/A    Vitals   Vitals at Rest  /74   HR 79   O2 Sat    Pain      Vitals With Activity  /70   HR 73   O2 Sat    Pain        Treatment     Cognitive Skills Building   Cognitive Observation Activity Assist Position Equipment Response            Comment:          Dynamic Activities   Activity Assist Position Equipment Response   Activity 1 Washer toss minimum assistance and maximal assistance Standing Rolling walker and Metal washers good   Comment: Sit to stand was max/min. Dynamic standing balance/reaching was min. Standing tolerance was 7 minutes.  UE coordination was I as were problem solving skills.  Flat and quiet but cooperative       Fine Motor Activities   Activity Assist Position Equipment Response           Comment:          Additional Info: This was a co-treat with OT    Goals     Short Term Goals    Goal  Goal Status   Will increase sit to stand to min Progressing   Will improve dynamic standing balance/reaching to supervision Progressing                 Long Term Goals    Goal Goal Status   Will increase standing balance/reaching to 5 minutes Met   Will improve dynamic standing balance/reaching to setup Met

## 2023-11-14 NOTE — PT/OT/SLP PROGRESS
"Physical Therapy Inpatient Rehab Treatment    Patient Name:  Geno Barry   MRN:  [id number]    Recommendations:     Discharge Recommendations:  Low Intensity Therapy   Discharge Equipment Recommendations:     Barriers to discharge: Impaired functional mobility  and Severity of Deficits     Assessment:     Geno Barry is a 72 y.o. female admitted with a medical diagnosis of S/P CABG (coronary artery bypass graft).  She presents with the following impairments/functional limitations:  weakness, impaired endurance, impaired functional mobility, gait instability, decreased lower extremity function.    Rehab Diagnosis: Multivessel CAD s/p CABG x 2    General Precautions: Standard, sternal     Orthopedic Precautions:N/A     Braces: N/A    Rehab Prognosis: Good and Fair; patient would benefit from acute skilled PT services to address these deficits and reach maximum level of function.      History:     Past Medical History:   Diagnosis Date    CAD (coronary artery disease)     Diabetes mellitus     Hypertension     PAD (peripheral artery disease)        Past Surgical History:   Procedure Laterality Date    CORONARY ARTERY BYPASS GRAFT (CABG) N/A 11/2/2023    Procedure: CORONARY ARTERY BYPASS GRAFT (CABG);  Surgeon: Heriberto Lara IV, MD;  Location: [facility] OR;  Service: Cardiothoracic;  Laterality: N/A;    ENDOSCOPIC HARVEST OF VEIN N/A 11/2/2023    Procedure: SURGICAL PROCUREMENT, VEIN, ENDOSCOPIC;  Surgeon: Heriberto Lara IV, MD;  Location: [facility] OR;  Service: Cardiothoracic;  Laterality: N/A;    HYSTERECTOMY      LEFT HEART CATHETERIZATION Left 11/2/2023    Procedure: Left heart cath;  Surgeon: Puma Vera MD;  Location: [facility] CATH LAB;  Service: Cardiology;  Laterality: Left;  LHC +/- PCI VIA RRA    LUMPECTOMY, BREAST Right        Subjective     Patient comments: "I'm tired today"    Respiratory Status: Room air    Patients cultural, spiritual, Jew conflicts given the current situation: " "no    Objective:     Patient found up in chair with peripheral IV (cardiac bear)  upon PT entry to room.    Pt is Alert and Cooperative, Fatigued.    Vitals   Vitals AM session:   Start: 132/78, 79bpm  End: 142/79, 86bpm    Vitals PM session:   Start: 109/61, 66bpm  End: 131/75, 73bpm    Functional Mobility:      Current   Status  Discharge   Goal   Functional Area: Care Score:    Sit to Lying 3  Mod A for BLE, HOB flat, VC for technique to maintain sternal pxns. Independent   Sit to Stand 3  CGA-Max A depending on surface height and fatigue levels. Pt mostly Min-Mod A, VC for weightshift forward and proper foot placement Independent   Chair/Bed-to-Chair Transfer 3  CGA once standing for stand step transfer with Rollator, P/M score d/t sit-to-stand. Independent   Walk 10 Feet 4  CGA with Rollator Independent   Walk 50 Feet with Two Turns 4  CGA with Rollator Independent   Walk 150 Feet 4  CGA with Rollator. AM session: 150ft + 212ft + 130ft + 100ft.  PM session: ~75ft x2 trials.  Extended seated rest breaks required between all bouts, pt able to safely sit on Rollator seat. Supervision or touching assistance       Therapeutic Activities and Exercises:  AM session:  Seated EOM, pt performed sit-to-stands with proper technique and hand placement. Performed 3-4 reps at progressively lower heights of therapy mat. Lowest height: 20".   Seated EOM, 2x10 renae: hip flexion, knee extension, hip add, hip abd, HSC. Pt required frequent rest breaks.     PM session:   Supine in bed, x10 renae: bridges, glute sets, quad sets, heel slides, ankle pumps (x20). Pt appeared in distress d/t increased difficulty performing simple exercises, but reported she was alright. Increased time required to perform TherEx.     Activity Tolerance: Good and Fair    Patient left  in recliner chair after AM session, supine in bed after PM session  with call button in reach.    Education provided: roles and goals of PT/PTA, transfer training, bed mob, " gait training, body mechanics, and strengthening exercises    Expected compliance: Moderate compliance    Plan:     During this hospitalization, patient to be seen 5 x/week (5-7x/wk) to address the identified rehab impairments via gait training, therapeutic activities, therapeutic exercises, neuromuscular re-education and progress toward the following goals:    GOALS:   Multidisciplinary Problems       Physical Therapy Goals          Problem: Physical Therapy    Goal Priority Disciplines Outcome Goal Variances Interventions   Physical Therapy Goal     PT, PT/OT Ongoing, Progressing     Description: Bed Mobility:  Roll left and right with partial/moderate assist.  Sit to supine transfer with partial/moderate assist.  Supine to sit transfer with partial/moderate assist.    Transfers:  Sit to stand transfer with partial/moderate assist using RW.  Bed to chair transfer with partial/moderate assist Stand Step  using RW.  Car transfer with substantial/maximal assist using RW.   an object from the ground in standing position with substantial/maximal assist using RW.    Mobility:  Ambulate 125 feet with supervision/touching assist using RW.  Ambulate 30 feet on uneven surfaces/ramps with supervision/touching assist using RW.  Pt ascended/descended a 4 inch curb with partial/moderate assist using RW.  Ascend/descend 2 stairs with substantial/maximal assist using no handrails.                       Plan of Care Expires:  12/01/23  PT Next Visit Date: 11/16/23  Plan of Care reviewed with: patient    Additional Information:         Time Tracking:     Therapy Time  PT Start Time:  (0900 ; 1430)    PT End Time: 1000; 1500  60 min + 30 min  90 min total    Billable Minutes: Gait Training 15, Therapeutic Activity 30, and Therapeutic Exercise 45    11/14/2023

## 2023-11-15 LAB
POCT GLUCOSE: 100 MG/DL (ref 70–110)
POCT GLUCOSE: 111 MG/DL (ref 70–110)
POCT GLUCOSE: 134 MG/DL (ref 70–110)

## 2023-11-15 PROCEDURE — 25000003 PHARM REV CODE 250: Performed by: NURSE PRACTITIONER

## 2023-11-15 PROCEDURE — 97530 THERAPEUTIC ACTIVITIES: CPT

## 2023-11-15 PROCEDURE — 25000003 PHARM REV CODE 250: Performed by: INTERNAL MEDICINE

## 2023-11-15 PROCEDURE — 97112 NEUROMUSCULAR REEDUCATION: CPT

## 2023-11-15 PROCEDURE — 97116 GAIT TRAINING THERAPY: CPT

## 2023-11-15 PROCEDURE — 11800000 HC REHAB PRIVATE ROOM

## 2023-11-15 PROCEDURE — 97535 SELF CARE MNGMENT TRAINING: CPT

## 2023-11-15 PROCEDURE — 97110 THERAPEUTIC EXERCISES: CPT

## 2023-11-15 PROCEDURE — 97168 OT RE-EVAL EST PLAN CARE: CPT

## 2023-11-15 PROCEDURE — 99900035 HC TECH TIME PER 15 MIN (STAT)

## 2023-11-15 PROCEDURE — 94799 UNLISTED PULMONARY SVC/PX: CPT

## 2023-11-15 RX ORDER — CHOLECALCIFEROL (VITAMIN D3) 25 MCG
1000 TABLET ORAL DAILY
Status: DISCONTINUED | OUTPATIENT
Start: 2023-11-15 | End: 2023-11-21 | Stop reason: HOSPADM

## 2023-11-15 RX ADMIN — FERROUS SULFATE TAB 325 MG (65 MG ELEMENTAL FE) 1 EACH: 325 (65 FE) TAB at 08:11

## 2023-11-15 RX ADMIN — Medication 800 MG: at 09:11

## 2023-11-15 RX ADMIN — METFORMIN HYDROCHLORIDE 1000 MG: 500 TABLET, FILM COATED ORAL at 04:11

## 2023-11-15 RX ADMIN — GLIPIZIDE 5 MG: 5 TABLET, FILM COATED, EXTENDED RELEASE ORAL at 08:11

## 2023-11-15 RX ADMIN — AMIODARONE HYDROCHLORIDE 200 MG: 200 TABLET ORAL at 08:11

## 2023-11-15 RX ADMIN — ATORVASTATIN CALCIUM 40 MG: 40 TABLET, FILM COATED ORAL at 08:11

## 2023-11-15 RX ADMIN — APIXABAN 5 MG: 5 TABLET, FILM COATED ORAL at 09:11

## 2023-11-15 RX ADMIN — ASPIRIN 81 MG: 81 TABLET, COATED ORAL at 08:11

## 2023-11-15 RX ADMIN — Medication 800 MG: at 08:11

## 2023-11-15 RX ADMIN — ACETAMINOPHEN 650 MG: 325 TABLET ORAL at 04:11

## 2023-11-15 RX ADMIN — METFORMIN HYDROCHLORIDE 1000 MG: 500 TABLET, FILM COATED ORAL at 08:11

## 2023-11-15 RX ADMIN — HYDROXYZINE PAMOATE 50 MG: 50 CAPSULE ORAL at 09:11

## 2023-11-15 RX ADMIN — DOCUSATE SODIUM 100 MG: 100 CAPSULE, LIQUID FILLED ORAL at 08:11

## 2023-11-15 RX ADMIN — SITAGLIPTIN 100 MG: 50 TABLET, FILM COATED ORAL at 08:11

## 2023-11-15 RX ADMIN — METOPROLOL SUCCINATE 50 MG: 50 TABLET, FILM COATED, EXTENDED RELEASE ORAL at 08:11

## 2023-11-15 RX ADMIN — ACETAMINOPHEN 650 MG: 325 TABLET ORAL at 09:11

## 2023-11-15 RX ADMIN — APIXABAN 5 MG: 5 TABLET, FILM COATED ORAL at 08:11

## 2023-11-15 RX ADMIN — AMLODIPINE BESYLATE 10 MG: 5 TABLET ORAL at 08:11

## 2023-11-15 RX ADMIN — EZETIMIBE 10 MG: 10 TABLET ORAL at 08:11

## 2023-11-15 RX ADMIN — Medication 1000 UNITS: at 02:11

## 2023-11-15 NOTE — PLAN OF CARE
Problem: Occupational Therapy  Goal: Occupational Therapy Goal  Description: ADLs:  MET Pt to perform grooming tasks with Indep at seated level   MET Pt to perform feeding tasks with setup assist   Progressing/continue Pt to perform UB dressing with mod A    Progressing/continue Pt to perform LB dressing with mod A    MET Pt to perform putting on/off footwear task with max A  MET Pt to perform toileting with mod A  Pt to perform toileting with SBA  MET Pt to perform bathing with mod A  Pt to perform bathing with setup assist    Functional Transfers:  Pt to perform toilet transfers with min A and BSC Progressing/continue  Pt to perform a tub transfer with min A and TTB Progressing/continue  Outcome: Ongoing, Progressing

## 2023-11-15 NOTE — PROGRESS NOTES
[facility] ([facility])  Rehab Progress Note    Patient Name: Geno Barry  MRN: [id number]  Age: 72 y.o. Sex: female  : 1950  Hospital Length of Stay: 6 days  Date of Service: 11/15/2023   Chief Complaint: Multivessel CAD s/p CABG x2 on 2023    Subjective:     Basic Information  Admit Information: 72-year-old  female presented to [facility] for scheduled CABG on 2023.  PMH significant for PID, DM type 2, CAD, and carotid artery stenosis.  Tolerated CABG x2 on  without perioperative complications.  Aspirin, Norvasc, and beta-blocker continued.  Lipitor 40 mg daily initiated on .  Chest tube discontinued on .  Amiodarone initiated due to postoperative atrial fibrillation.  Eliquis 5 mg b.i.d. initiated on .  Tolerated transfer to [facility] inpatient rehab unit on  without incident.  Today's Information: No acute events overnight.  Sitting in chair comfortably working with therapy.  Reports good sleep and appetite.  Last BM .  Vital signs at goal with no recent recorded fevers.  Good glycemic control.  No labs or imaging today.    Review of patient's allergies indicates:   Allergen Reactions    Ace inhibitors Swelling    Clopidogrel Swelling    Iodine         Current Facility-Administered Medications:     acetaminophen tablet 650 mg, 650 mg, Oral, Q8H PRN, Elizabeth, Marina A, FNP    ALPRAZolam tablet 0.25 mg, 0.25 mg, Oral, TID PRN, Duglas, Isidoro A, FNP    amiodarone tablet 200 mg, 200 mg, Oral, Daily, Duglas, Isidoro A, FNP, 200 mg at 11/15/23 08    amLODIPine tablet 10 mg, 10 mg, Oral, Daily, Duglas, Isidoro A, FNP, 10 mg at 11/15/23 08    apixaban tablet 5 mg, 5 mg, Oral, BID, Duglas, Isidoro A, FNP, 5 mg at 11/15/23 08    aspirin EC tablet 81 mg, 81 mg, Oral, Daily, Duglas, Isidoro A, FNP, 81 mg at 11/15/23 08    atorvastatin tablet 40 mg, 40 mg, Oral, Daily, Duglas, Isidoro A, FNP, 40 mg at 11/15/23  0812    benzonatate capsule 100 mg, 100 mg, Oral, TID PRN, Duglas, Isidoro A, FNP    bisacodyL suppository 10 mg, 10 mg, Rectal, Daily PRN, Duglas, Isidoro A, FNP    cholecalciferol (vitamin D3) capsule/tablet 400 Units, 400 Units, Oral, Daily, Duglas, Isidoro A, FNP, 400 Units at 11/12/23 0800    dextrose 10% bolus 125 mL 125 mL, 12.5 g, Intravenous, PRN, Duglas, Isidoro A, FNP    dextrose 10% bolus 250 mL 250 mL, 25 g, Intravenous, PRN, Duglas, Isidoro A, FNP    docusate sodium capsule 100 mg, 100 mg, Oral, BID, Duglas, Isidoro A, FNP, 100 mg at 11/15/23 0812    ezetimibe tablet 10 mg, 10 mg, Oral, Daily, Duglas, Isidoro A, FNP, 10 mg at 11/15/23 0812    ferrous sulfate tablet 1 each, 1 tablet, Oral, Daily, Duglas, Isidoro A, FNP, 1 each at 11/15/23 0813    glipiZIDE 24 hr tablet 5 mg, 5 mg, Oral, Daily with breakfast, Duglas, Isidoro A, FNP, 5 mg at 11/15/23 0812    glucagon (human recombinant) injection 1 mg, 1 mg, Intramuscular, PRN, Duglas, Isidoro A, FNP    glucose chewable tablet 16 g, 16 g, Oral, PRN, Duglas, Isidoro A, FNP    glucose chewable tablet 24 g, 24 g, Oral, PRN, Duglas, Isidoro A, FNP    hydrALAZINE injection 10 mg, 10 mg, Intravenous, Q4H PRN, Duglas, Isidoro A, FNP    HYDROcodone-acetaminophen 7.5-325 mg per tablet 1 tablet, 1 tablet, Oral, TID PRN, Elizabeth, Marina A, FNP, 1 tablet at 11/13/23 1113    hydrOXYzine pamoate capsule 50 mg, 50 mg, Oral, QHS, Duglas, Isidoro A, FNP, 50 mg at 11/14/23 2039    insulin aspart U-100 injection 0-10 Units, 0-10 Units, Subcutaneous, QID (AC + HS) PRN, Duglas, Isidoro A, FNP, 2 Units at 11/13/23 1114    labetalol 20 mg/4 mL (5 mg/mL) IV syring, 10 mg, Intravenous, Q4H PRN, Duglas, Isidoro A, FNP    magnesium oxide tablet 800 mg, 800 mg, Oral, BID, Duglas, Isidoro A, FNP, 800 mg at 11/15/23 0812    metFORMIN tablet 1,000 mg, 1,000 mg, Oral, BID WM, Duglas, Isidoro A, FNP, 1,000 mg at 11/15/23 0813     "methocarbamoL tablet 500 mg, 500 mg, Oral, TID PRN, Marina Johnson A, FNP    metoprolol injection 10 mg, 10 mg, Intravenous, Q2H PRN, Duglas, Isidoro A, FNP    metoprolol succinate (TOPROL-XL) 24 hr tablet 50 mg, 50 mg, Oral, Daily, Duglas, Isidoro A, FNP, 50 mg at 11/15/23 0813    nitroGLYCERIN SL tablet 0.4 mg, 0.4 mg, Sublingual, Q5 Min PRN, Duglas, Isidoro A, FNP    ondansetron disintegrating tablet 4 mg, 4 mg, Oral, Q6H PRN, Duglas, Isidoro A, FNP    polyethylene glycol packet 17 g, 17 g, Oral, Q12H PRN, Duglas, Isidoro A, FNP    promethazine tablet 25 mg, 25 mg, Oral, Q6H PRN, Duglas, Isidoro A, FNP    SITagliptin phosphate tablet 100 mg, 100 mg, Oral, Daily, Duglas, Isidoro A, FNP, 100 mg at 11/15/23 0812     Review of Systems   Complete 12-point review of symptoms negative except for what's mentioned in HPI     Objective:     /61   Pulse 76   Temp 98.2 °F (36.8 °C)   Resp 20   Ht 5' 4" (1.626 m)   Wt 91.8 kg (202 lb 6.1 oz)   SpO2 97%   Breastfeeding No   BMI 34.74 kg/m²      Physical Exam  Vitals reviewed.   Eyes:      Pupils: Pupils are equal, round, and reactive to light.   Cardiovascular:      Rate and Rhythm: Normal rate and regular rhythm.      Heart sounds: Normal heart sounds.   Pulmonary:      Effort: Pulmonary effort is normal.      Breath sounds: Normal breath sounds.   Abdominal:      General: Bowel sounds are normal.      Comments: obese   Musculoskeletal:         General: Normal range of motion.   Skin:     General: Skin is warm.      Comments: Sternal incision clean and intact.  Chest tube sutures intact. Left leg harvest site intact.  Left lower extremity trace edema   Neurological:      General: No focal deficit present.      Mental Status: She is alert and oriented to person, place, and time.      Motor: Weakness present.   Psychiatric:         Mood and Affect: Mood normal.     *MD performed and documented physical examination       Lines/Drains/Airways  "      None                 Labs  Recent Results (from the past 24 hour(s))   POCT glucose    Collection Time: 11/14/23 12:13 PM   Result Value Ref Range    POCT Glucose 134 (H) 70 - 110 mg/dL   POCT glucose    Collection Time: 11/14/23  3:35 PM   Result Value Ref Range    POCT Glucose 131 (H) 70 - 110 mg/dL   POCT glucose    Collection Time: 11/14/23  8:37 PM   Result Value Ref Range    POCT Glucose 94 70 - 110 mg/dL   POCT glucose    Collection Time: 11/15/23  5:12 AM   Result Value Ref Range    POCT Glucose 111 (H) 70 - 110 mg/dL     Radiology  CXR 2 view on 11/10/2023, IMPRESSION: Bilateral pleural effusions.   Increased left retrocardiac density and silhouetting of the left hemidiaphragm which might be related to pleural fluid, however, infiltrate/atelectasis cannot be completely excluded. Compressive atelectatic changes in both bases, however, superimposed infiltrates cannot be completely excluded   Radiology  Transthoracic echo on 11/02/2023, IMPRESSION: Left Ventricle: regional wall motion abnormalities present.  The distal apex, apical inferior and distal septal walls are hypokinetic There is moderately reduced systolic function with a visually estimated ejection fraction of 35 - 40%. Grade I diastolic dysfunction.  Right Ventricle: Normal right ventricular cavity size. Wall thickness is normal. Right ventricle wall motion  is normal. Systolic function is normal.  Mitral Valve: There is mild regurgitation.  Diagnostic studies   [facility] on 11/02/2023, IMPRESSION: Left Main-luminal irregularities. LAD-mid subtotal occlusion involving large diagonal. LCX-non dominant, luminal irregularities. RCA-dominant, 40% ostial stenosis. LVEDP 18    Assessment/Plan:     72 y.o. AAF admitted on 11/9/2023     Multivessel CAD  - s/p CABG x2 on 11/02/2023 (LIMA to LAD and rSVG to Diag 1)  - denies recent chest pain or discomfort  - continue                Aspirin 81 mg daily                Lipitor 40 mg daily                 Metoprolol succinate 50 mg daily                 Norco 7.5 mg/325 mg q.8 hours p.r.n.   - ECG and Nitro PRN  - not on ACEI or Plavix  - continue cardiac diet  - to follow-up with cardiology outpatient     HLD  - cholesterol 129, HDL 38, total cholesterol 3, triglycerides 42, LDL 83, VLDL 8 on 11/02/2023  - continue                Lipitor 40 mg daily                 Zetia 10 mg daily     HFpEF  - LVEF 55% on 10/11/2022  - continue                Metoprolol succinate 50 mg daily  - monitor weights daily  - to follow-up with cardiology outpatient     PAD  - stable  - continue                Aspirin 81 mg daily                Lipitor 40 mg daily                Eliquis 5 mg b.i.d.  - to follow-up with vascular surgery outpatient     Bilateral carotid artery stenosis  - moderate-no interventions at this time  - continue                Aspirin 81 mg daily                Lipitor 40 mg daily                Eliquis 5 mg b.i.d.  - to follow-up with vascular surgery outpatient     Paroxysmal atrial fibrillation  - no evidence of bleeding  - rate controlled  - continue                Amiodarone 200 mg b.i.d. (to end on 11/10)                Amiodarone daily to begin on 11/11                Eliquis 5 mg b.i.d.                Metoprolol succinate 50 mg daily   - to follow-up with cardiology outpatient     Normocytic anemia  - asymptomatic  - H/H trending down  - iron 33, TIBC 159, iron binding capacity 126, transferrin 141, iron saturation 21 on 11/10/2023  - continue                Ferrous sulfate 325 mg daily (initiated 11/10)  - no evidence of active bleeds  - will closely monitor and transfuse if needed      HTN  - BP at goal  - continue                Metoprolol succinate 50 mg daily                Norvasc 10 mg daily                  Hydralazine 10 mg every 2 hours as needed for BP > 160/90                Labetalol 10 mg every 2 hours as needed for BP > 160/90  - low sodium diet     DM type II  - HgA1c 9.3 on 11/02/2023  -  continue                Metformin 1000 mg twice daily                Glipizide 5 mg with breakfast                Januvia 100 mg daily                ISS   - CBGs AC/HS     GERD  - Avoid spicy foods, and nothing to eat or drink within x2 hours of bedtime or laying flat (water is ok)   - Avoid NSAIDs (Advil, ibuprofen, naproxen...) and andrade-2 inhibitors (Mobic, Celebrex)    - continue                Pepcid 40 mg daily      Osteoarthritis  - stable  - continue                Meloxicam 15 mg daily                 Vitamin-D 3 400 units daily     VTE Prophylaxis:  Eliquis 5 mg b.i.d.  COVID-19 testing:  Unknown  COVID-19 vaccination status:  Vaccinated (ftopia):  03/05/2021 and (Pfizer):  11/09/2021 and 06/22/2022     POA: No  Living will: No  Contacts: Heaven Burns ([facility]) [phone]     CODE STATUS: Full Code  Internal Medicine (attending): Salvador Sebastian MD  Physiatry (consulting):  Patricio Daniel MD     OUTPATIENT PROVIDERS  PCP:  JONG Clifton  Cardiology:  Clay chen MD  CV surgery:  Heriberto Lara MD     DISPOSITION:  Vital signs at goal.  No labs today.  Good glycemic control.  Bowel management, sleep hygiene, and appetite at goal.  Progressing well with therapies.  Monitor closely.  Notify of any acute changes.    Staffing 11/13/2023: Continent of bowel and bladder.  Incisions look good today. RT: Overall mod to supervision.  Limited by activity intolerance, but very motivated.  Appetite is good if she likes the food. Received food from family OTW.  PT: Supervision to independent overall.  Max with bed mobility.  Ambulating 75 feet with RW.  Needs cueing to keep sternal precautions.  Max to mod assist with ADLs. Supervision now. Sit to stand is her biggest obstacle.  Projected discharge 11/21.     Jesi Driscoll NP conducted independent physical examination and assisted with medical documentation.

## 2023-11-15 NOTE — PLAN OF CARE
Eveline with [employer] called back and indicated that pt already got a walker with Medicare within the past 5 years, so she would have to pay for a new one.  Discussed with patient, who said that instead she will just continue using her neighbor's walker until her 5 years have elapsed.      Cancelled rollator order with Mik[facility].

## 2023-11-15 NOTE — PT/OT/SLP PROGRESS
"Physical Therapy Inpatient Rehab Treatment    Patient Name:  Geno Barry   MRN:  [id number]    Recommendations:     Discharge Recommendations:  Low Intensity Therapy   Discharge Equipment Recommendations:     Barriers to discharge: Impaired functional mobility  and Severity of Deficits     Assessment:     Geno Barry is a 72 y.o. female admitted with a medical diagnosis of S/P CABG (coronary artery bypass graft).  She presents with the following impairments/functional limitations:  weakness, impaired endurance, impaired self care skills.    Rehab Diagnosis: Multivessel CAD s/p CABG x 2    General Precautions: Standard, sternal     Orthopedic Precautions:N/A     Braces: N/A    Rehab Prognosis: Good and Fair; patient would benefit from acute skilled PT services to address these deficits and reach maximum level of function.      History:     Past Medical History:   Diagnosis Date    CAD (coronary artery disease)     Diabetes mellitus     Hypertension     PAD (peripheral artery disease)        Past Surgical History:   Procedure Laterality Date    CORONARY ARTERY BYPASS GRAFT (CABG) N/A 11/2/2023    Procedure: CORONARY ARTERY BYPASS GRAFT (CABG);  Surgeon: Heriberto Lara IV, MD;  Location: [facility] OR;  Service: Cardiothoracic;  Laterality: N/A;    ENDOSCOPIC HARVEST OF VEIN N/A 11/2/2023    Procedure: SURGICAL PROCUREMENT, VEIN, ENDOSCOPIC;  Surgeon: Heriberto Lara IV, MD;  Location: [facility] OR;  Service: Cardiothoracic;  Laterality: N/A;    HYSTERECTOMY      LEFT HEART CATHETERIZATION Left 11/2/2023    Procedure: Left heart cath;  Surgeon: Puma Vera MD;  Location: [facility] CATH LAB;  Service: Cardiology;  Laterality: Left;  LHC +/- PCI VIA RRA    LUMPECTOMY, BREAST Right        Subjective     Patient comments: "I'm tired"    Respiratory Status: Room air    Patients cultural, spiritual, Quaker conflicts given the current situation: no    Objective:      Patient found up in chair with peripheral IV  upon " PT entry to room.    Pt is Alert and Cooperative.    Vitals   Vitals at Rest  /74   HR 82   O2 Sat     Pain         Functional Mobility:      Current   Status  Discharge   Goal   Functional Area: Care Score:    Sit to Stand 3  Mod A with Rollator, max cues for technique Independent   Chair/Bed-to-Chair Transfer 3  Mod A with Rollator d/t sit-to-stand, otherwise CGA for stand step. Independent       Therapeutic Activities and Exercises:  - Dynamic standing balance and tolerance while playing [employer] during Recreational Therapy. Pt required seated rest breaks d/t fatigue and RLE pain. Pt performed two rounds LXX/RXX to further challenge balance.     Activity Tolerance: Fair    Patient left up in chair with call button in reach and cushion added to pt's recliner chair to raise height of chair. .    Education provided: balance training and body mechanics    Expected compliance: Moderate compliance    Plan:     During this hospitalization, patient to be seen 5 x/week (5-7x/wk) to address the identified rehab impairments via gait training, therapeutic activities, therapeutic exercises, neuromuscular re-education and progress toward the following goals:    GOALS:   Multidisciplinary Problems       Physical Therapy Goals          Problem: Physical Therapy    Goal Priority Disciplines Outcome Goal Variances Interventions   Physical Therapy Goal     PT, PT/OT Ongoing, Progressing     Description: Bed Mobility:  Roll left and right with partial/moderate assist.  Sit to supine transfer with partial/moderate assist.  Supine to sit transfer with partial/moderate assist.    Transfers:  Sit to stand transfer with partial/moderate assist using RW.  Bed to chair transfer with partial/moderate assist Stand Step  using RW.  Car transfer with substantial/maximal assist using RW.   an object from the ground in standing position with substantial/maximal assist using RW.    Mobility:  Ambulate 125 feet with  supervision/touching assist using RW.  Ambulate 30 feet on uneven surfaces/ramps with supervision/touching assist using RW.  Pt ascended/descended a 4 inch curb with partial/moderate assist using RW.  Ascend/descend 2 stairs with substantial/maximal assist using no handrails.                       Plan of Care Expires:  12/01/23  PT Next Visit Date: 11/16/23  Plan of Care reviewed with: patient    Additional Information:         Time Tracking:     Therapy Time  PT Start Time: 1030  PT Stop Time: 1100  PT Total Time (min): 30 min   PT Individual: 30  Missed Time:    Time Missed due to:      Billable Minutes: Neuromuscular Re-education 30 min    11/15/2023

## 2023-11-15 NOTE — PT/OT/SLP PROGRESS
"Physical Therapy Inpatient Rehab Treatment    Patient Name:  Geno Barry   MRN:  [id number]    Recommendations:     Discharge Recommendations:  Low Intensity Therapy   Discharge Equipment Recommendations:     Barriers to discharge: Severity of Deficits     Assessment:     Geno Barry is a 72 y.o. female admitted with a medical diagnosis of S/P CABG (coronary artery bypass graft).  She presents with the following impairments/functional limitations:  impaired endurance, weakness, impaired functional mobility, impaired self care skills, gait instability, pain.    Rehab Diagnosis: Multivessel CAD s/p CABG x 2    General Precautions: Standard, sternal     Orthopedic Precautions:N/A     Braces: N/A    Rehab Prognosis: Fair; patient would benefit from acute skilled PT services to address these deficits and reach maximum level of function.      History:     Past Medical History:   Diagnosis Date    CAD (coronary artery disease)     Diabetes mellitus     Hypertension     PAD (peripheral artery disease)        Past Surgical History:   Procedure Laterality Date    CORONARY ARTERY BYPASS GRAFT (CABG) N/A 11/2/2023    Procedure: CORONARY ARTERY BYPASS GRAFT (CABG);  Surgeon: Heriberto Lara IV, MD;  Location: [facility] OR;  Service: Cardiothoracic;  Laterality: N/A;    ENDOSCOPIC HARVEST OF VEIN N/A 11/2/2023    Procedure: SURGICAL PROCUREMENT, VEIN, ENDOSCOPIC;  Surgeon: Heriberto Lara IV, MD;  Location: [facility] OR;  Service: Cardiothoracic;  Laterality: N/A;    HYSTERECTOMY      LEFT HEART CATHETERIZATION Left 11/2/2023    Procedure: Left heart cath;  Surgeon: Puma Vera MD;  Location: [facility] CATH LAB;  Service: Cardiology;  Laterality: Left;  LHC +/- PCI VIA RRA    LUMPECTOMY, BREAST Right        Subjective     Patient comments: "I don't know why they take my food order, they never bring what I want"    Respiratory Status: Room air    Patients cultural, spiritual, Jehovah's witness conflicts given the current situation: " no    Objective:     Communicated with GIULIA Bowen prior to session.  Patient found up in chair with peripheral IV  upon PT entry to room.    Pt is Alert, Cooperative, and Agitated.    Vitals   Vitals at Rest  /83   HR 83   O2 Sat    Pain      Vitals With Activity  /80   HR 82   O2 Sat     Pain         Functional Mobility:      Current   Status  Discharge   Goal   Functional Area: Care Score:    Sit to Lying 3  Mod A for BLE, VC for technique Independent   Lying to Sitting on Side of Bed 2  Max A for trunk Independent   Sit to Stand 3  Min-Mod A with Rollator, cues for weightshifting forward Independent   Chair/Bed-to-Chair Transfer 3  Min-Mod A with Rollator d/t sit-to-stand, once standing pt is SBA/CGA, stand step transfer  Independent   Walk 10 Feet 4  CGA with Rollator Independent   Walk 50 Feet with Two Turns 4  CGA with Rollator, 85ft x2 trials, 130ft x2 trials. Extended seated rest breaks between all bouts.   Independent       Therapeutic Activities and Exercises:  - Supine TherEx, x10 renae: glute sets, quad sets, hip/knee flexion, hip abd, abdominal squeezes  - Seated edge of bed, x10 renae: hip flexion, knee extension, hip abd, hip add, and HSC. All against manual resistance.   - Educated pt on importance of ambulating at home every hour.     Activity Tolerance: Fair    Patient left up in chair with call button in reach.    Education provided: roles and goals of PT/PTA, transfer training, bed mob, gait training, body mechanics, and strengthening exercises    Expected compliance: Moderate compliance    Plan:     During this hospitalization, patient to be seen 5 x/week (5-7x/wk) to address the identified rehab impairments via gait training, therapeutic activities, therapeutic exercises, neuromuscular re-education and progress toward the following goals:    GOALS:   Multidisciplinary Problems       Physical Therapy Goals          Problem: Physical Therapy    Goal Priority Disciplines Outcome Goal  Variances Interventions   Physical Therapy Goal     PT, PT/OT Ongoing, Progressing     Description: Bed Mobility:  Roll left and right with partial/moderate assist.  Sit to supine transfer with partial/moderate assist.  Supine to sit transfer with partial/moderate assist.    Transfers:  Sit to stand transfer with partial/moderate assist using RW.  Bed to chair transfer with partial/moderate assist Stand Step  using RW.  Car transfer with substantial/maximal assist using RW.   an object from the ground in standing position with substantial/maximal assist using RW.    Mobility:  Ambulate 125 feet with supervision/touching assist using RW.  Ambulate 30 feet on uneven surfaces/ramps with supervision/touching assist using RW.  Pt ascended/descended a 4 inch curb with partial/moderate assist using RW.  Ascend/descend 2 stairs with substantial/maximal assist using no handrails.                       Plan of Care Expires:  12/01/23  PT Next Visit Date: 11/16/23  Plan of Care reviewed with: patient    Additional Information:         Time Tracking:     Therapy Time  PT Start Time: 1400  PT Stop Time: 1500  PT Total Time (min): 60 min   PT Individual: 60  Missed Time:    Time Missed due to:      Billable Minutes: Gait Training 20, Therapeutic Activity 15, and Therapeutic Exercise 25    11/15/2023

## 2023-11-15 NOTE — PLAN OF CARE
Problem: Rehabilitation (IRF) Plan of Care  Goal: Absence of New-Onset Illness or Injury  Outcome: Ongoing, Progressing     Problem: Diabetes Comorbidity  Goal: Blood Glucose Level Within Targeted Range  Outcome: Ongoing, Progressing     Problem: Skin Injury Risk Increased  Goal: Skin Health and Integrity  Outcome: Ongoing, Progressing     Problem: Fall Injury Risk  Goal: Absence of Fall and Fall-Related Injury  Outcome: Ongoing, Progressing

## 2023-11-15 NOTE — PT/OT/SLP RE-EVAL
Occupational Therapy Inpatient Rehab Re-Evaluation    Name: Geno Barry  MRN: [id number]    Recommendations:     Discharge Recommendations:  Low Intensity Therapy   Discharge Equipment Recommendations:  (BSC, TTB)   Barriers to discharge:  time since onset    Assessment:  Geno Barry is a 72 y.o. female admitted with a medical diagnosis of S/P CABG (coronary artery bypass graft).  She presents with the following impairments/functional limitations:  weakness, impaired endurance, impaired self care skills.    General Precautions: Standard, fall, sternal     Orthopedic Precautions:N/A     Braces: N/A    Rehab Prognosis: Good; patient would benefit from acute skilled OT services to address these deficits and reach maximum level of function.      History:     Past Medical History:   Diagnosis Date    CAD (coronary artery disease)     Diabetes mellitus     Hypertension     PAD (peripheral artery disease)        Past Surgical History:   Procedure Laterality Date    CORONARY ARTERY BYPASS GRAFT (CABG) N/A 11/2/2023    Procedure: CORONARY ARTERY BYPASS GRAFT (CABG);  Surgeon: Heriberto Lara IV, MD;  Location: [facility];  Service: Cardiothoracic;  Laterality: N/A;    ENDOSCOPIC HARVEST OF VEIN N/A 11/2/2023    Procedure: SURGICAL PROCUREMENT, VEIN, ENDOSCOPIC;  Surgeon: Heriberto Lara IV, MD;  Location: [facility] OR;  Service: Cardiothoracic;  Laterality: N/A;    HYSTERECTOMY      LEFT HEART CATHETERIZATION Left 11/2/2023    Procedure: Left heart cath;  Surgeon: Puma Vera MD;  Location: [facility] CATH LAB;  Service: Cardiology;  Laterality: Left;  LHC +/- PCI VIA RRA    LUMPECTOMY, BREAST Right        Subjective     Orientation: Oriented x4    Chief Complaint: Generalized endurance deficits    Patient/Family Comments/goals: To gain strength and endurance to increase independence and safety with functional transfers and ADLs.    Vitals  Vitals at Rest  /69   HR 86   O2 Sat     Pain Pain Rating 1: 0/10      Vitals With Activity  BP (!) 145/83   HR 87   O2 Sat     Pain Pain Rating 1: 0/10     Respiratory Status: Room air    Patients cultural, spiritual, Druze conflicts given the current situation: no       Living Environment   Living Environment  People in Home: alone  Living Arrangements: house  Home Accessibility: wheelchair accessible  Number of Stairs, Main Entrance: none  Home Layout: Able to live on 1st floor  Transportation Anticipated: family or friend will provide  Equipment Currently Used at Home: rollator, cane, straight  Shower Setup: Tub/Shower combo    Prior Level of Function  BADL: Pt required partial assist with ADLs    IADL: Pt required assist with IADLs.    Equipment used at home: rollator.  DME owned (not currently used): rolling walker, which is currently broken per pt report.      Upon discharge, patient will have assistance from caregivers M-F, 10AM-230PM. Pt's neighbors and family assist pt outside of the caregiver hours.    Objective:     Patient found up in chair with peripheral IV  upon OT entry to room.    Mobility   Patient completed:  Sit to Stand Transfer with moderate assistance with Rollator  Stand to Sit Transfer with minimum assistance with Rollator   Toilet Transfer Step Transfer technique with maximal assistance with  Rollator  Tub Transfer Step Transfer technique with minimum assistance with TTB    Functional Mobility   Pt ambulated in/out bathroom with Rollator and supervision. No loss of balance noted throughout.    ADLs   Current Status   Eating 6   Oral Hygiene 6 seated in recliner   Shower, Bathe Self 4 pt required supervision per report, as pt was given a shower earlier this morning with CNA.    Upper Body Dressing 3 assist to don bra   Lower Body Dressing 4 CGA when standing to adjust clothing onto/off of hips   Toileting Hygiene 3 assist with posterior shabana hygiene   Toilet Transfer 2    Putting On, Taking Off Footwear 6     Limiting Factors for ADLs: endurance,  balance, body habitus, and sternal pxns    Exams     ROM:          -       WFL within sternal pxns    Hand Dominance: Right    ROM Hand  Left Hand: WFL  Right Hand: WFL    Strength  Overall Strength: WFL within sternal pxns; not formally assessed    Sensation  Left:          -       WNL  Right:          -       WNL    Coordination:      -       Intact    Tone  Left: WNL  Right: WNL    Visual/Perceptual  Intact    Cognition:   WFL    Additional Treatments   Sitting at EOM with Indep, pt completed 3 sets, 10 reps each, of modified sit ups (wedge + 2 pillows) to improve trunk strength and control to increase independence with bed mobility. Rest breaks taken between sets.    To improve pt's standing tolerance/endurance as well as dynamic reaching in standing position, pt tolerated three trials of standing with no UE support and CGA provided to participate in balloon toss. First trial: 1 min, second trial: 1 min 15 sec, third trial: 1 min 20 sec. Prolonged rest breaks taken following each trial. One loss of balance noted during third trial with min A to recover.     LifeStyle Change and Education     Patient left up in chair with call button in reach.    Education provided: Roles and goals of OT, ADLs, transfer training, body mechanics, modified goals, sequencing, safety precautions, fall prevention, post-op precautions, equipment recommendations, and home safety    Multidisciplinary Problems       Occupational Therapy Goals          Problem: Occupational Therapy    Goal Priority Disciplines Outcome Interventions   Occupational Therapy Goal     OT, PT/OT Ongoing, Progressing    Description: ADLs:  MET Pt to perform grooming tasks with Indep at seated level   MET Pt to perform feeding tasks with setup assist   Progressing/continue Pt to perform UB dressing with mod A    Progressing/continue Pt to perform LB dressing with mod A    MET Pt to perform putting on/off footwear task with max A  MET Pt to perform toileting with  mod A  Pt to perform toileting with SBA  MET Pt to perform bathing with mod A  Pt to perform bathing with setup assist    Functional Transfers:  Pt to perform toilet transfers with min A and BSC Progressing/continue  Pt to perform a tub transfer with min A and TTB Progressing/continue                       Plan     During this hospitalization, patient to be seen 5 x/week to address the identified rehab impairments via self-care/home management, therapeutic activities, therapeutic exercises and progress toward the following goals:    Plan of Care Expires:  11/21/23    Time Tracking     OT Received On: 11/15/23  Time In 0830     Time Out 0930  Total Time 60 min  Therapy Time: OT Individual: 60  Missed Time:    Missed Time Reason:      Billable Minutes: Re-eval 15, Self Care/Home Management 15, Therapeutic Activity 15, and Therapeutic Exercise 15    11/15/2023

## 2023-11-15 NOTE — PT/OT/SLP PROGRESS
Recreational Therapy Treatment    Date of Treatment: 11/15/23  Start Time: 1031  Stop Time: 1100  Total Time: 29 min  Missed Time:    General Precautions: fall, sternal  Ortho Precautions: N/A  Braces: N/A    Vitals   Vitals at Rest  /74   HR 82   O2 Sat    Pain      Vitals With Activity  /75   HR 84   O2 Sat    Pain        Treatment     Cognitive Skills Building   Cognitive Observation Activity Assist Position Equipment Response            Comment:          Dynamic Activities   Activity Assist Position Equipment Response   Activity 1 Bocce ball minimum assistance and moderate assistance Standing Rolling walker and Bocce balls fair   Comment: Sit to stand was mod/min as was dynamic standing balance/reaching. Standing tolerance was 5 minutes.  UE coordination was I.  Comprehension and problem solving skills were setup.  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

## 2023-11-15 NOTE — PROGRESS NOTES
Inpatient Nutrition Assessment    Admit Date: 11/9/2023   Total duration of encounter: 6 days   Patient Age: 72 y.o.    Nutrition Recommendation/Prescription     Continue with current diabetic diet. Honor food preferences.  Will add Boost Glucose Control (chocolate) TID with meals (190 kcal and 16 gm protein per serving)  Weekly weights  Medical management of glucose  Will provide diabetic education prior to discharge    Communication of Recommendations: reviewed with patient    Nutrition Assessment     Malnutrition Assessment/Nutrition-Focused Physical Exam    Does not meet criteria     Chart Review    Reason Seen: follow-up    Malnutrition Screening Tool Results   Have you recently lost weight without trying?: No  Have you been eating poorly because of a decreased appetite?: No   MST Score: 0   Diagnosis:  Multi vessel CAD s/p CABG x 2 on 11/2/23    Relevant Medical History: PAD, DM, HTN, CAD    Nutrition-Related Medications: amlodipine, atorvastatin, vitamin D3, colace, zetia, ferrous sulfate, glipizide, metformin, sitagliptin phosphate, mag ox  Calorie Containing IV Medications: no significant kcals from medications at this time    Nutrition-Related Labs:  (11/13) WBC 12.94(H) RBC 3.43(L) H/H 9.7(L)/21.6(L) Cl 108(H) Cr 1.09(H) Glu 145(H) Mg 1.5(L) Alb 2.8 (L) CBGs elevated  (11/10) WBC 11.74(H)  (L) H/H 9.8L(L)/31.5(L) Iron 33(L) TIBC 159(L) Transferrin 141(L) Ferritin 494.12(H) Cr 1.12(H) Glu 165(H) Alb 2.6(L) PAB 11.3(L)   (11/2)  A1C 9.3% (H)  CBGs elevated    Nutrition Orders:   Diet diabetic      Appetite/Oral Intake: fair/50-75% of meals    Factors Affecting Nutritional Intake:  Pt stated intake varies due to food preferences     Food/Jewish/Cultural Preferences: none reported    Food Allergies: none reported    Wound(s):   Intact    Last Bowel Movement: 11/14/23    Comments    (11/10) Pt stated her appetite and intake are fair. Consumes % of meals. Pt states her intake depends on what is  "served. Denied N/V/C/D. No issues chewing. Pt reports she does have some trouble swallowing. Stated she coughs when eating at times. # was about 2 weeks ago. Pt with a wt gain of 19# (9.6%). She is able to feed self with set-up.     (11/15) Pt stated her appetite is good, however, intake depends on food served. She does order alternate meals from dietary. Nrsg reports pt requesting chocolate milk. RDN offered Boost Glucose Control (chocolate) with meals. Pt agreeable. Denied N/V/C/D.     Anthropometrics    Height: 5' 4" (162.6 cm) Height Method: Stated  Last Weight: 91.8 kg (202 lb 6.1 oz) (11/15/23 0515) Weight Method: Standard Scale  BMI (Calculated): 34.7  BMI Classification: obese grade II (BMI 35-39.9)     Ideal Body Weight (IBW), Female: 120 lb     % Ideal Body Weight, Female (lb): 180.41 %                    Usual Body Weight (UBW), k.2 kg  % Usual Body Weight: 100.21     Usual Weight Provided By: patient    Wt Readings from Last 5 Encounters:   11/15/23 91.8 kg (202 lb 6.1 oz)   23 95.3 kg (210 lb 1.6 oz)   10/13/22 99.8 kg (220 lb)   10/24/17 58 kg (127 lb 13.9 oz)     Weight Change(s) Since Admission:   Wt Readings from Last 1 Encounters:   11/15/23 0515 91.8 kg (202 lb 6.1 oz)   23 0527 92.4 kg (203 lb 11.3 oz)   23 0524 92.9 kg (204 lb 12.9 oz)   23 0535 98.3 kg (216 lb 11.4 oz)   23 164 98.2 kg (216 lb 7.9 oz)   Admit Weight: 98.2 kg (216 lb 7.9 oz) (23 164), Weight Method: Standard Scale  (11/15) 202# Loss of 14#, however question accuracy of weights due to fluctuations.    Estimated Needs    Weight Used For Calorie Calculations: 98.2 kg (216 lb 7.9 oz)  Energy Calorie Requirements (kcal): 1467 kcal (MSJ)  Energy Need Method: [place]- Jeor  Weight Used For Protein Calculations: 98.2 kg (216 lb 7.9 oz)  Protein Requirements: 78 gm (0.8 gm/kg)  Fluid Requirements (mL): 1467 mL (1 ml/kcal)  Temp (24hrs), Av.2 °F (36.8 °C), Min:98.2 °F (36.8 °C), " Max:98.2 °F (36.8 °C)       Enteral Nutrition    Patient not receiving enteral nutrition at this time.    Parenteral Nutrition    Patient not receiving parenteral nutrition support at this time.    Evaluation of Received Nutrient Intake    Calories: not meeting estimated needs  Protein: not meeting estimated needs    Patient Education    Not applicable.    Nutrition Diagnosis     PES: Altered nutrition-related laboratory values related to  uncontrolled diabetes 2/2 excessive intake of CHO foods  as evidenced by A1C of 9.3(H). (active)    Interventions/Goals     Intervention(s): general/healthful diet and collaboration with other providers    Goal: Meet greater than 75% of nutritional needs by follow-up. (goal progressing)    Monitoring & Evaluation     Dietitian will monitor food and beverage intake, weight, electrolyte/renal panel, glucose/endocrine profile, and gastrointestinal profile.    Nutrition Risk/Follow-Up: moderate (follow-up in 3-5 days)   Please consult if re-assessment needed sooner.

## 2023-11-15 NOTE — PROGRESS NOTES
11/15/23 1031   Rec Therapy Time Calculation   Date of Treatment 11/15/23   Rec Start Time 1031   Rec Stop Time 1100   Rec Total Time (min) 29 min   Time   Treatment time 2 units   Charges   $Therapeutic Activity 1unit   Precautions   General Precautions fall;sternal   Orthopedic Precautions  N/A   Braces N/A   Pain/Comfort   Pain Rating 1 no pain   Vital Signs   Pulse 82   /74   OTHER   Rehab identified problem list/impairments weakness;impaired endurance;impaired functional mobility;gait instability;decreased lower extremity function   Values/Beliefs/Spiritual Care   Spiritual, Cultural Beliefs, Moravian Practices, Values that Affect Care no   Overall Level of Functioning   Activity Tolerance Mod Indep   Dynamic Sitting Balance/Reaching Independent   Dynamic Standing Balance/Reaching Mod A   Right UE Coodination/Dexterity Independent   Left UE Coordination/Dexterity Independent   Problem Solving/Sequencing Skills Mod Indep   Memory Recall Mod Indep   R/L Neglect/Inattention Does not occur   Attention Span Mod Indep   Social Interaction Independent   Recreational Therapy Short Term Goals   Short Term Goal 1 Progression Progressing   Short Term Goal 2 Progression Progressing   Recreational Therapy Long Term Goals   Long Term Goal 1 Progression Met   Long Term Goal 2 Progression Progressing   Plan   Patient to be seen Daily   Planned Duration 1 week   Treatments Planned Energy conservation training   Treatment plan/goals estblished with Patient/Caregiver Yes

## 2023-11-15 NOTE — PT/OT/SLP PROGRESS
"Occupational Therapy Inpatient Rehab Treatment    Name: Geno Barry  MRN: [id number]    Assessment:  Geno Barry is a 72 y.o. female admitted with a medical diagnosis of S/P CABG (coronary artery bypass graft).  She presents with the following impairments/functional limitations:  impaired endurance, weakness, impaired functional mobility, impaired self care skills, gait instability, pain.    General Precautions: Standard, fall, sternal     Orthopedic Precautions:N/A     Braces: N/A    Rehab Prognosis: Good; patient would benefit from acute skilled OT services to address these deficits and reach maximum level of function.      History:     Past Medical History:   Diagnosis Date    CAD (coronary artery disease)     Diabetes mellitus     Hypertension     PAD (peripheral artery disease)        Past Surgical History:   Procedure Laterality Date    CORONARY ARTERY BYPASS GRAFT (CABG) N/A 11/2/2023    Procedure: CORONARY ARTERY BYPASS GRAFT (CABG);  Surgeon: Heriberto Lara IV, MD;  Location: [facility];  Service: Cardiothoracic;  Laterality: N/A;    ENDOSCOPIC HARVEST OF VEIN N/A 11/2/2023    Procedure: SURGICAL PROCUREMENT, VEIN, ENDOSCOPIC;  Surgeon: Heriberto Lara IV, MD;  Location: [facility];  Service: Cardiothoracic;  Laterality: N/A;    HYSTERECTOMY      LEFT HEART CATHETERIZATION Left 11/2/2023    Procedure: Left heart cath;  Surgeon: Puma Vera MD;  Location: [facility] CATH LAB;  Service: Cardiology;  Laterality: Left;  LHC +/- PCI VIA RRA    LUMPECTOMY, BREAST Right        Subjective     Orientation: Oriented x4    Chief Complaint: "I didn't eat my lunch earlier. I asked for chicken salad."    Patient/Family Comments/goals: To gain strength and endurance to increase independence and safety with ADLs and functional transfers.    Vitals   Vitals:    11/15/23 1330 11/15/23 1400   BP: 131/87 (!) 149/83   Pulse: 85 83     Respiratory Status: Room air    Patients cultural, spiritual, Anglican conflicts " given the current situation: no       Objective:     Patient found up in chair with peripheral IV  upon OT entry to room.    Mobility   Patient completed:  Sit to Stand Transfer with max-min A with no AD  Stand to Sit Transfer with minimum assistance with no AD  Toilet Transfer Step Transfer technique with maximal assistance with  no AD and sitting on regular toilet    Functional Mobility  FM in/out bathroom with SBA and Rollator    ADLs   Current Status   Toileting Hygiene 4   Toilet Transfer 2     Limiting Factors for ADLs: sensory, endurance, balance, and weakness     Therapeutic Activities  To improve technique in adherence to sternal precautions as well as increase independence with functional transfers, pt tolerated sit <> stands x3 from three different heights from the EOB. Pt demo'd excellent carryover with technique provided by OT. Pt required mod A with 2 of the 9 total sit <> stands and min A with the remaining. Pt did require max A to stand from the bedside recliner.    LifeStyle Change and Education:     Patient left up in chair with call button in reach and ice pack applied to R knee .     Education provided: Roles and goals of OT, ADLs, transfer training, body mechanics, modified goals, sequencing, safety precautions, fall prevention, equipment recommendations, and home safety    Multidisciplinary Problems       Occupational Therapy Goals          Problem: Occupational Therapy    Goal Priority Disciplines Outcome Interventions   Occupational Therapy Goal     OT, PT/OT Ongoing, Progressing    Description: ADLs:  MET Pt to perform grooming tasks with Indep at seated level   MET Pt to perform feeding tasks with setup assist   Progressing/continue Pt to perform UB dressing with mod A    Progressing/continue Pt to perform LB dressing with mod A    MET Pt to perform putting on/off footwear task with max A  MET Pt to perform toileting with mod A  Pt to perform toileting with SBA  MET Pt to perform bathing  with mod A  Pt to perform bathing with setup assist    Functional Transfers:  Pt to perform toilet transfers with min A and BSC Progressing/continue  Pt to perform a tub transfer with min A and TTB Progressing/continue                       Time Tracking     OT Received On: 11/15/23  Time In 1330     Time Out 1400  Total Time 30 min  Therapy Time: OT Individual: 30  Missed Time:    Missed Time Reason:      Billable Minutes: Self Care/Home Management 15 and Therapeutic Activity 15    11/15/2023

## 2023-11-16 LAB
POCT GLUCOSE: 106 MG/DL (ref 70–110)
POCT GLUCOSE: 120 MG/DL (ref 70–110)
POCT GLUCOSE: 155 MG/DL (ref 70–110)
POCT GLUCOSE: 170 MG/DL (ref 70–110)
POCT GLUCOSE: 202 MG/DL (ref 70–110)

## 2023-11-16 PROCEDURE — 25000003 PHARM REV CODE 250: Performed by: NURSE PRACTITIONER

## 2023-11-16 PROCEDURE — 99900035 HC TECH TIME PER 15 MIN (STAT)

## 2023-11-16 PROCEDURE — 97530 THERAPEUTIC ACTIVITIES: CPT

## 2023-11-16 PROCEDURE — 63600175 PHARM REV CODE 636 W HCPCS: Performed by: NURSE PRACTITIONER

## 2023-11-16 PROCEDURE — 25000003 PHARM REV CODE 250: Performed by: INTERNAL MEDICINE

## 2023-11-16 PROCEDURE — 97116 GAIT TRAINING THERAPY: CPT

## 2023-11-16 PROCEDURE — 94761 N-INVAS EAR/PLS OXIMETRY MLT: CPT

## 2023-11-16 PROCEDURE — 11800000 HC REHAB PRIVATE ROOM

## 2023-11-16 PROCEDURE — 97110 THERAPEUTIC EXERCISES: CPT

## 2023-11-16 PROCEDURE — 97164 PT RE-EVAL EST PLAN CARE: CPT

## 2023-11-16 PROCEDURE — 99233 PR SUBSEQUENT HOSPITAL CARE,LEVL III: ICD-10-PCS | Mod: ,,, | Performed by: NURSE PRACTITIONER

## 2023-11-16 PROCEDURE — 94799 UNLISTED PULMONARY SVC/PX: CPT

## 2023-11-16 PROCEDURE — 99233 SBSQ HOSP IP/OBS HIGH 50: CPT | Mod: ,,, | Performed by: NURSE PRACTITIONER

## 2023-11-16 RX ORDER — LIDOCAINE 50 MG/G
1 PATCH TOPICAL
Status: DISCONTINUED | OUTPATIENT
Start: 2023-11-16 | End: 2023-11-21 | Stop reason: HOSPADM

## 2023-11-16 RX ADMIN — DOCUSATE SODIUM 100 MG: 100 CAPSULE, LIQUID FILLED ORAL at 08:11

## 2023-11-16 RX ADMIN — EZETIMIBE 10 MG: 10 TABLET ORAL at 08:11

## 2023-11-16 RX ADMIN — FERROUS SULFATE TAB 325 MG (65 MG ELEMENTAL FE) 1 EACH: 325 (65 FE) TAB at 08:11

## 2023-11-16 RX ADMIN — ASPIRIN 81 MG: 81 TABLET, COATED ORAL at 08:11

## 2023-11-16 RX ADMIN — INSULIN ASPART 2 UNITS: 100 INJECTION, SOLUTION INTRAVENOUS; SUBCUTANEOUS at 12:11

## 2023-11-16 RX ADMIN — INSULIN ASPART 2 UNITS: 100 INJECTION, SOLUTION INTRAVENOUS; SUBCUTANEOUS at 08:11

## 2023-11-16 RX ADMIN — APIXABAN 5 MG: 5 TABLET, FILM COATED ORAL at 08:11

## 2023-11-16 RX ADMIN — HYDROXYZINE PAMOATE 50 MG: 50 CAPSULE ORAL at 08:11

## 2023-11-16 RX ADMIN — AMLODIPINE BESYLATE 10 MG: 5 TABLET ORAL at 08:11

## 2023-11-16 RX ADMIN — LIDOCAINE 5% 1 PATCH: 700 PATCH TOPICAL at 10:11

## 2023-11-16 RX ADMIN — SITAGLIPTIN 100 MG: 50 TABLET, FILM COATED ORAL at 08:11

## 2023-11-16 RX ADMIN — ATORVASTATIN CALCIUM 40 MG: 40 TABLET, FILM COATED ORAL at 08:11

## 2023-11-16 RX ADMIN — METFORMIN HYDROCHLORIDE 1000 MG: 500 TABLET, FILM COATED ORAL at 08:11

## 2023-11-16 RX ADMIN — AMIODARONE HYDROCHLORIDE 200 MG: 200 TABLET ORAL at 08:11

## 2023-11-16 RX ADMIN — Medication 1000 UNITS: at 08:11

## 2023-11-16 RX ADMIN — METFORMIN HYDROCHLORIDE 1000 MG: 500 TABLET, FILM COATED ORAL at 04:11

## 2023-11-16 RX ADMIN — INSULIN ASPART 2 UNITS: 100 INJECTION, SOLUTION INTRAVENOUS; SUBCUTANEOUS at 04:11

## 2023-11-16 RX ADMIN — METOPROLOL SUCCINATE 50 MG: 50 TABLET, FILM COATED, EXTENDED RELEASE ORAL at 08:11

## 2023-11-16 RX ADMIN — GLIPIZIDE 5 MG: 5 TABLET, FILM COATED, EXTENDED RELEASE ORAL at 08:11

## 2023-11-16 NOTE — PLAN OF CARE
"Awaiting measurements from friend Lauryn on the bed and chair heights that pt will need to maneuver at her home (per PT request).  Will relay measurements to PT once called in by Lauryn.    Lauryn later called back and gave what sounds like incorrect measurements:  38" for (non-rocking) recliner seat height and 57.5" for bed height.  When I commented that those sounded very high, she said, "Ma'am, they are NOT high.  I was in the hospital and when I got home, it was fine."  I did mention removing the box spring from the bed to lower it if needed.  Lauryn said she could do that if needed.  "

## 2023-11-16 NOTE — PT/OT/SLP PROGRESS
Occupational Therapy Inpatient Rehab Treatment    Name: Geno Barry  MRN: [id number]    Assessment:  Geno Barry is a 72 y.o. female admitted with a medical diagnosis of S/P CABG (coronary artery bypass graft).  She presents with the following impairments/functional limitations:  weakness, impaired endurance, impaired self care skills, impaired functional mobility, gait instability, impaired balance, pain.    General Precautions: Standard, sternal, fall     Orthopedic Precautions:N/A     Braces: N/A    Rehab Prognosis: Good; patient would benefit from acute skilled OT services to address these deficits and reach maximum level of function.      History:     Past Medical History:   Diagnosis Date    CAD (coronary artery disease)     Diabetes mellitus     Hypertension     PAD (peripheral artery disease)        Past Surgical History:   Procedure Laterality Date    CORONARY ARTERY BYPASS GRAFT (CABG) N/A 11/2/2023    Procedure: CORONARY ARTERY BYPASS GRAFT (CABG);  Surgeon: Heriberto Lara IV, MD;  Location: [facility];  Service: Cardiothoracic;  Laterality: N/A;    ENDOSCOPIC HARVEST OF VEIN N/A 11/2/2023    Procedure: SURGICAL PROCUREMENT, VEIN, ENDOSCOPIC;  Surgeon: Heriberto Lara IV, MD;  Location: [facility] OR;  Service: Cardiothoracic;  Laterality: N/A;    HYSTERECTOMY      LEFT HEART CATHETERIZATION Left 11/2/2023    Procedure: Left heart cath;  Surgeon: Puma Vera MD;  Location: [facility] CATH LAB;  Service: Cardiology;  Laterality: Left;  LHC +/- PCI VIA RRA    LUMPECTOMY, BREAST Right        Subjective     Orientation: Oriented x4    Chief Complaint: R knee pain, decreased endurance    Patient/Family Comments/goals: To gain strength and endurance to improve independence with functional transfers and ADLs    Vitals   Vitals:    11/16/23 1100   BP: 139/82   Pulse: 83     Respiratory Status: Room air    Patients cultural, spiritual, Sabianist conflicts given the current situation: no       Objective:      Patient found up in chair with peripheral IV  upon OT entry to room.    Mobility   Patient completed:  Sit to Stand Transfer with maximal assistance with rolling walker  Stand to Sit Transfer with minimum assistance with rolling walker  Pt required min A to sit onto regular toilet at conclusion of OT treatment    Functional Mobility  FM into bathroom with RW and SBA. No loss of balance noted.    Therapeutic Activities  Pt stood at the OT kitchen counter to prepare a 4-cup pot of coffee. Pt retrieved items needed and prepared coffee with supervision overall. Once coffee was brewed, pt reached for a coffee cup, filled it, then washed/rinsed/dried it and stored back into cabinet. Pt did require three sitting breaks throughout activity due to increased fatigue.    Sitting unsupported in wheelchair to improve trunk control and strength, pt participated in a handwriting and coloring activity with [place].    Therapeutic Exercise  For improved endurance and activity tolerance, pt tolerated propelling forward x6 min and backward x6 min on UE ergometer at level 1 resistance. Rest break taken following each trial.    LifeStyle Change and Education:     Patient left sitting on toilet with call button in reach and CNA Jenny notified.     Education provided: Roles and goals of OT, ADLs, transfer training, bed mobility, body mechanics, wheelchair precautions, modified goals, sequencing, safety precautions, fall prevention, post-op precautions, equipment recommendations, and home safety    Multidisciplinary Problems       Occupational Therapy Goals          Problem: Occupational Therapy    Goal Priority Disciplines Outcome Interventions   Occupational Therapy Goal     OT, PT/OT Ongoing, Progressing    Description: ADLs:  MET Pt to perform grooming tasks with Indep at seated level   MET Pt to perform feeding tasks with setup assist   Progressing/continue Pt to perform UB dressing with mod A    Progressing/continue Pt to  perform LB dressing with mod A    MET Pt to perform putting on/off footwear task with max A  MET Pt to perform toileting with mod A  Pt to perform toileting with SBA  MET Pt to perform bathing with mod A  Pt to perform bathing with setup assist    Functional Transfers:  Pt to perform toilet transfers with min A and BSC Progressing/continue  Pt to perform a tub transfer with min A and TTB Progressing/continue                       Time Tracking     OT Received On: 11/16/23  Time In 1100     Time Out 1200  Total Time 60 min  Therapy Time: OT Individual: 60  Missed Time:    Missed Time Reason:      Billable Minutes: Therapeutic Activity 30 and Therapeutic Exercise 30    11/16/2023

## 2023-11-16 NOTE — PT/OT/SLP RE-EVAL
Physical Therapy Rehab Re-Evaluation    Patient Name:  Geno Barry   MRN:  [id number]    Recommendations:     Discharge Recommendations:  Low Intensity Therapy   Discharge Equipment Recommendations: rollator   Barriers to discharge:  decreased functional mobility, weakness    Assessment:     Geno Barry is a 72 y.o. female admitted with a medical diagnosis of S/P CABG (coronary artery bypass graft).  She presents with the following impairments/functional limitations:  weakness, impaired endurance, impaired functional mobility, impaired balance, decreased coordination, decreased lower extremity function, pain, decreased safety awareness, impaired coordination, edema     SPT Note: patient gets very fatigued and her knee hurts after long walks d/t her recovery and PLOF. Patient would benefit from more frequent shorter walks. During morning treatment, patient had R posterior knee pain that stopped us from going past 167 ft. Patient was not tender to palpation and pain was determined to be muscle fatigue and tightness.    Rehab Diagnosis: Multivessel CAD s/p CABG x 2    General Precautions: Standard, sternal, fall     Orthopedic Precautions: N/A     Braces: N/A    Rehab Prognosis: Good and Fair; patient would benefit from acute skilled PT services to address these deficits and reach maximum level of function.      History:     Past Medical History:   Diagnosis Date    CAD (coronary artery disease)     Diabetes mellitus     Hypertension     PAD (peripheral artery disease)        Past Surgical History:   Procedure Laterality Date    CORONARY ARTERY BYPASS GRAFT (CABG) N/A 11/2/2023    Procedure: CORONARY ARTERY BYPASS GRAFT (CABG);  Surgeon: Heriberto Lara IV, MD;  Location: [facility];  Service: Cardiothoracic;  Laterality: N/A;    ENDOSCOPIC HARVEST OF VEIN N/A 11/2/2023    Procedure: SURGICAL PROCUREMENT, VEIN, ENDOSCOPIC;  Surgeon: Heriberto Lara IV, MD;  Location: [facility];  Service: Cardiothoracic;   "Laterality: N/A;    HYSTERECTOMY      LEFT HEART CATHETERIZATION Left 11/2/2023    Procedure: Left heart cath;  Surgeon: Puma Vera MD;  Location: [facility] CATH LAB;  Service: Cardiology;  Laterality: Left;  LHC +/- PCI VIA RRA    LUMPECTOMY, BREAST Right        Subjective     Chief Complaint: "my knee hurts"  Patient/Family Comments/goals: "to get back to fix her plants"    Patients cultural, spiritual, Buddhist conflicts given the current situation: no       Living Environment  People in Home: alone (but has caregivers from 10AM-230PM M-F. Pt's neighbors and nephews/neices help her outside of sitter hours)  Living Arrangements: house  Home Accessibility: wheelchair accessible  Number of Stairs, Main Entrance: none  Home Layout: Able to live on 1st floor  Transportation Anticipated: family or friend will provide  Equipment Currently Used at Home: rollator    Prior Level of Function  Ambulation Skills: needs device  Stairs: unable to perform  Transfer Skills: needs device  ADL Skills: needs device  Cognitive Communication: independent    Equipment used at home: rollator, cane, straight.  DME owned (not currently used): rolling walker because its broken     Upon discharge, patient will have assistance from caretakers during day.    Objective:     Communicated with GIULIA Bowen, prior to morning session.  Patient found up in chair with peripheral IV  upon PT entry to PT gym in morning and entry to room in afternoon.    Vitals     Vitals after morning tx  /75   HR 91   O2 Sat     Pain       Vitals before pm tx:  -BP: 117/73  -HR: 84    Vitals after pm tx:  -BP: 127/73  -HR: 94    Respiratory Status: Room air    Functional Mobility      GGs   Admit Current   Status Goal   Functional Area: Care Score: Care Score: Care Score:   Roll Left and Right 2 4  Supervision with mild difficulty Independent   Sit to Lying 2 4  Supervision with leg  Independent   Lying to Sitting on Side of Bed 2 3  Min A with trunk, " use of leg  Independent   Sit to Stand 2 3  Min A with Rollator. Patient is able to stand from 19.5 inches on the mat with supervision but is min A from her WC. Patient is max A from recliner. Patient benefited from VC's to push off her knees to stand up and will continue to work on it. Independent   Chair/Bed-to-Chair Transfer 2 3  Min A stand step with Rollator. Patient is able to stand from 19.5 inches on the gaurav with supervision but is min A from her WC. Patient benefited from VC's to push off her knees to stand up and will continue to work on it. Independent   Car Transfer 1 3  Mod A with rollator stand step. Patient was mod A from wheelchair and min A from the car. Patient was given VC's to push off of her knees after this task was completed. Independent   Walk 10 Feet 4 4  Incidental touching with rollator Independent   Walk 50 Feet with Two Turns 4 4  Incidental touching with rollator Independent   Walk 150 Feet 88 4  Incidental touching with rollator for 167ft in morning and 50ft in afternoon Supervision or touching assistance     Therapeutic Activities and Exercises:  Am tx:    -seated 2x10: marching, knee ext, PF/DF, hip add with ball and hip abd with red tb    Afternoon tx:    STS: 22inch 3reps, 21inch 3reps, 20inch 2reps, 19.5inch 5reps, 4 reps from WC. Patient improved her ability to STS from the WC by the end needing min A    Activity Tolerance: Fair    Patient left up in chair with call button in reach.    Education Provided: transfer training, bed mob, gait training, safety awareness, body mechanics, assistive device, and strengthening exercises    Expected compliance: Moderate compliance    GOALS:   Multidisciplinary Problems       Physical Therapy Goals          Problem: Physical Therapy    Goal Priority Disciplines Outcome Goal Variances Interventions   Physical Therapy Goal     PT, PT/OT Ongoing, Progressing     Description: Bed Mobility:  Roll left and right with partial/moderate  assist.  Sit to supine transfer with partial/moderate assist.  Supine to sit transfer with partial/moderate assist.    Transfers:  Sit to stand transfer with partial/moderate assist using RW.  Bed to chair transfer with partial/moderate assist Stand Step  using RW.  Car transfer with substantial/maximal assist using RW.   an object from the ground in standing position with substantial/maximal assist using RW.    Mobility:  Ambulate 125 feet with supervision/touching assist using RW.  Ambulate 30 feet on uneven surfaces/ramps with supervision/touching assist using RW.  Pt ascended/descended a 4 inch curb with partial/moderate assist using RW.  Ascend/descend 2 stairs with substantial/maximal assist using no handrails.                       Plan:     During this hospitalization, patient to be seen 5 x/week (5-7x/wk) to address the identified rehab impairments via gait training, therapeutic activities, therapeutic exercises, neuromuscular re-education and progress toward the following goals:    Plan of Care Expires:  12/01/23  PT Next Visit Date: 11/22/23  Plan of Care reviewed with: patient      Additional Infomation:           Time Tracking:     Therapy Time   PT Start Time:  (930 ; 1300)  PT Stop Time:  (1000 ; 1400)  PT Individual: 90  Missed Time:    Time Missed due to:      Billable Minutes: Re-eval 15, Gait Training 15, Therapeutic Activity 30, and Therapeutic Exercise 30    11/16/2023

## 2023-11-16 NOTE — PT/OT/SLP RE-EVAL
Recreational Therapy Re-Evaluation      Date of Treatment: 11/16/23  Start Time: 1030  Stop Time: 1100  Total Time: 30 min  Missed Time:     Assessment      Geno Barry is a 72 y.o. female admitted with a medical diagnosis of S/P CABG (coronary artery bypass graft).  She presents with the following impairments/functional limitations:  weakness, impaired endurance, impaired functional mobility, gait instability, decreased lower extremity function, decreased safety awareness .    Rehab Diagnosis:     Recent Surgery:     General Precautions: Standard, sternal, fall     Orthopedic Precautions:N/A     Braces: N/A    Rehab Prognosis: Fair; patient would benefit from acute skilled Recreational Therapy services to address these deficits and reach maximum level of function.      Impairments: Coordination deficits, Endurance deficits, Mobility deficits, Pain, Safety awareness deficits, and Strength deficits  Rehab Potential: Fair, due to: Reduced endurance during therapy   Treatment Recommendations: Continue with current plan of care   Treatment Diagnosis: Multivessel CAD, s/p CABG x2, HTN, CAD, cerebrovascular disease, PAD, DM  Orientation: Oriented x4  Affect/Behavior: Cooperative  Safety/Judgement: intact   Basic Command Following: intact  Spiritual Cultural: no        History     Past Medical History:   Diagnosis Date    CAD (coronary artery disease)     Diabetes mellitus     Hypertension     PAD (peripheral artery disease)        Past Surgical History:   Procedure Laterality Date    CORONARY ARTERY BYPASS GRAFT (CABG) N/A 11/2/2023    Procedure: CORONARY ARTERY BYPASS GRAFT (CABG);  Surgeon: Heriberto Lara IV, MD;  Location: [facility];  Service: Cardiothoracic;  Laterality: N/A;    ENDOSCOPIC HARVEST OF VEIN N/A 11/2/2023    Procedure: SURGICAL PROCUREMENT, VEIN, ENDOSCOPIC;  Surgeon: Heriberto Lara IV, MD;  Location: [facility];  Service: Cardiothoracic;  Laterality: N/A;    HYSTERECTOMY      LEFT HEART  "CATHETERIZATION Left 11/2/2023    Procedure: Left heart cath;  Surgeon: Puma Vera MD;  Location: [facility] CATH LAB;  Service: Cardiology;  Laterality: Left;  LHC +/- PCI VIA RRA    LUMPECTOMY, BREAST Right        Home Environment     Admit Date: 11/09/23  Living Situation  People in Home: alone  Lives in: house  Patients Responsibilities: Meal preparation, Leisure/play/hobbies, Retired  Number of Children: 0  Occupation:Retired:     Instrumental Activities of Daily Living     Previous Hand Dominance: Right Current Hand Dominance: Right     Other iADL Information:        Cognitive Skills Building         Cognitive Observation Activity Assist Position Equipment Response            Comment:      Dynamic Activities      Activity Assist Position Equipment Response   Activity 1 Horseshoes modified independence and moderate assistance Standing Rolling walker and Rubber horseshoes fair   Comment: Recliner to w/c transfer was mod.  Sit to stand was mod as was dynamic standing balance/reaching. Standing tolerance was 3 minutes at a time.  UE coordination was setup as were sequencing skills and problem solving skills. C/o R knee pain       Fine Motor Activities      Activity Assist Position Equipment Response           Comment:        Goals     Patient Goals  Patient Goal 1: 'Get my strength back."    Short Term Goals    Goal  Goal Status   Will increase sit to stand to min Progressing   Will improve dynamic standing balance/reaching to supervision Progressing                 Long Term Goals    Goal Goal Status   Will increase standing balance/reaching to 5 minutes Progressing   Will improve dynamic standing balance/reaching to setup Progressing                     Plan       Patient to be seen: Daily  Duration: 1 week  Treatments planned: Energy conservation training, Safety education  Treatment plan/goals established with Patient/Caregiver: Yes     "

## 2023-11-16 NOTE — PROGRESS NOTES
Subjective  HPI: 73 yo F with a PMH of HTN, CAD, cerebrovascular disease, PAD, and DM presented to [facility] on 11/2/23 and underwent a LHC revealing severe multivessel CAD. CV surgeon was consulted for CABG. Patient then underwent a CABG x2V: LIMA to the LAD; reverse saphenous vein to the 1st diagonal; endoscopic vein harvest of the left greater saphenous vein by Dr. Lara. 1 chest tube was placed. Patient was then transferred to ICU, and extubated. Chest XR showed some pulmonary vascular congestion, no focal infiltrates or pneummothorax. O2 sats in high 90s on 2L/NC. Patient did develop some A-fib with controlled ventricular rate. Remained on low dose epinephrine at 0.05 for hemodynamic support. Patient also on insulin drip. On 11/3, labs showed elevated WBC of 12.97, and low H&H of 10.8 & 33.6. Epinephrine stopped. Continued ASA, norvasc, and betablocker. Started atorvastatin 40mg PO Q day. On amiodarone drip on 11/4. On 11/5, Amiodarone drip transitioned to oral. Chest tube removed. PT eval completed with deficits noted. On 11/6, patient on 4L O2/NC with sat decreasing in 70-80s with therapy, and continued IV diuresis of 40mg BID. On 11/7, Labs showed low H&H of 3.90, low H&H of 11.0 & 35.0, low MCHC of 31.4, elevated glucose of 397, elevated Cr of 1.04, low albumin of 3.1. Metoprolol tart 25mg BID. O2 titrated down to 2.5L/NC with sats ranging 89-94% with therapy. On 11/8, O2 was discontinued, Prevena discontinued to surgical site with minimal bloody drainage noted to mid-sternum. Vital signs stable, and HR in SR. Glucose elevated at 231. Nutrition was consulted with recommendation to continue diabetic diet, encourage small and frequent meals, and add Boost Max BID for additional nutrition. Patient complained of sternal pain rating at 7 on 1-10 scale. Last BM on 11/7. On 11/9, OT eval completed with deficits noted. Eliquis started at 5mg PO BID. Toprol XL transitioned to 50mg daily. Continued ASA and statin.  Patient is AAOx4.   Participating with therapy. Functional status includes setup/ supervision needed for eating and grooming while seated; minimal assist for bed mobility, minimal assist for transfers with RW, CGA for walking 120ft with RW, max assist lower body dressing, and moderate assist toileting.  Patient was evaluated, accepted, and admitted to inpatient rehab to improve functional status. Transferred to [facility] on 11/9 without incident.      11/16: Seen with OT, Yolanda w/RW. Doing well in therapy without current complaint. VSSAF.         Review of Systems  Depression/Anxiety: no current complaints.   ALPRAZolam tablet 0.25 mg TID PRN Anxiety  Pain: denies currently. Incisional with cough     acetaminophen tablet 650 mg TID  acetaminophen tablet 650 mg q8h PRN mild pain  methocarbamoL tablet 500 mg TID PRN spasm/tightness  HYDROcodone-acetaminophen 7.5-325 mg 1 tab TID PRN mod/severe pain  Bowels/Bladder: last BM 11/16     Appetite: getting better     Sleep: good      hydrOXYzine pamoate capsule 50 mg qHS        Physical Exam  General: well-developed, well-nourished, in no acute distress, flat  Respiratory: equal chest rise, no SOB, no audible wheeze  Cardiovascular: regular rate and rhythm, no edema  Gastrointestinal: soft, non-tender, non-distended   Musculoskeletal: full range of motion of all extremities/spine with generalized weakness  Integumentary: no rashes or skin lesions present, midsternal mzrrkjsy-QHA-i/d/I, with chest tube tdcb-zaxsgy-YSL-c/d/I, LLE EVH site inulvgwe-VON-f/d/i  Neurologic: cranial nerves intact, no signs of peripheral neurological deficit, motor/sensory function intact              Assessment/Plan  Hospital   Coronary artery disease involving native coronary artery of native heart   S/P CABG (coronary artery bypass graft)     Non-Hospital   Hypertension   Diabetes mellitus   Dyslipidemia   Obesity   Osteoarthritis of knee   Status post right knee replacement   PAD (peripheral artery  disease)   CAD (coronary artery disease)       Wounds: midsternal ehddiaos-TEF-o/d/I, with chest tube klvz-edcpiw-IMO-c/d/I, LLE EVH site syyqyvml-RIA-p/d/i  S/p CABG x2V: LIMA to the LAD; reverse saphenous vein to the 1st diagonal; endoscopic vein harvest of the left greater saphenous vein on 11/2 by Dr. Lara. 1 chest tube was placed.  Precautions: sternal   Bracing: cardiac bear for splinting  Swallowing: Diabetic Diet  Function: Tolerating therapy. Continue PT/OT  VTE Prophylaxis:   apixaban tablet 5 mg BID  Code Status: FULL CODE   Discharge: Lives alone in [place] in a single-story home with no steps to enter the residence. Completed 2nd grade. She has no  history. Pt. Used to babysit for a living.  The patient is . Lives alone. She has a sitter 5 days per week from 9AM - 2PM through [employer]. The sitter was helping with bathing, dressing, cooking, driving, and cleaning. Pt. Plans to stay with her cousin after rehab until she recovers.  Children (0). Date 11/21 Tuesday.

## 2023-11-16 NOTE — PROGRESS NOTES
[facility] ([facility])  Rehab Progress Note    Patient Name: Geno Barry  MRN: [id number]  Age: 72 y.o. Sex: female  : 1950  Hospital Length of Stay: 7 days  Date of Service: 2023   Chief Complaint: Multivessel CAD s/p CABG x2 on 2023    Subjective:     Basic Information  Admit Information: 72-year-old  female presented to [facility] for scheduled CABG on 2023.  PMH significant for PID, DM type 2, CAD, and carotid artery stenosis.  Tolerated CABG x2 on  without perioperative complications.  Aspirin, Norvasc, and beta-blocker continued.  Lipitor 40 mg daily initiated on .  Chest tube discontinued on .  Amiodarone initiated due to postoperative atrial fibrillation.  Eliquis 5 mg b.i.d. initiated on .  Tolerated transfer to [facility] inpatient rehab unit on  without incident.  Today's Information: No acute events overnight.  Sitting in chair comfortably working with therapy.  Reports good sleep and appetite.  Last BM .  Vital signs at goal with no recent recorded fevers.  Good glycemic control.  No labs or imaging today.  Does report left knee pain at times.    Review of patient's allergies indicates:   Allergen Reactions    Ace inhibitors Swelling    Clopidogrel Swelling    Iodine         Current Facility-Administered Medications:     acetaminophen tablet 650 mg, 650 mg, Oral, Q8H PRN, [place], Marina A, FNP, 650 mg at 11/15/23 2145    ALPRAZolam tablet 0.25 mg, 0.25 mg, Oral, TID PRN, Duglas, Isidoro A, FNP    amiodarone tablet 200 mg, 200 mg, Oral, Daily, Duglas, Isidoro A, FNP, 200 mg at 23 0807    amLODIPine tablet 10 mg, 10 mg, Oral, Daily, Duglas, Isidoro A, FNP, 10 mg at 23 0807    apixaban tablet 5 mg, 5 mg, Oral, BID, Duglas, Isidoro A, FNP, 5 mg at 23 0807    aspirin EC tablet 81 mg, 81 mg, Oral, Daily, Duglas, Isidoro A, FNP, 81 mg at 23 0800    atorvastatin tablet 40 mg, 40  mg, Oral, Daily, Duglas, Isidoro A, FNP, 40 mg at 11/16/23 0807    benzonatate capsule 100 mg, 100 mg, Oral, TID PRN, Duglas, Isidoro A, FNP    bisacodyL suppository 10 mg, 10 mg, Rectal, Daily PRN, Duglas, Isidoro A, FNP    dextrose 10% bolus 125 mL 125 mL, 12.5 g, Intravenous, PRN, Duglas, Isidoro A, FNP    dextrose 10% bolus 250 mL 250 mL, 25 g, Intravenous, PRN, Duglas, Isidoro A, FNP    docusate sodium capsule 100 mg, 100 mg, Oral, BID, Duglas, Isidoro A, FNP, 100 mg at 11/16/23 0807    ezetimibe tablet 10 mg, 10 mg, Oral, Daily, Duglas, Isidoro A, FNP, 10 mg at 11/16/23 0807    ferrous sulfate tablet 1 each, 1 tablet, Oral, Daily, Duglas, Isidoro A, FNP, 1 each at 11/16/23 0807    glipiZIDE 24 hr tablet 5 mg, 5 mg, Oral, Daily with breakfast, Duglas, Isidoro A, FNP, 5 mg at 11/16/23 0807    glucagon (human recombinant) injection 1 mg, 1 mg, Intramuscular, PRN, Duglas, Isidoro A, FNP    glucose chewable tablet 16 g, 16 g, Oral, PRN, Duglas, Isidoro A, FNP    glucose chewable tablet 24 g, 24 g, Oral, PRN, Duglas, Isidoro A, FNP    hydrALAZINE injection 10 mg, 10 mg, Intravenous, Q4H PRN, Duglas, Isidoro A, FNP    HYDROcodone-acetaminophen 7.5-325 mg per tablet 1 tablet, 1 tablet, Oral, TID PRN, [place], Marina A, FNP, 1 tablet at 11/13/23 1113    hydrOXYzine pamoate capsule 50 mg, 50 mg, Oral, QHS, Duglas, Isidoro A, FNP, 50 mg at 11/15/23 2142    insulin aspart U-100 injection 0-10 Units, 0-10 Units, Subcutaneous, QID (AC + HS) PRN, Duglas, Isidoro A, FNP, 2 Units at 11/13/23 1114    labetalol 20 mg/4 mL (5 mg/mL) IV syring, 10 mg, Intravenous, Q4H PRN, Isidoro Ardon FNP    LIDOcaine 5 % patch 1 patch, 1 patch, Transdermal, Q24H, Jesi Driscoll FNP    metFORMIN tablet 1,000 mg, 1,000 mg, Oral, BID WM, Isidoro Ardon FNP, 1,000 mg at 11/16/23 0807    methocarbamoL tablet 500 mg, 500 mg, Oral, TID PRN, Marina Johnson FNP    metoprolol injection  "10 mg, 10 mg, Intravenous, Q2H PRN, Duglas, Isidoro A, FNP    metoprolol succinate (TOPROL-XL) 24 hr tablet 50 mg, 50 mg, Oral, Daily, Duglas, Isidoro A, FNP, 50 mg at 11/16/23 0800    nitroGLYCERIN SL tablet 0.4 mg, 0.4 mg, Sublingual, Q5 Min PRN, Duglas, Isidoro A, FNP    ondansetron disintegrating tablet 4 mg, 4 mg, Oral, Q6H PRN, Duglas, Isidoro A, FNP    polyethylene glycol packet 17 g, 17 g, Oral, Q12H PRN, Duglas, Isidoro A, FNP    promethazine tablet 25 mg, 25 mg, Oral, Q6H PRN, Duglas, Isidoro A, FNP    SITagliptin phosphate tablet 100 mg, 100 mg, Oral, Daily, Duglas, Isidoro A, FNP, 100 mg at 11/16/23 0807    vitamin D 1000 units tablet 1,000 Units, 1,000 Units, Oral, Daily, Salvador Sebastian MD, 1,000 Units at 11/16/23 0807     Review of Systems   Complete 12-point review of symptoms negative except for what's mentioned in HPI     Objective:     /75   Pulse 71   Temp 97.9 °F (36.6 °C) (Oral)   Resp 18   Ht 5' 4" (1.626 m)   Wt 91.5 kg (201 lb 11.5 oz)   SpO2 (!) 94%   Breastfeeding No   BMI 34.63 kg/m²      Physical Exam  Vitals reviewed.   Eyes:      Pupils: Pupils are equal, round, and reactive to light.   Cardiovascular:      Rate and Rhythm: Normal rate and regular rhythm.      Heart sounds: Normal heart sounds.   Pulmonary:      Effort: Pulmonary effort is normal.      Breath sounds: Normal breath sounds.   Abdominal:      General: Bowel sounds are normal.      Comments: obese   Musculoskeletal:         General: Normal range of motion.   Skin:     General: Skin is warm.      Comments: Sternal incision clean and intact.  Chest tube sutures intact. Left leg harvest site intact.  Left lower extremity trace edema   Neurological:      General: No focal deficit present.      Mental Status: She is alert and oriented to person, place, and time.      Motor: Weakness present.   Psychiatric:         Mood and Affect: Mood normal.     *MD performed and documented physical " examination       Lines/Drains/Airways       None                 Labs  Recent Results (from the past 24 hour(s))   POCT glucose    Collection Time: 11/15/23 11:08 AM   Result Value Ref Range    POCT Glucose 134 (H) 70 - 110 mg/dL   POCT glucose    Collection Time: 11/15/23  4:22 PM   Result Value Ref Range    POCT Glucose 100 70 - 110 mg/dL   POCT glucose    Collection Time: 11/15/23  9:48 PM   Result Value Ref Range    POCT Glucose 120 (H) 70 - 110 mg/dL   POCT glucose    Collection Time: 11/16/23  5:40 AM   Result Value Ref Range    POCT Glucose 106 70 - 110 mg/dL     Radiology  CXR 2 view on 11/10/2023, IMPRESSION: Bilateral pleural effusions.   Increased left retrocardiac density and silhouetting of the left hemidiaphragm which might be related to pleural fluid, however, infiltrate/atelectasis cannot be completely excluded. Compressive atelectatic changes in both bases, however, superimposed infiltrates cannot be completely excluded   Radiology  Transthoracic echo on 11/02/2023, IMPRESSION: Left Ventricle: regional wall motion abnormalities present.  The distal apex, apical inferior and distal septal walls are hypokinetic There is moderately reduced systolic function with a visually estimated ejection fraction of 35 - 40%. Grade I diastolic dysfunction.  Right Ventricle: Normal right ventricular cavity size. Wall thickness is normal. Right ventricle wall motion  is normal. Systolic function is normal.  Mitral Valve: There is mild regurgitation.  Diagnostic studies   [facility] on 11/02/2023, IMPRESSION: Left Main-luminal irregularities. LAD-mid subtotal occlusion involving large diagonal. LCX-non dominant, luminal irregularities. RCA-dominant, 40% ostial stenosis. LVEDP 18    Assessment/Plan:     72 y.o. AAF admitted on 11/9/2023     Multivessel CAD  - s/p CABG x2 on 11/02/2023 (LIMA to LAD and rSVG to Diag 1)  - denies recent chest pain or discomfort  - continue                Aspirin 81 mg daily                 Lipitor 40 mg daily                Metoprolol succinate 50 mg daily                 Norco 7.5 mg/325 mg q.8 hours p.r.n.   - ECG and Nitro PRN  - not on ACEI or Plavix  - continue cardiac diet  - to follow-up with cardiology outpatient     HLD  - cholesterol 129, HDL 38, total cholesterol 3, triglycerides 42, LDL 83, VLDL 8 on 11/02/2023  - continue                Lipitor 40 mg daily                 Zetia 10 mg daily     HFpEF  - LVEF 55% on 10/11/2022  - continue                Metoprolol succinate 50 mg daily  - monitor weights daily  - to follow-up with cardiology outpatient     PAD  - stable  - continue                Aspirin 81 mg daily                Lipitor 40 mg daily                Eliquis 5 mg b.i.d.  - to follow-up with vascular surgery outpatient     Bilateral carotid artery stenosis  - moderate-no interventions at this time  - continue                Aspirin 81 mg daily                Lipitor 40 mg daily                Eliquis 5 mg b.i.d.  - to follow-up with vascular surgery outpatient     Paroxysmal atrial fibrillation  - no evidence of bleeding  - rate controlled  - continue                Amiodarone 200 mg b.i.d. (to end on 11/10)                Amiodarone daily to begin on 11/11                Eliquis 5 mg b.i.d.                Metoprolol succinate 50 mg daily   - to follow-up with cardiology outpatient     Normocytic anemia  - asymptomatic  - H/H trending down  - iron 33, TIBC 159, iron binding capacity 126, transferrin 141, iron saturation 21 on 11/10/2023  - continue                Ferrous sulfate 325 mg daily (initiated 11/10)  - no evidence of active bleeds  - will closely monitor and transfuse if needed      HTN  - BP at goal  - continue                Metoprolol succinate 50 mg daily                Norvasc 10 mg daily                  Hydralazine 10 mg every 2 hours as needed for BP > 160/90                Labetalol 10 mg every 2 hours as needed for BP > 160/90  - low sodium diet     DM type  II  - HgA1c 9.3 on 11/02/2023  - continue                Metformin 1000 mg twice daily                Glipizide 5 mg with breakfast                Januvia 100 mg daily                ISS   - CBGs AC/HS     GERD  - Avoid spicy foods, and nothing to eat or drink within x2 hours of bedtime or laying flat (water is ok)   - Avoid NSAIDs (Advil, ibuprofen, naproxen...) and andrade-2 inhibitors (Mobic, Celebrex)    - continue                Pepcid 40 mg daily      Osteoarthritis  - stable  - continue   Lidocaine patch to left knee (initiated 11/16)                Meloxicam 15 mg daily                 Vitamin-D 3 400 units daily     VTE Prophylaxis:  Eliquis 5 mg b.i.d.  COVID-19 testing:  Unknown  COVID-19 vaccination status:  Vaccinated ([employer]):  03/05/2021 and (Pfizer):  11/09/2021 and 06/22/2022     POA: No  Living will: No  Contacts: Heaven Burns ([facility]) [phone]     CODE STATUS: Full Code  Internal Medicine (attending): Salvador Sebastian MD  Physiatry (consulting):  Patricio Daniel MD     OUTPATIENT PROVIDERS  PCP:  JONG Clifton  Cardiology:  Clay chen MD  CV surgery:  Heriberto Lara MD     DISPOSITION:  Vital signs at goal.  No labs today.  Good glycemic control.  Bowel management, sleep hygiene, and appetite at goal.  Initiate lidocaine patch to left knee.  Progressing well with therapies.  Monitor closely.  Notify of any acute changes.    Staffing 11/13/2023: Continent of bowel and bladder.  Incisions look good today. RT: Overall mod to supervision.  Limited by activity intolerance, but very motivated.  Appetite is good if she likes the food. Received food from family OTW.  PT: Supervision to independent overall.  Max with bed mobility.  Ambulating 75 feet with RW.  Needs cueing to keep sternal precautions.  Max to mod assist with ADLs. Supervision now. Sit to stand is her biggest obstacle.  Projected discharge 11/21.     Jesi Driscoll NP conducted independent physical examination and  assisted with medical documentation.

## 2023-11-16 NOTE — PROGRESS NOTES
11/16/23 1030   Rec Therapy Time Calculation   Date of Treatment 11/16/23   Rec Start Time 1030   Rec Stop Time 1100   Rec Total Time (min) 30 min   Time   Treatment time 2 units   Charges   $Therapeutic Activity 2 units   Precautions   General Precautions fall;sternal   Orthopedic Precautions  N/A   Braces N/A   Pain/Comfort   Pain Rating 1 2/10   Location - Side 1 Right   Location - Orientation 1 lower   Location 1 knee   Pain Addressed 1 Reposition   Vital Signs   Pulse 80   /80   OTHER   Rehab identified problem list/impairments weakness;impaired endurance;impaired functional mobility;gait instability;decreased lower extremity function;decreased safety awareness   Values/Beliefs/Spiritual Care   Spiritual, Cultural Beliefs, Orthodoxy Practices, Values that Affect Care no   Overall Level of Functioning   Activity Tolerance Mod Indep   Dynamic Sitting Balance/Reaching Independent   Dynamic Standing Balance/Reaching Mod A   Right UE Coodination/Dexterity Mod Indep   Left UE Coordination/Dexterity Mod Indep   Problem Solving/Sequencing Skills Mod Indep   Memory Recall Mod Indep   R/L Neglect/Inattention Does not occur   Attention Span Mod Indep   Social Interaction Independent   Recreational Therapy Short Term Goals   Short Term Goal 1 Progression Progressing   Short Term Goal 2 Progression Progressing   Recreational Therapy Long Term Goals   Long Term Goal 1 Progression Progressing   Long Term Goal 2 Progression Progressing   Plan   Patient to be seen Daily   Planned Duration 1 week   Treatments Planned Energy conservation training;Safety education   Treatment plan/goals estblished with Patient/Caregiver Yes

## 2023-11-17 LAB
POCT GLUCOSE: 182 MG/DL (ref 70–110)
POCT GLUCOSE: 229 MG/DL (ref 70–110)

## 2023-11-17 PROCEDURE — 97116 GAIT TRAINING THERAPY: CPT | Mod: CQ

## 2023-11-17 PROCEDURE — 97530 THERAPEUTIC ACTIVITIES: CPT

## 2023-11-17 PROCEDURE — 99233 PR SUBSEQUENT HOSPITAL CARE,LEVL III: ICD-10-PCS | Mod: ,,, | Performed by: NURSE PRACTITIONER

## 2023-11-17 PROCEDURE — 25000003 PHARM REV CODE 250: Performed by: INTERNAL MEDICINE

## 2023-11-17 PROCEDURE — 99233 SBSQ HOSP IP/OBS HIGH 50: CPT | Mod: ,,, | Performed by: NURSE PRACTITIONER

## 2023-11-17 PROCEDURE — 97110 THERAPEUTIC EXERCISES: CPT | Mod: CQ

## 2023-11-17 PROCEDURE — 11800000 HC REHAB PRIVATE ROOM

## 2023-11-17 PROCEDURE — 99900035 HC TECH TIME PER 15 MIN (STAT)

## 2023-11-17 PROCEDURE — 63600175 PHARM REV CODE 636 W HCPCS: Performed by: NURSE PRACTITIONER

## 2023-11-17 PROCEDURE — 25000003 PHARM REV CODE 250: Performed by: NURSE PRACTITIONER

## 2023-11-17 PROCEDURE — 97112 NEUROMUSCULAR REEDUCATION: CPT

## 2023-11-17 PROCEDURE — 94761 N-INVAS EAR/PLS OXIMETRY MLT: CPT

## 2023-11-17 PROCEDURE — 94799 UNLISTED PULMONARY SVC/PX: CPT

## 2023-11-17 RX ADMIN — APIXABAN 5 MG: 5 TABLET, FILM COATED ORAL at 08:11

## 2023-11-17 RX ADMIN — ASPIRIN 81 MG: 81 TABLET, COATED ORAL at 07:11

## 2023-11-17 RX ADMIN — INSULIN ASPART 2 UNITS: 100 INJECTION, SOLUTION INTRAVENOUS; SUBCUTANEOUS at 06:11

## 2023-11-17 RX ADMIN — INSULIN ASPART 2 UNITS: 100 INJECTION, SOLUTION INTRAVENOUS; SUBCUTANEOUS at 08:11

## 2023-11-17 RX ADMIN — DOCUSATE SODIUM 100 MG: 100 CAPSULE, LIQUID FILLED ORAL at 07:11

## 2023-11-17 RX ADMIN — FERROUS SULFATE TAB 325 MG (65 MG ELEMENTAL FE) 1 EACH: 325 (65 FE) TAB at 07:11

## 2023-11-17 RX ADMIN — GLIPIZIDE 5 MG: 5 TABLET, FILM COATED, EXTENDED RELEASE ORAL at 07:11

## 2023-11-17 RX ADMIN — APIXABAN 5 MG: 5 TABLET, FILM COATED ORAL at 07:11

## 2023-11-17 RX ADMIN — EZETIMIBE 10 MG: 10 TABLET ORAL at 07:11

## 2023-11-17 RX ADMIN — METOPROLOL SUCCINATE 50 MG: 50 TABLET, FILM COATED, EXTENDED RELEASE ORAL at 07:11

## 2023-11-17 RX ADMIN — AMLODIPINE BESYLATE 10 MG: 5 TABLET ORAL at 07:11

## 2023-11-17 RX ADMIN — SITAGLIPTIN 100 MG: 50 TABLET, FILM COATED ORAL at 07:11

## 2023-11-17 RX ADMIN — Medication 1000 UNITS: at 07:11

## 2023-11-17 RX ADMIN — HYDROXYZINE PAMOATE 50 MG: 50 CAPSULE ORAL at 08:11

## 2023-11-17 RX ADMIN — METFORMIN HYDROCHLORIDE 1000 MG: 500 TABLET, FILM COATED ORAL at 07:11

## 2023-11-17 RX ADMIN — AMIODARONE HYDROCHLORIDE 200 MG: 200 TABLET ORAL at 07:11

## 2023-11-17 RX ADMIN — METFORMIN HYDROCHLORIDE 1000 MG: 500 TABLET, FILM COATED ORAL at 04:11

## 2023-11-17 RX ADMIN — ATORVASTATIN CALCIUM 40 MG: 40 TABLET, FILM COATED ORAL at 07:11

## 2023-11-17 NOTE — PROGRESS NOTES
11/17/23 1001   Rec Therapy Time Calculation   Date of Treatment 11/17/23   Rec Start Time 1001   Rec Stop Time 1030   Rec Total Time (min) 29 min   Time   Treatment time 2 units   Charges   $Therapeutic Activity 1unit   Precautions   General Precautions sternal;fall   Orthopedic Precautions  N/A   Braces N/A   Pain/Comfort   Pain Rating 1 9/10   Location - Side 1 Right   Location - Orientation 1 lower   Location 1 knee   Pain Addressed 1 Reposition   Vital Signs   Pulse 85   /71   OTHER   Rehab identified problem list/impairments weakness;impaired endurance;impaired functional mobility;gait instability;decreased lower extremity function;pain   Values/Beliefs/Spiritual Care   Spiritual, Cultural Beliefs, Baptism Practices, Values that Affect Care no   Overall Level of Functioning   Activity Tolerance Mod Indep   Dynamic Sitting Balance/Reaching Independent   Dynamic Standing Balance/Reaching Min A   Right UE Coodination/Dexterity Independent   Left UE Coordination/Dexterity Independent   Problem Solving/Sequencing Skills Independent   Memory Recall Independent   R/L Neglect/Inattention Does not occur   Attention Span Independent   Social Interaction Independent   Recreational Therapy Short Term Goals   Short Term Goal 1 Progression Met   Short Term Goal 2 Progression Progressing   Recreational Therapy Long Term Goals   Long Term Goal 1 Progression Progressing   Long Term Goal 2 Progression Progressing   Plan   Patient to be seen Daily   Planned Duration Other (Comment)  (4 days)   Treatments Planned Energy conservation training   Treatment plan/goals estblished with Patient/Caregiver Yes

## 2023-11-17 NOTE — PT/OT/SLP PROGRESS
"Physical Therapy Inpatient Rehab Treatment    Patient Name:  Geno Barry   MRN:  [id number]    Recommendations:     Discharge Recommendations:  Low Intensity Therapy   Discharge Equipment Recommendations:     Barriers to discharge: Impaired functional mobility     Assessment:     Geno Barry is a 72 y.o. female admitted with a medical diagnosis of S/P CABG (coronary artery bypass graft).  She presents with the following impairments/functional limitations:  weakness, impaired endurance, impaired self care skills, impaired functional mobility, gait instability, impaired balance.    Rehab Diagnosis: Multivessel CAD s/p CABG x 2    General Precautions: Standard, sternal     Orthopedic Precautions:N/A     Braces: N/A    Rehab Prognosis: Fair; patient would benefit from acute skilled PT services to address these deficits and reach maximum level of function.      History:     Past Medical History:   Diagnosis Date    CAD (coronary artery disease)     Diabetes mellitus     Hypertension     PAD (peripheral artery disease)        Past Surgical History:   Procedure Laterality Date    CORONARY ARTERY BYPASS GRAFT (CABG) N/A 11/2/2023    Procedure: CORONARY ARTERY BYPASS GRAFT (CABG);  Surgeon: Heriberto Lara IV, MD;  Location: [facility] OR;  Service: Cardiothoracic;  Laterality: N/A;    ENDOSCOPIC HARVEST OF VEIN N/A 11/2/2023    Procedure: SURGICAL PROCUREMENT, VEIN, ENDOSCOPIC;  Surgeon: Heriberto Lara IV, MD;  Location: [facility] OR;  Service: Cardiothoracic;  Laterality: N/A;    HYSTERECTOMY      LEFT HEART CATHETERIZATION Left 11/2/2023    Procedure: Left heart cath;  Surgeon: Puma Vera MD;  Location: [facility] CATH LAB;  Service: Cardiology;  Laterality: Left;  LHC +/- PCI VIA RRA    LUMPECTOMY, BREAST Right        Subjective     Patient comments: "its cold in here"    Respiratory Status: Room air    Patients cultural, spiritual, Voodoo conflicts given the current situation: no    Objective:     Communicated " with Clemente, PTA, prior to session.  Patient found up in chair with peripheral IV  upon PT entry to PT Gym.    Pt is Alert, Cooperative, and Motivated.    Vitals   Vitals at Rest  BP    HR    O2 Sat    Pain      Vitals With Activity  BP     HR     O2 Sat     Pain         Functional Mobility:        Current   Status  Discharge   Goal   Functional Area: Care Score:    Sit to Stand 3  Min A with Rollator. Patient has improved and has minimal posterior lean but still pushes backward sometimes Independent   Chair/Bed-to-Chair Transfer 3  Min A stand step with Rollator. Patient has improved and has minimal posterior lean but still pushes backward sometimes   Independent   Walk 10 Feet 4  CGA with Rollator Independent   Walk 50 Feet with Two Turns 4  CGA with Rollator Independent   Walk 150 Feet 4  CGA with Rollator for 150 ft  and 50ft with fatigue limiting both walks Supervision or touching assistance       Therapeutic Activities and Exercises:  Wheelchair push with LE and pulls with LE for 50ft both ways    -standing balance in //: neutral stance EO/EC for 30seconds each with minimal sway and good balance. Heel toe stance with right and left foot forward static balance for 30 seconds. Patient had more sway and used the R rail to assist with balance 2x.    -sideways walking in //: 2laps without holding on and good balance    Activity Tolerance: Good    Patient left up in chair with all lines intact and call button in reach.    Education provided: transfer training, gait training, balance training, safety awareness, body mechanics, assistive device, and sternal precautions    Expected compliance: Moderate compliance    Plan:     During this hospitalization, patient to be seen 5 x/week (5-7x/wk) to address the identified rehab impairments via gait training, therapeutic activities, therapeutic exercises, neuromuscular re-education and progress toward the following goals:    GOALS:   Multidisciplinary Problems       Physical  Therapy Goals          Problem: Physical Therapy    Goal Priority Disciplines Outcome Goal Variances Interventions   Physical Therapy Goal     PT, PT/OT Ongoing, Progressing     Description: Bed Mobility:  Roll left and right with partial/moderate assist.  Sit to supine transfer with partial/moderate assist.  Supine to sit transfer with partial/moderate assist.    Transfers:  Sit to stand transfer with partial/moderate assist using RW.  Bed to chair transfer with partial/moderate assist Stand Step  using RW.  Car transfer with substantial/maximal assist using RW.   an object from the ground in standing position with substantial/maximal assist using RW.    Mobility:  Ambulate 125 feet with supervision/touching assist using RW.  Ambulate 30 feet on uneven surfaces/ramps with supervision/touching assist using RW.  Pt ascended/descended a 4 inch curb with partial/moderate assist using RW.  Ascend/descend 2 stairs with substantial/maximal assist using no handrails.                       Plan of Care Expires:  12/01/23  PT Next Visit Date: 11/22/23  Plan of Care reviewed with: patient    Additional Information:         Time Tracking:     Therapy Time  PT Start Time: 1100  PT Stop Time: 1130  PT Total Time (min): 30 min   PT Individual: 30  Missed Time:    Time Missed due to:      Billable Minutes: Therapeutic Exercise 15 and Neuromuscular Re-education 15    11/17/2023

## 2023-11-17 NOTE — PT/OT/SLP PROGRESS
"Physical Therapy Inpatient Rehab Treatment    Patient Name:  Geno Barry   MRN:  [id number]    Recommendations:     Discharge Recommendations:  Low Intensity Therapy   Discharge Equipment Recommendations:     Barriers to discharge: Impaired functional mobility     Assessment:     Geno Barry is a 72 y.o. female admitted with a medical diagnosis of S/P CABG (coronary artery bypass graft).  She presents with the following impairments/functional limitations:  weakness, impaired endurance, impaired self care skills, impaired functional mobility, gait instability, impaired balance .    Rehab Diagnosis: Multivessel CAD s/p CABG x 2    General Precautions: Standard, sternal     Orthopedic Precautions:N/A     Braces: N/A    Rehab Prognosis: Fair; patient would benefit from acute skilled PT services to address these deficits and reach maximum level of function.      History:     Past Medical History:   Diagnosis Date    CAD (coronary artery disease)     Diabetes mellitus     Hypertension     PAD (peripheral artery disease)        Past Surgical History:   Procedure Laterality Date    CORONARY ARTERY BYPASS GRAFT (CABG) N/A 11/2/2023    Procedure: CORONARY ARTERY BYPASS GRAFT (CABG);  Surgeon: Heriberto Lara IV, MD;  Location: [facility] OR;  Service: Cardiothoracic;  Laterality: N/A;    ENDOSCOPIC HARVEST OF VEIN N/A 11/2/2023    Procedure: SURGICAL PROCUREMENT, VEIN, ENDOSCOPIC;  Surgeon: Heriberto Lara IV, MD;  Location: [facility] OR;  Service: Cardiothoracic;  Laterality: N/A;    HYSTERECTOMY      LEFT HEART CATHETERIZATION Left 11/2/2023    Procedure: Left heart cath;  Surgeon: Puma Vera MD;  Location: [facility] CATH LAB;  Service: Cardiology;  Laterality: Left;  LHC +/- PCI VIA RRA    LUMPECTOMY, BREAST Right        Subjective     Patient comments: "my right knee is hurting"    Respiratory Status: Room air    Patients cultural, spiritual, Mosque conflicts given the current situation: no    Objective: "     Patient found up in chair with peripheral IV  upon PT entry to room.    Pt is Alert, Cooperative, and Motivated.    Vitals     Vitals after tx  /74   HR 78   O2 Sat     Pain         Functional Mobility:        Current   Status  Discharge   Goal   Functional Area: Care Score:    Sit to Stand 3  Min A with Rollator. Patient has continued to improve with less posterior lean Independent   Chair/Bed-to-Chair Transfer 3  Min A with rollater stand step. Patient has continued to improve with less posterior lean Independent   Walk 10 Feet Uneven Surface 4  Supervision/CGA with Rollator outside on uneven sidewalk for 75ft x2  limited by fatigue Supervision or touching assistance       Therapeutic Activities and Exercises:  -seated [id number]: march with 3#, knee ext with 3#, pf/df with 3#, add with pillow, abd with red TB.    -push and pull the WC with her legs for 50ft    -squat 5x in // limited by right knee pain    - in //: gait forward and backwards and sideways x2 laps with breaks between each set and sitting to standing each time (4times).    Patient was educated on knowing her limits of when she needs to stop and when she can go further. Patient still has moments where she walks a certain distance then realizes she is fatigued and needs to sit down in rollator quickly.    Activity Tolerance: Good and Fair    Patient left up in chair with all lines intact and call button in reach.    Education provided: transfer training, gait training, balance training, safety awareness, body mechanics, assistive device, and sternal precautions    Expected compliance: Moderate compliance    Plan:     During this hospitalization, patient to be seen 5 x/week (5-7x/wk) to address the identified rehab impairments via gait training, therapeutic activities, therapeutic exercises, neuromuscular re-education and progress toward the following goals:    GOALS:   Multidisciplinary Problems       Physical Therapy Goals          Problem:  Physical Therapy    Goal Priority Disciplines Outcome Goal Variances Interventions   Physical Therapy Goal     PT, PT/OT Ongoing, Progressing     Description: Bed Mobility:  Roll left and right with partial/moderate assist.  Sit to supine transfer with partial/moderate assist.  Supine to sit transfer with partial/moderate assist.    Transfers:  Sit to stand transfer with partial/moderate assist using RW.  Bed to chair transfer with partial/moderate assist Stand Step  using RW.  Car transfer with substantial/maximal assist using RW.   an object from the ground in standing position with substantial/maximal assist using RW.    Mobility:  Ambulate 125 feet with supervision/touching assist using RW.  Ambulate 30 feet on uneven surfaces/ramps with supervision/touching assist using RW.  Pt ascended/descended a 4 inch curb with partial/moderate assist using RW.  Ascend/descend 2 stairs with substantial/maximal assist using no handrails.                       Plan of Care Expires:  12/01/23  PT Next Visit Date: 11/22/23  Plan of Care reviewed with: patient    Additional Information:         Time Tracking:     Therapy Time  PT Start Time: 1300  PT Stop Time: 1400  PT Total Time (min): 60 min   PT Individual: 60  Missed Time:    Time Missed due to:      Billable Minutes: Gait Training 15, Therapeutic Activity 15, and Therapeutic Exercise 30    11/17/2023

## 2023-11-17 NOTE — PT/OT/SLP PROGRESS
Recreational Therapy Treatment    Date of Treatment: 11/17/23  Start Time: 1001  Stop Time: 1030  Total Time: 29 min  Missed Time:    General Precautions: sternal, fall  Ortho Precautions: N/A  Braces: N/A    Vitals   Vitals at Rest  /71   HR 85   O2 Sat    Pain      Vitals With Activity  /79   HR 86   O2 Sat    Pain        Treatment     Cognitive Skills Building                    Comment:          Dynamic Activities   Activity Assist Position Equipment Response   Activity 1 Bowling minimum assistance Standing Rolling walker and Rubber bowling balls good   Comment: Sit to stand was min as was dynamic standing balance/reaching. Standing tolerance was 3-4 minutes.  UE coordination was I as were sequencing skills.  Flat affect and c/o 10/10 pain in R knee. Breaks were taken through treatment       Fine Motor Activities   Activity Assist Position Equipment Response           Comment:          Additional Info: This was a co-treat with PTA    Goals     Short Term Goals    Goal  Goal Status   Will increase sit to stand to min Met   Will improve dynamic standing balance/reaching to supervision Progressing                 Long Term Goals    Goal Goal Status   Will increase standing balance/reaching to 5 minutes Progressing   Will improve dynamic standing balance/reaching to setup Progressing

## 2023-11-17 NOTE — PROGRESS NOTES
[facility] ([facility])  Rehab Progress Note    Patient Name: Geno Barry  MRN: [id number]  Age: 72 y.o. Sex: female  : 1950  Hospital Length of Stay: 8 days  Date of Service: 2023   Chief Complaint: Multivessel CAD s/p CABG x2 on 2023    Subjective:     Basic Information  Admit Information: 72-year-old  female presented to [facility] for scheduled CABG on 2023.  PMH significant for PID, DM type 2, CAD, and carotid artery stenosis.  Tolerated CABG x2 on  without perioperative complications.  Aspirin, Norvasc, and beta-blocker continued.  Lipitor 40 mg daily initiated on .  Chest tube discontinued on .  Amiodarone initiated due to postoperative atrial fibrillation.  Eliquis 5 mg b.i.d. initiated on .  Tolerated transfer to [facility] inpatient rehab unit on  without incident.  Today's Information: No acute events overnight.  Sitting in chair comfortably.  Reports good sleep and appetite.  Last BM .  Vital signs at goal with no recent recorded fevers.  Good glycemic control.  No labs or imaging today.      Review of patient's allergies indicates:   Allergen Reactions    Ace inhibitors Swelling    Clopidogrel Swelling    Iodine         Current Facility-Administered Medications:     acetaminophen tablet 650 mg, 650 mg, Oral, Q8H PRN, Elizabeth, Marina A, FNP, 650 mg at 11/15/23 2145    ALPRAZolam tablet 0.25 mg, 0.25 mg, Oral, TID PRN, Duglas, Isidoro A, FNP    amiodarone tablet 200 mg, 200 mg, Oral, Daily, Duglas, Isidoro A, FNP, 200 mg at 23 0756    amLODIPine tablet 10 mg, 10 mg, Oral, Daily, Duglas, Isidoro A, FNP, 10 mg at 23 0756    apixaban tablet 5 mg, 5 mg, Oral, BID, Duglas, Isidoro A, FNP, 5 mg at 23 0756    aspirin EC tablet 81 mg, 81 mg, Oral, Daily, Duglas, Isidoro A, FNP, 81 mg at 23 0755    atorvastatin tablet 40 mg, 40 mg, Oral, Daily, Isidoro Ardon FNP, 40 mg at  11/17/23 0756    benzonatate capsule 100 mg, 100 mg, Oral, TID PRN, Duglas, Isidoro A, FNP    bisacodyL suppository 10 mg, 10 mg, Rectal, Daily PRN, Duglas, Isidoro A, FNP    dextrose 10% bolus 125 mL 125 mL, 12.5 g, Intravenous, PRN, Duglas, Isidoro A, FNP    dextrose 10% bolus 250 mL 250 mL, 25 g, Intravenous, PRN, Duglas, Isidoro A, FNP    docusate sodium capsule 100 mg, 100 mg, Oral, BID, Duglas, Isidoro A, FNP, 100 mg at 11/17/23 0756    ezetimibe tablet 10 mg, 10 mg, Oral, Daily, Duglas, Isidoro A, FNP, 10 mg at 11/17/23 0755    ferrous sulfate tablet 1 each, 1 tablet, Oral, Daily, Duglas, Isidoro A, FNP, 1 each at 11/17/23 0755    glipiZIDE 24 hr tablet 5 mg, 5 mg, Oral, Daily with breakfast, Duglas, Isidoro A, FNP, 5 mg at 11/17/23 0756    glucagon (human recombinant) injection 1 mg, 1 mg, Intramuscular, PRN, Duglas, Isidoro A, FNP    glucose chewable tablet 16 g, 16 g, Oral, PRN, Duglas, Isidoro A, FNP    glucose chewable tablet 24 g, 24 g, Oral, PRN, Duglas, Isidoro A, FNP    hydrALAZINE injection 10 mg, 10 mg, Intravenous, Q4H PRN, Duglas, Isidoro A, FNP    HYDROcodone-acetaminophen 7.5-325 mg per tablet 1 tablet, 1 tablet, Oral, TID PRN, Elizabeth, Marina A, FNP, 1 tablet at 11/13/23 1113    hydrOXYzine pamoate capsule 50 mg, 50 mg, Oral, QHS, Duglas, Isidoro A, FNP, 50 mg at 11/16/23 2033    insulin aspart U-100 injection 0-10 Units, 0-10 Units, Subcutaneous, QID (AC + HS) PRN, Duglas, Isidoro A, FNP, 2 Units at 11/17/23 0648    labetalol 20 mg/4 mL (5 mg/mL) IV syring, 10 mg, Intravenous, Q4H PRN, Isidoro Ardon FNP    LIDOcaine 5 % patch 1 patch, 1 patch, Transdermal, Q24H, Jesi Driscoll FNP, 1 patch at 11/16/23 1009    metFORMIN tablet 1,000 mg, 1,000 mg, Oral, BID WM, Isidoro Ardon FNP, 1,000 mg at 11/17/23 0755    methocarbamoL tablet 500 mg, 500 mg, Oral, TID PRN, Marina Johnson FNP    metoprolol injection 10 mg, 10 mg, Intravenous,  "Q2H PRN, Duglas, Isidoro A, FNP    metoprolol succinate (TOPROL-XL) 24 hr tablet 50 mg, 50 mg, Oral, Daily, Duglas, Isidoro A, FNP, 50 mg at 11/17/23 0756    nitroGLYCERIN SL tablet 0.4 mg, 0.4 mg, Sublingual, Q5 Min PRN, Duglas, Isidoro A, FNP    ondansetron disintegrating tablet 4 mg, 4 mg, Oral, Q6H PRN, Duglas, Isidoro A, FNP    polyethylene glycol packet 17 g, 17 g, Oral, Q12H PRN, Duglas, Isidoro A, FNP    promethazine tablet 25 mg, 25 mg, Oral, Q6H PRN, Duglas, Isidoro A, FNP    SITagliptin phosphate tablet 100 mg, 100 mg, Oral, Daily, Duglas, Isidoro A, FNP, 100 mg at 11/17/23 0755    vitamin D 1000 units tablet 1,000 Units, 1,000 Units, Oral, Daily, Salvador Sebastian MD, 1,000 Units at 11/17/23 0756     Review of Systems   Complete 12-point review of symptoms negative except for what's mentioned in HPI     Objective:     /71   Pulse 84   Temp 98.1 °F (36.7 °C) (Oral)   Resp 20   Ht 5' 4" (1.626 m)   Wt 91.9 kg (202 lb 9.6 oz)   SpO2 (!) 94%   Breastfeeding No   BMI 34.78 kg/m²      Physical Exam  Vitals reviewed.   Eyes:      Pupils: Pupils are equal, round, and reactive to light.   Cardiovascular:      Rate and Rhythm: Normal rate and regular rhythm.      Heart sounds: Normal heart sounds.   Pulmonary:      Effort: Pulmonary effort is normal.      Breath sounds: Normal breath sounds.   Abdominal:      General: Bowel sounds are normal.      Comments: obese   Musculoskeletal:         General: Normal range of motion.   Skin:     General: Skin is warm.      Comments: Sternal incision clean and intact.  Chest tube sutures intact. Left leg harvest site intact.  Left lower extremity trace edema   Neurological:      General: No focal deficit present.      Mental Status: She is alert and oriented to person, place, and time.      Motor: Weakness present.   Psychiatric:         Mood and Affect: Mood normal.     *MD performed and documented physical examination   "     Lines/Drains/Airways       None                 Labs  Recent Results (from the past 24 hour(s))   POCT glucose    Collection Time: 11/16/23 11:59 AM   Result Value Ref Range    POCT Glucose 170 (H) 70 - 110 mg/dL   POCT glucose    Collection Time: 11/16/23  4:13 PM   Result Value Ref Range    POCT Glucose 155 (H) 70 - 110 mg/dL   POCT glucose    Collection Time: 11/16/23  8:37 PM   Result Value Ref Range    POCT Glucose 202 (H) 70 - 110 mg/dL   POCT glucose    Collection Time: 11/17/23  6:46 AM   Result Value Ref Range    POCT Glucose 182 (H) 70 - 110 mg/dL     Radiology  CXR 2 view on 11/10/2023, IMPRESSION: Bilateral pleural effusions.   Increased left retrocardiac density and silhouetting of the left hemidiaphragm which might be related to pleural fluid, however, infiltrate/atelectasis cannot be completely excluded. Compressive atelectatic changes in both bases, however, superimposed infiltrates cannot be completely excluded   Radiology  Transthoracic echo on 11/02/2023, IMPRESSION: Left Ventricle: regional wall motion abnormalities present.  The distal apex, apical inferior and distal septal walls are hypokinetic There is moderately reduced systolic function with a visually estimated ejection fraction of 35 - 40%. Grade I diastolic dysfunction.  Right Ventricle: Normal right ventricular cavity size. Wall thickness is normal. Right ventricle wall motion  is normal. Systolic function is normal.  Mitral Valve: There is mild regurgitation.  Diagnostic studies   [facility] on 11/02/2023, IMPRESSION: Left Main-luminal irregularities. LAD-mid subtotal occlusion involving large diagonal. LCX-non dominant, luminal irregularities. RCA-dominant, 40% ostial stenosis. LVEDP 18    Assessment/Plan:     72 y.o. AAF admitted on 11/9/2023     Multivessel CAD  - s/p CABG x2 on 11/02/2023 (LIMA to LAD and rSVG to Diag 1)  - denies recent chest pain or discomfort  - continue                Aspirin 81 mg daily                Lipitor 40  mg daily                Metoprolol succinate 50 mg daily                 Norco 7.5 mg/325 mg q.8 hours p.r.n.   - ECG and Nitro PRN  - not on ACEI or Plavix  - continue cardiac diet  - to follow-up with cardiology outpatient     HLD  - cholesterol 129, HDL 38, total cholesterol 3, triglycerides 42, LDL 83, VLDL 8 on 11/02/2023  - continue                Lipitor 40 mg daily                 Zetia 10 mg daily     HFpEF  - LVEF 55% on 10/11/2022  - continue                Metoprolol succinate 50 mg daily  - monitor weights daily  - to follow-up with cardiology outpatient     PAD  - stable  - continue                Aspirin 81 mg daily                Lipitor 40 mg daily                Eliquis 5 mg b.i.d.  - to follow-up with vascular surgery outpatient     Bilateral carotid artery stenosis  - moderate-no interventions at this time  - continue                Aspirin 81 mg daily                Lipitor 40 mg daily                Eliquis 5 mg b.i.d.  - to follow-up with vascular surgery outpatient     Paroxysmal atrial fibrillation  - no evidence of bleeding  - rate controlled  - continue                Amiodarone 200 mg b.i.d. (to end on 11/10)                Amiodarone daily to begin on 11/11                Eliquis 5 mg b.i.d.                Metoprolol succinate 50 mg daily   - to follow-up with cardiology outpatient     Normocytic anemia  - asymptomatic  - H/H trending down  - iron 33, TIBC 159, iron binding capacity 126, transferrin 141, iron saturation 21 on 11/10/2023  - continue                Ferrous sulfate 325 mg daily (initiated 11/10)  - no evidence of active bleeds  - will closely monitor and transfuse if needed      HTN  - BP at goal  - continue                Metoprolol succinate 50 mg daily                Norvasc 10 mg daily                  Hydralazine 10 mg every 2 hours as needed for BP > 160/90                Labetalol 10 mg every 2 hours as needed for BP > 160/90  - low sodium diet     DM type II  -  HgA1c 9.3 on 11/02/2023  - continue                Metformin 1000 mg twice daily                Glipizide 5 mg with breakfast                Januvia 100 mg daily                ISS   - CBGs AC/HS     GERD  - Avoid spicy foods, and nothing to eat or drink within x2 hours of bedtime or laying flat (water is ok)   - Avoid NSAIDs (Advil, ibuprofen, naproxen...) and andrade-2 inhibitors (Mobic, Celebrex)    - continue                Pepcid 40 mg daily      Osteoarthritis  - stable  - continue   Lidocaine patch to left knee (initiated 11/16)                Meloxicam 15 mg daily                 Vitamin-D 3 400 units daily     VTE Prophylaxis:  Eliquis 5 mg b.i.d.  COVID-19 testing:  Unknown  COVID-19 vaccination status:  Vaccinated ([employer]):  03/05/2021 and ([employer]):  11/09/2021 and 06/22/2022     POA: No  Living will: No  Contacts: Heaven Burns ([facility]) [phone]     CODE STATUS: Full Code  Internal Medicine (attending): Salvador Sebastian MD  Physiatry (consulting):  Patricio Daniel MD     OUTPATIENT PROVIDERS  PCP:  JONG Clifton  Cardiology:  Clay chen MD  CV surgery:  Heriberto Lara MD     DISPOSITION:  Vital signs at goal.  No labs today.  Good glycemic control.  Bowel management, sleep hygiene, and appetite at goal.  Progressing well with therapies.  Monitor closely.  Notify of any acute changes.    Staffing 11/13/2023: Continent of bowel and bladder.  Incisions look good today. RT: Overall mod to supervision.  Limited by activity intolerance, but very motivated.  Appetite is good if she likes the food. Received food from family OTW.  PT: Supervision to independent overall.  Max with bed mobility.  Ambulating 75 feet with RW.  Needs cueing to keep sternal precautions.  Max to mod assist with ADLs. Supervision now. Sit to stand is her biggest obstacle.  Projected discharge 11/21.     Jesi Driscoll NP conducted independent physical examination and assisted with medical documentation.

## 2023-11-17 NOTE — PROGRESS NOTES
Subjective  HPI: 71 yo F with a PMH of HTN, CAD, cerebrovascular disease, PAD, and DM presented to [facility] on 11/2/23 and underwent a LHC revealing severe multivessel CAD. CV surgeon was consulted for CABG. Patient then underwent a CABG x2V: LIMA to the LAD; reverse saphenous vein to the 1st diagonal; endoscopic vein harvest of the left greater saphenous vein by Dr. Lara. 1 chest tube was placed. Patient was then transferred to ICU, and extubated. Chest XR showed some pulmonary vascular congestion, no focal infiltrates or pneummothorax. O2 sats in high 90s on 2L/NC. Patient did develop some A-fib with controlled ventricular rate. Remained on low dose epinephrine at 0.05 for hemodynamic support. Patient also on insulin drip. On 11/3, labs showed elevated WBC of 12.97, and low H&H of 10.8 & 33.6. Epinephrine stopped. Continued ASA, norvasc, and betablocker. Started atorvastatin 40mg PO Q day. On amiodarone drip on 11/4. On 11/5, Amiodarone drip transitioned to oral. Chest tube removed. PT eval completed with deficits noted. On 11/6, patient on 4L O2/NC with sat decreasing in 70-80s with therapy, and continued IV diuresis of 40mg BID. On 11/7, Labs showed low H&H of 3.90, low H&H of 11.0 & 35.0, low MCHC of 31.4, elevated glucose of 397, elevated Cr of 1.04, low albumin of 3.1. Metoprolol tart 25mg BID. O2 titrated down to 2.5L/NC with sats ranging 89-94% with therapy. On 11/8, O2 was discontinued, Prevena discontinued to surgical site with minimal bloody drainage noted to mid-sternum. Vital signs stable, and HR in SR. Glucose elevated at 231. Nutrition was consulted with recommendation to continue diabetic diet, encourage small and frequent meals, and add Boost Max BID for additional nutrition. Patient complained of sternal pain rating at 7 on 1-10 scale. Last BM on 11/7. On 11/9, OT eval completed with deficits noted. Eliquis started at 5mg PO BID. Toprol XL transitioned to 50mg daily. Continued ASA and statin.  Patient is AAOx4.   Participating with therapy. Functional status includes setup/ supervision needed for eating and grooming while seated; minimal assist for bed mobility, minimal assist for transfers with RW, CGA for walking 120ft with RW, max assist lower body dressing, and moderate assist toileting.  Patient was evaluated, accepted, and admitted to inpatient rehab to improve functional status. Transferred to [facility] on 11/9 without incident.      11/17: Seen with OT, Amb w/Rollator around the kitchen during mock task of making a sandwich while retrieving necessary ingredients from refrigerator, and cabinets. Progressing well without complaint. VSSAF.         Review of Systems  Depression/Anxiety: no current complaints.   ALPRAZolam tablet 0.25 mg TID PRN Anxiety  Pain: denies currently. Incisional with cough     acetaminophen tablet 650 mg TID  acetaminophen tablet 650 mg q8h PRN mild pain  methocarbamoL tablet 500 mg TID PRN spasm/tightness  HYDROcodone-acetaminophen 7.5-325 mg 1 tab TID PRN mod/severe pain  Bowels/Bladder: last BM 11/17 x 2     Appetite: good    Sleep: good      hydrOXYzine pamoate capsule 50 mg qHS        Physical Exam  General: well-developed, well-nourished, in no acute distress, flat  Respiratory: equal chest rise, no SOB, no audible wheeze  Cardiovascular: regular rate and rhythm, no edema  Gastrointestinal: soft, non-tender, non-distended   Musculoskeletal: full range of motion of all extremities/spine with generalized weakness  Integumentary: no rashes or skin lesions present, midsternal jsmyaznq-DDJ-l/d/I, with chest tube vneu-fwohnp-PVQ-c/d/I, LLE EVH site noevulqk-LEC-r/d/i  Neurologic: cranial nerves intact, no signs of peripheral neurological deficit, motor/sensory function intact              Assessment/Plan  Hospital   Coronary artery disease involving native coronary artery of native heart   S/P CABG (coronary artery bypass graft)     Non-Hospital   Hypertension   Diabetes mellitus    Dyslipidemia   Obesity   Osteoarthritis of knee   Status post right knee replacement   PAD (peripheral artery disease)   CAD (coronary artery disease)       Wounds: midsternal ygnjogow-PLM-y/d/I, with chest tube ingl-lxqfjl-HEZ-c/d/I, LLE EVH site iskucsyu-WBP-j/d/i  S/p CABG x2V: LIMA to the LAD; reverse saphenous vein to the 1st diagonal; endoscopic vein harvest of the left greater saphenous vein on 11/2 by Dr. Lara. 1 chest tube was placed.  Precautions: sternal   Bracing: cardiac bear for splinting  Swallowing: Diabetic Diet  Function: Tolerating therapy. Continue PT/OT  VTE Prophylaxis:   apixaban tablet 5 mg BID  Code Status: FULL CODE   Discharge: Lives alone in [place] in a single-story home with no steps to enter the residence. Completed 2nd grade. She has no  history. Pt. Used to babysit for a living.  The patient is . Lives alone. She has a sitter 5 days per week from 9AM - 2PM through [employer]. The sitter was helping with bathing, dressing, cooking, driving, and cleaning. Pt. Plans to stay with her cousin after rehab until she recovers.  Children (0). Date 11/21 Tuesday.

## 2023-11-17 NOTE — PT/OT/SLP PROGRESS
Physical Therapy Inpatient Rehab Treatment    Patient Name:  Geno Barry   MRN:  [id number]    Recommendations:     Discharge Recommendations:  Low Intensity Therapy   Discharge Equipment Recommendations:     Barriers to discharge: Impaired functional mobility     Assessment:     Geno Barry is a 72 y.o. female admitted with a medical diagnosis of S/P CABG (coronary artery bypass graft).  She presents with the following impairments/functional limitations:  weakness, impaired endurance, impaired functional mobility, gait instability, decreased lower extremity function, pain .    Rehab Diagnosis: Multivessel CAD s/p CABG x 2    General Precautions: Standard, sternal     Orthopedic Precautions:N/A     Braces: N/A    Rehab Prognosis: Good; patient would benefit from acute skilled PT services to address these deficits and reach maximum level of function.      History:     Past Medical History:   Diagnosis Date    CAD (coronary artery disease)     Diabetes mellitus     Hypertension     PAD (peripheral artery disease)        Past Surgical History:   Procedure Laterality Date    CORONARY ARTERY BYPASS GRAFT (CABG) N/A 11/2/2023    Procedure: CORONARY ARTERY BYPASS GRAFT (CABG);  Surgeon: Heriberto Lara IV, MD;  Location: [facility] OR;  Service: Cardiothoracic;  Laterality: N/A;    ENDOSCOPIC HARVEST OF VEIN N/A 11/2/2023    Procedure: SURGICAL PROCUREMENT, VEIN, ENDOSCOPIC;  Surgeon: Heriberto Lara IV, MD;  Location: [facility] OR;  Service: Cardiothoracic;  Laterality: N/A;    HYSTERECTOMY      LEFT HEART CATHETERIZATION Left 11/2/2023    Procedure: Left heart cath;  Surgeon: Puma Vera MD;  Location: [facility] CATH LAB;  Service: Cardiology;  Laterality: Left;  LHC +/- PCI VIA RRA    LUMPECTOMY, BREAST Right        Subjective     Patient comments: n/a    Respiratory Status: Room air    Patients cultural, spiritual, Sabianism conflicts given the current situation: no    Objective:      Patient found up in chair with  peripheral IV  upon PT entry to room.        Vitals   Vitals at Rest  /71   HR 85   O2 Sat    Pain      Vitals With Activity  /73   HR 85   O2 Sat     Pain         Functional Mobility:        Current   Status  Discharge   Goal   Functional Area: Care Score:    Roll Left and Right   Independent   Sit to Lying   Independent   Lying to Sitting on Side of Bed   Independent   Sit to Stand 3  W/Rolator and vc to lean forward w/hands on knees.   Independent   Chair/Bed-to-Chair Transfer 3  W/Rolator Independent   Car Transfer  Independent   Walk 10 Feet  Independent   Walk 50 Feet with Two Turns  Independent   Walk 150 Feet  Supervision or touching assistance   Walk 10 Feet Uneven Surface  Supervision or touching assistance   1 Step (Curb)   Supervision or touching assistance   4 Steps   Not applicable   12 Steps   Not applicable   Picking Up Object   Supervision or touching assistance   Wheel 50 Feet with Two Turns   Not attempted due to medical/safety concerns   Wheel 150 Feet   Not attempted due to medical/safety concerns       Therapeutic Activities and Exercises:  Patient performed 1 set(s) of 20 repetitions of the following seated exercises: ankle pumps, long arc quads, marches, hip abduction, hip adduction, and knee flexion for bilateral LE. Patient required skilled PT for instruction of exercises and appropriate cues to perform exercises safely and appropriately.  Pt preformed dynamic standing balance w/Rolator while playing bowling, Rec therapist present.     Activity Tolerance: Good    Patient left up in chair with all lines intact and call button in reach.    Education provided: balance training and strengthening exercises    Expected compliance: High compliance    Plan:     During this hospitalization, patient to be seen 5 x/week (5-7x/wk) to address the identified rehab impairments via gait training, therapeutic activities, therapeutic exercises, neuromuscular re-education and progress toward the  following goals:    GOALS:   Multidisciplinary Problems       Physical Therapy Goals          Problem: Physical Therapy    Goal Priority Disciplines Outcome Goal Variances Interventions   Physical Therapy Goal     PT, PT/OT Ongoing, Progressing     Description: Bed Mobility:  Roll left and right with partial/moderate assist.  Sit to supine transfer with partial/moderate assist.  Supine to sit transfer with partial/moderate assist.    Transfers:  Sit to stand transfer with partial/moderate assist using RW.  Bed to chair transfer with partial/moderate assist Stand Step  using RW.  Car transfer with substantial/maximal assist using RW.   an object from the ground in standing position with substantial/maximal assist using RW.    Mobility:  Ambulate 125 feet with supervision/touching assist using RW.  Ambulate 30 feet on uneven surfaces/ramps with supervision/touching assist using RW.  Pt ascended/descended a 4 inch curb with partial/moderate assist using RW.  Ascend/descend 2 stairs with substantial/maximal assist using no handrails.                       Plan of Care Expires:  12/01/23  PT Next Visit Date: 11/22/23  Plan of Care reviewed with: patient    Additional Information:         Time Tracking:     Therapy Time  PT Received On: 11/17/23  PT Start Time: 1000  PT Stop Time: 1100  PT Total Time (min): 60 min   PT Individual: 60  Missed Time:    Time Missed due to:      Billable Minutes: Therapeutic Activity 15 and Therapeutic Exercise 45    11/17/2023

## 2023-11-17 NOTE — PT/OT/SLP PROGRESS
Occupational Therapy Inpatient Rehab Treatment    Name: Geno Barry  MRN: [id number]    Assessment:  Geno Barry is a 72 y.o. female admitted with a medical diagnosis of S/P CABG (coronary artery bypass graft).  She presents with the following impairments/functional limitations:  weakness, impaired endurance, impaired self care skills, impaired functional mobility, gait instability, impaired balance.    General Precautions: Standard, sternal, fall     Orthopedic Precautions:N/A     Braces: N/A    Rehab Prognosis: Good; patient would benefit from acute skilled OT services to address these deficits and reach maximum level of function.      History:     Past Medical History:   Diagnosis Date    CAD (coronary artery disease)     Diabetes mellitus     Hypertension     PAD (peripheral artery disease)        Past Surgical History:   Procedure Laterality Date    CORONARY ARTERY BYPASS GRAFT (CABG) N/A 11/2/2023    Procedure: CORONARY ARTERY BYPASS GRAFT (CABG);  Surgeon: Heriberto Lara IV, MD;  Location: [facility];  Service: Cardiothoracic;  Laterality: N/A;    ENDOSCOPIC HARVEST OF VEIN N/A 11/2/2023    Procedure: SURGICAL PROCUREMENT, VEIN, ENDOSCOPIC;  Surgeon: Heriberto Lara IV, MD;  Location: [facility];  Service: Cardiothoracic;  Laterality: N/A;    HYSTERECTOMY      LEFT HEART CATHETERIZATION Left 11/2/2023    Procedure: Left heart cath;  Surgeon: Puma Vera MD;  Location: [facility] CATH LAB;  Service: Cardiology;  Laterality: Left;  LHC +/- PCI VIA RRA    LUMPECTOMY, BREAST Right        Subjective     Orientation: Oriented x4    Chief Complaint: Pt c/o knee pain.    Patient/Family Comments/goals: Pt would like to increase her independence so she can achieve PLOF.     Vitals   Vitals at Rest  /62   HR 83   Pain 0/10     Respiratory Status: Room air    Patients cultural, spiritual, Jew conflicts given the current situation: no     Objective:     Patient found up in chair with peripheral IV   upon OT entry to room.    Mobility   Patient completed:  Sit to Stand Transfer with minimum assistance with rollator; pt demo ability to adhere to sternal precautions   Stand to Sit Transfer with minimum assistance with rollator; pt demo ability to adhere to sternal precautions     Functional Mobility  Pt completed functional mobility from room, to OT therapy gym, to room with CGA w/rollator.     Limiting Factors for ADLs: motor, endurance, balance, weakness, and safety awareness , knee pain    Therapeutic Activities  Pt engaged in meal prep activity, in which pt obtained items to make a sandwich and put it together for improved IADL performance while adhering to sternal precautions. Pt was noted to need Vcs to remain in the tube d/t over-reaching to obtain items. Pt required a seated rest break mid-activity d/t increased knee pain. After the activity, pt reported feeling more exerted but  being manageable.   Pt received education for home safely, how to safely transport items with rollator, and good body mechanics while adhering to sternal precautions for fall prevention. Pt verbalized understanding and would benefit from continued meal prep activities.     Patient left up in chair with all lines intact and call button in reach.     Education provided: Roles and goals of OT, ADLs, body mechanics, safety precautions, fall prevention, post-op precautions, and home safety    Multidisciplinary Problems       Occupational Therapy Goals          Problem: Occupational Therapy    Goal Priority Disciplines Outcome Interventions   Occupational Therapy Goal     OT, PT/OT Ongoing, Progressing    Description: ADLs:  MET Pt to perform grooming tasks with Indep at seated level   MET Pt to perform feeding tasks with setup assist   Progressing/continue Pt to perform UB dressing with mod A    Progressing/continue Pt to perform LB dressing with mod A    MET Pt to perform putting on/off footwear task with max A  MET Pt to perform  toileting with mod A  Pt to perform toileting with SBA  MET Pt to perform bathing with mod A  Pt to perform bathing with setup assist    Functional Transfers:  Pt to perform toilet transfers with min A and BSC Progressing/continue  Pt to perform a tub transfer with min A and TTB Progressing/continue                       Time Tracking     OT Received On: 11/17/23  Time In 0830     Time Out 0900  Total Time 30 min  Therapy Time: OT Individual: 30  Missed Time:    Missed Time Reason:      Billable Minutes: Therapeutic Activity 30 11/17/2023

## 2023-11-18 LAB — POCT GLUCOSE: 137 MG/DL (ref 70–110)

## 2023-11-18 PROCEDURE — 94761 N-INVAS EAR/PLS OXIMETRY MLT: CPT

## 2023-11-18 PROCEDURE — 25000003 PHARM REV CODE 250: Performed by: INTERNAL MEDICINE

## 2023-11-18 PROCEDURE — 63600175 PHARM REV CODE 636 W HCPCS: Performed by: NURSE PRACTITIONER

## 2023-11-18 PROCEDURE — 25000003 PHARM REV CODE 250: Performed by: NURSE PRACTITIONER

## 2023-11-18 PROCEDURE — 11800000 HC REHAB PRIVATE ROOM

## 2023-11-18 PROCEDURE — 99900035 HC TECH TIME PER 15 MIN (STAT)

## 2023-11-18 PROCEDURE — 94799 UNLISTED PULMONARY SVC/PX: CPT

## 2023-11-18 PROCEDURE — 27000221 HC OXYGEN, UP TO 24 HOURS

## 2023-11-18 RX ADMIN — METFORMIN HYDROCHLORIDE 1000 MG: 500 TABLET, FILM COATED ORAL at 08:11

## 2023-11-18 RX ADMIN — APIXABAN 5 MG: 5 TABLET, FILM COATED ORAL at 08:11

## 2023-11-18 RX ADMIN — METOPROLOL SUCCINATE 50 MG: 50 TABLET, FILM COATED, EXTENDED RELEASE ORAL at 08:11

## 2023-11-18 RX ADMIN — EZETIMIBE 10 MG: 10 TABLET ORAL at 08:11

## 2023-11-18 RX ADMIN — SITAGLIPTIN 100 MG: 50 TABLET, FILM COATED ORAL at 08:11

## 2023-11-18 RX ADMIN — FERROUS SULFATE TAB 325 MG (65 MG ELEMENTAL FE) 1 EACH: 325 (65 FE) TAB at 08:11

## 2023-11-18 RX ADMIN — INSULIN ASPART 2 UNITS: 100 INJECTION, SOLUTION INTRAVENOUS; SUBCUTANEOUS at 07:11

## 2023-11-18 RX ADMIN — METFORMIN HYDROCHLORIDE 1000 MG: 500 TABLET, FILM COATED ORAL at 04:11

## 2023-11-18 RX ADMIN — AMLODIPINE BESYLATE 10 MG: 5 TABLET ORAL at 08:11

## 2023-11-18 RX ADMIN — DOCUSATE SODIUM 100 MG: 100 CAPSULE, LIQUID FILLED ORAL at 08:11

## 2023-11-18 RX ADMIN — AMIODARONE HYDROCHLORIDE 200 MG: 200 TABLET ORAL at 08:11

## 2023-11-18 RX ADMIN — ASPIRIN 81 MG: 81 TABLET, COATED ORAL at 08:11

## 2023-11-18 RX ADMIN — ATORVASTATIN CALCIUM 40 MG: 40 TABLET, FILM COATED ORAL at 08:11

## 2023-11-18 RX ADMIN — GLIPIZIDE 5 MG: 5 TABLET, FILM COATED, EXTENDED RELEASE ORAL at 08:11

## 2023-11-18 RX ADMIN — HYDROXYZINE PAMOATE 50 MG: 50 CAPSULE ORAL at 08:11

## 2023-11-18 RX ADMIN — Medication 1000 UNITS: at 08:11

## 2023-11-18 NOTE — PROGRESS NOTES
[facility] ([facility])  Rehab Progress Note    Patient Name: Geno Barry  MRN: [id number]  Age: 72 y.o. Sex: female  : 1950  Hospital Length of Stay: 9 days  Date of Service: 2023   Chief Complaint: Multivessel CAD s/p CABG x2 on 2023    Subjective:     Basic Information  Admit Information: 72-year-old  female presented to [facility] for scheduled CABG on 2023.  PMH significant for PID, DM type 2, CAD, and carotid artery stenosis.  Tolerated CABG x2 on  without perioperative complications.  Aspirin, Norvasc, and beta-blocker continued.  Lipitor 40 mg daily initiated on .  Chest tube discontinued on .  Amiodarone initiated due to postoperative atrial fibrillation.  Eliquis 5 mg b.i.d. initiated on .  Tolerated transfer to [facility] inpatient rehab unit on  without incident.  Today's Information: No acute events overnight.  Sitting up in chair.  Reports good sleep and appetite.  Last BM .  Vital signs at goal with no recorded fevers.  Moderate glycemic control.  Tolerating nasal cannula 1 L. limited by right knee pain, but chronic over the last 6 years.  No new labs or imaging today.    Review of patient's allergies indicates:   Allergen Reactions    Ace inhibitors Swelling    Clopidogrel Swelling    Iodine         Current Facility-Administered Medications:     acetaminophen tablet 650 mg, 650 mg, Oral, Q8H PRN, Elizabeth, Marina A, FNP, 650 mg at 11/15/23 2145    ALPRAZolam tablet 0.25 mg, 0.25 mg, Oral, TID PRN, Duglas, Isidoro A, FNP    amiodarone tablet 200 mg, 200 mg, Oral, Daily, Duglas, Isidoro A, FNP, 200 mg at 23 0756    amLODIPine tablet 10 mg, 10 mg, Oral, Daily, Duglas, Isidoro A, FNP, 10 mg at 23 0756    apixaban tablet 5 mg, 5 mg, Oral, BID, Duglas, Isidoro A, FNP, 5 mg at 23    aspirin EC tablet 81 mg, 81 mg, Oral, Daily, Duglas, Isidoro A, FNP, 81 mg at 23 0758     atorvastatin tablet 40 mg, 40 mg, Oral, Daily, Duglas, Isidoro A, FNP, 40 mg at 11/17/23 0756    benzonatate capsule 100 mg, 100 mg, Oral, TID PRN, Duglas, Isidoro A, FNP    bisacodyL suppository 10 mg, 10 mg, Rectal, Daily PRN, Duglas, Isidoro A, FNP    dextrose 10% bolus 125 mL 125 mL, 12.5 g, Intravenous, PRN, Duglas, Isidoro A, FNP    dextrose 10% bolus 250 mL 250 mL, 25 g, Intravenous, PRN, Duglas, Isidoro A, FNP    docusate sodium capsule 100 mg, 100 mg, Oral, BID, Duglas, Isidoro A, FNP, 100 mg at 11/17/23 0756    ezetimibe tablet 10 mg, 10 mg, Oral, Daily, Duglas, Isidoro A, FNP, 10 mg at 11/17/23 0755    ferrous sulfate tablet 1 each, 1 tablet, Oral, Daily, Duglas, Isidoro A, FNP, 1 each at 11/17/23 0755    glipiZIDE 24 hr tablet 5 mg, 5 mg, Oral, Daily with breakfast, Duglas, Isidoro A, FNP, 5 mg at 11/17/23 0756    glucagon (human recombinant) injection 1 mg, 1 mg, Intramuscular, PRN, Duglas, Isidoro A, FNP    glucose chewable tablet 16 g, 16 g, Oral, PRN, Duglas, Isidoro A, FNP    glucose chewable tablet 24 g, 24 g, Oral, PRN, Duglas, Isidoro A, FNP    hydrALAZINE injection 10 mg, 10 mg, Intravenous, Q4H PRN, Duglas, Isidoro A, FNP    HYDROcodone-acetaminophen 7.5-325 mg per tablet 1 tablet, 1 tablet, Oral, TID PRN, [place], Marina A, FNP, 1 tablet at 11/13/23 1113    hydrOXYzine pamoate capsule 50 mg, 50 mg, Oral, QHS, Duglas, Isidoro A, FNP, 50 mg at 11/17/23 2040    insulin aspart U-100 injection 0-10 Units, 0-10 Units, Subcutaneous, QID (AC + HS) PRN, Duglas, Isidoro A, JODIP, 2 Units at 11/17/23 2040    labetalol 20 mg/4 mL (5 mg/mL) IV syring, 10 mg, Intravenous, Q4H PRN, Isidoro Ardon, JODIP    LIDOcaine 5 % patch 1 patch, 1 patch, Transdermal, Q24H, Jesi Driscoll, OJNG, 1 patch at 11/16/23 1009    metFORMIN tablet 1,000 mg, 1,000 mg, Oral, BID WM, Isidoro Ardon, JODIP, 1,000 mg at 11/17/23 1638    methocarbamoL tablet 500 mg, 500 mg, Oral,  "TID PRN, Elizabeth Marina CHANTELL, FNP    metoprolol injection 10 mg, 10 mg, Intravenous, Q2H PRN, Duglas, Isidoro A, FNP    metoprolol succinate (TOPROL-XL) 24 hr tablet 50 mg, 50 mg, Oral, Daily, Duglas, Isidoro A, FNP, 50 mg at 11/17/23 0756    nitroGLYCERIN SL tablet 0.4 mg, 0.4 mg, Sublingual, Q5 Min PRN, Duglas, Isidoro A, FNP    ondansetron disintegrating tablet 4 mg, 4 mg, Oral, Q6H PRN, Duglas, Isidoro A, FNP    polyethylene glycol packet 17 g, 17 g, Oral, Q12H PRN, Duglas, Isidoro A, FNP    promethazine tablet 25 mg, 25 mg, Oral, Q6H PRN, Duglas, Isidoro A, FNP    SITagliptin phosphate tablet 100 mg, 100 mg, Oral, Daily, Duglas, Isidoro A, FNP, 100 mg at 11/17/23 0755    vitamin D 1000 units tablet 1,000 Units, 1,000 Units, Oral, Daily, Salvador Sebastian MD, 1,000 Units at 11/17/23 0756     Review of Systems   Complete 12-point review of symptoms negative except for what's mentioned in HPI     Objective:     BP (!) 149/78   Pulse 86   Temp 97.8 °F (36.6 °C)   Resp 19   Ht 5' 4" (1.626 m)   Wt 92 kg (202 lb 13.2 oz)   SpO2 (!) 93%   Breastfeeding No   BMI 34.81 kg/m²      Physical Exam  Vitals reviewed.   Eyes:      Pupils: Pupils are equal, round, and reactive to light.   Cardiovascular:      Rate and Rhythm: Normal rate and regular rhythm.      Heart sounds: Normal heart sounds.   Pulmonary:      Effort: Pulmonary effort is normal.      Breath sounds: Normal breath sounds.   Abdominal:      General: Bowel sounds are normal.      Comments: obese   Musculoskeletal:         General: Normal range of motion.   Skin:     General: Skin is warm.      Comments: Sternal incision clean and intact.  Left leg harvest site intact.     Neurological:      General: No focal deficit present.      Mental Status: She is alert and oriented to person, place, and time.      Motor: Weakness present.   Psychiatric:         Mood and Affect: Mood normal.       Lines/Drains/Airways       None             "     Labs  Recent Results (from the past 24 hour(s))   POCT glucose    Collection Time: 11/17/23  8:35 PM   Result Value Ref Range    POCT Glucose 229 (H) 70 - 110 mg/dL     Radiology  CXR 2 view on 11/10/2023, IMPRESSION: Bilateral pleural effusions.   Increased left retrocardiac density and silhouetting of the left hemidiaphragm which might be related to pleural fluid, however, infiltrate/atelectasis cannot be completely excluded. Compressive atelectatic changes in both bases, however, superimposed infiltrates cannot be completely excluded   Radiology  Transthoracic echo on 11/02/2023, IMPRESSION: Left Ventricle: regional wall motion abnormalities present.  The distal apex, apical inferior and distal septal walls are hypokinetic There is moderately reduced systolic function with a visually estimated ejection fraction of 35 - 40%. Grade I diastolic dysfunction.  Right Ventricle: Normal right ventricular cavity size. Wall thickness is normal. Right ventricle wall motion  is normal. Systolic function is normal.  Mitral Valve: There is mild regurgitation.  Diagnostic studies   LHC on 11/02/2023, IMPRESSION: Left Main-luminal irregularities. LAD-mid subtotal occlusion involving large diagonal. LCX-non dominant, luminal irregularities. RCA-dominant, 40% ostial stenosis. LVEDP 18    Assessment/Plan:     72 y.o. AAF admitted on 11/9/2023     Multivessel CAD  - s/p CABG x2 on 11/02/2023 (LIMA to LAD and rSVG to Diag 1)  - denies recent chest pain or discomfort  - continue                Aspirin 81 mg daily                Lipitor 40 mg daily                Metoprolol succinate 50 mg daily                 Norco 7.5 mg/325 mg q.8 hours p.r.n.   - ECG and Nitro PRN  - not on ACEI or Plavix  - continue cardiac diet  - to follow-up with cardiology outpatient     HLD  - cholesterol 129, HDL 38, total cholesterol 3, triglycerides 42, LDL 83, VLDL 8 on 11/02/2023  - continue                Lipitor 40 mg daily                 Zetia  10 mg daily     HFpEF  - LVEF 55% on 10/11/2022  - continue                Metoprolol succinate 50 mg daily  - monitor weights daily  - to follow-up with cardiology outpatient     PAD  - stable  - continue                Aspirin 81 mg daily                Lipitor 40 mg daily                Eliquis 5 mg b.i.d.  - to follow-up with vascular surgery outpatient     Bilateral carotid artery stenosis  - moderate-no interventions at this time  - continue                Aspirin 81 mg daily                Lipitor 40 mg daily                Eliquis 5 mg b.i.d.  - to follow-up with vascular surgery outpatient     Paroxysmal atrial fibrillation  - no evidence of bleeding  - rate controlled  - continue                Amiodarone 200 mg b.i.d. (to end on 11/10)                Amiodarone daily to begin on 11/11                Eliquis 5 mg b.i.d.                Metoprolol succinate 50 mg daily   - to follow-up with cardiology outpatient     Normocytic anemia  - asymptomatic  - H/H trending down  - iron 33, TIBC 159, iron binding capacity 126, transferrin 141, iron saturation 21 on 11/10/2023  - continue                Ferrous sulfate 325 mg daily (initiated 11/10)  - no evidence of active bleeds  - will closely monitor and transfuse if needed      HTN  - BP at goal  - continue                Metoprolol succinate 50 mg daily                Norvasc 10 mg daily                  Hydralazine 10 mg every 2 hours as needed for BP > 160/90                Labetalol 10 mg every 2 hours as needed for BP > 160/90  - low sodium diet     DM type II  - HgA1c 9.3 on 11/02/2023  - continue                Metformin 1000 mg twice daily                Glipizide 5 mg with breakfast                Januvia 100 mg daily                ISS   - CBGs AC/HS     GERD  - Avoid spicy foods, and nothing to eat or drink within x2 hours of bedtime or laying flat (water is ok)   - Avoid NSAIDs (Advil, ibuprofen, naproxen...) and andrade-2 inhibitors (Mobic, Celebrex)     - continue                Pepcid 40 mg daily      Osteoarthritis  - stable  - continue   Lidocaine patch to left knee (initiated 11/16)                Meloxicam 15 mg daily                 Vitamin-D 3 400 units daily     VTE Prophylaxis:  Eliquis 5 mg b.i.d.  COVID-19 testing:  Unknown  COVID-19 vaccination status:  Vaccinated ([employer]):  03/05/2021 and (Pfizer):  11/09/2021 and 06/22/2022     POA: No  Living will: No  Contacts: Heaven Burns ([facility]) [phone]     CODE STATUS: Full Code  Internal Medicine (attending): Salvador Sebastian MD  Physiatry (consulting):  Patricio Daniel MD     OUTPATIENT PROVIDERS  PCP:  JONG Clifton  Cardiology:  Clay chen MD  CV surgery:  Heriberto Lara MD     DISPOSITION:  Sleep hygiene, bowel maintenance, and appetite at goal.  Last BM 11/18.  Vital signs at goal with no recorded fevers.  Moderate glycemic control.  Tolerating 1 L nasal cannula.  Wean as tolerated.  No new labs or imaging today.  Continues to participate in progress in therapy.  Monitor closely.  Notify of acute changes.    Staffing 11/13/2023: Continent of bowel and bladder.  Incisions look good today. RT: Overall mod to supervision.  Limited by activity intolerance, but very motivated.  Appetite is good if she likes the food. Received food from family OTW.  PT: Supervision to independent overall.  Max with bed mobility.  Ambulating 75 feet with RW.  Needs cueing to keep sternal precautions.  Max to mod assist with ADLs. Supervision now. Sit to stand is her biggest obstacle.  Projected discharge 11/21.

## 2023-11-19 LAB
POCT GLUCOSE: 105 MG/DL (ref 70–110)
POCT GLUCOSE: 122 MG/DL (ref 70–110)
POCT GLUCOSE: 126 MG/DL (ref 70–110)
POCT GLUCOSE: 171 MG/DL (ref 70–110)
POCT GLUCOSE: 184 MG/DL (ref 70–110)

## 2023-11-19 PROCEDURE — 25000003 PHARM REV CODE 250: Performed by: INTERNAL MEDICINE

## 2023-11-19 PROCEDURE — 27000221 HC OXYGEN, UP TO 24 HOURS

## 2023-11-19 PROCEDURE — 63600175 PHARM REV CODE 636 W HCPCS: Performed by: NURSE PRACTITIONER

## 2023-11-19 PROCEDURE — 97110 THERAPEUTIC EXERCISES: CPT

## 2023-11-19 PROCEDURE — 25000003 PHARM REV CODE 250: Performed by: NURSE PRACTITIONER

## 2023-11-19 PROCEDURE — 94799 UNLISTED PULMONARY SVC/PX: CPT

## 2023-11-19 PROCEDURE — 94761 N-INVAS EAR/PLS OXIMETRY MLT: CPT

## 2023-11-19 PROCEDURE — 97530 THERAPEUTIC ACTIVITIES: CPT

## 2023-11-19 PROCEDURE — 11800000 HC REHAB PRIVATE ROOM

## 2023-11-19 PROCEDURE — 99900035 HC TECH TIME PER 15 MIN (STAT)

## 2023-11-19 RX ADMIN — INSULIN ASPART 2 UNITS: 100 INJECTION, SOLUTION INTRAVENOUS; SUBCUTANEOUS at 04:11

## 2023-11-19 RX ADMIN — GLIPIZIDE 5 MG: 5 TABLET, FILM COATED, EXTENDED RELEASE ORAL at 07:11

## 2023-11-19 RX ADMIN — METOPROLOL SUCCINATE 50 MG: 50 TABLET, FILM COATED, EXTENDED RELEASE ORAL at 07:11

## 2023-11-19 RX ADMIN — ASPIRIN 81 MG: 81 TABLET, COATED ORAL at 07:11

## 2023-11-19 RX ADMIN — SITAGLIPTIN 100 MG: 50 TABLET, FILM COATED ORAL at 07:11

## 2023-11-19 RX ADMIN — AMLODIPINE BESYLATE 10 MG: 5 TABLET ORAL at 07:11

## 2023-11-19 RX ADMIN — FERROUS SULFATE TAB 325 MG (65 MG ELEMENTAL FE) 1 EACH: 325 (65 FE) TAB at 07:11

## 2023-11-19 RX ADMIN — EZETIMIBE 10 MG: 10 TABLET ORAL at 07:11

## 2023-11-19 RX ADMIN — DOCUSATE SODIUM 100 MG: 100 CAPSULE, LIQUID FILLED ORAL at 07:11

## 2023-11-19 RX ADMIN — METFORMIN HYDROCHLORIDE 1000 MG: 500 TABLET, FILM COATED ORAL at 04:11

## 2023-11-19 RX ADMIN — ATORVASTATIN CALCIUM 40 MG: 40 TABLET, FILM COATED ORAL at 07:11

## 2023-11-19 RX ADMIN — AMIODARONE HYDROCHLORIDE 200 MG: 200 TABLET ORAL at 07:11

## 2023-11-19 RX ADMIN — METFORMIN HYDROCHLORIDE 1000 MG: 500 TABLET, FILM COATED ORAL at 07:11

## 2023-11-19 RX ADMIN — APIXABAN 5 MG: 5 TABLET, FILM COATED ORAL at 08:11

## 2023-11-19 RX ADMIN — HYDROXYZINE PAMOATE 50 MG: 50 CAPSULE ORAL at 08:11

## 2023-11-19 RX ADMIN — Medication 1000 UNITS: at 07:11

## 2023-11-19 RX ADMIN — LIDOCAINE 5% 1 PATCH: 700 PATCH TOPICAL at 09:11

## 2023-11-19 RX ADMIN — APIXABAN 5 MG: 5 TABLET, FILM COATED ORAL at 07:11

## 2023-11-19 NOTE — PROGRESS NOTES
[facility] ([facility])  Rehab Progress Note    Patient Name: Geno Barry  MRN: [id number]  Age: 72 y.o. Sex: female  : 1950  Hospital Length of Stay: 10 days  Date of Service: 2023   Chief Complaint: Multivessel CAD s/p CABG x2 on 2023    Subjective:     Basic Information  Admit Information: 72-year-old  female presented to [facility] for scheduled CABG on 2023.  PMH significant for PID, DM type 2, CAD, and carotid artery stenosis.  Tolerated CABG x2 on  without perioperative complications.  Aspirin, Norvasc, and beta-blocker continued.  Lipitor 40 mg daily initiated on .  Chest tube discontinued on .  Amiodarone initiated due to postoperative atrial fibrillation.  Eliquis 5 mg b.i.d. initiated on .  Tolerated transfer to [facility] inpatient rehab unit on  without incident.  Today's Information: No acute events overnight.  Sitting up in chair.  Reports good sleep and appetite.  Last BM .  Vital signs at goal with no recorded fevers.  Good glycemic control.  No new labs or imaging today.    Review of patient's allergies indicates:   Allergen Reactions    Ace inhibitors Swelling    Clopidogrel Swelling    Iodine         Current Facility-Administered Medications:     acetaminophen tablet 650 mg, 650 mg, Oral, Q8H PRN, [place], Marina A, FNP, 650 mg at 11/15/23 2145    ALPRAZolam tablet 0.25 mg, 0.25 mg, Oral, TID PRN, Duglas, Isidoro A, FNP    amiodarone tablet 200 mg, 200 mg, Oral, Daily, Duglas, Isidoro A, FNP, 200 mg at 23 0744    amLODIPine tablet 10 mg, 10 mg, Oral, Daily, Duglas, Isidoro A, FNP, 10 mg at 23 0743    apixaban tablet 5 mg, 5 mg, Oral, BID, Duglas, Isidoro A, FNP, 5 mg at 23 0743    aspirin EC tablet 81 mg, 81 mg, Oral, Daily, Duglas, Isidoro A, FNP, 81 mg at 23 0743    atorvastatin tablet 40 mg, 40 mg, Oral, Daily, Isidoro Ardon, FNP, 40 mg at 23 0744     benzonatate capsule 100 mg, 100 mg, Oral, TID PRN, Duglas, Isidoro A, FNP    bisacodyL suppository 10 mg, 10 mg, Rectal, Daily PRN, Duglas, Isidoro A, FNP    dextrose 10% bolus 125 mL 125 mL, 12.5 g, Intravenous, PRN, Duglas, Isidoro A, FNP    dextrose 10% bolus 250 mL 250 mL, 25 g, Intravenous, PRN, Duglas, Isidoro A, FNP    docusate sodium capsule 100 mg, 100 mg, Oral, BID, Duglas, Isidoro A, FNP, 100 mg at 11/19/23 0744    ezetimibe tablet 10 mg, 10 mg, Oral, Daily, Duglas, Isidoro A, FNP, 10 mg at 11/19/23 0743    ferrous sulfate tablet 1 each, 1 tablet, Oral, Daily, Duglas, Isidoro A, FNP, 1 each at 11/19/23 0743    glipiZIDE 24 hr tablet 5 mg, 5 mg, Oral, Daily with breakfast, Duglas, Isidoro A, FNP, 5 mg at 11/19/23 0743    glucagon (human recombinant) injection 1 mg, 1 mg, Intramuscular, PRN, Duglas, Isidoro A, FNP    glucose chewable tablet 16 g, 16 g, Oral, PRN, Duglas, Isidoro A, FNP    glucose chewable tablet 24 g, 24 g, Oral, PRN, Duglas, Isidoro A, FNP    hydrALAZINE injection 10 mg, 10 mg, Intravenous, Q4H PRN, Duglas, Isidoro A, FNP    HYDROcodone-acetaminophen 7.5-325 mg per tablet 1 tablet, 1 tablet, Oral, TID PRN, Elizabeth, Marina A, FNP, 1 tablet at 11/13/23 1113    hydrOXYzine pamoate capsule 50 mg, 50 mg, Oral, QHS, Duglas, Isidoro A, FNP, 50 mg at 11/18/23 2025    insulin aspart U-100 injection 0-10 Units, 0-10 Units, Subcutaneous, QID (AC + HS) PRN, Duglas, Isidoro A, FNP, 2 Units at 11/18/23 0725    labetalol 20 mg/4 mL (5 mg/mL) IV syring, 10 mg, Intravenous, Q4H PRN, Isidoro Ardon FNP    LIDOcaine 5 % patch 1 patch, 1 patch, Transdermal, Q24H, Jesi Driscoll FNP, 1 patch at 11/19/23 0918    metFORMIN tablet 1,000 mg, 1,000 mg, Oral, BID WM, Isidoro Ardon FNP, 1,000 mg at 11/19/23 0744    methocarbamoL tablet 500 mg, 500 mg, Oral, TID PRN, Marina Johnson FNP    metoprolol injection 10 mg, 10 mg, Intravenous, Q2H PRN,  "Duglas, Isidoro A, FNP    metoprolol succinate (TOPROL-XL) 24 hr tablet 50 mg, 50 mg, Oral, Daily, Duglas, Isidoro A, FNP, 50 mg at 11/19/23 0743    nitroGLYCERIN SL tablet 0.4 mg, 0.4 mg, Sublingual, Q5 Min PRN, Duglas, Isidoro A, FNP    ondansetron disintegrating tablet 4 mg, 4 mg, Oral, Q6H PRN, Duglas, Isidoro A, FNP    polyethylene glycol packet 17 g, 17 g, Oral, Q12H PRN, Duglas, Isidoro A, FNP    promethazine tablet 25 mg, 25 mg, Oral, Q6H PRN, Duglas, Isidoro A, FNP    SITagliptin phosphate tablet 100 mg, 100 mg, Oral, Daily, Duglas, Isidoro A, FNP, 100 mg at 11/19/23 0743    vitamin D 1000 units tablet 1,000 Units, 1,000 Units, Oral, Daily, Salvador Sebastian MD, 1,000 Units at 11/19/23 0743     Review of Systems   Complete 12-point review of symptoms negative except for what's mentioned in HPI     Objective:     /69   Pulse 79   Temp 98.5 °F (36.9 °C)   Resp 19   Ht 5' 4" (1.626 m)   Wt 92 kg (202 lb 13.2 oz)   SpO2 95%   Breastfeeding No   BMI 34.81 kg/m²      Physical Exam  Vitals reviewed.   Eyes:      Pupils: Pupils are equal, round, and reactive to light.   Cardiovascular:      Rate and Rhythm: Normal rate and regular rhythm.      Heart sounds: Normal heart sounds.   Pulmonary:      Effort: Pulmonary effort is normal.      Breath sounds: Normal breath sounds.   Abdominal:      General: Bowel sounds are normal.      Comments: obese   Musculoskeletal:         General: Normal range of motion.   Skin:     General: Skin is warm.      Comments: Sternal incision clean and intact.  Left leg harvest site intact.     Neurological:      General: No focal deficit present.      Mental Status: She is alert and oriented to person, place, and time.      Motor: Weakness present.   Psychiatric:         Mood and Affect: Mood normal.       Lines/Drains/Airways       None                 Labs  Recent Results (from the past 24 hour(s))   POCT glucose    Collection Time: 11/18/23  4:35 PM "   Result Value Ref Range    POCT Glucose 137 (H) 70 - 110 mg/dL   POCT glucose    Collection Time: 11/18/23  8:30 PM   Result Value Ref Range    POCT Glucose 105 70 - 110 mg/dL   POCT glucose    Collection Time: 11/19/23  5:44 AM   Result Value Ref Range    POCT Glucose 122 (H) 70 - 110 mg/dL     Radiology  CXR 2 view on 11/10/2023, IMPRESSION: Bilateral pleural effusions.   Increased left retrocardiac density and silhouetting of the left hemidiaphragm which might be related to pleural fluid, however, infiltrate/atelectasis cannot be completely excluded. Compressive atelectatic changes in both bases, however, superimposed infiltrates cannot be completely excluded   Radiology  Transthoracic echo on 11/02/2023, IMPRESSION: Left Ventricle: regional wall motion abnormalities present.  The distal apex, apical inferior and distal septal walls are hypokinetic There is moderately reduced systolic function with a visually estimated ejection fraction of 35 - 40%. Grade I diastolic dysfunction.  Right Ventricle: Normal right ventricular cavity size. Wall thickness is normal. Right ventricle wall motion  is normal. Systolic function is normal.  Mitral Valve: There is mild regurgitation.  Diagnostic studies   C on 11/02/2023, IMPRESSION: Left Main-luminal irregularities. LAD-mid subtotal occlusion involving large diagonal. LCX-non dominant, luminal irregularities. RCA-dominant, 40% ostial stenosis. LVEDP 18    Assessment/Plan:     72 y.o. AAF admitted on 11/9/2023     Multivessel CAD  - s/p CABG x2 on 11/02/2023 (LIMA to LAD and rSVG to Diag 1)  - denies recent chest pain or discomfort  - continue                Aspirin 81 mg daily                Lipitor 40 mg daily                Metoprolol succinate 50 mg daily                 Norco 7.5 mg/325 mg q.8 hours p.r.n.   - ECG and Nitro PRN  - not on ACEI or Plavix  - continue cardiac diet  - to follow-up with cardiology outpatient     HLD  - cholesterol 129, HDL 38, total  cholesterol 3, triglycerides 42, LDL 83, VLDL 8 on 11/02/2023  - continue                Lipitor 40 mg daily                 Zetia 10 mg daily     HFpEF  - LVEF 55% on 10/11/2022  - continue                Metoprolol succinate 50 mg daily  - monitor weights daily  - to follow-up with cardiology outpatient     PAD  - stable  - continue                Aspirin 81 mg daily                Lipitor 40 mg daily                Eliquis 5 mg b.i.d.  - to follow-up with vascular surgery outpatient     Bilateral carotid artery stenosis  - moderate-no interventions at this time  - continue                Aspirin 81 mg daily                Lipitor 40 mg daily                Eliquis 5 mg b.i.d.  - to follow-up with vascular surgery outpatient     Paroxysmal atrial fibrillation  - no evidence of bleeding  - rate controlled  - continue                Amiodarone 200 mg b.i.d. (to end on 11/10)                Amiodarone daily to begin on 11/11                Eliquis 5 mg b.i.d.                Metoprolol succinate 50 mg daily   - to follow-up with cardiology outpatient     Normocytic anemia  - asymptomatic  - H/H trending down  - iron 33, TIBC 159, iron binding capacity 126, transferrin 141, iron saturation 21 on 11/10/2023  - continue                Ferrous sulfate 325 mg daily (initiated 11/10)  - no evidence of active bleeds  - will closely monitor and transfuse if needed      HTN  - BP at goal  - continue                Metoprolol succinate 50 mg daily                Norvasc 10 mg daily                  Hydralazine 10 mg every 2 hours as needed for BP > 160/90                Labetalol 10 mg every 2 hours as needed for BP > 160/90  - low sodium diet     DM type II  - HgA1c 9.3 on 11/02/2023  - continue                Metformin 1000 mg twice daily                Glipizide 5 mg with breakfast                Januvia 100 mg daily                ISS   - CBGs AC/HS     GERD  - Avoid spicy foods, and nothing to eat or drink within x2 hours  of bedtime or laying flat (water is ok)   - Avoid NSAIDs (Advil, ibuprofen, naproxen...) and andrade-2 inhibitors (Mobic, Celebrex)    - continue                Pepcid 40 mg daily      Osteoarthritis  - stable  - continue   Lidocaine patch to left knee (initiated 11/16)                Meloxicam 15 mg daily                 Vitamin-D 3 400 units daily     VTE Prophylaxis:  Eliquis 5 mg b.i.d.  COVID-19 testing:  Unknown  COVID-19 vaccination status:  Vaccinated ([employer]):  03/05/2021 and (Pfizer):  11/09/2021 and 06/22/2022     POA: No  Living will: No  Contacts: Heaven Burns ([facility]) [phone]     CODE STATUS: Full Code  Internal Medicine (attending): Salvador Sebastian MD  Physiatry (consulting):  Patricio Daniel MD     OUTPATIENT PROVIDERS  PCP:  JONG Clifton  Cardiology:  Clay chen MD  CV surgery:  Heriberto Lara MD     DISPOSITION:  Sleep hygiene, bowel maintenance, and appetite at goal.  Last BM 11/18.  Vital signs at goal with no recorded fevers.  Good glycemic control.  Tolerating room air.  No new labs or imaging today.  Continues to participate in progress in therapy.  Monitor closely.  Notify of acute changes.    Staffing 11/13/2023: Continent of bowel and bladder.  Incisions look good today. RT: Overall mod to supervision.  Limited by activity intolerance, but very motivated.  Appetite is good if she likes the food. Received food from family OTW.  PT: Supervision to independent overall.  Max with bed mobility.  Ambulating 75 feet with RW.  Needs cueing to keep sternal precautions.  Max to mod assist with ADLs. Supervision now. Sit to stand is her biggest obstacle.  Projected discharge 11/21.

## 2023-11-19 NOTE — PT/OT/SLP PROGRESS
Occupational Therapy Inpatient Rehab Treatment    Name: Geno Barry  MRN: [id number]    Assessment:  Geno Barry is a 72 y.o. female admitted with a medical diagnosis of S/P CABG (coronary artery bypass graft).  She presents with the following impairments/functional limitations:  impaired endurance, impaired sensation, impaired self care skills, impaired functional mobility, weakness.    General Precautions: Standard, fall, sternal     Orthopedic Precautions:N/A     Braces: N/A    Rehab Prognosis: Good; patient would benefit from acute skilled OT services to address these deficits and reach maximum level of function.      History:     Past Medical History:   Diagnosis Date    CAD (coronary artery disease)     Diabetes mellitus     Hypertension     PAD (peripheral artery disease)        Past Surgical History:   Procedure Laterality Date    CORONARY ARTERY BYPASS GRAFT (CABG) N/A 11/2/2023    Procedure: CORONARY ARTERY BYPASS GRAFT (CABG);  Surgeon: Heriberto Lara IV, MD;  Location: [facility];  Service: Cardiothoracic;  Laterality: N/A;    ENDOSCOPIC HARVEST OF VEIN N/A 11/2/2023    Procedure: SURGICAL PROCUREMENT, VEIN, ENDOSCOPIC;  Surgeon: Heriberto Lara IV, MD;  Location: [facility];  Service: Cardiothoracic;  Laterality: N/A;    HYSTERECTOMY      LEFT HEART CATHETERIZATION Left 11/2/2023    Procedure: Left heart cath;  Surgeon: Puma Vera MD;  Location: [facility] CATH LAB;  Service: Cardiology;  Laterality: Left;  LHC +/- PCI VIA RRA    LUMPECTOMY, BREAST Right        Subjective     Orientation: Oriented x4    Chief Complaint: No complaints at this time.    Patient/Family Comments/goals: To increase independence with ADLs and functional transfers.    Vitals  Vitals:    11/19/23 0820 11/19/23 0900   BP: 120/74 137/69   Pulse: 71 79     Respiratory Status: Room air    Patients cultural, spiritual, Taoism conflicts given the current situation: no       Objective:     Patient found up in chair with  peripheral IV  upon OT entry to room.    Mobility   Patient completed:  Sit to Stand Transfer with minimum assistance with rolling walker  Stand to Sit Transfer with minimum assistance with rolling walker    Functional Mobility  Pt ambulated 120' twice (room 403 <> OT gym) with Rollator and SBA. No loss of balance noted. Increased fatigue noted when ambulating from OT gym > room 403 due amount of activity completed prior to ambulating. Pt required min cueing to complete pursed lip breathing.     Therapeutic Activities  To challenge standing tolerance/endurance as well as dynamic standing balance with no UE support, pt folded an assortment of towels, sheets and blankets. Pt required two prolonged sitting breaks due to fatigue; however, completed with SBA overall.    Therapeutic Exercise  Sitting unsupported in wheelchair, pt tolerated propelling UE ergometer forward x6 min and backward x6 min with breaks following each trial to improve cardiovascular endurance. Resistance set at 1.    LifeStyle Change and Education:     Patient left up in chair with call button in reach.     Education provided: Roles and goals of OT, ADLs, transfer training, bed mobility, body mechanics, assistive device, wheelchair precautions, modified goals, sequencing, safety precautions, fall prevention, equipment recommendations, and home safety    Multidisciplinary Problems       Occupational Therapy Goals          Problem: Occupational Therapy    Goal Priority Disciplines Outcome Interventions   Occupational Therapy Goal     OT, PT/OT Ongoing, Progressing    Description: ADLs:  MET Pt to perform grooming tasks with Indep at seated level   MET Pt to perform feeding tasks with setup assist   Progressing/continue Pt to perform UB dressing with mod A    Progressing/continue Pt to perform LB dressing with mod A    MET Pt to perform putting on/off footwear task with max A  MET Pt to perform toileting with mod A  Pt to perform toileting with  SBA  MET Pt to perform bathing with mod A  Pt to perform bathing with setup assist    Functional Transfers:  Pt to perform toilet transfers with min A and BSC Progressing/continue  Pt to perform a tub transfer with min A and TTB Progressing/continue                       Time Tracking     OT Received On: 11/19/23  Time In 0820     Time Out 0900  Total Time 40 min  Therapy Time: OT Individual: 40  Missed Time:    Missed Time Reason:      Billable Minutes: Therapeutic Activity 30 and Therapeutic Exercise 15    11/19/2023

## 2023-11-20 LAB
ALBUMIN SERPL-MCNC: 2.9 G/DL (ref 3.4–4.8)
ALBUMIN/GLOB SERPL: 0.8 RATIO (ref 1.1–2)
ALP SERPL-CCNC: 65 UNIT/L (ref 40–150)
ALT SERPL-CCNC: 10 UNIT/L (ref 0–55)
AST SERPL-CCNC: 13 UNIT/L (ref 5–34)
BASOPHILS # BLD AUTO: 0.05 X10(3)/MCL
BASOPHILS NFR BLD AUTO: 0.6 %
BILIRUB SERPL-MCNC: 0.4 MG/DL
BUN SERPL-MCNC: 23.7 MG/DL (ref 9.8–20.1)
CALCIUM SERPL-MCNC: 9.2 MG/DL (ref 8.4–10.2)
CHLORIDE SERPL-SCNC: 112 MMOL/L (ref 98–107)
CO2 SERPL-SCNC: 23 MMOL/L (ref 23–31)
COLOR STL: ABNORMAL
CONSISTENCY STL: ABNORMAL
CREAT SERPL-MCNC: 1.23 MG/DL (ref 0.55–1.02)
EOSINOPHIL # BLD AUTO: 0.23 X10(3)/MCL (ref 0–0.9)
EOSINOPHIL NFR BLD AUTO: 2.9 %
ERYTHROCYTE [DISTWIDTH] IN BLOOD BY AUTOMATED COUNT: 15.2 % (ref 11.5–17)
GFR SERPLBLD CREATININE-BSD FMLA CKD-EPI: 46 MLS/MIN/1.73/M2
GLOBULIN SER-MCNC: 3.7 GM/DL (ref 2.4–3.5)
GLUCOSE SERPL-MCNC: 120 MG/DL (ref 82–115)
HCT VFR BLD AUTO: 26.9 % (ref 37–47)
HEMOCCULT SP1 STL QL: POSITIVE
HGB BLD-MCNC: 8.2 G/DL (ref 12–16)
IMM GRANULOCYTES # BLD AUTO: 0.02 X10(3)/MCL (ref 0–0.04)
IMM GRANULOCYTES NFR BLD AUTO: 0.3 %
LYMPHOCYTES # BLD AUTO: 2.1 X10(3)/MCL (ref 0.6–4.6)
LYMPHOCYTES NFR BLD AUTO: 26.9 %
MAGNESIUM SERPL-MCNC: 1.8 MG/DL (ref 1.6–2.6)
MCH RBC QN AUTO: 28.3 PG (ref 27–31)
MCHC RBC AUTO-ENTMCNC: 30.5 G/DL (ref 33–36)
MCV RBC AUTO: 92.8 FL (ref 80–94)
MONOCYTES # BLD AUTO: 0.49 X10(3)/MCL (ref 0.1–1.3)
MONOCYTES NFR BLD AUTO: 6.3 %
NEUTROPHILS # BLD AUTO: 4.93 X10(3)/MCL (ref 2.1–9.2)
NEUTROPHILS NFR BLD AUTO: 63 %
NRBC BLD AUTO-RTO: 0 %
PHOSPHATE SERPL-MCNC: 3.7 MG/DL (ref 2.3–4.7)
PLATELET # BLD AUTO: 644 X10(3)/MCL (ref 130–400)
PMV BLD AUTO: 8.7 FL (ref 7.4–10.4)
POCT GLUCOSE: 116 MG/DL (ref 70–110)
POCT GLUCOSE: 133 MG/DL (ref 70–110)
POCT GLUCOSE: 133 MG/DL (ref 70–110)
POCT GLUCOSE: 140 MG/DL (ref 70–110)
POTASSIUM SERPL-SCNC: 4.8 MMOL/L (ref 3.5–5.1)
PREALB SERPL-MCNC: 19.3 MG/DL (ref 14–37)
PROT SERPL-MCNC: 6.6 GM/DL (ref 5.8–7.6)
RBC # BLD AUTO: 2.9 X10(6)/MCL (ref 4.2–5.4)
RET# (OHS): 0.09 X10E6/UL (ref 0.02–0.08)
RETICULOCYTE COUNT AUTOMATED (OLG): 2.99 % (ref 1.1–2.1)
SODIUM SERPL-SCNC: 141 MMOL/L (ref 136–145)
WBC # SPEC AUTO: 7.82 X10(3)/MCL (ref 4.5–11.5)

## 2023-11-20 PROCEDURE — 97110 THERAPEUTIC EXERCISES: CPT

## 2023-11-20 PROCEDURE — 84134 ASSAY OF PREALBUMIN: CPT | Performed by: NURSE PRACTITIONER

## 2023-11-20 PROCEDURE — 97535 SELF CARE MNGMENT TRAINING: CPT

## 2023-11-20 PROCEDURE — 94761 N-INVAS EAR/PLS OXIMETRY MLT: CPT

## 2023-11-20 PROCEDURE — 63600175 PHARM REV CODE 636 W HCPCS: Performed by: NURSE PRACTITIONER

## 2023-11-20 PROCEDURE — 11800000 HC REHAB PRIVATE ROOM

## 2023-11-20 PROCEDURE — 83735 ASSAY OF MAGNESIUM: CPT | Performed by: NURSE PRACTITIONER

## 2023-11-20 PROCEDURE — 94799 UNLISTED PULMONARY SVC/PX: CPT

## 2023-11-20 PROCEDURE — 25000003 PHARM REV CODE 250: Performed by: INTERNAL MEDICINE

## 2023-11-20 PROCEDURE — 80053 COMPREHEN METABOLIC PANEL: CPT | Performed by: NURSE PRACTITIONER

## 2023-11-20 PROCEDURE — 25000003 PHARM REV CODE 250: Performed by: NURSE PRACTITIONER

## 2023-11-20 PROCEDURE — 85045 AUTOMATED RETICULOCYTE COUNT: CPT | Performed by: NURSE PRACTITIONER

## 2023-11-20 PROCEDURE — 84100 ASSAY OF PHOSPHORUS: CPT | Performed by: NURSE PRACTITIONER

## 2023-11-20 PROCEDURE — 85025 COMPLETE CBC W/AUTO DIFF WBC: CPT | Performed by: NURSE PRACTITIONER

## 2023-11-20 PROCEDURE — 82272 OCCULT BLD FECES 1-3 TESTS: CPT | Performed by: NURSE PRACTITIONER

## 2023-11-20 PROCEDURE — 97530 THERAPEUTIC ACTIVITIES: CPT

## 2023-11-20 PROCEDURE — 27000221 HC OXYGEN, UP TO 24 HOURS

## 2023-11-20 RX ORDER — FUROSEMIDE 10 MG/ML
40 INJECTION INTRAMUSCULAR; INTRAVENOUS ONCE
Status: COMPLETED | OUTPATIENT
Start: 2023-11-20 | End: 2023-11-20

## 2023-11-20 RX ADMIN — GLIPIZIDE 5 MG: 5 TABLET, FILM COATED, EXTENDED RELEASE ORAL at 07:11

## 2023-11-20 RX ADMIN — Medication 1000 UNITS: at 08:11

## 2023-11-20 RX ADMIN — LIDOCAINE 5% 1 PATCH: 700 PATCH TOPICAL at 12:11

## 2023-11-20 RX ADMIN — SITAGLIPTIN 100 MG: 50 TABLET, FILM COATED ORAL at 07:11

## 2023-11-20 RX ADMIN — EZETIMIBE 10 MG: 10 TABLET ORAL at 07:11

## 2023-11-20 RX ADMIN — FUROSEMIDE 40 MG: 10 INJECTION, SOLUTION INTRAMUSCULAR; INTRAVENOUS at 01:11

## 2023-11-20 RX ADMIN — FERROUS SULFATE TAB 325 MG (65 MG ELEMENTAL FE) 1 EACH: 325 (65 FE) TAB at 07:11

## 2023-11-20 RX ADMIN — ATORVASTATIN CALCIUM 40 MG: 40 TABLET, FILM COATED ORAL at 07:11

## 2023-11-20 RX ADMIN — METFORMIN HYDROCHLORIDE 1000 MG: 500 TABLET, FILM COATED ORAL at 07:11

## 2023-11-20 RX ADMIN — AMLODIPINE BESYLATE 10 MG: 5 TABLET ORAL at 07:11

## 2023-11-20 RX ADMIN — APIXABAN 5 MG: 5 TABLET, FILM COATED ORAL at 08:11

## 2023-11-20 RX ADMIN — APIXABAN 5 MG: 5 TABLET, FILM COATED ORAL at 07:11

## 2023-11-20 RX ADMIN — AMIODARONE HYDROCHLORIDE 200 MG: 200 TABLET ORAL at 07:11

## 2023-11-20 RX ADMIN — DOCUSATE SODIUM 100 MG: 100 CAPSULE, LIQUID FILLED ORAL at 08:11

## 2023-11-20 RX ADMIN — METOPROLOL SUCCINATE 50 MG: 50 TABLET, FILM COATED, EXTENDED RELEASE ORAL at 07:11

## 2023-11-20 RX ADMIN — ASPIRIN 81 MG: 81 TABLET, COATED ORAL at 07:11

## 2023-11-20 RX ADMIN — METFORMIN HYDROCHLORIDE 1000 MG: 500 TABLET, FILM COATED ORAL at 04:11

## 2023-11-20 RX ADMIN — HYDROXYZINE PAMOATE 50 MG: 50 CAPSULE ORAL at 08:11

## 2023-11-20 NOTE — PROGRESS NOTES
[facility] ([facility])  Rehab Progress Note    Patient Name: Geno Barry  MRN: [id number]  Age: 72 y.o. Sex: female  : 1950  Hospital Length of Stay: 11 days  Date of Service: 2023   Chief Complaint: Multivessel CAD s/p CABG x2 on 2023    Subjective:     Basic Information  Admit Information: 72-year-old  female presented to [facility] for scheduled CABG on 2023.  PMH significant for PID, DM type 2, CAD, and carotid artery stenosis.  Tolerated CABG x2 on  without perioperative complications.  Aspirin, Norvasc, and beta-blocker continued.  Lipitor 40 mg daily initiated on .  Chest tube discontinued on .  Amiodarone initiated due to postoperative atrial fibrillation.  Eliquis 5 mg b.i.d. initiated on .  Tolerated transfer to [facility] inpatient rehab unit on  without incident.  Today's Information: No acute events overnight.  Sitting up in chair.  Reports good sleep and appetite.  Last BM .  Vital signs at goal with no recorded fevers.  H&H trending down.  Weight 91.8-trending down.  Renal indices stable.  Currently on Eliquis.  CXR pending.    Review of patient's allergies indicates:   Allergen Reactions    Ace inhibitors Swelling    Clopidogrel Swelling    Iodine         Current Facility-Administered Medications:     acetaminophen tablet 650 mg, 650 mg, Oral, Q8H PRN, Richmond, Marina A, FNP, 650 mg at 11/15/23 214    ALPRAZolam tablet 0.25 mg, 0.25 mg, Oral, TID PRN, Duglas, Isidoro A, FNP    amiodarone tablet 200 mg, 200 mg, Oral, Daily, Duglas, Isidoro A, FNP, 200 mg at 23 0744    amLODIPine tablet 10 mg, 10 mg, Oral, Daily, Duglas, Isidoro A, FNP, 10 mg at 23 0743    apixaban tablet 5 mg, 5 mg, Oral, BID, Duglas, Isidoro A, FNP, 5 mg at 23 204    aspirin EC tablet 81 mg, 81 mg, Oral, Daily, Duglas, Isidoro A, FNP, 81 mg at 23 0743    atorvastatin tablet 40 mg, 40 mg, Oral, Daily,  Duglas, Isidoro A, FNP, 40 mg at 11/19/23 0744    benzonatate capsule 100 mg, 100 mg, Oral, TID PRN, Duglas, Isidoro A, FNP    bisacodyL suppository 10 mg, 10 mg, Rectal, Daily PRN, Duglas, Isidoro A, FNP    dextrose 10% bolus 125 mL 125 mL, 12.5 g, Intravenous, PRN, Duglas, Isidoro A, FNP    dextrose 10% bolus 250 mL 250 mL, 25 g, Intravenous, PRN, Duglas, Isidoro A, FNP    docusate sodium capsule 100 mg, 100 mg, Oral, BID, Duglas, Isidoro A, FNP, 100 mg at 11/19/23 0744    ezetimibe tablet 10 mg, 10 mg, Oral, Daily, Duglas, Isidoro A, FNP, 10 mg at 11/19/23 0743    ferrous sulfate tablet 1 each, 1 tablet, Oral, Daily, Duglas, Isidoro A, FNP, 1 each at 11/19/23 0743    glipiZIDE 24 hr tablet 5 mg, 5 mg, Oral, Daily with breakfast, Duglas, Isidoro A, FNP, 5 mg at 11/19/23 0743    glucagon (human recombinant) injection 1 mg, 1 mg, Intramuscular, PRN, Duglas, Isdioro A, FNP    glucose chewable tablet 16 g, 16 g, Oral, PRN, Duglas, Isidoro A, FNP    glucose chewable tablet 24 g, 24 g, Oral, PRN, Duglas, Isidoro A, FNP    hydrALAZINE injection 10 mg, 10 mg, Intravenous, Q4H PRN, Duglas, Isidoro A, FNP    HYDROcodone-acetaminophen 7.5-325 mg per tablet 1 tablet, 1 tablet, Oral, TID PRN, Elizabeth, Marina A, FNP, 1 tablet at 11/13/23 1113    hydrOXYzine pamoate capsule 50 mg, 50 mg, Oral, QHS, Duglas, Isidoro A, FNP, 50 mg at 11/19/23 2046    insulin aspart U-100 injection 0-10 Units, 0-10 Units, Subcutaneous, QID (AC + HS) PRN, Duglas, Isidoro A, FNP, 2 Units at 11/19/23 1620    labetalol 20 mg/4 mL (5 mg/mL) IV syring, 10 mg, Intravenous, Q4H PRN, Isidoro Ardon FNP    LIDOcaine 5 % patch 1 patch, 1 patch, Transdermal, Q24H, Jesi Driscoll FNP, 1 patch at 11/19/23 0918    metFORMIN tablet 1,000 mg, 1,000 mg, Oral, BID WM, Isidoro Ardon FNP, 1,000 mg at 11/19/23 1618    methocarbamoL tablet 500 mg, 500 mg, Oral, TID PRN, Marina Johnson FNP    metoprolol  "injection 10 mg, 10 mg, Intravenous, Q2H PRN, Duglas, Isidoro A, FNP    metoprolol succinate (TOPROL-XL) 24 hr tablet 50 mg, 50 mg, Oral, Daily, Duglas, Isidoro A, FNP, 50 mg at 11/19/23 0743    nitroGLYCERIN SL tablet 0.4 mg, 0.4 mg, Sublingual, Q5 Min PRN, Duglas, Isidoro A, FNP    ondansetron disintegrating tablet 4 mg, 4 mg, Oral, Q6H PRN, Duglas, Isidoro A, FNP    polyethylene glycol packet 17 g, 17 g, Oral, Q12H PRN, Duglas, Isidoro A, FNP    promethazine tablet 25 mg, 25 mg, Oral, Q6H PRN, Duglas, Isidoro A, FNP    SITagliptin phosphate tablet 100 mg, 100 mg, Oral, Daily, Duglas, Isidoro A, FNP, 100 mg at 11/19/23 0743    vitamin D 1000 units tablet 1,000 Units, 1,000 Units, Oral, Daily, Salvador Sebastian MD, 1,000 Units at 11/19/23 0743     Review of Systems   Complete 12-point review of symptoms negative except for what's mentioned in HPI     Objective:     /75   Pulse 82   Temp 98.1 °F (36.7 °C) (Oral)   Resp 20   Ht 5' 4" (1.626 m)   Wt 91.8 kg (202 lb 6.1 oz)   SpO2 97%   Breastfeeding No   BMI 34.74 kg/m²      Physical Exam  Vitals reviewed.   Eyes:      Pupils: Pupils are equal, round, and reactive to light.   Cardiovascular:      Rate and Rhythm: Normal rate and regular rhythm.      Heart sounds: Normal heart sounds.   Pulmonary:      Effort: Pulmonary effort is normal.      Breath sounds: Normal breath sounds.   Abdominal:      General: Bowel sounds are normal.      Comments: obese   Musculoskeletal:         General: Normal range of motion.   Skin:     General: Skin is warm.      Comments: Sternal incision clean and intact.  Left leg harvest site intact.     Neurological:      General: No focal deficit present.      Mental Status: She is alert and oriented to person, place, and time.      Motor: Weakness present.   Psychiatric:         Mood and Affect: Mood normal.       Lines/Drains/Airways       None                 Labs  Recent Results (from the past 24 hour(s)) "   POCT glucose    Collection Time: 11/19/23  4:19 PM   Result Value Ref Range    POCT Glucose 184 (H) 70 - 110 mg/dL   POCT glucose    Collection Time: 11/19/23  8:45 PM   Result Value Ref Range    POCT Glucose 126 (H) 70 - 110 mg/dL   POCT glucose    Collection Time: 11/20/23  5:30 AM   Result Value Ref Range    POCT Glucose 116 (H) 70 - 110 mg/dL   Prealbumin    Collection Time: 11/20/23  5:39 AM   Result Value Ref Range    Prealbumin 19.3 14.0 - 37.0 mg/dL   Comprehensive Metabolic Panel    Collection Time: 11/20/23  5:39 AM   Result Value Ref Range    Sodium Level 141 136 - 145 mmol/L    Potassium Level 4.8 3.5 - 5.1 mmol/L    Chloride 112 (H) 98 - 107 mmol/L    Carbon Dioxide 23 23 - 31 mmol/L    Glucose Level 120 (H) 82 - 115 mg/dL    Blood Urea Nitrogen 23.7 (H) 9.8 - 20.1 mg/dL    Creatinine 1.23 (H) 0.55 - 1.02 mg/dL    Calcium Level Total 9.2 8.4 - 10.2 mg/dL    Protein Total 6.6 5.8 - 7.6 gm/dL    Albumin Level 2.9 (L) 3.4 - 4.8 g/dL    Globulin 3.7 (H) 2.4 - 3.5 gm/dL    Albumin/Globulin Ratio 0.8 (L) 1.1 - 2.0 ratio    Bilirubin Total 0.4 <=1.5 mg/dL    Alkaline Phosphatase 65 40 - 150 unit/L    Alanine Aminotransferase 10 0 - 55 unit/L    Aspartate Aminotransferase 13 5 - 34 unit/L    eGFR 46 mls/min/1.73/m2   Magnesium    Collection Time: 11/20/23  5:39 AM   Result Value Ref Range    Magnesium Level 1.80 1.60 - 2.60 mg/dL   Phosphorus    Collection Time: 11/20/23  5:39 AM   Result Value Ref Range    Phosphorus Level 3.7 2.3 - 4.7 mg/dL   CBC with Differential    Collection Time: 11/20/23  5:39 AM   Result Value Ref Range    WBC 7.82 4.50 - 11.50 x10(3)/mcL    RBC 2.90 (L) 4.20 - 5.40 x10(6)/mcL    Hgb 8.2 (L) 12.0 - 16.0 g/dL    Hct 26.9 (L) 37.0 - 47.0 %    MCV 92.8 80.0 - 94.0 fL    MCH 28.3 27.0 - 31.0 pg    MCHC 30.5 (L) 33.0 - 36.0 g/dL    RDW 15.2 11.5 - 17.0 %    Platelet 644 (H) 130 - 400 x10(3)/mcL    MPV 8.7 7.4 - 10.4 fL    Neut % 63.0 %    Lymph % 26.9 %    Mono % 6.3 %    Eos % 2.9 %     Basophil % 0.6 %    Lymph # 2.10 0.6 - 4.6 x10(3)/mcL    Neut # 4.93 2.1 - 9.2 x10(3)/mcL    Mono # 0.49 0.1 - 1.3 x10(3)/mcL    Eos # 0.23 0 - 0.9 x10(3)/mcL    Baso # 0.05 <=0.2 x10(3)/mcL    IG# 0.02 0 - 0.04 x10(3)/mcL    IG% 0.3 %    NRBC% 0.0 %     Radiology  CXR 2 view on 11/10/2023, IMPRESSION: Bilateral pleural effusions.   Increased left retrocardiac density and silhouetting of the left hemidiaphragm which might be related to pleural fluid, however, infiltrate/atelectasis cannot be completely excluded. Compressive atelectatic changes in both bases, however, superimposed infiltrates cannot be completely excluded   Radiology  Transthoracic echo on 11/02/2023, IMPRESSION: Left Ventricle: regional wall motion abnormalities present.  The distal apex, apical inferior and distal septal walls are hypokinetic There is moderately reduced systolic function with a visually estimated ejection fraction of 35 - 40%. Grade I diastolic dysfunction.  Right Ventricle: Normal right ventricular cavity size. Wall thickness is normal. Right ventricle wall motion  is normal. Systolic function is normal.  Mitral Valve: There is mild regurgitation.  Diagnostic studies   [facility] on 11/02/2023, IMPRESSION: Left Main-luminal irregularities. LAD-mid subtotal occlusion involving large diagonal. LCX-non dominant, luminal irregularities. RCA-dominant, 40% ostial stenosis. LVEDP 18    Assessment/Plan:     72 y.o. AAF admitted on 11/9/2023     Multivessel CAD  - s/p CABG x2 on 11/02/2023 (LIMA to LAD and rSVG to Diag 1)  - denies recent chest pain or discomfort  - continue                Aspirin 81 mg daily                Lipitor 40 mg daily                Metoprolol succinate 50 mg daily                 Norco 7.5 mg/325 mg q.8 hours p.r.n.   - ECG and Nitro PRN  - not on ACEI or Plavix  - continue cardiac diet  - to follow-up with cardiology outpatient     HLD  - cholesterol 129, HDL 38, total cholesterol 3, triglycerides 42, LDL 83, VLDL 8 on  11/02/2023  - continue                Lipitor 40 mg daily                 Zetia 10 mg daily     HFpEF  - LVEF 55% on 10/11/2022  - initiate   Lasix 40 mg IVP x1 dose now  - continue                Metoprolol succinate 50 mg daily  - monitor weights daily  - to follow-up with cardiology outpatient     PAD  - stable  - continue                Aspirin 81 mg daily                Lipitor 40 mg daily                Eliquis 5 mg b.i.d.  - to follow-up with vascular surgery outpatient     Bilateral carotid artery stenosis  - moderate-no interventions at this time  - continue                Aspirin 81 mg daily                Lipitor 40 mg daily                Eliquis 5 mg b.i.d.  - to follow-up with vascular surgery outpatient     Paroxysmal atrial fibrillation  - no evidence of bleeding  - rate controlled  - continue                Amiodarone 200 mg b.i.d. (to end on 11/10)                Amiodarone daily to begin on 11/11                Eliquis 5 mg b.i.d.                Metoprolol succinate 50 mg daily   - to follow-up with cardiology outpatient     Normocytic anemia  - asymptomatic  - H/H trending down  - iron 33, TIBC 159, iron binding capacity 126, transferrin 141, iron saturation 21 on 11/10/2023  - continue                Ferrous sulfate 325 mg daily (initiated 11/10)  - no evidence of active bleeds  - will closely monitor and transfuse if needed      HTN  - BP at goal  - continue                Metoprolol succinate 50 mg daily                Norvasc 10 mg daily                  Hydralazine 10 mg every 2 hours as needed for BP > 160/90                Labetalol 10 mg every 2 hours as needed for BP > 160/90  - low sodium diet     DM type II  - HgA1c 9.3 on 11/02/2023  - continue                Metformin 1000 mg twice daily                Glipizide 5 mg with breakfast                Januvia 100 mg daily                ISS   - CBGs AC/HS     GERD  - Avoid spicy foods, and nothing to eat or drink within x2 hours of  bedtime or laying flat (water is ok)   - Avoid NSAIDs (Advil, ibuprofen, naproxen...) and andrade-2 inhibitors (Mobic, Celebrex)    - continue                Pepcid 40 mg daily      Osteoarthritis  - stable  - continue    Lidocaine patch to left knee (initiated 11/16)                Meloxicam 15 mg daily                 Vitamin-D 3 400 units daily     VTE Prophylaxis:  Eliquis 5 mg b.i.d.  COVID-19 testing:  Unknown  COVID-19 vaccination status:  Vaccinated ([employer]):  03/05/2021 and (Pf[employer]):  11/09/2021 and 06/22/2022     POA: No  Living will: No  Contacts: Heaven Burns ([facility]) [phone]     CODE STATUS: Full Code  Internal Medicine (attending): Salvador Sebastian MD  Physiatry (consulting):  Patricio Daniel MD     OUTPATIENT PROVIDERS  PCP:  JONG Clifton  Cardiology:  Clay chen MD  CV surgery:  Heriberto Lara MD     DISPOSITION:  Sleep hygiene, bowel maintenance, and appetite at goal.  Last BM 11/19.  Vital signs at goal with no recorded fevers.  Good glycemic control.  H&H trending down.  Weight trending down.  Currently on Eliquis.  She does report black stools.  Obtain FOBT x3.  CXR appears with worsening bilateral pleural effusions.  Initiate Lasix 40 mg IVP x1 dose now.  Repeat lab work in the morning.  Monitor closely.  Notify of acute changes.  Staffing completed today.    Staffing 11/20/2023: Continent of bowel and bladder.  Incisions look good today. RT: Overall min assist-improving. Appetite is good if she likes the food.  PT: Overall partial mod assist to supervision.  Sit to stand and transfers partial mod assist.  Overall back at baseline.   OT: Overall mod to min assist with transfers.  Improved greater than baseline.  Has sitters at home.  Projected discharge 11/21.    Jeff Ardon NP conducted independent physical examination and assisted with medical documentation.    Total time spent on this encounter including chart review and direct MD + NP 1-on-1 patient interaction: 52  minutes   Over 50% of this time was spent in counseling and coordination of care

## 2023-11-20 NOTE — PT/OT/SLP PROGRESS
"Occupational Therapy Inpatient Rehab Treatment    Name: Geno Barry  MRN: [id number]    Assessment:  Geno Barry is a 72 y.o. female admitted with a medical diagnosis of S/P CABG (coronary artery bypass graft).  She presents with the following impairments/functional limitations:  weakness, impaired endurance.    General Precautions: Standard, fall, sternal     Orthopedic Precautions:N/A     Braces: N/A    Rehab Prognosis: Good; patient would benefit from acute skilled OT services to address these deficits and reach maximum level of function.      History:     Past Medical History:   Diagnosis Date    CAD (coronary artery disease)     Diabetes mellitus     Hypertension     PAD (peripheral artery disease)        Past Surgical History:   Procedure Laterality Date    CORONARY ARTERY BYPASS GRAFT (CABG) N/A 11/2/2023    Procedure: CORONARY ARTERY BYPASS GRAFT (CABG);  Surgeon: Heriberto Lara IV, MD;  Location: [facility];  Service: Cardiothoracic;  Laterality: N/A;    ENDOSCOPIC HARVEST OF VEIN N/A 11/2/2023    Procedure: SURGICAL PROCUREMENT, VEIN, ENDOSCOPIC;  Surgeon: Heriberto Lara IV, MD;  Location: [facility];  Service: Cardiothoracic;  Laterality: N/A;    HYSTERECTOMY      LEFT HEART CATHETERIZATION Left 11/2/2023    Procedure: Left heart cath;  Surgeon: Puma Vera MD;  Location: [facility] CATH LAB;  Service: Cardiology;  Laterality: Left;  LHC +/- PCI VIA RRA    LUMPECTOMY, BREAST Right        Subjective     Orientation: Oriented x4    Chief Complaint: "I'm ready to go home tomorrow."    Patient/Family Comments/goals: To go home    Vitals   Vitals:    11/20/23 1030 11/20/23 1200   BP: (!) 147/73 125/62   Pulse: 89 90     Respiratory Status: Room air    Patients cultural, spiritual, Gnosticist conflicts given the current situation: no       Objective:     Patient found up in chair with peripheral IV  upon OT entry to room.    Mobility   Patient completed:  Sit to Stand Transfer with minimum assistance " with rolling walker  Stand to Sit Transfer with minimum assistance with rolling walker  Toilet Transfer Step Transfer technique with minimum assistance with  no AD  Tub Transfer Step Transfer technique with contact guard assistance with hand-held assist    Functional Mobility  Pt ambulated in/out bathroom with HHA. Pt then used BLEs to self-propel in wheelchair from room 403 > OT gym with Indep. Intermittent rest breaks taken due to increased fatigue.    ADLs   Current Status   Eating 6   Oral Hygiene 6   Shower, Bathe Self 5   Upper Body Dressing 6   Lower Body Dressing 5   Toileting Hygiene 5   Toilet Transfer 3   Putting On, Taking Off Footwear 6     Limiting Factors for ADLs: endurance     Therapeutic Activities  Pt tolerated sitting unsupported in wheelchair for 20 mins to participate in three rounds of Pokeno. Pt able to call each card and look for placement on her Pokeno card with independence.    Therapeutic Exercise  Pt tolerated three standing trials with one UE support to improve standing tolerance/endurance. Pt reached for bean bags with opposing UE to toss towards target. Pt required sitting breaks due to increased fatigue.    LifeStyle Change and Education:   Patient left up in chair with call button in reach.     Education provided: Roles and goals of OT, ADLs, transfer training, body mechanics, modified goals, sequencing, safety precautions, fall prevention, post-op precautions, equipment recommendations, and home safety    Multidisciplinary Problems       Occupational Therapy Goals          Problem: Occupational Therapy    Goal Priority Disciplines Outcome Interventions   Occupational Therapy Goal     OT, PT/OT Ongoing, Progressing    Description: ADLs:  MET Pt to perform grooming tasks with Indep at seated level   MET Pt to perform feeding tasks with setup assist   Progressing/continue Pt to perform UB dressing with mod A    Progressing/continue Pt to perform LB dressing with mod A    MET Pt to  perform putting on/off footwear task with max A  MET Pt to perform toileting with mod A  Pt to perform toileting with SBA  MET Pt to perform bathing with mod A  Pt to perform bathing with setup assist    Functional Transfers:  Pt to perform toilet transfers with min A and BSC Progressing/continue  Pt to perform a tub transfer with min A and TTB Progressing/continue                       Time Tracking     OT Received On: 11/20/23  Time In 1030     Time Out 1200  Total Time 90 min  Therapy Time: OT Individual: 90  Missed Time:    Missed Time Reason:      Billable Minutes: Self Care/Home Management 30, Therapeutic Activity 30, and Therapeutic Exercise 30    11/20/2023

## 2023-11-20 NOTE — PT/OT/SLP PROGRESS
"Physical Therapy Inpatient Rehab Treatment    Patient Name:  Geno Barry   MRN:  [id number]    Recommendations:     Discharge Recommendations:  Low Intensity Therapy   Discharge Equipment Recommendations:     Barriers to discharge: Impaired functional mobility     Assessment:     Geno Barry is a 72 y.o. female admitted with a medical diagnosis of S/P CABG (coronary artery bypass graft).  She presents with the following impairments/functional limitations:  weakness, impaired endurance, impaired functional mobility, gait instability, decreased lower extremity function, decreased safety awareness     Patient is improving and is back to baseline or better and ready for D/C 11/21.    Rehab Diagnosis: Multivessel CAD s/p CABG x 2    General Precautions: Standard, sternal     Orthopedic Precautions:N/A     Braces: N/A    Rehab Prognosis: Good and Fair; patient would benefit from acute skilled PT services to address these deficits and reach maximum level of function.      History:     Past Medical History:   Diagnosis Date    CAD (coronary artery disease)     Diabetes mellitus     Hypertension     PAD (peripheral artery disease)        Past Surgical History:   Procedure Laterality Date    CORONARY ARTERY BYPASS GRAFT (CABG) N/A 11/2/2023    Procedure: CORONARY ARTERY BYPASS GRAFT (CABG);  Surgeon: Heriberto Lara IV, MD;  Location: [facility] OR;  Service: Cardiothoracic;  Laterality: N/A;    ENDOSCOPIC HARVEST OF VEIN N/A 11/2/2023    Procedure: SURGICAL PROCUREMENT, VEIN, ENDOSCOPIC;  Surgeon: Heriberto Lara IV, MD;  Location: [facility] OR;  Service: Cardiothoracic;  Laterality: N/A;    HYSTERECTOMY      LEFT HEART CATHETERIZATION Left 11/2/2023    Procedure: Left heart cath;  Surgeon: Puma Vera MD;  Location: [facility] CATH LAB;  Service: Cardiology;  Laterality: Left;  LHC +/- PCI VIA RRA    LUMPECTOMY, BREAST Right        Subjective     Patient comments: "I am tired"    Respiratory Status: Room air    Patients " cultural, spiritual, Pentecostalism conflicts given the current situation: no    Objective:     Patient found up in chair with peripheral IV  upon PT entry to room.    Pt is  Alert, Cooperative, and Motivated.    Functional Mobility:        Current   Status  Discharge   Goal   Functional Area: Care Score:    Roll Left and Right 4  supervision Independent   Sit to Lying 4  Supervision with leg  Independent   Lying to Sitting on Side of Bed 4  Supervision  Independent   Sit to Stand 3  Min A with Rollator. Patient is min A from her chair but from her WC she is supervision/CGA. Patient has a posterior lean on some of her sit to stands and needs assistance but there are times where she is supervision. Patient needs VC's to push off and lean over toes. Independent   Chair/Bed-to-Chair Transfer 3  Min A with Rollator stand step. Patient is min A from her chair but from her WC she is supervision/CGA primarily. Patient has a posterior lean on some of her sit to stands and needs assistance but there are times where she is supervision. Patient needs VC's to push off and lean over toes. Independent   Car Transfer 3  Min A with Rollator stand step. Min A from WC and car  Independent   Walk 10 Feet 4  Supervision with rollator Independent   Walk 50 Feet with Two Turns 4  Supervision with rollator Independent   Walk 150 Feet 4  Supervision with rollator for ~200ft Supervision or touching assistance   Walk 10 Feet Uneven Surface 4  Supervision with Rollator for 15 ft on gaurav Supervision or touching assistance   Picking Up Object 4  CGA with rollator and reacher Supervision or touching assistance       Therapeutic Activities and Exercises:  Seated HEP x5each: march, knee flexion with red tb, knee ext, hip abd with red tb, pf/df    Activity Tolerance: Good and Fair    Patient left up in chair with call button in reach.    Education provided: transfer training, bed mob, gait training, balance training, safety awareness, body  mechanics, assistive device, wheelchair management, strengthening exercises, and sternal precautions    Expected compliance: Moderate compliance    Plan:     During this hospitalization, patient to be seen 5 x/week (5-7x/wk) to address the identified rehab impairments via gait training, therapeutic activities, therapeutic exercises, neuromuscular re-education and progress toward the following goals:    GOALS:   Multidisciplinary Problems       Physical Therapy Goals          Problem: Physical Therapy    Goal Priority Disciplines Outcome Goal Variances Interventions   Physical Therapy Goal     PT, PT/OT Ongoing, Progressing     Description: Bed Mobility:  Roll left and right with partial/moderate assist.  Sit to supine transfer with partial/moderate assist.  Supine to sit transfer with partial/moderate assist.    Transfers:  Sit to stand transfer with partial/moderate assist using RW.  Bed to chair transfer with partial/moderate assist Stand Step  using RW.  Car transfer with substantial/maximal assist using RW.   an object from the ground in standing position with substantial/maximal assist using RW.    Mobility:  Ambulate 125 feet with supervision/touching assist using RW.  Ambulate 30 feet on uneven surfaces/ramps with supervision/touching assist using RW.  Pt ascended/descended a 4 inch curb with partial/moderate assist using RW.  Ascend/descend 2 stairs with substantial/maximal assist using no handrails.                       Plan of Care Expires:  12/01/23  PT Next Visit Date: 11/22/23  Plan of Care reviewed with: patient    Additional Information:         Time Tracking:     Therapy Time  PT Start Time: 1400  PT Stop Time: 1500  PT Total Time (min): 60 min   PT Individual: 60  Missed Time:    Time Missed due to:      Billable Minutes: Therapeutic Activity 45 and Therapeutic Exercise 15    11/20/2023

## 2023-11-20 NOTE — PT/OT/SLP DISCHARGE
Recreational Therapy Evaluation    Pt progress, plan of care and discharge plans were discussed during staffing at 0930      Date of Treatment: 11/20/23  Start Time: 1031  Stop Time: 1100  Total Time: 29 min  Missed Time:     Assessment      Geno Barry is a 72 y.o. female admitted with a medical diagnosis of S/P CABG (coronary artery bypass graft).  She presents with the following impairments/functional limitations:  weakness, impaired endurance, impaired functional mobility, gait instability, decreased lower extremity function, decreased safety awareness .    Rehab Diagnosis:     Recent Surgery:    General Precautions: Standard, fall, sternal     Orthopedic Precautions:N/A     Braces: N/A    Rehab Prognosis: Good; patient would benefit from acute skilled Recreational Therapy services to address these deficits and reach maximum level of function.      Impairments: Endurance deficits, Mobility deficits, Safety awareness deficits, and Strength deficits  Rehab Potential: Good  Treatment Recommendations: Complete discharge plan  Treatment Diagnosis: Multivessel CAD, s/p CABG x2, HTN, CAD, cerebrovascular disease, PAD, DM  Orientation: Oriented x4  Affect/Behavior: Cooperative  Safety/Judgement: intact   Basic Command Following: intact  Spiritual Cultural: no        History     Past Medical History:   Diagnosis Date    CAD (coronary artery disease)     Diabetes mellitus     Hypertension     PAD (peripheral artery disease)        Past Surgical History:   Procedure Laterality Date    CORONARY ARTERY BYPASS GRAFT (CABG) N/A 11/2/2023    Procedure: CORONARY ARTERY BYPASS GRAFT (CABG);  Surgeon: Heriberto Lara IV, MD;  Location: [facility];  Service: Cardiothoracic;  Laterality: N/A;    ENDOSCOPIC HARVEST OF VEIN N/A 11/2/2023    Procedure: SURGICAL PROCUREMENT, VEIN, ENDOSCOPIC;  Surgeon: Heriberto Lara IV, MD;  Location: [facility];  Service: Cardiothoracic;  Laterality: N/A;    HYSTERECTOMY      LEFT HEART  "CATHETERIZATION Left 11/2/2023    Procedure: Left heart cath;  Surgeon: Puma Vera MD;  Location: [facility] CATH LAB;  Service: Cardiology;  Laterality: Left;  LHC +/- PCI VIA RRA    LUMPECTOMY, BREAST Right        Home Environment     Admit Date: 11/09/23  Living Situation  People in Home: alone  Lives in: house  Patients Responsibilities: Meal preparation, Leisure/play/hobbies, Retired  Number of Children: 0  Occupation:Retired:     Instrumental Activities of Daily Living     Previous Hand Dominance: Right Current Hand Dominance: Right     Other iADL Information:        Cognitive Skills Building         Cognitive Observation Activity Assist Position Equipment Response            Comment:      Dynamic Activities      Activity Assist Position Equipment Response   Activity 1 Bean bag toos supervision and minimum assistance Standing Rolling walker and Bean bags good   Comment: W/c to toilet transfer was mod/min.  Sit to stand was min.  Dynamic standing balance/reaching was min/supervision.  Standing tolerance was 5 minutes.  UE coordination was I as were sequencing skills.  Flat but cooperative    This was a co-treat with OT       Fine Motor Activities      Activity Assist Position Equipment Response           Comment:        Goals     Patient Goals  Patient Goal 1: 'Get my strength back."    Short Term Goals    Goal  Goal Status   Will increase sit to stand to min Met   Will improve dynamic standing balance/reaching to supervision Met                 Long Term Goals    Goal Goal Status   Will increase standing balance/reaching to 5 minutes Met   Will improve dynamic standing balance/reaching to setup Progressing                     Plan       Patient to be seen: Daily  Duration: Other (Comment) (1 day)  Treatments planned: Energy conservation training  Treatment plan/goals established with Patient/Caregiver: Yes     "

## 2023-11-20 NOTE — PT/OT/SLP PROGRESS
Physical Therapy Inpatient Rehab Treatment    Patient Name:  Geno Barry   MRN:  [id number]    Recommendations:     Discharge Recommendations:  Low Intensity Therapy   Discharge Equipment Recommendations:     Barriers to discharge: Impaired functional mobility     Assessment:     Geno Barry is a 72 y.o. female admitted with a medical diagnosis of S/P CABG (coronary artery bypass graft).  She presents with the following impairments/functional limitations:  weakness, impaired endurance, impaired functional mobility, gait instability, decreased lower extremity function, decreased safety awareness.    Rehab Diagnosis: Multivessel CAD s/p CABG x 2    General Precautions: Standard, sternal     Orthopedic Precautions:N/A     Braces: N/A    Rehab Prognosis: Good; patient would benefit from acute skilled PT services to address these deficits and reach maximum level of function.      History:     Past Medical History:   Diagnosis Date    CAD (coronary artery disease)     Diabetes mellitus     Hypertension     PAD (peripheral artery disease)        Past Surgical History:   Procedure Laterality Date    CORONARY ARTERY BYPASS GRAFT (CABG) N/A 11/2/2023    Procedure: CORONARY ARTERY BYPASS GRAFT (CABG);  Surgeon: Heriberto Lara IV, MD;  Location: [facility] OR;  Service: Cardiothoracic;  Laterality: N/A;    ENDOSCOPIC HARVEST OF VEIN N/A 11/2/2023    Procedure: SURGICAL PROCUREMENT, VEIN, ENDOSCOPIC;  Surgeon: Heriberto Lara IV, MD;  Location: [facility] OR;  Service: Cardiothoracic;  Laterality: N/A;    HYSTERECTOMY      LEFT HEART CATHETERIZATION Left 11/2/2023    Procedure: Left heart cath;  Surgeon: Puma Vera MD;  Location: [facility] CATH LAB;  Service: Cardiology;  Laterality: Left;  LHC +/- PCI VIA RRA    LUMPECTOMY, BREAST Right        Subjective     Respiratory Status: Room air    Patients cultural, spiritual, Confucianist conflicts given the current situation: no    Objective:     Patient found semi supine with  peripheral IV  upon PT entry to room.      Functional Mobility:      Current   Status  Discharge   Goal   Functional Area: Care Score:    Roll Left and Right   Independent   Sit to Lying   Independent   Lying to Sitting on Side of Bed   Independent   Sit to Stand   Independent   Chair/Bed-to-Chair Transfer   Independent   Car Transfer   Independent   Walk 10 Feet   Independent   Walk 50 Feet with Two Turns   Independent   Walk 150 Feet   Supervision or touching assistance   Walk 10 Feet Uneven Surface   Supervision or touching assistance   1 Step (Curb)   Supervision or touching assistance   4 Steps   Not applicable   12 Steps   Not applicable   Picking Up Object   Supervision or touching assistance   Wheel 50 Feet with Two Turns   Not attempted due to medical/safety concerns   Wheel 150 Feet   Not attempted due to medical/safety concerns       Therapeutic Activities and Exercises:  Supine, HOB elevated, BL, 3x15:  Glute Sets, Quad Sets, Hip Abduction/Adduction, Hip Flexion     Activity Tolerance: Good    Patient left semi supine with all lines intact, call button in reach, and CNA present.    Education provided: roles and goals of PT/PTA, strengthening exercises, and oriented to student    Expected compliance: High compliance    Plan:     During this hospitalization, patient to be seen 5 x/week (5-7x/wk) to address the identified rehab impairments via gait training, therapeutic activities, therapeutic exercises, neuromuscular re-education and progress toward the following goals:    GOALS:   Multidisciplinary Problems       Physical Therapy Goals          Problem: Physical Therapy    Goal Priority Disciplines Outcome Goal Variances Interventions   Physical Therapy Goal     PT, PT/OT Ongoing, Progressing     Description: Bed Mobility:  Roll left and right with partial/moderate assist.  Sit to supine transfer with partial/moderate assist.  Supine to sit transfer with partial/moderate assist.    Transfers:  Sit to  stand transfer with partial/moderate assist using RW.  Bed to chair transfer with partial/moderate assist Stand Step  using RW.  Car transfer with substantial/maximal assist using RW.   an object from the ground in standing position with substantial/maximal assist using RW.    Mobility:  Ambulate 125 feet with supervision/touching assist using RW.  Ambulate 30 feet on uneven surfaces/ramps with supervision/touching assist using RW.  Pt ascended/descended a 4 inch curb with partial/moderate assist using RW.  Ascend/descend 2 stairs with substantial/maximal assist using no handrails.                       Plan of Care Expires:  12/01/23  PT Next Visit Date: 11/22/23  Plan of Care reviewed with: patient    Additional Information:         Time Tracking:     Therapy Time  PT Received On: 11/20/23  PT Start Time: 1500  PT Stop Time: 1530  PT Total Time (min): 30 min   PT Individual: 30  Missed Time:    Time Missed due to:      Billable Minutes: Therapeutic Exercise 30    11/20/2023

## 2023-11-20 NOTE — PROGRESS NOTES
11/20/23 1031   Rec Therapy Time Calculation   Date of Treatment 11/20/23   Rec Start Time 1031   Rec Stop Time 1100   Rec Total Time (min) 29 min   Time   Treatment time 2 units   Charges   $Therapeutic Activity 1unit   Precautions   General Precautions fall;sternal   Orthopedic Precautions  N/A   Braces N/A   Pain/Comfort   Pain Rating 1 no pain   OTHER   Rehab identified problem list/impairments weakness;impaired endurance;impaired functional mobility;gait instability;decreased lower extremity function;decreased safety awareness   Values/Beliefs/Spiritual Care   Spiritual, Cultural Beliefs, Zoroastrian Practices, Values that Affect Care no   Overall Level of Functioning   Activity Tolerance Mod Indep   Dynamic Sitting Balance/Reaching Independent   Dynamic Standing Balance/Reaching Min A   Right UE Coodination/Dexterity Independent   Left UE Coordination/Dexterity Independent   Problem Solving/Sequencing Skills Independent   Memory Recall Independent   R/L Neglect/Inattention Does not occur   Attention Span Mod Indep   Social Interaction Independent   Recreational Therapy Short Term Goals   Short Term Goal 1 Progression Met   Short Term Goal 2 Progression Met   Recreational Therapy Long Term Goals   Long Term Goal 1 Progression Met   Long Term Goal 2 Progression Progressing   Plan   Patient to be seen Daily   Planned Duration Other (Comment)  (1 day)   Treatments Planned Energy conservation training   Treatment plan/goals estblished with Patient/Caregiver Yes

## 2023-11-20 NOTE — PROGRESS NOTES
Inpatient Nutrition Assessment    Admit Date: 11/9/2023   Total duration of encounter: 11 days   Patient Age: 72 y.o.    Nutrition Recommendation/Prescription     Continue with current diabetic diet. Honor food preferences.  Continue Boost Glucose Control (chocolate) TID with meals (190 kcal and 16 gm protein per serving)  Weekly weights  Medical management of glucose  Will provide diabetic education prior to discharge    Communication of Recommendations: reviewed with patient    Nutrition Assessment     Malnutrition Assessment/Nutrition-Focused Physical Exam    Does not meet criteria     Chart Review    Reason Seen: follow-up    Malnutrition Screening Tool Results   Have you recently lost weight without trying?: No  Have you been eating poorly because of a decreased appetite?: No   MST Score: 0   Diagnosis:  Multi vessel CAD s/p CABG x 2 on 11/2/23    Relevant Medical History: PAD, DM, HTN, CAD    Nutrition-Related Medications: amlodipine, atorvastatin, vitamin D3, colace, zetia, ferrous sulfate, glipizide, metformin, sitagliptin phosphate, mag ox  Calorie Containing IV Medications: no significant kcals from medications at this time    Nutrition-Related Labs:  (11/20) RBC 2.9(L) H/H 8.2(L)/26.9(L) BUN 23.7(H) Cr 1.23(H) Glu 120 (H) Alb 2.9(L) CBGs elevated  (11/13) WBC 12.94(H) RBC 3.43(L) H/H 9.7(L)/21.6(L) Cl 108(H) Cr 1.09(H) Glu 145(H) Mg 1.5(L) Alb 2.8 (L) CBGs elevated  (11/10) WBC 11.74(H)  (L) H/H 9.8L(L)/31.5(L) Iron 33(L) TIBC 159(L) Transferrin 141(L) Ferritin 494.12(H) Cr 1.12(H) Glu 165(H) Alb 2.6(L) PAB 11.3(L)   (11/2)  A1C 9.3% (H)  CBGs elevated    Nutrition Orders:   Diet diabetic      Appetite/Oral Intake: fair/50-75% of meals    Factors Affecting Nutritional Intake:  Pt stated intake varies due to food preferences     Food/Adventist/Cultural Preferences: none reported    Food Allergies: none reported    Wound(s):   Intact    Last Bowel Movement: 11/19/23    Comments    (11/10) Pt stated  REPORT CALLED TO [facility]. THE PATIENT'S TRANSPORT CALLED AND
STATED THEY WILL BE HERE EARLIER THAN PLANNED, AND [facility] WAS NOTIFIED OF
THIS CHANGE. "her appetite and intake are fair. Consumes % of meals. Pt states her intake depends on what is served. Denied N/V/C/D. No issues chewing. Pt reports she does have some trouble swallowing. Stated she coughs when eating at times. # was about 2 weeks ago. Pt with a wt gain of 19# (9.6%). She is able to feed self with set-up.     (11/15) Pt stated her appetite is good, however, intake depends on food served. She does order alternate meals from dietary. Nrsg reports pt requesting chocolate milk. RDN offered Boost Glucose Control (chocolate) with meals. Pt agreeable. Denied N/V/C/D.     () Pt with no changes to appetite or intake. Consumes % of meals. Pt with no GI issues. No wt changes over past week.    Anthropometrics    Height: 5' 4" (162.6 cm) Height Method: Stated  Last Weight: 91.8 kg (202 lb 6.1 oz) (23) Weight Method: Standard Scale  BMI (Calculated): 34.7  BMI Classification: obese grade II (BMI 35-39.9)     Ideal Body Weight (IBW), Female: 120 lb     % Ideal Body Weight, Female (lb): 180.41 %                    Usual Body Weight (UBW), k.2 kg  % Usual Body Weight: 100.21     Usual Weight Provided By: patient    Wt Readings from Last 5 Encounters:   23 91.8 kg (202 lb 6.1 oz)   23 95.3 kg (210 lb 1.6 oz)   10/13/22 99.8 kg (220 lb)   10/24/17 58 kg (127 lb 13.9 oz)     Weight Change(s) Since Admission:   Wt Readings from Last 1 Encounters:   23 0528 91.8 kg (202 lb 6.1 oz)   23 0631 92 kg (202 lb 13.2 oz)   23 0533 91.9 kg (202 lb 9.6 oz)   23 0502 91.5 kg (201 lb 11.5 oz)   11/15/23 0515 91.8 kg (202 lb 6.1 oz)   23 0527 92.4 kg (203 lb 11.3 oz)   23 0524 92.9 kg (204 lb 12.9 oz)   23 0535 98.3 kg (216 lb 11.4 oz)   23 164 98.2 kg (216 lb 7.9 oz)   Admit Weight: 98.2 kg (216 lb 7.9 oz) (23 1648), Weight Method: Standard Scale  (11/15) 202# Loss of 14#, however question accuracy of weights due to " fluctuations.  () 202#-no changes over past week    Estimated Needs    Weight Used For Calorie Calculations: 98.2 kg (216 lb 7.9 oz)  Energy Calorie Requirements (kcal): 1467 kcal (MSJ)  Energy Need Method: [place] Jeor  Weight Used For Protein Calculations: 98.2 kg (216 lb 7.9 oz)  Protein Requirements: 78 gm (0.8 gm/kg)  Fluid Requirements (mL): 1467 mL (1 ml/kcal)  Temp (24hrs), Av.5 °F (36.9 °C), Min:98.1 °F (36.7 °C), Max:98.9 °F (37.2 °C)       Enteral Nutrition    Patient not receiving enteral nutrition at this time.    Parenteral Nutrition    Patient not receiving parenteral nutrition support at this time.    Evaluation of Received Nutrient Intake    Calories: not meeting estimated needs  Protein: not meeting estimated needs    Patient Education    Not applicable.    Nutrition Diagnosis     PES: Altered nutrition-related laboratory values related to  uncontrolled diabetes 2/2 excessive intake of CHO foods  as evidenced by A1C of 9.3(H). (active)    Interventions/Goals     Intervention(s): general/healthful diet and collaboration with other providers    Goal: Meet greater than 75% of nutritional needs by follow-up. (goal progressing)    Monitoring & Evaluation     Dietitian will monitor food and beverage intake, weight, electrolyte/renal panel, glucose/endocrine profile, and gastrointestinal profile.    Nutrition Risk/Follow-Up: moderate (follow-up in 3-5 days)   Please consult if re-assessment needed sooner.

## 2023-11-21 LAB
ANION GAP SERPL CALC-SCNC: 8 MEQ/L
BUN SERPL-MCNC: 18.3 MG/DL (ref 9.8–20.1)
CALCIUM SERPL-MCNC: 9.7 MG/DL (ref 8.4–10.2)
CHLORIDE SERPL-SCNC: 110 MMOL/L (ref 98–107)
CO2 SERPL-SCNC: 26 MMOL/L (ref 23–31)
CREAT SERPL-MCNC: 1.06 MG/DL (ref 0.55–1.02)
CREAT/UREA NIT SERPL: 17
GFR SERPLBLD CREATININE-BSD FMLA CKD-EPI: 55 MLS/MIN/1.73/M2
GLUCOSE SERPL-MCNC: 116 MG/DL (ref 82–115)
HCT VFR BLD AUTO: 29.1 % (ref 37–47)
HGB BLD-MCNC: 9 G/DL (ref 12–16)
MAGNESIUM SERPL-MCNC: 1.6 MG/DL (ref 1.6–2.6)
POCT GLUCOSE: 105 MG/DL (ref 70–110)
POTASSIUM SERPL-SCNC: 4.5 MMOL/L (ref 3.5–5.1)
SODIUM SERPL-SCNC: 144 MMOL/L (ref 136–145)

## 2023-11-21 PROCEDURE — 80048 BASIC METABOLIC PNL TOTAL CA: CPT | Performed by: NURSE PRACTITIONER

## 2023-11-21 PROCEDURE — 25000003 PHARM REV CODE 250: Performed by: NURSE PRACTITIONER

## 2023-11-21 PROCEDURE — 25000003 PHARM REV CODE 250: Performed by: INTERNAL MEDICINE

## 2023-11-21 PROCEDURE — 83735 ASSAY OF MAGNESIUM: CPT | Performed by: NURSE PRACTITIONER

## 2023-11-21 PROCEDURE — 63600175 PHARM REV CODE 636 W HCPCS: Performed by: NURSE PRACTITIONER

## 2023-11-21 PROCEDURE — 99900035 HC TECH TIME PER 15 MIN (STAT)

## 2023-11-21 PROCEDURE — 97535 SELF CARE MNGMENT TRAINING: CPT

## 2023-11-21 PROCEDURE — 85014 HEMATOCRIT: CPT | Performed by: NURSE PRACTITIONER

## 2023-11-21 PROCEDURE — 94761 N-INVAS EAR/PLS OXIMETRY MLT: CPT

## 2023-11-21 RX ORDER — AMIODARONE HYDROCHLORIDE 200 MG/1
200 TABLET ORAL DAILY
Qty: 90 TABLET | Refills: 0 | Status: SHIPPED | OUTPATIENT
Start: 2023-11-21 | End: 2023-11-21 | Stop reason: SDUPTHER

## 2023-11-21 RX ORDER — FERROUS SULFATE 325(65) MG
325 TABLET, DELAYED RELEASE (ENTERIC COATED) ORAL DAILY
Qty: 30 TABLET | Refills: 0 | Status: SHIPPED | OUTPATIENT
Start: 2023-11-21 | End: 2023-11-21 | Stop reason: SDUPTHER

## 2023-11-21 RX ORDER — DOCUSATE SODIUM 100 MG/1
100 CAPSULE, LIQUID FILLED ORAL 2 TIMES DAILY
Qty: 20 CAPSULE | Refills: 0 | Status: SHIPPED | OUTPATIENT
Start: 2023-11-21 | End: 2023-11-21 | Stop reason: SDUPTHER

## 2023-11-21 RX ORDER — HYDROCODONE BITARTRATE AND ACETAMINOPHEN 7.5; 325 MG/1; MG/1
1 TABLET ORAL EVERY 6 HOURS PRN
Qty: 12 TABLET | Refills: 0 | Status: SHIPPED | OUTPATIENT
Start: 2023-11-21 | End: 2023-11-21 | Stop reason: SDUPTHER

## 2023-11-21 RX ORDER — CHOLECALCIFEROL (VITAMIN D3) 25 MCG
1000 TABLET ORAL DAILY
Qty: 30 TABLET | Refills: 2 | Status: SHIPPED | OUTPATIENT
Start: 2023-11-21 | End: 2023-11-21 | Stop reason: SDUPTHER

## 2023-11-21 RX ORDER — METHOCARBAMOL 500 MG/1
500 TABLET, FILM COATED ORAL 3 TIMES DAILY PRN
Qty: 30 TABLET | Refills: 0 | Status: SHIPPED | OUTPATIENT
Start: 2023-11-21 | End: 2023-11-21 | Stop reason: SDUPTHER

## 2023-11-21 RX ORDER — ATORVASTATIN CALCIUM 40 MG/1
40 TABLET, FILM COATED ORAL DAILY
Qty: 90 TABLET | Refills: 0 | Status: SHIPPED | OUTPATIENT
Start: 2023-11-21 | End: 2023-11-21 | Stop reason: SDUPTHER

## 2023-11-21 RX ORDER — AMLODIPINE BESYLATE 10 MG/1
10 TABLET ORAL DAILY
Qty: 30 TABLET | Refills: 2 | Status: SHIPPED | OUTPATIENT
Start: 2023-11-21 | End: 2024-02-19

## 2023-11-21 RX ORDER — CHOLECALCIFEROL (VITAMIN D3) 25 MCG
1000 TABLET ORAL DAILY
Qty: 30 TABLET | Refills: 2 | Status: SHIPPED | OUTPATIENT
Start: 2023-11-21 | End: 2024-02-19

## 2023-11-21 RX ORDER — ASPIRIN 81 MG/1
81 TABLET ORAL DAILY
Qty: 90 TABLET | Refills: 0 | Status: SHIPPED | OUTPATIENT
Start: 2023-11-21 | End: 2023-11-21 | Stop reason: SDUPTHER

## 2023-11-21 RX ORDER — METFORMIN HYDROCHLORIDE 1000 MG/1
1000 TABLET ORAL 2 TIMES DAILY WITH MEALS
Qty: 60 TABLET | Refills: 0 | Status: SHIPPED | OUTPATIENT
Start: 2023-11-21 | End: 2023-11-21 | Stop reason: SDUPTHER

## 2023-11-21 RX ORDER — ATORVASTATIN CALCIUM 40 MG/1
40 TABLET, FILM COATED ORAL DAILY
Qty: 90 TABLET | Refills: 0 | Status: SHIPPED | OUTPATIENT
Start: 2023-11-21 | End: 2024-02-19

## 2023-11-21 RX ORDER — METOPROLOL SUCCINATE 50 MG/1
50 TABLET, EXTENDED RELEASE ORAL DAILY
Qty: 30 TABLET | Refills: 2 | Status: SHIPPED | OUTPATIENT
Start: 2023-11-21 | End: 2023-11-21 | Stop reason: SDUPTHER

## 2023-11-21 RX ORDER — FUROSEMIDE 10 MG/ML
40 INJECTION INTRAMUSCULAR; INTRAVENOUS ONCE
Status: COMPLETED | OUTPATIENT
Start: 2023-11-21 | End: 2023-11-21

## 2023-11-21 RX ORDER — AMIODARONE HYDROCHLORIDE 200 MG/1
200 TABLET ORAL DAILY
Qty: 90 TABLET | Refills: 0 | Status: SHIPPED | OUTPATIENT
Start: 2023-11-21 | End: 2024-02-19

## 2023-11-21 RX ORDER — ASPIRIN 81 MG/1
81 TABLET ORAL DAILY
Qty: 90 TABLET | Refills: 0 | Status: SHIPPED | OUTPATIENT
Start: 2023-11-21 | End: 2024-02-19

## 2023-11-21 RX ORDER — DOCUSATE SODIUM 100 MG/1
100 CAPSULE, LIQUID FILLED ORAL 2 TIMES DAILY
Qty: 20 CAPSULE | Refills: 0 | Status: SHIPPED | OUTPATIENT
Start: 2023-11-21 | End: 2023-12-01

## 2023-11-21 RX ORDER — FUROSEMIDE 20 MG/1
40 TABLET ORAL DAILY
Qty: 60 TABLET | Refills: 0 | Status: SHIPPED | OUTPATIENT
Start: 2023-11-21 | End: 2023-12-21

## 2023-11-21 RX ORDER — GLIPIZIDE 5 MG/1
5 TABLET, FILM COATED, EXTENDED RELEASE ORAL
Qty: 90 TABLET | Refills: 0 | Status: SHIPPED | OUTPATIENT
Start: 2023-11-21 | End: 2024-02-19

## 2023-11-21 RX ORDER — EZETIMIBE 10 MG/1
10 TABLET ORAL DAILY
Qty: 90 TABLET | Refills: 0 | Status: SHIPPED | OUTPATIENT
Start: 2023-11-21 | End: 2023-11-21 | Stop reason: SDUPTHER

## 2023-11-21 RX ORDER — AMLODIPINE BESYLATE 10 MG/1
10 TABLET ORAL DAILY
Qty: 30 TABLET | Refills: 2 | Status: SHIPPED | OUTPATIENT
Start: 2023-11-21 | End: 2023-11-21 | Stop reason: SDUPTHER

## 2023-11-21 RX ORDER — METHOCARBAMOL 500 MG/1
500 TABLET, FILM COATED ORAL 3 TIMES DAILY PRN
Qty: 30 TABLET | Refills: 0 | Status: SHIPPED | OUTPATIENT
Start: 2023-11-21 | End: 2023-12-01

## 2023-11-21 RX ORDER — HYDROCODONE BITARTRATE AND ACETAMINOPHEN 7.5; 325 MG/1; MG/1
1 TABLET ORAL EVERY 6 HOURS PRN
Qty: 12 TABLET | Refills: 0 | Status: SHIPPED | OUTPATIENT
Start: 2023-11-21 | End: 2023-11-24

## 2023-11-21 RX ORDER — METOPROLOL SUCCINATE 50 MG/1
50 TABLET, EXTENDED RELEASE ORAL DAILY
Qty: 30 TABLET | Refills: 2 | Status: SHIPPED | OUTPATIENT
Start: 2023-11-21 | End: 2024-02-19

## 2023-11-21 RX ORDER — FUROSEMIDE 20 MG/1
40 TABLET ORAL DAILY
Qty: 60 TABLET | Refills: 0 | Status: SHIPPED | OUTPATIENT
Start: 2023-11-21 | End: 2023-11-21 | Stop reason: SDUPTHER

## 2023-11-21 RX ORDER — EZETIMIBE 10 MG/1
10 TABLET ORAL DAILY
Qty: 90 TABLET | Refills: 0 | Status: SHIPPED | OUTPATIENT
Start: 2023-11-21 | End: 2024-02-19

## 2023-11-21 RX ORDER — GLIPIZIDE 5 MG/1
5 TABLET, FILM COATED, EXTENDED RELEASE ORAL
Qty: 90 TABLET | Refills: 3 | Status: SHIPPED | OUTPATIENT
Start: 2023-11-21 | End: 2023-11-21 | Stop reason: SDUPTHER

## 2023-11-21 RX ORDER — METFORMIN HYDROCHLORIDE 1000 MG/1
1000 TABLET ORAL 2 TIMES DAILY WITH MEALS
Qty: 60 TABLET | Refills: 0 | Status: SHIPPED | OUTPATIENT
Start: 2023-11-21 | End: 2023-12-21

## 2023-11-21 RX ORDER — FERROUS SULFATE 325(65) MG
325 TABLET, DELAYED RELEASE (ENTERIC COATED) ORAL DAILY
Qty: 30 TABLET | Refills: 0 | Status: SHIPPED | OUTPATIENT
Start: 2023-11-21 | End: 2023-12-21

## 2023-11-21 RX ADMIN — SITAGLIPTIN 100 MG: 50 TABLET, FILM COATED ORAL at 09:11

## 2023-11-21 RX ADMIN — APIXABAN 5 MG: 5 TABLET, FILM COATED ORAL at 09:11

## 2023-11-21 RX ADMIN — GLIPIZIDE 5 MG: 5 TABLET, FILM COATED, EXTENDED RELEASE ORAL at 09:11

## 2023-11-21 RX ADMIN — AMIODARONE HYDROCHLORIDE 200 MG: 200 TABLET ORAL at 09:11

## 2023-11-21 RX ADMIN — EZETIMIBE 10 MG: 10 TABLET ORAL at 09:11

## 2023-11-21 RX ADMIN — FUROSEMIDE 40 MG: 10 INJECTION, SOLUTION INTRAMUSCULAR; INTRAVENOUS at 09:11

## 2023-11-21 RX ADMIN — ASPIRIN 81 MG: 81 TABLET, COATED ORAL at 09:11

## 2023-11-21 RX ADMIN — FERROUS SULFATE TAB 325 MG (65 MG ELEMENTAL FE) 1 EACH: 325 (65 FE) TAB at 09:11

## 2023-11-21 RX ADMIN — AMLODIPINE BESYLATE 10 MG: 5 TABLET ORAL at 09:11

## 2023-11-21 RX ADMIN — METFORMIN HYDROCHLORIDE 1000 MG: 500 TABLET, FILM COATED ORAL at 09:11

## 2023-11-21 RX ADMIN — Medication 1000 UNITS: at 09:11

## 2023-11-21 RX ADMIN — LIDOCAINE 5% 1 PATCH: 700 PATCH TOPICAL at 10:11

## 2023-11-21 RX ADMIN — ATORVASTATIN CALCIUM 40 MG: 40 TABLET, FILM COATED ORAL at 09:11

## 2023-11-21 RX ADMIN — METOPROLOL SUCCINATE 50 MG: 50 TABLET, FILM COATED, EXTENDED RELEASE ORAL at 09:11

## 2023-11-21 NOTE — DISCHARGE SUMMARY
[facility] ([facility])  Rehab Discharge Summary    Patient Name: Geno Barry  MRN: [id number]  Age: 72 y.o. Sex: female  : 1950  Hospital Length of Stay: 12 days   Date of Service: 2023      Discharge Information   Date of Admission: 2023  Date of Discharge: 2023  Admit Diagnosis: Multivessel CAD          HLD         HFpEF         PAD          Bilateral carotid artery stenosis          Paroxysmal atrial fibrillation         Normocytic anemia          HTN         DM type 2          GERD          Osteoarthritis  Discharge Diagnosis: Multivessel CAD (stable)           HLD (stable)           HFpEF (stable           PAD (stable)           Bilateral carotid artery stenosis (stable)           Paroxysmal atrial fibrillation (stable)           Normocytic anemia (stable)           HTN (stable)           DM type 2 (stable)           GERD (stable)           Osteoarthritis (stable)           BL pleural effusions (improved)    VTE Prophylaxis:  Eliquis 5 mg b.i.d.  COVID-19 testing:  Unknown  COVID-19 vaccination status:  Vaccinated ([email or web address]):  2021 and (Pf[employer]):  2021 and 2022     Internal Medicine (attending): Salvador Sebastian MD  Physiatry (consulting):  Patricio Daniel MD     OUTPATIENT PROVIDERS  PCP:  JONG Clifton  Cardiology:  Puma Vera MD  CV surgery:  Heriberto Lara MD    Hospital Course   72-year-old  female presented to [facility] for scheduled CABG on 2023.  PMH significant for PID, DM type 2, CAD, and carotid artery stenosis.  Tolerated CABG x2 on  without perioperative complications.  Aspirin, Norvasc, and beta-blocker continued.  Lipitor 40 mg daily initiated on .  Chest tube discontinued on .  Amiodarone initiated due to postoperative atrial fibrillation.  Eliquis 5 mg b.i.d. initiated on .  Tolerated transfer to [facility] inpatient rehab unit on  without incident.     During  "inpatient rehab course ferrous sulfate 325 mg daily initiated on 11/10.  CXR with bilateral pleural effusions.  Weights monitored daily slow decline.  O2 wean.  Tolerating room air.  Complained of increased shortness of breath on 11/20.  Weights looks good.  Renal indices trending up.  CXR with significant pleural effusion.  Lasix 40 mg IVP initiated with improvement.  Therapy reported activity intolerance improved.  Received Lasix 40 mg again on 11/21.  Lasix 40 mg oral daily prescribed on discharge.  Renal indices improved with Lasix.  PT: Supervision to independent overall.  Max with bed mobility.  Ambulating 75 feet with RW.  Needs cueing to keep sternal precautions.  Max to mod assist with ADLs. Supervision now. Sit to stand is her biggest obstacle.  Med reconciliation completed.  Discharge orders initiated.  Stable for transfer home with home health care.  To follow-up with scheduled appointments documented below.    Chief Complaint: Multivessel CAD s/p CABG x2 on 11/02/2023     /75   Pulse 98   Temp 98.1 °F (36.7 °C)   Resp 18   Ht 5' 4" (1.626 m)   Wt 90.4 kg (199 lb 4.7 oz)   SpO2 95%   Breastfeeding No   BMI 34.21 kg/m²      Physical Exam  Vitals reviewed.   Eyes:      Pupils: Pupils are equal, round, and reactive to light.   Cardiovascular:      Rate and Rhythm: Normal rate and regular rhythm.      Heart sounds: Normal heart sounds.   Pulmonary:      Effort: Pulmonary effort is normal.      Breath sounds: Normal breath sounds.   Abdominal:      General: Bowel sounds are normal.      Comments: obese   Musculoskeletal:         General: Normal range of motion.   Skin:     General: Skin is warm.      Comments: Sternal incision clean and intact.  Left leg harvest site intact.     Neurological:      General: No focal deficit present.      Mental Status: She is alert and oriented to person, place, and time.      Motor: Weakness present.   Psychiatric:         Mood and Affect: Mood normal.   *MD " performed and documented physical examination        Labs  Admission on 11/09/2023   Component Date Value Ref Range Status    POCT Glucose 11/09/2023 208 (H)  70 - 110 mg/dL Final    POCT Glucose 11/09/2023 229 (H)  70 - 110 mg/dL Final    Sodium Level 11/10/2023 145  136 - 145 mmol/L Final    Potassium Level 11/10/2023 3.7  3.5 - 5.1 mmol/L Final    Chloride 11/10/2023 107  98 - 107 mmol/L Final    Carbon Dioxide 11/10/2023 28  23 - 31 mmol/L Final    Glucose Level 11/10/2023 165 (H)  82 - 115 mg/dL Final    Blood Urea Nitrogen 11/10/2023 14.0  9.8 - 20.1 mg/dL Final    Creatinine 11/10/2023 1.12 (H)  0.55 - 1.02 mg/dL Final    Calcium Level Total 11/10/2023 9.4  8.4 - 10.2 mg/dL Final    Protein Total 11/10/2023 6.3  5.8 - 7.6 gm/dL Final    Albumin Level 11/10/2023 2.6 (L)  3.4 - 4.8 g/dL Final    Globulin 11/10/2023 3.7 (H)  2.4 - 3.5 gm/dL Final    Albumin/Globulin Ratio 11/10/2023 0.7 (L)  1.1 - 2.0 ratio Final    Bilirubin Total 11/10/2023 0.5  <=1.5 mg/dL Final    Alkaline Phosphatase 11/10/2023 69  40 - 150 unit/L Final    Alanine Aminotransferase 11/10/2023 13  0 - 55 unit/L Final    Aspartate Aminotransferase 11/10/2023 14  5 - 34 unit/L Final    eGFR 11/10/2023 52  mls/min/1.73/m2 Final    Magnesium Level 11/10/2023 1.80  1.60 - 2.60 mg/dL Final    Phosphorus Level 11/10/2023 2.3  2.3 - 4.7 mg/dL Final    Prealbumin 11/10/2023 11.3 (L)  14.0 - 37.0 mg/dL Final    Ferritin Level 11/10/2023 494.12 (H)  4.63 - 204.00 ng/mL Final    Iron Binding Capacity Unsaturated 11/10/2023 126  70 - 310 ug/dL Final    Iron Level 11/10/2023 33 (L)  50 - 170 ug/dL Final    Transferrin 11/10/2023 141 (L)  173 - 360 mg/dL Final    Iron Binding Capacity Total 11/10/2023 159 (L)  250 - 450 ug/dL Final    Iron Saturation 11/10/2023 21  20 - 50 % Final    WBC 11/10/2023 11.74 (H)  4.50 - 11.50 x10(3)/mcL Final    RBC 11/10/2023 3.41 (L)  4.20 - 5.40 x10(6)/mcL Final    Hgb 11/10/2023 9.8 (L)  12.0 - 16.0 g/dL Final    Hct  11/10/2023 31.5 (L)  37.0 - 47.0 % Final    MCV 11/10/2023 92.4  80.0 - 94.0 fL Final    MCH 11/10/2023 28.7  27.0 - 31.0 pg Final    MCHC 11/10/2023 31.1 (L)  33.0 - 36.0 g/dL Final    RDW 11/10/2023 15.3  11.5 - 17.0 % Final    Platelet 11/10/2023 392  130 - 400 x10(3)/mcL Final    MPV 11/10/2023 9.1  7.4 - 10.4 fL Final    Neut % 11/10/2023 67.8  % Final    Lymph % 11/10/2023 22.9  % Final    Mono % 11/10/2023 6.2  % Final    Eos % 11/10/2023 2.1  % Final    Basophil % 11/10/2023 0.2  % Final    Lymph # 11/10/2023 2.69  0.6 - 4.6 x10(3)/mcL Final    Neut # 11/10/2023 7.96  2.1 - 9.2 x10(3)/mcL Final    Mono # 11/10/2023 0.73  0.1 - 1.3 x10(3)/mcL Final    Eos # 11/10/2023 0.25  0 - 0.9 x10(3)/mcL Final    Baso # 11/10/2023 0.02  <=0.2 x10(3)/mcL Final    IG# 11/10/2023 0.09 (H)  0 - 0.04 x10(3)/mcL Final    IG% 11/10/2023 0.8  % Final    NRBC% 11/10/2023 0.0  % Final    POCT Glucose 11/10/2023 167 (H)  70 - 110 mg/dL Final    POCT Glucose 11/10/2023 170 (H)  70 - 110 mg/dL Final    POCT Glucose 11/10/2023 149 (H)  70 - 110 mg/dL Final    POCT Glucose 11/11/2023 106  70 - 110 mg/dL Final    POCT Glucose 11/11/2023 115 (H)  70 - 110 mg/dL Final    POCT Glucose 11/11/2023 112 (H)  70 - 110 mg/dL Final    POCT Glucose 11/11/2023 118 (H)  70 - 110 mg/dL Final    POCT Glucose 11/12/2023 88  70 - 110 mg/dL Final    POCT Glucose 11/12/2023 150 (H)  70 - 110 mg/dL Final    POCT Glucose 11/12/2023 147 (H)  70 - 110 mg/dL Final    POCT Glucose 11/12/2023 169 (H)  70 - 110 mg/dL Final    Prealbumin 11/13/2023 15.5  14.0 - 37.0 mg/dL Final    Sodium Level 11/13/2023 145  136 - 145 mmol/L Final    Potassium Level 11/13/2023 4.4  3.5 - 5.1 mmol/L Final    Chloride 11/13/2023 108 (H)  98 - 107 mmol/L Final    Carbon Dioxide 11/13/2023 29  23 - 31 mmol/L Final    Glucose Level 11/13/2023 145 (H)  82 - 115 mg/dL Final    Blood Urea Nitrogen 11/13/2023 15.9  9.8 - 20.1 mg/dL Final    Creatinine 11/13/2023 1.09 (H)  0.55 - 1.02  mg/dL Final    Calcium Level Total 11/13/2023 9.5  8.4 - 10.2 mg/dL Final    Protein Total 11/13/2023 6.6  5.8 - 7.6 gm/dL Final    Albumin Level 11/13/2023 2.8 (L)  3.4 - 4.8 g/dL Final    Globulin 11/13/2023 3.8 (H)  2.4 - 3.5 gm/dL Final    Albumin/Globulin Ratio 11/13/2023 0.7 (L)  1.1 - 2.0 ratio Final    Bilirubin Total 11/13/2023 0.5  <=1.5 mg/dL Final    Alkaline Phosphatase 11/13/2023 59  40 - 150 unit/L Final    Alanine Aminotransferase 11/13/2023 16  0 - 55 unit/L Final    Aspartate Aminotransferase 11/13/2023 19  5 - 34 unit/L Final    eGFR 11/13/2023 53  mls/min/1.73/m2 Final    Magnesium Level 11/13/2023 1.50 (L)  1.60 - 2.60 mg/dL Final    Phosphorus Level 11/13/2023 3.1  2.3 - 4.7 mg/dL Final    WBC 11/13/2023 12.94 (H)  4.50 - 11.50 x10(3)/mcL Final    RBC 11/13/2023 3.43 (L)  4.20 - 5.40 x10(6)/mcL Final    Hgb 11/13/2023 9.7 (L)  12.0 - 16.0 g/dL Final    Hct 11/13/2023 31.6 (L)  37.0 - 47.0 % Final    MCV 11/13/2023 92.1  80.0 - 94.0 fL Final    MCH 11/13/2023 28.3  27.0 - 31.0 pg Final    MCHC 11/13/2023 30.7 (L)  33.0 - 36.0 g/dL Final    RDW 11/13/2023 15.3  11.5 - 17.0 % Final    Platelet 11/13/2023 546 (H)  130 - 400 x10(3)/mcL Final    MPV 11/13/2023 8.8  7.4 - 10.4 fL Final    Neut % 11/13/2023 73.4  % Final    Lymph % 11/13/2023 17.9  % Final    Mono % 11/13/2023 6.6  % Final    Eos % 11/13/2023 1.5  % Final    Basophil % 11/13/2023 0.2  % Final    Lymph # 11/13/2023 2.31  0.6 - 4.6 x10(3)/mcL Final    Neut # 11/13/2023 9.51 (H)  2.1 - 9.2 x10(3)/mcL Final    Mono # 11/13/2023 0.86  0.1 - 1.3 x10(3)/mcL Final    Eos # 11/13/2023 0.19  0 - 0.9 x10(3)/mcL Final    Baso # 11/13/2023 0.02  <=0.2 x10(3)/mcL Final    IG# 11/13/2023 0.05 (H)  0 - 0.04 x10(3)/mcL Final    IG% 11/13/2023 0.4  % Final    NRBC% 11/13/2023 0.0  % Final    POCT Glucose 11/13/2023 145 (H)  70 - 110 mg/dL Final    POCT Glucose 11/13/2023 186 (H)  70 - 110 mg/dL Final    POCT Glucose 11/13/2023 111 (H)  70 - 110 mg/dL  Final    POCT Glucose 11/13/2023 148 (H)  70 - 110 mg/dL Final    POCT Glucose 11/14/2023 115 (H)  70 - 110 mg/dL Final    POCT Glucose 11/14/2023 134 (H)  70 - 110 mg/dL Final    POCT Glucose 11/14/2023 131 (H)  70 - 110 mg/dL Final    POCT Glucose 11/14/2023 94  70 - 110 mg/dL Final    POCT Glucose 11/15/2023 111 (H)  70 - 110 mg/dL Final    POCT Glucose 11/15/2023 134 (H)  70 - 110 mg/dL Final    POCT Glucose 11/15/2023 100  70 - 110 mg/dL Final    POCT Glucose 11/15/2023 120 (H)  70 - 110 mg/dL Final    POCT Glucose 11/16/2023 106  70 - 110 mg/dL Final    POCT Glucose 11/16/2023 170 (H)  70 - 110 mg/dL Final    POCT Glucose 11/16/2023 155 (H)  70 - 110 mg/dL Final    POCT Glucose 11/16/2023 202 (H)  70 - 110 mg/dL Final    POCT Glucose 11/17/2023 182 (H)  70 - 110 mg/dL Final    POCT Glucose 11/17/2023 229 (H)  70 - 110 mg/dL Final    POCT Glucose 11/18/2023 137 (H)  70 - 110 mg/dL Final    POCT Glucose 11/18/2023 171 (H)  70 - 110 mg/dL Final    POCT Glucose 11/18/2023 105  70 - 110 mg/dL Final    POCT Glucose 11/19/2023 122 (H)  70 - 110 mg/dL Final    POCT Glucose 11/19/2023 184 (H)  70 - 110 mg/dL Final    POCT Glucose 11/19/2023 126 (H)  70 - 110 mg/dL Final    Prealbumin 11/20/2023 19.3  14.0 - 37.0 mg/dL Final    Sodium Level 11/20/2023 141  136 - 145 mmol/L Final    Potassium Level 11/20/2023 4.8  3.5 - 5.1 mmol/L Final    Chloride 11/20/2023 112 (H)  98 - 107 mmol/L Final    Carbon Dioxide 11/20/2023 23  23 - 31 mmol/L Final    Glucose Level 11/20/2023 120 (H)  82 - 115 mg/dL Final    Blood Urea Nitrogen 11/20/2023 23.7 (H)  9.8 - 20.1 mg/dL Final    Creatinine 11/20/2023 1.23 (H)  0.55 - 1.02 mg/dL Final    Calcium Level Total 11/20/2023 9.2  8.4 - 10.2 mg/dL Final    Protein Total 11/20/2023 6.6  5.8 - 7.6 gm/dL Final    Albumin Level 11/20/2023 2.9 (L)  3.4 - 4.8 g/dL Final    Globulin 11/20/2023 3.7 (H)  2.4 - 3.5 gm/dL Final    Albumin/Globulin Ratio 11/20/2023 0.8 (L)  1.1 - 2.0 ratio Final     Bilirubin Total 11/20/2023 0.4  <=1.5 mg/dL Final    Alkaline Phosphatase 11/20/2023 65  40 - 150 unit/L Final    Alanine Aminotransferase 11/20/2023 10  0 - 55 unit/L Final    Aspartate Aminotransferase 11/20/2023 13  5 - 34 unit/L Final    eGFR 11/20/2023 46  mls/min/1.73/m2 Final    Magnesium Level 11/20/2023 1.80  1.60 - 2.60 mg/dL Final    Phosphorus Level 11/20/2023 3.7  2.3 - 4.7 mg/dL Final    WBC 11/20/2023 7.82  4.50 - 11.50 x10(3)/mcL Final    RBC 11/20/2023 2.90 (L)  4.20 - 5.40 x10(6)/mcL Final    Hgb 11/20/2023 8.2 (L)  12.0 - 16.0 g/dL Final    Hct 11/20/2023 26.9 (L)  37.0 - 47.0 % Final    MCV 11/20/2023 92.8  80.0 - 94.0 fL Final    MCH 11/20/2023 28.3  27.0 - 31.0 pg Final    MCHC 11/20/2023 30.5 (L)  33.0 - 36.0 g/dL Final    RDW 11/20/2023 15.2  11.5 - 17.0 % Final    Platelet 11/20/2023 644 (H)  130 - 400 x10(3)/mcL Final    MPV 11/20/2023 8.7  7.4 - 10.4 fL Final    Neut % 11/20/2023 63.0  % Final    Lymph % 11/20/2023 26.9  % Final    Mono % 11/20/2023 6.3  % Final    Eos % 11/20/2023 2.9  % Final    Basophil % 11/20/2023 0.6  % Final    Lymph # 11/20/2023 2.10  0.6 - 4.6 x10(3)/mcL Final    Neut # 11/20/2023 4.93  2.1 - 9.2 x10(3)/mcL Final    Mono # 11/20/2023 0.49  0.1 - 1.3 x10(3)/mcL Final    Eos # 11/20/2023 0.23  0 - 0.9 x10(3)/mcL Final    Baso # 11/20/2023 0.05  <=0.2 x10(3)/mcL Final    IG# 11/20/2023 0.02  0 - 0.04 x10(3)/mcL Final    IG% 11/20/2023 0.3  % Final    NRBC% 11/20/2023 0.0  % Final    POCT Glucose 11/20/2023 116 (H)  70 - 110 mg/dL Final    Stool Color 1 11/20/2023 Brown   Final    Stool Consistancy 1 11/20/2023 soft   Final    Occult Blood Stool 1 11/20/2023 Positive (A)  Negative Final    Retic Cnt Auto 11/20/2023 2.99 (H)  1.1 - 2.1 % Final    RET# 11/20/2023 0.0873 (H)  0.016 - 0.078 x10e6/uL Final    POCT Glucose 11/20/2023 133 (H)  70 - 110 mg/dL Final    POCT Glucose 11/20/2023 133 (H)  70 - 110 mg/dL Final    POCT Glucose 11/20/2023 140 (H)  70 - 110 mg/dL  Final    Sodium Level 11/21/2023 144  136 - 145 mmol/L Final    Potassium Level 11/21/2023 4.5  3.5 - 5.1 mmol/L Final    Chloride 11/21/2023 110 (H)  98 - 107 mmol/L Final    Carbon Dioxide 11/21/2023 26  23 - 31 mmol/L Final    Glucose Level 11/21/2023 116 (H)  82 - 115 mg/dL Final    Blood Urea Nitrogen 11/21/2023 18.3  9.8 - 20.1 mg/dL Final    Creatinine 11/21/2023 1.06 (H)  0.55 - 1.02 mg/dL Final    BUN/Creatinine Ratio 11/21/2023 17   Final    Calcium Level Total 11/21/2023 9.7  8.4 - 10.2 mg/dL Final    Anion Gap 11/21/2023 8.0  mEq/L Final    eGFR 11/21/2023 55  mls/min/1.73/m2 Final    Magnesium Level 11/21/2023 1.60  1.60 - 2.60 mg/dL Final    Hgb 11/21/2023 9.0 (L)  12.0 - 16.0 g/dL Final    Hct 11/21/2023 29.1 (L)  37.0 - 47.0 % Final    POCT Glucose 11/21/2023 105  70 - 110 mg/dL Final     Radiology  CXR 2 view on 11/10/2023, IMPRESSION: Bilateral pleural effusions.   Increased left retrocardiac density and silhouetting of the left hemidiaphragm which might be related to pleural fluid, however, infiltrate/atelectasis cannot be completely excluded. Compressive atelectatic changes in both bases, however, superimposed infiltrates cannot be completely excluded   Radiology  Transthoracic echo on 11/02/2023, IMPRESSION: Left Ventricle: regional wall motion abnormalities present.  The distal apex, apical inferior and distal septal walls are hypokinetic There is moderately reduced systolic function with a visually estimated ejection fraction of 35 - 40%. Grade I diastolic dysfunction.  Right Ventricle: Normal right ventricular cavity size. Wall thickness is normal. Right ventricle wall motion  is normal. Systolic function is normal.  Mitral Valve: There is mild regurgitation.  Diagnostic studies   [facility] on 11/02/2023, IMPRESSION: Left Main-luminal irregularities. LAD-mid subtotal occlusion involving large diagonal. LCX-non dominant, luminal irregularities. RCA-dominant, 40% ostial stenosis. LVEDP  18    Discharge Summary Plan   Discharge Status:  Improved    Location: Discharge home with home health    Medications: See discharge medicine reconciliation    Activity:  As tolerated    Diet:  Cardiac    Instructions:  Take all medications as prescribed.      Attend appointments as scheduled.      Return to ED if symptoms worsen, or if t > 100.4.    Education:  CAD.  HFpEF. PAD. AFIB    Follow-up:  JONG Gray on 11/30/2023 at 11:15 a.m.      Puma Vera MD on 12/07/2023 at 9:50 a.m.      Heriberto Lara IV, MD on 11/28/2023 at 9:20 a.m.    Discussed plan of care, and patient communicated understanding. Agreed to comply with recommendations.    Jeff Ardon NP conducted independent examination and assisted with medical documentation.     Discharge Time: 34 minutes

## 2023-11-21 NOTE — PLAN OF CARE
Discharging today as planned following family training with cousin Lauryn.    Alerted [facility] via [employer].  They will provide HH PT/OT/RN services.  Will send AVS once completed.    No home DME ordered (cancelled new rollator order per pt's request).

## 2023-11-21 NOTE — PT/OT/SLP PROGRESS
"Physical Therapy Inpatient Rehab Treatment    Patient Name:  Geno Barry   MRN:  [id number]    Recommendations:     Discharge Recommendations:  Low Intensity Therapy   Discharge Equipment Recommendations:     Barriers to discharge: None    Assessment:     Geno Barry is a 72 y.o. female admitted with a medical diagnosis of S/P CABG (coronary artery bypass graft).  She presents with the following impairments/functional limitations:  impaired endurance.    Rehab Diagnosis: Multivessel CAD s/p CABG x 2    General Precautions: Standard, sternal     Orthopedic Precautions:N/A     Braces: N/A    Rehab Prognosis: Good; patient would benefit from acute skilled PT services to address these deficits and reach maximum level of function.      History:     Past Medical History:   Diagnosis Date    CAD (coronary artery disease)     Diabetes mellitus     Hypertension     PAD (peripheral artery disease)        Past Surgical History:   Procedure Laterality Date    CORONARY ARTERY BYPASS GRAFT (CABG) N/A 11/2/2023    Procedure: CORONARY ARTERY BYPASS GRAFT (CABG);  Surgeon: Heriberto Lara IV, MD;  Location: [facility];  Service: Cardiothoracic;  Laterality: N/A;    ENDOSCOPIC HARVEST OF VEIN N/A 11/2/2023    Procedure: SURGICAL PROCUREMENT, VEIN, ENDOSCOPIC;  Surgeon: Heriberto Lara IV, MD;  Location: [facility] OR;  Service: Cardiothoracic;  Laterality: N/A;    HYSTERECTOMY      LEFT HEART CATHETERIZATION Left 11/2/2023    Procedure: Left heart cath;  Surgeon: Puma Vera MD;  Location: [facility] CATH LAB;  Service: Cardiology;  Laterality: Left;  LHC +/- PCI VIA RRA    LUMPECTOMY, BREAST Right        Subjective     Patient comments: "I'm ready to go home"    Respiratory Status: Room air    Patients cultural, spiritual, Orthodoxy conflicts given the current situation: no    Objective:     Communicated with GIULIA Bowen prior to session.  Patient found up in chair with peripheral IV  upon PT entry to room.    Pt is Alert, " Cooperative, and Motivated.        Functional Mobility:      Current   Status  Discharge   Goal   Functional Area: Care Score:    Sit to Stand 4  CGA with no AD Independent       Therapeutic Activities and Exercises:  PT Family Training session performed with pt's cousin, Lauryn, present. Answered all questions/concerns as appropriate. Educated family on sternal precautions and use of gait belt. Verbally reviewed car transfer and bed mobility techniques. Encouraged use of Rollator at home for safety, and encouraged ambulation every hour. Also encouraged performing HEP daily.    Activity Tolerance: Good    Patient left up in chair with call button in reach, Jessi, GIULIA notified, and Lauryn present.    Education provided: roles and goals of PT/PTA, transfer training, bed mob, gait training, safety awareness, body mechanics, and strengthening exercises    Expected compliance: High compliance and Moderate compliance    Plan:     During this hospitalization, patient to be seen 5 x/week (5-7x/wk) to address the identified rehab impairments via gait training, therapeutic activities, therapeutic exercises, neuromuscular re-education and progress toward the following goals:    GOALS:   Multidisciplinary Problems       Physical Therapy Goals          Problem: Physical Therapy    Goal Priority Disciplines Outcome Goal Variances Interventions   Physical Therapy Goal     PT, PT/OT Ongoing, Progressing     Description: Bed Mobility:  Roll left and right with partial/moderate assist.  Sit to supine transfer with partial/moderate assist.  Supine to sit transfer with partial/moderate assist.    Transfers:  Sit to stand transfer with partial/moderate assist using RW.  Bed to chair transfer with partial/moderate assist Stand Step  using RW.  Car transfer with substantial/maximal assist using RW.   an object from the ground in standing position with substantial/maximal assist using RW.    Mobility:  Ambulate 125 feet with  supervision/touching assist using RW.  Ambulate 30 feet on uneven surfaces/ramps with supervision/touching assist using RW.  Pt ascended/descended a 4 inch curb with partial/moderate assist using RW.  Ascend/descend 2 stairs with substantial/maximal assist using no handrails.                       Plan of Care Expires:  12/01/23  PT Next Visit Date: 11/22/23  Plan of Care reviewed with: patient, family    Additional Information:         Time Tracking:     Therapy Time  PT Start Time: 1030  PT Stop Time: 1045  PT Total Time (min): 15 min   PT Individual: 15  Missed Time:    Time Missed due to:      Billable Minutes: Self care/Home management 15 min    11/21/2023

## 2023-11-21 NOTE — PROGRESS NOTES
11/21/23 1134   AVS Confirmation   Discharge instructions and AVS given to and reviewed with patient and/or significant other. Yes

## 2023-11-21 NOTE — PT/OT/SLP PROGRESS
"Occupational Therapy Inpatient Rehab Treatment    Name: Geno Barry  MRN: [id number]    Assessment:  Geno Barry is a 72 y.o. female admitted with a medical diagnosis of S/P CABG (coronary artery bypass graft).  She presents with the following impairments/functional limitations:  impaired endurance.    General Precautions: Standard, fall, sternal     Orthopedic Precautions:N/A     Braces: N/A    Rehab Prognosis: Good; patient would benefit from acute skilled OT services to address these deficits and reach maximum level of function.      History:     Past Medical History:   Diagnosis Date    CAD (coronary artery disease)     Diabetes mellitus     Hypertension     PAD (peripheral artery disease)        Past Surgical History:   Procedure Laterality Date    CORONARY ARTERY BYPASS GRAFT (CABG) N/A 11/2/2023    Procedure: CORONARY ARTERY BYPASS GRAFT (CABG);  Surgeon: Heriberto Lara IV, MD;  Location: [facility];  Service: Cardiothoracic;  Laterality: N/A;    ENDOSCOPIC HARVEST OF VEIN N/A 11/2/2023    Procedure: SURGICAL PROCUREMENT, VEIN, ENDOSCOPIC;  Surgeon: Heriberto Lara IV, MD;  Location: [facility] OR;  Service: Cardiothoracic;  Laterality: N/A;    HYSTERECTOMY      LEFT HEART CATHETERIZATION Left 11/2/2023    Procedure: Left heart cath;  Surgeon: Puma Vera MD;  Location: [facility] CATH LAB;  Service: Cardiology;  Laterality: Left;  LHC +/- PCI VIA RRA    LUMPECTOMY, BREAST Right        Subjective     Orientation: Oriented x4    Chief Complaint: "I'm ready to go home."    Patient/Family Comments/goals: To go home    Respiratory Status: Room air    Patients cultural, spiritual, Baptist conflicts given the current situation: no       Objective:     Patient found up in chair with peripheral IV  upon OT entry to room. Pt's cousin present at bedside for scheduled family training.     Mobility   Patient completed:  Sit to Stand Transfer with independence with no assistive device  Stand to Sit Transfer with " independence with no assistive device  Toilet Transfer Step Transfer technique with independence with  no AD    Functional Mobility  Pt ambulated in/out bathroom with Indep and no AD.    ADLs   Current Status   Toileting Hygiene 6   Toilet Transfer 6     Limiting Factors for ADLs: endurance     LifeStyle Change and Education:   OT discussed pt's progress and overall level of assist with ADLs, IADLs, and functional transfers. OT also discussed sternal precautions, DME/AE needs and uses, home safety tips, how to safely use the gait belt when assisting pt, and energy conservation tips. OT also discussed the importance of ensuring pt stays active at home. Both pt and cousin voiced agreement and understanding. All questions/concerns addressed. Patient left up in chair with call button in reach.     Education provided: Roles and goals of OT, ADLs, transfer training, body mechanics, assistive device, modified goals, sequencing, safety precautions, fall prevention, post-op precautions, equipment recommendations, and home safety    Multidisciplinary Problems       Occupational Therapy Goals          Problem: Occupational Therapy    Goal Priority Disciplines Outcome Interventions   Occupational Therapy Goal     OT, PT/OT Ongoing, Progressing    Description: ADLs:  MET Pt to perform grooming tasks with Indep at seated level   MET Pt to perform feeding tasks with setup assist   Progressing/continue Pt to perform UB dressing with mod A    Progressing/continue Pt to perform LB dressing with mod A    MET Pt to perform putting on/off footwear task with max A  MET Pt to perform toileting with mod A  Pt to perform toileting with SBA  MET Pt to perform bathing with mod A  Pt to perform bathing with setup assist    Functional Transfers:  Pt to perform toilet transfers with min A and BSC Progressing/continue  Pt to perform a tub transfer with min A and TTB Progressing/continue                       Time Tracking     OT Received On:  11/21/23  Time In 1050     Time Out 1100  Total Time 10 min  Therapy Time: OT Individual: 10  Missed Time:    Missed Time Reason:      Billable Minutes: Self Care/Home Management 10    11/21/2023

## 2023-11-21 NOTE — PT/OT/SLP DISCHARGE
Physical Therapy Discharge Summary    Name: Geno Barry  MRN: [id number]   Principal Problem: S/P CABG (coronary artery bypass graft)     Patient Discharged from IRF Physical Therapy on 11/21/23.  Please refer to prior PT noted date on 11/19-11/21 for functional status.     Assessment:     Patient made good progress in mobility while at IRF. Pt was limited by decreased endurance and BLE strength, knee pain, and shortness of breath. Pt varied from CGA-Mod A with sit-to-stands depending on the height of the surface, but pt learned the proper technique during therapy.     Objective:     GOALS:   Multidisciplinary Problems       Physical Therapy Goals       Not on file              Multidisciplinary Problems (Resolved)          Problem: Physical Therapy    Goal Priority Disciplines Outcome Goal Variances Interventions   Physical Therapy Goal   (Resolved)     PT, PT/OT Met     Description: Bed Mobility:  Roll left and right with partial/moderate assist. - MET  Sit to supine transfer with partial/moderate assist. - MET  Supine to sit transfer with partial/moderate assist. - MET    Transfers:  Sit to stand transfer with partial/moderate assist using RW. - MET  Bed to chair transfer with partial/moderate assist Stand Step  using RW. - MET  Car transfer with substantial/maximal assist using RW. - MET   an object from the ground in standing position with substantial/maximal assist using RW. - MET    Mobility:  Ambulate 125 feet with supervision/touching assist using RW. - MET  Ambulate 30 feet on uneven surfaces/ramps with supervision/touching assist using RW. - MET  Pt ascended/descended a 4 inch curb with partial/moderate assist using RW. - discontinue  Ascend/descend 2 stairs with substantial/maximal assist using no handrails. - discontinue                       Care Scores:   Admission Assessment Current   Status  Discharge   Goal   Functional Area: Care Score:  Care Score:    Roll Left and Right 2 4 Independent    Sit to Lying 2 4 Independent   Lying to Sitting on Side of Bed 2 4 Independent   Sit to Stand 2 4 Independent   Chair/Bed-to-Chair Transfer 2 3 Independent   Car Transfer 1 3 Independent   Walk 10 Feet 4 4 Independent   Walk 50 Feet with Two Turns 4 4 Independent   Walk 150 Feet 88 4 Supervision or touching assistance   Walk 10 Feet Uneven Surface 4 4 Supervision or touching assistance   1 Step (Curb) 88 88 Supervision or touching assistance   4 Steps 9 9 Not applicable   12 Steps 9 9 Not applicable   Picking Up Object 88 4 Supervision or touching assistance   Wheel 50 Feet with Two Turns 88 88 Not attempted due to medical/safety concerns   Wheel 150 Feet 88 88 Not attempted due to medical/safety concerns       Reasons for Discontinuation of IRF Therapy Services  Satisfactory goal achievement.      Plan:     Patient Discharged to: Home with Home Health Service.    11/21/2023

## 2023-11-23 PROCEDURE — G0180 MD CERTIFICATION HHA PATIENT: HCPCS | Mod: ,,, | Performed by: SPECIALIST

## 2023-11-28 VITALS
RESPIRATION RATE: 18 BRPM | HEART RATE: 98 BPM | WEIGHT: 199.31 LBS | BODY MASS INDEX: 34.03 KG/M2 | OXYGEN SATURATION: 95 % | SYSTOLIC BLOOD PRESSURE: 126 MMHG | TEMPERATURE: 98 F | HEIGHT: 64 IN | DIASTOLIC BLOOD PRESSURE: 75 MMHG

## 2023-11-28 NOTE — PT/OT/SLP PROGRESS
"Occupational Therapy Inpatient Rehab Treatment    Name: Geno Barry  MRN: [id number]    Assessment:  Geno Barry is a 72 y.o. female admitted with a medical diagnosis of S/P CABG (coronary artery bypass graft).  She presents with the following impairments/functional limitations:  impaired endurance.    General Precautions: Standard, fall, sternal     Orthopedic Precautions:N/A     Braces: N/A    Rehab Prognosis: Good; patient would benefit from acute skilled OT services to address these deficits and reach maximum level of function.      History:     Past Medical History:   Diagnosis Date    CAD (coronary artery disease)     Diabetes mellitus     Hypertension     PAD (peripheral artery disease)        Past Surgical History:   Procedure Laterality Date    CORONARY ARTERY BYPASS GRAFT (CABG) N/A 11/2/2023    Procedure: CORONARY ARTERY BYPASS GRAFT (CABG);  Surgeon: Heriberto Lara IV, MD;  Location: [facility];  Service: Cardiothoracic;  Laterality: N/A;    ENDOSCOPIC HARVEST OF VEIN N/A 11/2/2023    Procedure: SURGICAL PROCUREMENT, VEIN, ENDOSCOPIC;  Surgeon: Heriberto Lara IV, MD;  Location: [facility] OR;  Service: Cardiothoracic;  Laterality: N/A;    HYSTERECTOMY      LEFT HEART CATHETERIZATION Left 11/2/2023    Procedure: Left heart cath;  Surgeon: Puma Vera MD;  Location: [facility] CATH LAB;  Service: Cardiology;  Laterality: Left;  LHC +/- PCI VIA RRA    LUMPECTOMY, BREAST Right        Subjective     Orientation: Oriented x4    Chief Complaint: "It's so cold."    Patient/Family Comments/goals: To gain independence and safety with ADLs and functional transfers.    Vitals   Before: 119/70 90P  After: 134/67 94P    Respiratory Status: Room air    Patients cultural, spiritual, Sikh conflicts given the current situation: no       Objective:     Patient found up in chair with peripheral IV  upon OT entry to room.    Functional Mobility  Pt used BLEs to self-propel in wheelchair from room 403 <> OT gym " with SBA for managing the wheelchair with turns.    ADLs   Current Status   Putting On, Taking Off Footwear 6     Therapeutic Activities  Pt participated in three games of Peg Nick. Pt able to tolerate standing with SBA with each trial. No loss of balance noted. Pt required mod-min A with standing and prolonged sitting breaks following each game completion.    LifeStyle Change and Education:     Patient left up in chair with call button in reach.     Education provided: Roles and goals of OT, ADLs, transfer training, body mechanics, assistive device, wheelchair precautions, modified goals, sequencing, safety precautions, fall prevention, equipment recommendations, and home safety    Multidisciplinary Problems       Occupational Therapy Goals          Problem: Occupational Therapy    Goal Priority Disciplines Outcome Interventions   Occupational Therapy Goal     OT, PT/OT Ongoing, Progressing    Description: ADLs:  MET Pt to perform grooming tasks with Indep at seated level   MET Pt to perform feeding tasks with setup assist   Progressing/continue Pt to perform UB dressing with mod A    Progressing/continue Pt to perform LB dressing with mod A    MET Pt to perform putting on/off footwear task with max A  MET Pt to perform toileting with mod A  Pt to perform toileting with SBA  MET Pt to perform bathing with mod A  Pt to perform bathing with setup assist    Functional Transfers:  Pt to perform toilet transfers with min A and BSC Progressing/continue  Pt to perform a tub transfer with min A and TTB Progressing/continue                       Time Tracking     OT Received On: 11/16/23  Time In 0900  Time Out 0930  Total Time 30  Therapy Time: 30  Missed Time:    Missed Time Reason:      Billable Minutes: Therapeutic Activity 30    11/28/2023

## 2023-11-28 NOTE — PT/OT/SLP DISCHARGE
Occupational Therapy Discharge Summary    Geno Barry  MRN: [id number]   Principal Problem: S/P CABG (coronary artery bypass graft)      Patient Discharged from acute Occupational Therapy on 11/21/23.    Assessment:      Goals partially met.    Objective:     GOALS:   Multidisciplinary Problems       Occupational Therapy Goals          Problem: Occupational Therapy    Goal Priority Disciplines Outcome Interventions   Occupational Therapy Goal     OT, PT/OT Ongoing, Progressing    Description: ADLs:  MET Pt to perform grooming tasks with Indep at seated level   MET Pt to perform feeding tasks with setup assist   Progressing/continue Pt to perform UB dressing with mod A    Progressing/continue Pt to perform LB dressing with mod A    MET Pt to perform putting on/off footwear task with max A  MET Pt to perform toileting with mod A  Pt to perform toileting with SBA  MET Pt to perform bathing with mod A  Pt to perform bathing with setup assist    Functional Transfers:  Pt to perform toilet transfers with min A and BSC Progressing/continue  Pt to perform a tub transfer with min A and TTB Progressing/continue                       Care Scores:   Admission Assessment Current Status Discharge  Goal   Functional Area: Care Score:  Care Score:    Eating 5 6 Set-up/clean-up   Oral Hygiene 4 6 Independent   Toileting Hygiene 1 6 Partial/moderate assistance   Shower/Bathe Self 2 5 Supervision or touching assistance   Upper Body Dressing 3 6 Partial/moderate assistance   Lower Body Dressing 1 5 Partial/moderate assistance   Putting On/Taking Off Footwear 1 6 Partial/moderate assistance   Toilet Transfer 3 6 Set-up/clean-up     Reasons for Discontinuation of Therapy Services   Satisfactory goal achievement.      Plan:     Patient Discharged to: Home with Home Health Service      11/28/2023

## 2023-12-05 ENCOUNTER — OFFICE VISIT (OUTPATIENT)
Dept: CARDIAC SURGERY | Facility: CLINIC | Age: 73
End: 2023-12-05
Payer: MEDICARE

## 2023-12-05 VITALS
OXYGEN SATURATION: 95 % | HEART RATE: 110 BPM | WEIGHT: 194 LBS | BODY MASS INDEX: 33.12 KG/M2 | DIASTOLIC BLOOD PRESSURE: 93 MMHG | HEIGHT: 64 IN | SYSTOLIC BLOOD PRESSURE: 166 MMHG

## 2023-12-05 DIAGNOSIS — Z95.1 STATUS POST CORONARY ARTERY BYPASS GRAFTING: Primary | ICD-10-CM

## 2023-12-05 PROCEDURE — 99024 PR POST-OP FOLLOW-UP VISIT: ICD-10-PCS | Mod: ,,, | Performed by: SPECIALIST

## 2023-12-05 PROCEDURE — 99024 POSTOP FOLLOW-UP VISIT: CPT | Mod: ,,, | Performed by: SPECIALIST

## 2023-12-05 RX ORDER — INDOMETHACIN 50 MG/1
50 CAPSULE ORAL
Qty: 9 CAPSULE | Refills: 0 | Status: SHIPPED | OUTPATIENT
Start: 2023-12-05 | End: 2023-12-08

## 2023-12-05 NOTE — PROGRESS NOTES
Patient returns about 1 month out from a CABG x2.  She had been doing very well until last night when she suddenly developed acute pain in her left forefoot and toe.  Her lungs are clear   Heart has a regular rate and rhythm  Abdomen is soft   Her incision has healed nicely in the sternum is stable   Her leg incision in the thigh has healed nicely  Her foot is very tender to the touch on the forefront.  She has excellent Doppler signals.  Overall I think she is having a gout flare-up  We will put her on some indomethacin 50 t.i.d. for a few days if this does not relieve the pain then she will follow up with her primary care doctor  Follow up long-term with cardiology   Return to clinic p.r.n.

## 2023-12-29 ENCOUNTER — EXTERNAL HOME HEALTH (OUTPATIENT)
Dept: HOME HEALTH SERVICES | Facility: HOSPITAL | Age: 73
End: 2023-12-29
Payer: MEDICARE

## 2024-02-01 ENCOUNTER — LAB SERVICES (OUTPATIENT)
Dept: LAB | Age: 74
End: 2024-02-01

## 2024-02-01 ENCOUNTER — APPOINTMENT (OUTPATIENT)
Dept: INTERNAL MEDICINE | Age: 74
End: 2024-02-01

## 2024-02-01 VITALS
HEIGHT: 63 IN | WEIGHT: 209.44 LBS | DIASTOLIC BLOOD PRESSURE: 78 MMHG | SYSTOLIC BLOOD PRESSURE: 165 MMHG | HEART RATE: 69 BPM | BODY MASS INDEX: 37.11 KG/M2 | TEMPERATURE: 98 F

## 2024-02-01 DIAGNOSIS — E11.9 TYPE 2 DIABETES MELLITUS WITHOUT COMPLICATION, WITHOUT LONG-TERM CURRENT USE OF INSULIN (CMD): ICD-10-CM

## 2024-02-01 DIAGNOSIS — Z00.00 HEALTH CARE MAINTENANCE: Primary | ICD-10-CM

## 2024-02-01 DIAGNOSIS — E55.9 VITAMIN D DEFICIENCY: ICD-10-CM

## 2024-02-01 DIAGNOSIS — I10 PRIMARY HYPERTENSION: ICD-10-CM

## 2024-02-01 DIAGNOSIS — Z23 IMMUNIZATION DUE: ICD-10-CM

## 2024-02-01 DIAGNOSIS — E66.9 OBESITY (BMI 30-39.9): ICD-10-CM

## 2024-02-01 DIAGNOSIS — M15.9 PRIMARY OSTEOARTHRITIS INVOLVING MULTIPLE JOINTS: ICD-10-CM

## 2024-02-01 PROBLEM — M15.0 PRIMARY OSTEOARTHRITIS INVOLVING MULTIPLE JOINTS: Status: ACTIVE | Noted: 2024-02-01

## 2024-02-01 LAB — GLUCOSE SERPL-MCNC: 97 MG/DL (ref 65–99)

## 2024-02-01 RX ORDER — LANCETS
EACH MISCELLANEOUS
Qty: 100 EACH | Refills: 3 | Status: SHIPPED | OUTPATIENT
Start: 2024-02-01

## 2024-02-01 RX ORDER — ATORVASTATIN CALCIUM 20 MG/1
20 TABLET, FILM COATED ORAL DAILY
COMMUNITY

## 2024-02-01 RX ORDER — LOSARTAN POTASSIUM AND HYDROCHLOROTHIAZIDE 12.5; 1 MG/1; MG/1
1 TABLET ORAL DAILY
Qty: 90 TABLET | Refills: 3 | Status: SHIPPED | OUTPATIENT
Start: 2024-02-01

## 2024-02-01 RX ORDER — DILTIAZEM HYDROCHLORIDE 120 MG/1
120 TABLET, FILM COATED ORAL DAILY
COMMUNITY

## 2024-02-01 RX ORDER — ALPRAZOLAM 0.25 MG/1
0.25 TABLET ORAL NIGHTLY PRN
COMMUNITY

## 2024-02-01 RX ORDER — BLOOD-GLUCOSE METER
1 EACH MISCELLANEOUS DAILY
Qty: 1 KIT | Refills: 0 | Status: SHIPPED | OUTPATIENT
Start: 2024-02-01

## 2024-02-01 ASSESSMENT — ENCOUNTER SYMPTOMS
RHINORRHEA: 0
BLOOD IN STOOL: 0
SHORTNESS OF BREATH: 0
COUGH: 0
VOICE CHANGE: 0
RECTAL PAIN: 0
BACK PAIN: 0
SINUS PAIN: 0
HALLUCINATIONS: 0
TROUBLE SWALLOWING: 0
CONSTIPATION: 0
DIAPHORESIS: 0
EYE REDNESS: 0
ABDOMINAL PAIN: 0
HEMATOLOGIC/LYMPHATIC NEGATIVE: 1
AGITATION: 0
ABDOMINAL DISTENTION: 0
TREMORS: 0
POLYPHAGIA: 0
EYE ITCHING: 0
EYE DISCHARGE: 0
FEVER: 0
SLEEP DISTURBANCE: 0
DIARRHEA: 0
WOUND: 0
HEADACHES: 0
PHOTOPHOBIA: 0
NAUSEA: 0
ANAL BLEEDING: 0
UNEXPECTED WEIGHT CHANGE: 0
APPETITE CHANGE: 0
ACTIVITY CHANGE: 0
DIZZINESS: 0
CHILLS: 0
WEAKNESS: 0
LIGHT-HEADEDNESS: 0
SINUS PRESSURE: 0
NERVOUS/ANXIOUS: 0
SORE THROAT: 0
CONFUSION: 0
SEIZURES: 0
EYE PAIN: 0
POLYDIPSIA: 0
FATIGUE: 0
APNEA: 0
WHEEZING: 0
VOMITING: 0

## 2024-02-01 ASSESSMENT — PATIENT HEALTH QUESTIONNAIRE - PHQ9
1. LITTLE INTEREST OR PLEASURE IN DOING THINGS: NOT AT ALL
SUM OF ALL RESPONSES TO PHQ9 QUESTIONS 1 AND 2: 1
CLINICAL INTERPRETATION OF PHQ2 SCORE: NO FURTHER SCREENING NEEDED
SUM OF ALL RESPONSES TO PHQ9 QUESTIONS 1 AND 2: 1
2. FEELING DOWN, DEPRESSED OR HOPELESS: SEVERAL DAYS

## 2024-02-02 ENCOUNTER — OFFICE VISIT (OUTPATIENT)
Dept: NUTRITION | Age: 74
End: 2024-02-02
Attending: INTERNAL MEDICINE

## 2024-02-02 DIAGNOSIS — E11.9 TYPE 2 DIABETES MELLITUS WITHOUT COMPLICATION, WITHOUT LONG-TERM CURRENT USE OF INSULIN (CMD): Primary | ICD-10-CM

## 2024-02-02 LAB
25(OH)D3+25(OH)D2 SERPL-MCNC: 22.4 NG/ML (ref 30–100)
ALBUMIN SERPL-MCNC: 3.9 G/DL (ref 3.6–5.1)
ALBUMIN/GLOB SERPL: 1.3 {RATIO} (ref 1–2.4)
ALP SERPL-CCNC: 89 UNITS/L (ref 45–117)
ALT SERPL-CCNC: 15 UNITS/L
ANION GAP SERPL CALC-SCNC: 7 MMOL/L (ref 7–19)
AST SERPL-CCNC: 9 UNITS/L
BASOPHILS # BLD: 0.1 K/MCL (ref 0–0.3)
BASOPHILS NFR BLD: 1 %
BILIRUB SERPL-MCNC: 0.3 MG/DL (ref 0.2–1)
BUN SERPL-MCNC: 13 MG/DL (ref 6–20)
BUN/CREAT SERPL: 26 (ref 7–25)
CALCIUM SERPL-MCNC: 9.5 MG/DL (ref 8.4–10.2)
CHLORIDE SERPL-SCNC: 110 MMOL/L (ref 97–110)
CHOLEST SERPL-MCNC: 170 MG/DL
CHOLEST/HDLC SERPL: 2.7 {RATIO}
CO2 SERPL-SCNC: 27 MMOL/L (ref 21–32)
CREAT SERPL-MCNC: 0.5 MG/DL (ref 0.51–0.95)
DEPRECATED RDW RBC: 44.4 FL (ref 39–50)
EGFRCR SERPLBLD CKD-EPI 2021: >90 ML/MIN/{1.73_M2}
EOSINOPHIL # BLD: 0.2 K/MCL (ref 0–0.5)
EOSINOPHIL NFR BLD: 2 %
ERYTHROCYTE [DISTWIDTH] IN BLOOD: 14.7 % (ref 11–15)
FASTING DURATION TIME PATIENT: ABNORMAL H
GLOBULIN SER-MCNC: 3 G/DL (ref 2–4)
GLUCOSE SERPL-MCNC: 95 MG/DL (ref 70–99)
HBA1C MFR BLD: 6.5 % (ref 4.5–5.6)
HCT VFR BLD CALC: 43.4 % (ref 36–46.5)
HDLC SERPL-MCNC: 64 MG/DL
HGB BLD-MCNC: 13.9 G/DL (ref 12–15.5)
IMM GRANULOCYTES # BLD AUTO: 0 K/MCL (ref 0–0.2)
IMM GRANULOCYTES # BLD: 0 %
LDLC SERPL CALC-MCNC: 89 MG/DL
LYMPHOCYTES # BLD: 2.8 K/MCL (ref 1–4)
LYMPHOCYTES NFR BLD: 33 %
MCH RBC QN AUTO: 26.5 PG (ref 26–34)
MCHC RBC AUTO-ENTMCNC: 32 G/DL (ref 32–36.5)
MCV RBC AUTO: 82.8 FL (ref 78–100)
MONOCYTES # BLD: 0.7 K/MCL (ref 0.3–0.9)
MONOCYTES NFR BLD: 9 %
NEUTROPHILS # BLD: 4.8 K/MCL (ref 1.8–7.7)
NEUTROPHILS NFR BLD: 55 %
NONHDLC SERPL-MCNC: 106 MG/DL
NRBC BLD MANUAL-RTO: 0 /100 WBC
PLATELET # BLD AUTO: 222 K/MCL (ref 140–450)
POTASSIUM SERPL-SCNC: 3.9 MMOL/L (ref 3.4–5.1)
PROT SERPL-MCNC: 6.9 G/DL (ref 6.4–8.2)
RBC # BLD: 5.24 MIL/MCL (ref 4–5.2)
SODIUM SERPL-SCNC: 140 MMOL/L (ref 135–145)
TRIGL SERPL-MCNC: 84 MG/DL
WBC # BLD: 8.5 K/MCL (ref 4.2–11)

## 2024-02-07 ENCOUNTER — LAB SERVICES (OUTPATIENT)
Dept: LAB | Age: 74
End: 2024-02-07

## 2024-02-07 DIAGNOSIS — Z00.00 HEALTH CARE MAINTENANCE: ICD-10-CM

## 2024-02-08 LAB — HEMOCCULT STL QL: NEGATIVE

## 2024-02-12 DIAGNOSIS — Z00.00 HEALTH CARE MAINTENANCE: Primary | ICD-10-CM

## 2024-02-21 ENCOUNTER — APPOINTMENT (OUTPATIENT)
Dept: INTERNAL MEDICINE | Age: 74
End: 2024-02-21

## 2024-02-21 VITALS
WEIGHT: 209 LBS | TEMPERATURE: 97.2 F | SYSTOLIC BLOOD PRESSURE: 136 MMHG | HEIGHT: 63 IN | HEART RATE: 54 BPM | BODY MASS INDEX: 37.03 KG/M2 | OXYGEN SATURATION: 99 % | DIASTOLIC BLOOD PRESSURE: 61 MMHG | RESPIRATION RATE: 16 BRPM

## 2024-02-21 DIAGNOSIS — I10 PRIMARY HYPERTENSION: ICD-10-CM

## 2024-02-21 DIAGNOSIS — Z23 NEED FOR VACCINATION: ICD-10-CM

## 2024-02-21 DIAGNOSIS — E66.9 OBESITY (BMI 30-39.9): ICD-10-CM

## 2024-02-21 DIAGNOSIS — M15.9 PRIMARY OSTEOARTHRITIS INVOLVING MULTIPLE JOINTS: ICD-10-CM

## 2024-02-21 DIAGNOSIS — E55.9 VITAMIN D DEFICIENCY: ICD-10-CM

## 2024-02-21 DIAGNOSIS — E11.9 TYPE 2 DIABETES MELLITUS WITHOUT COMPLICATION, WITHOUT LONG-TERM CURRENT USE OF INSULIN (CMD): Primary | ICD-10-CM

## 2024-02-21 LAB — GLUCOSE BLD-MCNC: 96 MG/DL (ref 65–99)

## 2024-02-21 ASSESSMENT — ENCOUNTER SYMPTOMS
APNEA: 0
WHEEZING: 0
SHORTNESS OF BREATH: 0
EYE REDNESS: 0
RECTAL PAIN: 0
HEMATOLOGIC/LYMPHATIC NEGATIVE: 1
TROUBLE SWALLOWING: 0
AGITATION: 0
ANAL BLEEDING: 0
HEADACHES: 0
CONSTIPATION: 0
BACK PAIN: 0
DIARRHEA: 0
EYE PAIN: 0
DIAPHORESIS: 0
TREMORS: 0
POLYDIPSIA: 0
ACTIVITY CHANGE: 0
WOUND: 0
FEVER: 0
FATIGUE: 0
CHILLS: 0
COUGH: 0
NERVOUS/ANXIOUS: 0
SORE THROAT: 0
RHINORRHEA: 0
SEIZURES: 0
WEAKNESS: 0
SINUS PAIN: 0
VOICE CHANGE: 0
BLOOD IN STOOL: 0
UNEXPECTED WEIGHT CHANGE: 0
ABDOMINAL DISTENTION: 0
VOMITING: 0
LIGHT-HEADEDNESS: 0
EYE ITCHING: 0
ABDOMINAL PAIN: 0
EYE DISCHARGE: 0
CONFUSION: 0
APPETITE CHANGE: 0
SLEEP DISTURBANCE: 0
PHOTOPHOBIA: 0
NAUSEA: 0
SINUS PRESSURE: 0
DIZZINESS: 0
POLYPHAGIA: 0
HALLUCINATIONS: 0

## 2024-02-21 ASSESSMENT — PATIENT HEALTH QUESTIONNAIRE - PHQ9
CLINICAL INTERPRETATION OF PHQ2 SCORE: NO FURTHER SCREENING NEEDED
1. LITTLE INTEREST OR PLEASURE IN DOING THINGS: NOT AT ALL
SUM OF ALL RESPONSES TO PHQ9 QUESTIONS 1 AND 2: 0
SUM OF ALL RESPONSES TO PHQ9 QUESTIONS 1 AND 2: 0
2. FEELING DOWN, DEPRESSED OR HOPELESS: NOT AT ALL

## 2024-02-21 ASSESSMENT — PAIN SCALES - GENERAL: PAINLEVEL: 0

## 2024-02-28 ENCOUNTER — APPOINTMENT (OUTPATIENT)
Dept: MAMMOGRAPHY | Age: 74
End: 2024-02-28
Attending: INTERNAL MEDICINE

## 2024-03-22 ENCOUNTER — EXTERNAL RECORD (OUTPATIENT)
Dept: HEALTH INFORMATION MANAGEMENT | Facility: OTHER | Age: 74
End: 2024-03-22

## 2024-03-22 RX ORDER — DILTIAZEM HYDROCHLORIDE 120 MG/1
120 TABLET, FILM COATED ORAL DAILY
Qty: 90 TABLET | Refills: 0 | Status: SHIPPED | OUTPATIENT
Start: 2024-03-22

## 2024-03-25 NOTE — H&P
[facility] - [facility] ICU  Pulmonary Critical Care Note    Patient Name: Geno Barry  MRN: [id number]  Admission Date: 11/2/2023  Hospital Length of Stay: 0 days  Code Status: Full Code  Attending Provider: Heriberto Lara IV, MD  Primary Care Provider: Ninoska, Primary Doctor     Subjective:     HPI:   Geno Barry is a 71 yo female with PMH of CAD, CVA, PAD, HTN and DM who presents to [facility] for scheduled LHC. Before presentation, patient was having shortness of breath along with chest pain. She had a positive Pamela scan on 10/26/23 with a large reversible anterior apical, medium reversible septic and medium reversible inferior defect. During LHC today, patient was noted to have severe multivessel CAD and Cardiovascular surgery was consulted for CABG. Patient underwent CABGx2 (lima to LAD & reverse saphenous to 1st diagonal) without complication and is being upgraded to ICU for close surveillance & post-CABG protocol.    Hospital Course/Significant events:  11/2/23: Admitted to ICU s/p CABG    24 Hour Interval History:  N/A    Past Medical History:   Diagnosis Date    CAD (coronary artery disease)     Diabetes mellitus     Hypertension     PAD (peripheral artery disease)        Past Surgical History:   Procedure Laterality Date    HYSTERECTOMY      LUMPECTOMY, BREAST Right        Social History     Socioeconomic History    Marital status:            Current Outpatient Medications   Medication Instructions    amLODIPine (NORVASC) 10 mg, Oral, Daily    aspirin (ECOTRIN) 81 mg, Oral, Daily    cholecalciferol (vitamin D3) (VITAMIN D3) 400 Units, Oral, Daily    diphenhydrAMINE (BENADRYL) 25 mg, Oral, Once, 2 tabs at 6pm, midnight, and 6am morning of procedure    ezetimibe (ZETIA) 10 mg, Oral, Daily    famotidine (PEPCID) 40 mg, Oral, Once, Take 1 tab at 6pm, midnight, and 6am morning of procedure     glipiZIDE (GLUCOTROL) 5 mg, Oral, With breakfast    HYDROcodone-acetaminophen (NORCO) 7.5-325 mg per  tablet 1 tablet, Oral, 3 times daily PRN    ibuprofen (ADVIL,MOTRIN) 800 mg, Oral, Every 6 hours PRN    meloxicam (MOBIC) 15 mg, Oral, Daily    metoprolol succinate (TOPROL-XL) 25 mg, Oral, Daily    pantoprazole (PROTONIX) 20 mg, Oral, Daily    predniSONE (DELTASONE) 50 mg, Oral, Once, Take 1 tab at 6pm, midnight, and 6am morning of procedure    rosuvastatin (CRESTOR) 10 mg, Oral, Daily    SITagliptan-metformin (JANUMET) 50-1,000 mg per tablet 1 tablet, Oral, 2 times daily with meals       Current Inpatient Medications   amLODIPine  10 mg Oral Daily    aspirin  81 mg Oral Daily    [START ON 11/3/2023] ceFAZolin (ANCEF) IVPB  2 g Intravenous Q12H    [START ON 11/3/2023] docusate sodium  100 mg Oral BID    [START ON 11/3/2023] enoxparin  40 mg Subcutaneous Daily    ezetimibe  10 mg Oral Daily    [START ON 11/3/2023] famotidine (PF)  20 mg Intravenous Daily    [START ON 11/3/2023] folic acid  1 mg Oral Daily    [START ON 11/3/2023] metoprolol tartrate  12.5 mg Oral BID    mupirocin   Nasal BID    [START ON 11/3/2023] sucralfate  1 g Oral QID (AC & HS)       Current Intravenous Infusions   dexmedeTOMIDine (Precedex) infusion (titrating) 1 mcg/kg/hr (11/02/23 2000)    dextrose 5 % and 0.45 % NaCl      EPINEPHrine Stopped (11/02/23 2130)    insulin regular 1 units/mL infusion orderable (CTS POST-OP)      loperamide           Review of Systems   Unable to perform ROS: Intubated          Objective:       Intake/Output Summary (Last 24 hours) at 11/2/2023 2151  Last data filed at 11/2/2023 2019  Gross per 24 hour   Intake 938 ml   Output 900 ml   Net 38 ml         Vital Signs (Most Recent):  Temp: 97.9 °F (36.6 °C) (11/02/23 2050)  Pulse: 78 (11/02/23 2050)  Resp: 16 (11/02/23 2050)  BP: 110/60 (11/02/23 2050)  SpO2: 96 % (11/02/23 2050)  Body mass index is 31.66 kg/m².  Weight: 86.2 kg (190 lb) Vital Signs (24h Range):  Temp:  [97.9 °F (36.6 °C)-98.6 °F (37 °C)] 97.9 °F (36.6 °C)  Pulse:  [63-81] 78  Resp:  [9-24] 16  SpO2:   [95 %-100 %] 96 %  BP: (110-177)/() 110/60     Physical Exam  Constitutional:       General: She is not in acute distress.     Appearance: She is not ill-appearing or toxic-appearing.      Comments: Intubated, not sedated, not reverse procedural only, in no acute distress, on mechanical ventilation   HENT:      Head: Normocephalic and atraumatic.      Mouth/Throat:      Comments: Endotracheal tube secured in place  Neck:      Comments: Right IJ triple-lumen central line in place without surrounding skin breakdown/erythema or oozing/bleeding  Cardiovascular:      Rate and Rhythm: Normal rate and regular rhythm.      Heart sounds: Normal heart sounds.      Comments: Nonpalpable left DP though dopplerable.  Pulmonary:      Effort: Pulmonary effort is normal. No respiratory distress.      Breath sounds: Normal breath sounds. No wheezing, rhonchi or rales.      Comments: Mechanically ventilated  Abdominal:      Palpations: Abdomen is soft.      Comments: Hypoactive bowel sounds   Musculoskeletal:         General: No swelling.      Right lower leg: No edema.      Left lower leg: No edema.   Skin:     General: Skin is warm and dry.      Comments: Left foot cold to touch with x2 darkened nailbeds   Neurological:      Comments: Patient intubated, not sedated, not reversed postprocedurally.  Pupils 2.5 mm, reactive B/L.  Unable to complete full neurological exam secondary to above           Lines/Drains/Airways       Central Venous Catheter Line  Duration             Percutaneous Central Line Insertion/Assessment - Double Lumen  11/02/23 1836 Internal Jugular Right <1 day              Drain  Duration                  Chest Tube 11/02/23 1933 Mediastinal 24 Fr. <1 day    Female External Urinary Catheter 11/02/23 0930 <1 day              Airway  Duration                  Airway - Non-Surgical 11/02/23 1710 <1 day              Peripheral Intravenous Line  Duration                  Peripheral IV - Single Lumen 11/02/23  0715 22 G Posterior;Right Hand <1 day                    Significant Labs:    Lab Results   Component Value Date    WBC 14.48 (H) 11/02/2023    HGB 10.3 (L) 11/02/2023    HCT 29 (L) 11/02/2023    MCV 91.3 11/02/2023     11/02/2023           BMP  Lab Results   Component Value Date     11/02/2023    K 3.3 (L) 11/02/2023    CHLORIDE 112 (H) 11/02/2023    CO2 17 (L) 11/02/2023    BUN 26.6 (H) 11/02/2023    CREATININE 0.93 11/02/2023    CALCIUM 9.9 11/02/2023    AGAP 9.0 11/02/2023    EGFRNONAA 47 08/05/2021         ABG  Recent Labs   Lab 11/02/23 2138   PH 7.340*   PO2 71.0*   PCO2 41.0   HCO3 22.1   POCBASEDEF -3.50*       Mechanical Ventilation Support:  Vent Mode: SIMV (VC) + PS (11/02/23 2048)  Ventilator Initiated: Yes (11/02/23 2048)  Set Rate: 18 BPM (11/02/23 2048)  Vt Set: 450 mL (11/02/23 2048)  Pressure Support: 10 cmH20 (11/02/23 2048)  PEEP/CPAP: 5 cmH20 (11/02/23 2048)  Oxygen Concentration (%): 60 (11/02/23 2048)  Peak Airway Pressure: 23 cmH20 (11/02/23 2048)  Total Ve: 7.3 L/m (11/02/23 2048)  F/VT Ratio<105 (RSBI): (!) 45.45 (11/02/23 2048)      Significant Imaging:  I have reviewed the pertinent imaging within the past 24 hours.        Assessment/Plan:     Assessment  Severe CAD s/p CABG x2  CVA  PAD  HTN  DM    Plan  - Admitted to the ICU s/p CABG for close monitoring  - CT Surgery following  - Continue CABG protocol  - Currently requiring insulin gtt at 8 units/hour 2/2 elevated CABG intra and postoperatively  - Patient arrived to ICU with epinephrine paused, has not required since presentation  - Patient not reversed postprocedurally, continue Precedex at this time, wean as tolerated  - Extubate as tolerated.  - Replete electrolytes as necessary  - Will closely follow from a critical care aspect  - In setting of above who left foot with x3 dark in nailbeds and dopplerable dorsalis pedis; patient benefit from arterial ultrasound/ABIs for further evaluation as none appreciated during  chart review    DVT Prophylaxis: Famotidine 20mg q.D  GI Prophylaxis: Lovenox 40mg q.D.     32 minutes of critical care was time spent personally by me on the following activities: development of treatment plan with patient or surrogate and bedside caregivers, discussions with consultants, evaluation of patient's response to treatment, examination of patient, ordering and performing treatments and interventions, ordering and review of laboratory studies, ordering and review of radiographic studies, pulse oximetry, re-evaluation of patient's condition.  This critical care time did not overlap with that of any other provider or involve time for any procedures.     Cisco Farrell MD  Pulmonary Critical Care Medicine  [facility] - [facility] ICU  DOS: 11/02/2023        room air

## 2024-05-01 ENCOUNTER — APPOINTMENT (OUTPATIENT)
Dept: INTERNAL MEDICINE | Age: 74
End: 2024-05-01

## 2024-05-13 NOTE — CONSULTS
ANNUAL EXAM NOTE      HISTORY    Meng Diego is a 63 year old male who presents for an annual exam.        MEDICAL HISTORY    Patient Active Problem List    Diagnosis Date Noted    Skin flap necrosis  (CMD) 08/26/2014     Priority: Low    Degloving injury of foot 08/13/2014     Priority: Low    Activity involving water skiing or wake boarding 08/13/2014     Priority: Low          MEDICATIONS    Current Outpatient Medications   Medication Sig    tadalafil (CIALIS) 5 MG tablet Take 1 tablet by mouth daily as needed for Erectile Dysfunction.    sildenafil (REVATIO) 20 MG tablet TAKE 1 TO 4 TABLETS BY MOUTH DAILY 30 TO 60 MINUTES BEFORE SEXUAL ACTIVITY AS NEEDED    fenofibrate (TRICOR) 145 MG tablet TAKE 1 TABLET BY MOUTH DAILY (Patient not taking: Reported on 4/19/2024)    Glucosamine-Chondroitin (GLUCOSAMINE CHONDR COMPLEX PO) Take 1 tablet by mouth daily.    Omega-3 Fatty Acids (Fish Oil) 1000 MG capsule Take 2 g by mouth daily.    VITAMIN D, CHOLECALCIFEROL, PO Take 1 capsule by mouth daily.     No current facility-administered medications for this visit.       ALLERGIES:  No Known Allergies    SOCIAL HISTORY    Meng reports that he has never smoked. He has never used smokeless tobacco.      Family History   Problem Relation Age of Onset    Cancer Mother     Emphysema Father     Diabetes Sister     Stroke Maternal Grandfather     Cancer Paternal Grandmother     Cancer Paternal Grandfather     Heart Sister        REVIEW OF SYSTEMS    Constitutional:  Denies fevers or chills  Eyes:  Denies blurred vision or double vision.    HENT:  Denies facial pain, ear pain, hearing loss,  sinus pain, or sore throat.    Respiratory:  Denies SOB (shortness of breath), cough  Cardiovascular:  Denies chest pains or palpitations  Gastrointestinal:  Denies abdominal pain, heartburn, nausea, vomiting, diarrhea, constipation or blood in stool.    Musculoskeletal:  No new joint/muscle pain  Neurologic:  Denies numbness, tingling,  [facility]  Cardiothoracic Surgery  Consult Note    Patient Name: Geno Barry  MRN: [id number]  Admission Date: 11/2/2023  Attending Physician: Puma Vera MD  Referring Provider: Puma Vera MD    Patient information was obtained from patient, past medical records and ER records.     Consults  Subjective:     Principal Problem: <principal problem not specified>    History of Present Illness: Ms. Barry is a 73yo female with PMH Of HTN, CAD, cerebrovascular disease, PAD and diabetes mellitus.  She c/o intermittent chest pain with activity over the last few months with activity. Describes it as a heaviness. She underwent a LHC this morning revealing severe multivessel CAD.  CV surgery has been consulted for coronary artery bypass grafting. She ambulates with a rolling walker for balance.      No current facility-administered medications on file prior to encounter.     Current Outpatient Medications on File Prior to Encounter   Medication Sig    aspirin (ECOTRIN) 81 MG EC tablet Take 81 mg by mouth once daily.    cholecalciferol, vitamin D3, (VITAMIN D3) 10 mcg (400 unit) Cap capsule Take 400 Units by mouth once daily.    diphenhydrAMINE (BENADRYL) 25 mg capsule Take 25 mg by mouth once. 2 tabs at 6pm, midnight, and 6am morning of procedure    ezetimibe (ZETIA) 10 mg tablet Take 10 mg by mouth once daily.    famotidine (PEPCID) 40 MG tablet Take 40 mg by mouth once. Take 1 tab at 6pm, midnight, and 6am morning of procedure    glipiZIDE (GLUCOTROL) 10 MG TR24 Take 5 mg by mouth daily with breakfast.    ibuprofen (ADVIL,MOTRIN) 800 MG tablet Take 800 mg by mouth every 6 (six) hours as needed for Pain.    metoprolol succinate (TOPROL-XL) 25 MG 24 hr tablet Take 25 mg by mouth once daily.    pantoprazole (PROTONIX) 20 MG tablet Take 20 mg by mouth once daily.    predniSONE (DELTASONE) 50 MG Tab Take 50 mg by mouth once. Take 1 tab at 6pm, midnight, and 6am morning of  other sensory changes  Genitourinary:  Denies urinary frequency, nocturia, hematuria.    Integument:  Denies new rashes or lesions.    Psychiatric:  Denies anxiety, depression  Allergic/Immunologic:  Denies recurrent infections, hypersensitivity.      All other Review of Systems reviewed and are negative.    PHYSICAL EXAM    Visit Vitals  BP (!) 132/94 (BP Location: LUE - Left upper extremity, Patient Position: Sitting, Cuff Size: Large Adult)   Pulse 94   Temp 98.2 °F (36.8 °C) (Other (comment))   Ht 5' 11\" (1.803 m)   Wt 93.1 kg (205 lb 4.8 oz)   SpO2 95%   BMI 28.63 kg/m²     General:  Well developed, alert/oriented x 3. No acute distress.    Eyes:  PERRL (Pupils equal, round, reactive to light), EOMI (extraocular movements intact). Conjunctivae pink. Sclerae anicteric.    HENT:  Normocephalic, atraumatic. Bilateral external ears are normal. Mucosal membranes moist. External nose is normal. Oropharynx is clear.    Neck:  Supple. Nontender. Normal range of motion. No masses. Trachea midline.  Respiratory:  Normal respiratory effort. No chest wall tenderness. Lungs clear to auscultation bilaterally. Symmetrical chest expansion.    Cardiovascular:  Regular rate and rhythm. No murmurs, rubs or gallops. Normal S1 and S2. No S3 or S4. No JVD (jugular venous distention). No carotid bruits. Good dorsalis pedis pulses bilaterally. No peripheral edema.   Gastrointestinal:  Soft. Nontender. Nondistended. Normal bowel sounds. No pulsatile or other abdominal masses. No hepatosplenomegaly or splenomegaly.    Musculoskeletal:  No clubbing or cyanosis.   Neurologic:  Gait is normal. Cranial nerves II-XII intact.   Integumentary:  Warm, clear no rashes or jaundice.    Lymphatic:  No lymphadenopathy  Psychiatric: Cooperative. Appropriate mood and affect. Normal judgment.     RESULTS    Pertinent labs and imaging studies reviewed.      ASSESSMENT & PLAN    1. Annual physical exam  Counseled regarding diet and exercise  - Basic  procedure    rosuvastatin (CRESTOR) 40 MG Tab Take 10 mg by mouth once daily.    SITagliptan-metformin (JANUMET) 50-1,000 mg per tablet Take 1 tablet by mouth 2 (two) times daily with meals.    amLODIPine (NORVASC) 10 MG tablet Take 10 mg by mouth once daily.    HYDROcodone-acetaminophen (NORCO) 7.5-325 mg per tablet Take 1 tablet by mouth 3 (three) times daily as needed for Pain.    meloxicam (MOBIC) 15 MG tablet Take 15 mg by mouth once daily.       Review of patient's allergies indicates:   Allergen Reactions    Ace inhibitors Swelling    Clopidogrel Swelling    Iodine        Past Medical History:   Diagnosis Date    CAD (coronary artery disease)     Diabetes mellitus     Hypertension     PAD (peripheral artery disease)      Past Surgical History:   Procedure Laterality Date    HYSTERECTOMY      LUMPECTOMY, BREAST Right      Family History    None       Tobacco Use    Smoking status: Not on file    Smokeless tobacco: Not on file   Substance and Sexual Activity    Alcohol use: Not on file    Drug use: Not on file    Sexual activity: Not on file     Review of Systems   Constitutional:  Positive for fatigue. Negative for fever.   HENT: Negative.     Respiratory:  Negative for shortness of breath.    Cardiovascular:  Positive for chest pain.   Gastrointestinal: Negative.    Genitourinary: Negative.    Musculoskeletal: Negative.    Neurological: Negative.    Psychiatric/Behavioral: Negative.       Objective:     Vital Signs (Most Recent):  Temp: 98.6 °F (37 °C) (11/02/23 0649)  Pulse: 73 (11/02/23 0845)  BP: (!) 150/85 (11/02/23 0845)  SpO2: 100 % (11/02/23 0845) Vital Signs (24h Range):  Temp:  [98.6 °F (37 °C)] 98.6 °F (37 °C)  Pulse:  [71-81] 73  SpO2:  [98 %-100 %] 100 %  BP: (129-176)/(70-89) 150/85     Weight: 86.5 kg (190 lb 11.2 oz)  Body mass index is 31.73 kg/m².    SpO2: 100 %        Intake/Output - Last 3 Shifts       None             Lines/Drains/Airways       Drain  Duration             Female External  Metabolic Panel; Future    2. Screening for prostate cancer    - PSA; Future    3. Screening cholesterol level    - Lipid Panel With Reflex; Future    4. Hyperglycemia    - Glycohemoglobin; Future          Urinary Catheter 11/02/23 0930 <1 day              Peripheral Intravenous Line  Duration                  Peripheral IV - Single Lumen 11/02/23 0715 22 G Posterior;Right Hand <1 day                     STS Risk Score: not calculated     Physical Exam  Constitutional:       General: She is not in acute distress.  HENT:      Head: Normocephalic and atraumatic.      Mouth/Throat:      Mouth: Mucous membranes are moist.   Eyes:      Extraocular Movements: Extraocular movements intact.      Conjunctiva/sclera: Conjunctivae normal.   Cardiovascular:      Rate and Rhythm: Normal rate and regular rhythm.   Pulmonary:      Effort: Pulmonary effort is normal.      Breath sounds: Normal breath sounds.   Abdominal:      General: Bowel sounds are normal.      Palpations: Abdomen is soft.   Musculoskeletal:         General: Normal range of motion.   Skin:     General: Skin is warm.   Neurological:      General: No focal deficit present.      Mental Status: She is alert.   Psychiatric:         Mood and Affect: Mood normal.            Significant Labs:  All pertinent labs from the last 24 hours have been reviewed.    Significant Diagnostics:  I have reviewed all pertinent imaging results/findings within the past 24 hours.      Assessment/Plan:  Severe multivessel CAD  HTN  HLD  Diabetes mellitus    Planning CABG today  NPO       NYHA Score:  per CIS    No notes have been filed under this hospital service.  Service: Cardiothoracic Surgery       Procedures performed:  1. Ultrasound-guided right radial access  2. Coronary angiography  3. Left ventricular hemodynamics  4. TR band access site hemostasis     Findings:  1. Left main-luminal irregularities  2. Lad-mid subtotal occlusion involving large diagonal  3. LCX-non dominant, luminal irregularities  4. RCA-dominant, 40% ostial stenosis  5. LVEDP 18     Procedure detail:  Ultrasound-guided right radial access obtained.  Sheath inserted.  Vasodilators and heparin administered.  Tiger  catheter used for left ventricular hemodynamics.  Catheter used for selective left and right coronary angiography.  Catheter was removed and a TR band was utilized for access site hemostasis.     Plan:  1. Maximize medical management  2. Consult CT surgery to evaluate for inpatient 2 vessel CABG  Thank you for your consult. I will follow-up with patient. Please contact us if you have any additional questions.    FARZAD Ledezma  Cardiothoracic Surgery  [facility]    As above  Patient with two-vessel coronary artery disease although the data somewhat small.  She is good left ventricular function is a good candidate for coronary artery bypass grafting.  I have discussed the risks benefits and alternatives in great detail with her.  She understands and would like to proceed.  We are going to plan for surgery later today.

## 2024-05-20 ENCOUNTER — APPOINTMENT (OUTPATIENT)
Dept: INTERNAL MEDICINE | Age: 74
End: 2024-05-20

## 2024-05-20 VITALS
HEART RATE: 67 BPM | HEIGHT: 63 IN | BODY MASS INDEX: 38.28 KG/M2 | TEMPERATURE: 98 F | WEIGHT: 216.05 LBS | SYSTOLIC BLOOD PRESSURE: 129 MMHG | DIASTOLIC BLOOD PRESSURE: 58 MMHG

## 2024-05-20 DIAGNOSIS — Z86.59 HISTORY OF BEHAVIORAL AND MENTAL HEALTH PROBLEMS: ICD-10-CM

## 2024-05-20 DIAGNOSIS — E66.9 OBESITY (BMI 30-39.9): ICD-10-CM

## 2024-05-20 DIAGNOSIS — M15.9 PRIMARY OSTEOARTHRITIS INVOLVING MULTIPLE JOINTS: ICD-10-CM

## 2024-05-20 DIAGNOSIS — E55.9 VITAMIN D DEFICIENCY: ICD-10-CM

## 2024-05-20 DIAGNOSIS — E11.9 TYPE 2 DIABETES MELLITUS WITHOUT COMPLICATION, WITHOUT LONG-TERM CURRENT USE OF INSULIN  (CMD): Primary | ICD-10-CM

## 2024-05-20 DIAGNOSIS — I10 PRIMARY HYPERTENSION: ICD-10-CM

## 2024-05-20 LAB — GLUCOSE BLD-MCNC: 118 MG/DL (ref 65–99)

## 2024-05-20 RX ORDER — ATORVASTATIN CALCIUM 20 MG/1
20 TABLET, FILM COATED ORAL DAILY
Qty: 90 TABLET | Refills: 3 | Status: SHIPPED | OUTPATIENT
Start: 2024-05-20

## 2024-05-20 RX ORDER — DILTIAZEM HYDROCHLORIDE 120 MG/1
120 TABLET, FILM COATED ORAL DAILY
Qty: 90 TABLET | Refills: 3 | Status: SHIPPED | OUTPATIENT
Start: 2024-05-20

## 2024-05-20 ASSESSMENT — ENCOUNTER SYMPTOMS
BLOOD IN STOOL: 0
DIARRHEA: 0
ANAL BLEEDING: 0
VOMITING: 0
WOUND: 0
HALLUCINATIONS: 0
SORE THROAT: 0
ACTIVITY CHANGE: 0
TREMORS: 0
EYE ITCHING: 0
POLYPHAGIA: 0
POLYDIPSIA: 0
SHORTNESS OF BREATH: 0
WEAKNESS: 0
ABDOMINAL DISTENTION: 0
DIAPHORESIS: 0
EYE PAIN: 0
HEADACHES: 0
RECTAL PAIN: 0
FATIGUE: 0
SINUS PRESSURE: 0
PHOTOPHOBIA: 0
CONSTIPATION: 0
ABDOMINAL PAIN: 0
APPETITE CHANGE: 0
EYE DISCHARGE: 0
APNEA: 0
BACK PAIN: 0
CHILLS: 0
VOICE CHANGE: 0
SINUS PAIN: 0
DIZZINESS: 0
COUGH: 0
HEMATOLOGIC/LYMPHATIC NEGATIVE: 1
TROUBLE SWALLOWING: 0
WHEEZING: 0
AGITATION: 0
UNEXPECTED WEIGHT CHANGE: 0
LIGHT-HEADEDNESS: 0
CONFUSION: 0
NERVOUS/ANXIOUS: 0
SLEEP DISTURBANCE: 0
NAUSEA: 0
EYE REDNESS: 0
SEIZURES: 0
FEVER: 0
RHINORRHEA: 0

## 2024-05-20 ASSESSMENT — PAIN SCALES - GENERAL: PAINLEVEL: 0

## 2024-05-22 ENCOUNTER — TELEPHONE (OUTPATIENT)
Dept: INTERNAL MEDICINE | Age: 74
End: 2024-05-22

## 2024-05-23 ENCOUNTER — TELEPHONE (OUTPATIENT)
Dept: INTERNAL MEDICINE | Age: 74
End: 2024-05-23

## 2024-07-19 ENCOUNTER — OFFICE VISIT (OUTPATIENT)
Dept: FAMILY MEDICINE | Age: 74
End: 2024-07-19

## 2024-07-19 VITALS
BODY MASS INDEX: 37.74 KG/M2 | WEIGHT: 213 LBS | OXYGEN SATURATION: 98 % | HEIGHT: 63 IN | DIASTOLIC BLOOD PRESSURE: 73 MMHG | SYSTOLIC BLOOD PRESSURE: 162 MMHG | RESPIRATION RATE: 18 BRPM | TEMPERATURE: 98.5 F | HEART RATE: 82 BPM

## 2024-07-19 DIAGNOSIS — K63.5 POLYP OF COLON, UNSPECIFIED PART OF COLON, UNSPECIFIED TYPE: ICD-10-CM

## 2024-07-19 DIAGNOSIS — K59.00 CONSTIPATION, ACUTE: Primary | ICD-10-CM

## 2024-07-19 DIAGNOSIS — I10 PRIMARY HYPERTENSION: ICD-10-CM

## 2024-07-19 DIAGNOSIS — R19.5 DARK STOOLS: ICD-10-CM

## 2024-07-19 RX ORDER — POLYETHYLENE GLYCOL 3350 17 G/17G
17 POWDER, FOR SOLUTION ORAL DAILY PRN
Qty: 116 EACH | Refills: 0 | Status: SHIPPED | OUTPATIENT
Start: 2024-07-19

## 2024-07-19 RX ORDER — DOCUSATE SODIUM 100 MG/1
100 CAPSULE, LIQUID FILLED ORAL 2 TIMES DAILY
Qty: 60 CAPSULE | Refills: 0 | Status: SHIPPED | OUTPATIENT
Start: 2024-07-19

## 2024-07-19 ASSESSMENT — ENCOUNTER SYMPTOMS
NAUSEA: 0
CONSTIPATION: 1
ABDOMINAL PAIN: 0
ANAL BLEEDING: 0
BLOOD IN STOOL: 0
DIARRHEA: 0
VOMITING: 0

## 2024-07-19 ASSESSMENT — PATIENT HEALTH QUESTIONNAIRE - PHQ9
SUM OF ALL RESPONSES TO PHQ9 QUESTIONS 1 AND 2: 0
CLINICAL INTERPRETATION OF PHQ2 SCORE: NO FURTHER SCREENING NEEDED
SUM OF ALL RESPONSES TO PHQ9 QUESTIONS 1 AND 2: 0
1. LITTLE INTEREST OR PLEASURE IN DOING THINGS: NOT AT ALL
2. FEELING DOWN, DEPRESSED OR HOPELESS: NOT AT ALL

## 2024-07-19 ASSESSMENT — PAIN SCALES - GENERAL: PAINLEVEL: 0

## 2024-07-26 ENCOUNTER — APPOINTMENT (OUTPATIENT)
Dept: LAB | Age: 74
End: 2024-07-26

## 2024-07-26 DIAGNOSIS — R19.5 DARK STOOLS: ICD-10-CM

## 2024-07-29 ENCOUNTER — TELEPHONE (OUTPATIENT)
Dept: INTERNAL MEDICINE | Age: 74
End: 2024-07-29

## 2024-07-29 LAB — HEMOCCULT STL QL: NEGATIVE

## 2024-08-01 ENCOUNTER — APPOINTMENT (OUTPATIENT)
Dept: INTERNAL MEDICINE | Age: 74
End: 2024-08-01

## 2024-08-07 ENCOUNTER — APPOINTMENT (OUTPATIENT)
Dept: INTERNAL MEDICINE | Age: 74
End: 2024-08-07

## 2024-11-20 ENCOUNTER — APPOINTMENT (OUTPATIENT)
Dept: INTERNAL MEDICINE | Age: 74
End: 2024-11-20

## 2024-12-02 ENCOUNTER — APPOINTMENT (OUTPATIENT)
Age: 74
End: 2024-12-02
Attending: INTERNAL MEDICINE

## 2024-12-04 ENCOUNTER — APPOINTMENT (OUTPATIENT)
Age: 74
End: 2024-12-04
Attending: INTERNAL MEDICINE

## 2024-12-05 ENCOUNTER — APPOINTMENT (OUTPATIENT)
Age: 74
End: 2024-12-05
Attending: INTERNAL MEDICINE

## 2024-12-28 ENCOUNTER — HOSPITAL ENCOUNTER (EMERGENCY)
Facility: HOSPITAL | Age: 74
Discharge: HOME | End: 2024-12-28
Attending: EMERGENCY MEDICINE
Payer: MEDICARE

## 2024-12-28 VITALS
RESPIRATION RATE: 18 BRPM | OXYGEN SATURATION: 99 % | HEIGHT: 65 IN | TEMPERATURE: 98 F | BODY MASS INDEX: 31.65 KG/M2 | DIASTOLIC BLOOD PRESSURE: 79 MMHG | SYSTOLIC BLOOD PRESSURE: 144 MMHG | WEIGHT: 190 LBS | HEART RATE: 70 BPM

## 2024-12-28 DIAGNOSIS — M25.511 SHOULDER PAIN, RIGHT: ICD-10-CM

## 2024-12-28 DIAGNOSIS — M25.511 RIGHT SHOULDER PAIN: ICD-10-CM

## 2024-12-28 DIAGNOSIS — M47.812 CERVICAL SPONDYLOSIS: ICD-10-CM

## 2024-12-28 DIAGNOSIS — M25.511 ACUTE PAIN OF RIGHT SHOULDER: Primary | ICD-10-CM

## 2024-12-28 PROCEDURE — 63600175 PHARM REV CODE 636 W HCPCS: Performed by: EMERGENCY MEDICINE

## 2024-12-28 PROCEDURE — 96372 THER/PROPH/DIAG INJ SC/IM: CPT | Performed by: EMERGENCY MEDICINE

## 2024-12-28 PROCEDURE — 99284 EMERGENCY DEPT VISIT MOD MDM: CPT | Mod: 25

## 2024-12-28 RX ORDER — MELOXICAM 7.5 MG/1
7.5 TABLET ORAL DAILY
Qty: 14 TABLET | Refills: 0 | Status: SHIPPED | OUTPATIENT
Start: 2024-12-28 | End: 2025-01-11

## 2024-12-28 RX ORDER — MORPHINE SULFATE 4 MG/ML
4 INJECTION, SOLUTION INTRAMUSCULAR; INTRAVENOUS
Status: COMPLETED | OUTPATIENT
Start: 2024-12-28 | End: 2024-12-28

## 2024-12-28 RX ORDER — GABAPENTIN 300 MG/1
300 CAPSULE ORAL 3 TIMES DAILY
Qty: 42 CAPSULE | Refills: 0 | Status: SHIPPED | OUTPATIENT
Start: 2024-12-28 | End: 2025-01-11

## 2024-12-28 RX ADMIN — MORPHINE SULFATE 4 MG: 4 INJECTION INTRAVENOUS at 10:12

## 2024-12-28 NOTE — ED PROVIDER NOTES
Encounter Date: 12/28/2024       History     Chief Complaint   Patient presents with    Shoulder Pain     C/o bilateral shoulder pain for onset Monday. Denies injury. Using otc rub and muscle relaxer with no relief.      The history is provided by the patient.   Shoulder Pain  This is a new problem. The current episode started more than 2 days ago. Pertinent negatives include no chest pain and no shortness of breath. Exacerbated by: abduction, palpation. The symptoms are relieved by narcotics.   Denies injury.  States she had left shoulder pain about a week ago, but that is improved and now she is having right shoulder pain.  Moderate relief with Norco, that she is prescribed for chronic hip and knee pain.    Review of patient's allergies indicates:   Allergen Reactions    Ace inhibitors Swelling    Clopidogrel Swelling    Iodine      Past Medical History:   Diagnosis Date    Arthritis     CAD (coronary artery disease)     Diabetes mellitus     Hypertension     Knee pain, chronic     On pain mgmt.    PAD (peripheral artery disease)      Past Surgical History:   Procedure Laterality Date    CORONARY ARTERY BYPASS GRAFT (CABG) N/A 11/2/2023    Procedure: CORONARY ARTERY BYPASS GRAFT (CABG);  Surgeon: Heriberto Lara IV, MD;  Location: [facility] OR;  Service: Cardiothoracic;  Laterality: N/A;    ENDOSCOPIC HARVEST OF VEIN N/A 11/2/2023    Procedure: SURGICAL PROCUREMENT, VEIN, ENDOSCOPIC;  Surgeon: Heriberto Lara IV, MD;  Location: [facility] OR;  Service: Cardiothoracic;  Laterality: N/A;    HYSTERECTOMY      LEFT HEART CATHETERIZATION Left 11/2/2023    Procedure: Left heart cath;  Surgeon: Puma Vera MD;  Location: [facility] CATH LAB;  Service: Cardiology;  Laterality: Left;  LHC +/- PCI VIA RRA    LUMPECTOMY, BREAST Right      No family history on file.  Social History     Tobacco Use    Smoking status: Never    Smokeless tobacco: Never     Review of Systems   Constitutional:  Negative for fever.   HENT:  Negative for  sore throat.    Respiratory:  Negative for shortness of breath.    Cardiovascular:  Negative for chest pain.   Gastrointestinal:  Negative for nausea.   Genitourinary:  Negative for dysuria.   Musculoskeletal:  Negative for back pain.   Skin:  Negative for rash.   Neurological:  Negative for weakness.   Hematological:  Does not bruise/bleed easily.       Physical Exam     Initial Vitals [12/28/24 1038]   BP Pulse Resp Temp SpO2   (!) 149/78 61 18 98.2 °F (36.8 °C) 99 %      MAP       --         Physical Exam    Nursing note and vitals reviewed.  Constitutional: She appears well-developed and well-nourished.   HENT:   Head: Normocephalic and atraumatic.   Right Ear: External ear normal.   Left Ear: External ear normal.   Eyes: Conjunctivae and EOM are normal. Pupils are equal, round, and reactive to light.   Neck: Neck supple.   Normal range of motion.  Cardiovascular:  Normal rate, regular rhythm, normal heart sounds and intact distal pulses.           Pulmonary/Chest: Breath sounds normal.   Abdominal: Abdomen is soft. Bowel sounds are normal.   Musculoskeletal:      Right shoulder: Tenderness present. Decreased range of motion.        Arms:       Cervical back: Normal range of motion and neck supple.     Neurological: She is alert and oriented to person, place, and time. GCS score is 15. GCS eye subscore is 4. GCS verbal subscore is 5. GCS motor subscore is 6.   Skin: Skin is warm and dry. Capillary refill takes less than 2 seconds.   Psychiatric: She has a normal mood and affect. Her behavior is normal. Judgment and thought content normal.         ED Course   Procedures  Labs Reviewed - No data to display       Imaging Results              X-Ray Shoulder Trauma Right (Preliminary result)  Result time 12/28/24 11:26:30      Wet Read by Priyank Reyes MD (12/28/24 11:26:30, [facility] Emergency Dept, Emergency Medicine)    NAF                                     X-Ray Cervical Spine  AP And Lateral (Preliminary result)  Result time 12/28/24 11:27:22      Wet Read by Priyank Reyes MD (12/28/24 11:27:22, [facility] Emergency Dept, Emergency Medicine)    Degenerative changes, no fracture or subluxation                                     Medications   morphine injection 4 mg (4 mg Intramuscular Given 12/28/24 1052)     Medical Decision Making  Amount and/or Complexity of Data Reviewed  Radiology: ordered and independent interpretation performed. Decision-making details documented in ED Course.    Risk  Prescription drug management.  Parenteral controlled substances.    Differential includes:  rotator cuff tendinopathy, cervical radiculopathy, labral tear, OA, RA, spasm, AC joint arthropathy.  Will give analgesic and obtain C-spine and shoulder x-rays.                                  Clinical Impression:  Final diagnoses:  [M25.511] Right shoulder pain  [M25.511] Shoulder pain, right  [M25.511] Acute pain of right shoulder (Primary)  [M47.812] Cervical spondylosis          ED Disposition Condition    Discharge Stable          ED Prescriptions       Medication Sig Dispense Start Date End Date Auth. Provider    meloxicam (MOBIC) 7.5 MG tablet Take 1 tablet (7.5 mg total) by mouth once daily. for 14 days 14 tablet 12/28/2024 1/11/2025 Priyank Reyes MD    gabapentin (NEURONTIN) 300 MG capsule Take 1 capsule (300 mg total) by mouth 3 (three) times daily. for 14 days 42 capsule 12/28/2024 1/11/2025 Priyank Reyes MD          Follow-up Information       Follow up With Specialties Details Why Contact Info    Delmy Montano FNP Family Medicine Schedule an appointment as soon as possible for a visit   [address] [place] [address] [id number] [phone]               Priyank Reyes MD  12/28/24 0258

## 2025-02-03 ENCOUNTER — LAB SERVICES (OUTPATIENT)
Dept: LAB | Age: 75
End: 2025-02-03

## 2025-02-03 ENCOUNTER — APPOINTMENT (OUTPATIENT)
Dept: INTERNAL MEDICINE | Age: 75
End: 2025-02-03

## 2025-02-03 ENCOUNTER — IMAGING SERVICES (OUTPATIENT)
Dept: GENERAL RADIOLOGY | Age: 75
End: 2025-02-03
Attending: INTERNAL MEDICINE

## 2025-02-03 VITALS
SYSTOLIC BLOOD PRESSURE: 138 MMHG | BODY MASS INDEX: 38.27 KG/M2 | RESPIRATION RATE: 16 BRPM | OXYGEN SATURATION: 98 % | TEMPERATURE: 96.9 F | HEIGHT: 63 IN | HEART RATE: 67 BPM | DIASTOLIC BLOOD PRESSURE: 75 MMHG | WEIGHT: 216 LBS

## 2025-02-03 DIAGNOSIS — I10 PRIMARY HYPERTENSION: ICD-10-CM

## 2025-02-03 DIAGNOSIS — R05.3 CHRONIC COUGH: Primary | ICD-10-CM

## 2025-02-03 DIAGNOSIS — R05.3 CHRONIC COUGH: ICD-10-CM

## 2025-02-03 DIAGNOSIS — E11.9 TYPE 2 DIABETES MELLITUS WITHOUT COMPLICATION, WITHOUT LONG-TERM CURRENT USE OF INSULIN  (CMD): ICD-10-CM

## 2025-02-03 DIAGNOSIS — E55.9 VITAMIN D DEFICIENCY: ICD-10-CM

## 2025-02-03 DIAGNOSIS — E66.9 OBESITY (BMI 30-39.9): ICD-10-CM

## 2025-02-03 LAB
GLUCOSE BLD-MCNC: 137 MG/DL (ref 65–99)
HBA1C MFR BLD: 7.8 % (ref 4.5–5.6)

## 2025-02-03 PROCEDURE — 36415 COLL VENOUS BLD VENIPUNCTURE: CPT | Performed by: INTERNAL MEDICINE

## 2025-02-03 PROCEDURE — 83036 HEMOGLOBIN GLYCOSYLATED A1C: CPT | Performed by: CLINICAL MEDICAL LABORATORY

## 2025-02-03 PROCEDURE — 71046 X-RAY EXAM CHEST 2 VIEWS: CPT | Performed by: INTERNAL MEDICINE

## 2025-02-03 RX ORDER — ATORVASTATIN CALCIUM 20 MG/1
20 TABLET, FILM COATED ORAL DAILY
Qty: 90 TABLET | Refills: 3 | Status: SHIPPED | OUTPATIENT
Start: 2025-02-03

## 2025-02-03 RX ORDER — DILTIAZEM HYDROCHLORIDE 120 MG/1
120 TABLET, FILM COATED ORAL DAILY
Qty: 90 TABLET | Refills: 3 | Status: SHIPPED | OUTPATIENT
Start: 2025-02-03

## 2025-02-03 RX ORDER — LOSARTAN POTASSIUM AND HYDROCHLOROTHIAZIDE 12.5; 1 MG/1; MG/1
1 TABLET ORAL DAILY
Qty: 90 TABLET | Refills: 3 | Status: SHIPPED | OUTPATIENT
Start: 2025-02-03

## 2025-02-03 RX ORDER — FLUTICASONE PROPIONATE 50 MCG
2 SPRAY, SUSPENSION (ML) NASAL DAILY
Qty: 3 EACH | Refills: 3 | Status: SHIPPED | OUTPATIENT
Start: 2025-02-03

## 2025-02-03 ASSESSMENT — ENCOUNTER SYMPTOMS
SHORTNESS OF BREATH: 0
APPETITE CHANGE: 0
RECTAL PAIN: 0
SEIZURES: 0
PHOTOPHOBIA: 0
LIGHT-HEADEDNESS: 0
HEMATOLOGIC/LYMPHATIC NEGATIVE: 1
SORE THROAT: 0
TROUBLE SWALLOWING: 0
NERVOUS/ANXIOUS: 0
ANAL BLEEDING: 0
BLOOD IN STOOL: 0
NAUSEA: 0
CHILLS: 0
EYE DISCHARGE: 0
HALLUCINATIONS: 0
HEADACHES: 0
SLEEP DISTURBANCE: 0
FATIGUE: 0
VOMITING: 0
APNEA: 0
RHINORRHEA: 0
EYE REDNESS: 0
COUGH: 1
DIARRHEA: 0
WEAKNESS: 0
TREMORS: 0
POLYPHAGIA: 0
CONSTIPATION: 0
WOUND: 0
VOICE CHANGE: 0
DIAPHORESIS: 0
POLYDIPSIA: 0
WHEEZING: 0
UNEXPECTED WEIGHT CHANGE: 0
SINUS PAIN: 0
SINUS PRESSURE: 0
FEVER: 0
EYE ITCHING: 0
BACK PAIN: 0
ABDOMINAL DISTENTION: 0
CONFUSION: 0
ABDOMINAL PAIN: 0
DIZZINESS: 0
AGITATION: 0
EYE PAIN: 0
ACTIVITY CHANGE: 0

## 2025-02-03 ASSESSMENT — PATIENT HEALTH QUESTIONNAIRE - PHQ9
1. LITTLE INTEREST OR PLEASURE IN DOING THINGS: NOT AT ALL
SUM OF ALL RESPONSES TO PHQ9 QUESTIONS 1 AND 2: 0
2. FEELING DOWN, DEPRESSED OR HOPELESS: NOT AT ALL
CLINICAL INTERPRETATION OF PHQ2 SCORE: NO FURTHER SCREENING NEEDED
SUM OF ALL RESPONSES TO PHQ9 QUESTIONS 1 AND 2: 0

## 2025-02-03 ASSESSMENT — PAIN SCALES - GENERAL: PAINLEVEL: 0

## 2025-02-24 ENCOUNTER — APPOINTMENT (OUTPATIENT)
Dept: INTERNAL MEDICINE | Age: 75
End: 2025-02-24

## 2025-02-24 ENCOUNTER — LAB SERVICES (OUTPATIENT)
Dept: LAB | Age: 75
End: 2025-02-24

## 2025-02-24 VITALS
SYSTOLIC BLOOD PRESSURE: 139 MMHG | DIASTOLIC BLOOD PRESSURE: 63 MMHG | OXYGEN SATURATION: 97 % | HEIGHT: 63 IN | HEART RATE: 74 BPM | RESPIRATION RATE: 17 BRPM | WEIGHT: 217 LBS | TEMPERATURE: 97.5 F | BODY MASS INDEX: 38.45 KG/M2

## 2025-02-24 DIAGNOSIS — E11.9 TYPE 2 DIABETES MELLITUS WITHOUT COMPLICATION, WITHOUT LONG-TERM CURRENT USE OF INSULIN  (CMD): ICD-10-CM

## 2025-02-24 DIAGNOSIS — R05.3 CHRONIC COUGH: ICD-10-CM

## 2025-02-24 DIAGNOSIS — E55.9 VITAMIN D DEFICIENCY: ICD-10-CM

## 2025-02-24 DIAGNOSIS — I10 PRIMARY HYPERTENSION: ICD-10-CM

## 2025-02-24 DIAGNOSIS — E66.01 TYPE 2 DIABETES MELLITUS WITH MORBID OBESITY  (CMD): ICD-10-CM

## 2025-02-24 DIAGNOSIS — J30.2 SEASONAL ALLERGIC RHINITIS DUE TO FUNGAL SPORES: ICD-10-CM

## 2025-02-24 DIAGNOSIS — Z00.00 MEDICARE ANNUAL WELLNESS VISIT, SUBSEQUENT: Primary | ICD-10-CM

## 2025-02-24 DIAGNOSIS — M15.0 PRIMARY OSTEOARTHRITIS INVOLVING MULTIPLE JOINTS: ICD-10-CM

## 2025-02-24 DIAGNOSIS — E11.69 TYPE 2 DIABETES MELLITUS WITH MORBID OBESITY  (CMD): ICD-10-CM

## 2025-02-24 LAB
25(OH)D3+25(OH)D2 SERPL-MCNC: 18.2 NG/ML (ref 30–100)
ALBUMIN SERPL-MCNC: 3.6 G/DL (ref 3.4–5)
ALBUMIN/GLOB SERPL: 1.1 {RATIO} (ref 1–2.4)
ALP SERPL-CCNC: 78 UNITS/L (ref 45–117)
ALT SERPL-CCNC: 18 UNITS/L
ANION GAP SERPL CALC-SCNC: 7 MMOL/L (ref 7–19)
AST SERPL-CCNC: 13 UNITS/L
BASOPHILS # BLD: 0.1 K/MCL (ref 0–0.3)
BASOPHILS NFR BLD: 1 %
BILIRUB SERPL-MCNC: 0.4 MG/DL (ref 0.2–1)
BUN SERPL-MCNC: 10 MG/DL (ref 6–20)
BUN/CREAT SERPL: 14 (ref 7–25)
CALCIUM SERPL-MCNC: 9.1 MG/DL (ref 8.4–10.2)
CHLORIDE SERPL-SCNC: 106 MMOL/L (ref 97–110)
CHOLEST SERPL-MCNC: 177 MG/DL
CHOLEST/HDLC SERPL: 3.1 {RATIO}
CO2 SERPL-SCNC: 28 MMOL/L (ref 21–32)
CREAT SERPL-MCNC: 0.69 MG/DL (ref 0.51–0.95)
DEPRECATED RDW RBC: 43.3 FL (ref 39–50)
EGFRCR SERPLBLD CKD-EPI 2021: 90 ML/MIN/{1.73_M2}
EOSINOPHIL # BLD: 0.2 K/MCL (ref 0–0.5)
EOSINOPHIL NFR BLD: 2 %
ERYTHROCYTE [DISTWIDTH] IN BLOOD: 14.3 % (ref 11–15)
FASTING DURATION TIME PATIENT: ABNORMAL H
GLOBULIN SER-MCNC: 3.3 G/DL (ref 2–4)
GLUCOSE BLD-MCNC: 144 MG/DL (ref 65–99)
GLUCOSE SERPL-MCNC: 123 MG/DL (ref 70–99)
HCT VFR BLD CALC: 40.9 % (ref 36–46.5)
HDLC SERPL-MCNC: 57 MG/DL
HGB BLD-MCNC: 13.1 G/DL (ref 12–15.5)
IMM GRANULOCYTES # BLD AUTO: 0 K/MCL (ref 0–0.2)
IMM GRANULOCYTES # BLD: 0 %
LDLC SERPL CALC-MCNC: 92 MG/DL
LYMPHOCYTES # BLD: 2.8 K/MCL (ref 1–4)
LYMPHOCYTES NFR BLD: 28 %
MCH RBC QN AUTO: 26.8 PG (ref 26–34)
MCHC RBC AUTO-ENTMCNC: 32 G/DL (ref 32–36.5)
MCV RBC AUTO: 83.6 FL (ref 78–100)
MONOCYTES # BLD: 0.7 K/MCL (ref 0.3–0.9)
MONOCYTES NFR BLD: 7 %
NEUTROPHILS # BLD: 6.1 K/MCL (ref 1.8–7.7)
NEUTROPHILS NFR BLD: 62 %
NONHDLC SERPL-MCNC: 120 MG/DL
NRBC BLD MANUAL-RTO: 0 /100 WBC
PLATELET # BLD AUTO: 237 K/MCL (ref 140–450)
POTASSIUM SERPL-SCNC: 3.6 MMOL/L (ref 3.4–5.1)
PROT SERPL-MCNC: 6.9 G/DL (ref 6.4–8.2)
RBC # BLD: 4.89 MIL/MCL (ref 4–5.2)
SODIUM SERPL-SCNC: 137 MMOL/L (ref 135–145)
TRIGL SERPL-MCNC: 142 MG/DL
WBC # BLD: 10 K/MCL (ref 4.2–11)

## 2025-02-24 RX ORDER — FEXOFENADINE HCL AND PSEUDOEPHEDRINE HCL 180; 240 MG/1; MG/1
1 TABLET, EXTENDED RELEASE ORAL DAILY
Qty: 30 TABLET | Refills: 1 | Status: SHIPPED | OUTPATIENT
Start: 2025-02-24

## 2025-02-24 ASSESSMENT — ENCOUNTER SYMPTOMS
TROUBLE SWALLOWING: 0
BLOOD IN STOOL: 0
COUGH: 1
VOICE CHANGE: 0
EYE REDNESS: 0
WHEEZING: 0
ACTIVITY CHANGE: 0
SHORTNESS OF BREATH: 0
APNEA: 0
ANAL BLEEDING: 0
SLEEP DISTURBANCE: 0
RECTAL PAIN: 0
POLYDIPSIA: 0
SEIZURES: 0
RHINORRHEA: 0
AGITATION: 0
HEADACHES: 0
NERVOUS/ANXIOUS: 0
LIGHT-HEADEDNESS: 0
EYE ITCHING: 0
WOUND: 0
ABDOMINAL DISTENTION: 0
CONSTIPATION: 0
FEVER: 0
DIARRHEA: 0
APPETITE CHANGE: 0
NAUSEA: 0
EYE PAIN: 0
UNEXPECTED WEIGHT CHANGE: 0
TREMORS: 0
HALLUCINATIONS: 0
FATIGUE: 0
CONFUSION: 0
VOMITING: 0
EYE DISCHARGE: 0
SORE THROAT: 0
DIAPHORESIS: 0
SINUS PRESSURE: 0
ABDOMINAL PAIN: 0
POLYPHAGIA: 0
HEMATOLOGIC/LYMPHATIC NEGATIVE: 1
CHILLS: 0
WEAKNESS: 0
BACK PAIN: 0
DIZZINESS: 0
PHOTOPHOBIA: 0
SINUS PAIN: 0

## 2025-02-24 ASSESSMENT — ANXIETY QUESTIONNAIRES
2. NOT BEING ABLE TO STOP OR CONTROL WORRYING: 1
7. FEELING AFRAID AS IF SOMETHING AWFUL MIGHT HAPPEN: NOT AT ALL
2. NOT BEING ABLE TO STOP OR CONTROL WORRYING: SEVERAL DAYS
IF YOU CHECKED OFF ANY PROBLEMS ON THIS QUESTIONNAIRE, HOW DIFFICULT HAVE THESE PROBLEMS MADE IT FOR YOU TO DO YOUR WORK, TAKE CARE OF THINGS AT HOME, OR GET ALONG WITH OTHER PEOPLE: NOT DIFFICULT AT ALL
6. BECOMING EASILY ANNOYED OR IRRITABLE: 0
6. BECOMING EASILY ANNOYED OR IRRITABLE: NOT AT ALL
5. BEING SO RESTLESS THAT IT IS HARD TO SIT STILL: 0
3. WORRYING TOO MUCH ABOUT DIFFERENT THINGS: 1
1. FEELING NERVOUS, ANXIOUS, OR ON EDGE: 1
7. FEELING AFRAID AS IF SOMETHING AWFUL MIGHT HAPPEN: 0
1. FEELING NERVOUS, ANXIOUS, OR ON EDGE: SEVERAL DAYS
GAD7 TOTAL SCORE: 3
5. BEING SO RESTLESS THAT IT IS HARD TO SIT STILL: NOT AT ALL
4. TROUBLE RELAXING: 0
4. TROUBLE RELAXING: NOT AT ALL
3. WORRYING TOO MUCH ABOUT DIFFERENT THINGS: SEVERAL DAYS

## 2025-02-24 ASSESSMENT — ACTIVITIES OF DAILY LIVING (ADL)
ADL_BEFORE_ADMISSION: INDEPENDENT
ADL_SCORE: 12
SENSORY_SUPPORT_DEVICES: EYEGLASSES
RECENT_DECLINE_ADL: NO
NEEDS_ASSIST: NO
ADL_SHORT_OF_BREATH: NO

## 2025-02-24 ASSESSMENT — PAIN SCALES - GENERAL: PAINLEVEL: 0

## 2025-02-24 ASSESSMENT — PATIENT HEALTH QUESTIONNAIRE - PHQ9
1. LITTLE INTEREST OR PLEASURE IN DOING THINGS: NOT AT ALL
CLINICAL INTERPRETATION OF PHQ2 SCORE: NO FURTHER SCREENING NEEDED
SUM OF ALL RESPONSES TO PHQ9 QUESTIONS 1 AND 2: 0
2. FEELING DOWN, DEPRESSED OR HOPELESS: NOT AT ALL
SUM OF ALL RESPONSES TO PHQ9 QUESTIONS 1 AND 2: 0

## 2025-02-24 ASSESSMENT — MINI COG
TOTAL SCORE: 4
PATIENT ABLE TO FILL IN THE CLOCK FACE WITH 10 MINUTES PAST 11 O'CLOCK?: YES, CLOCK IS CORRECT
PATIENT WAS GIVEN REPEAT BACK WORDS FROM VERSION: 1 - BANANA SUNRISE CHAIR
PATIENT ABLE TO REPEAT THE 3 WORDS GIVEN PREVIOUSLY?: WAS ABLE TO REPEAT BACK 2 WORDS CORRECTLY

## 2025-02-24 ASSESSMENT — COGNITIVE AND FUNCTIONAL STATUS - GENERAL
DO YOU HAVE SERIOUS DIFFICULTY WALKING OR CLIMBING STAIRS: NO
BECAUSE OF A PHYSICAL, MENTAL, OR EMOTIONAL CONDITION, DO YOU HAVE DIFFICULTY DOING ERRANDS ALONE: NO
DO YOU HAVE DIFFICULTY DRESSING OR BATHING: NO
BECAUSE OF A PHYSICAL, MENTAL, OR EMOTIONAL CONDITION, DO YOU HAVE SERIOUS DIFFICULTY CONCENTRATING, REMEMBERING OR MAKING DECISIONS: NO

## 2025-03-05 ENCOUNTER — TELEPHONE (OUTPATIENT)
Dept: INTERNAL MEDICINE | Age: 75
End: 2025-03-05

## 2025-03-05 ENCOUNTER — LAB SERVICES (OUTPATIENT)
Dept: LAB | Age: 75
End: 2025-03-05

## 2025-03-05 ENCOUNTER — APPOINTMENT (OUTPATIENT)
Dept: INTERNAL MEDICINE | Age: 75
End: 2025-03-05

## 2025-03-05 VITALS
WEIGHT: 217 LBS | TEMPERATURE: 98.2 F | RESPIRATION RATE: 17 BRPM | OXYGEN SATURATION: 96 % | SYSTOLIC BLOOD PRESSURE: 130 MMHG | DIASTOLIC BLOOD PRESSURE: 66 MMHG | BODY MASS INDEX: 38.45 KG/M2 | HEIGHT: 63 IN | HEART RATE: 73 BPM

## 2025-03-05 DIAGNOSIS — I10 PRIMARY HYPERTENSION: ICD-10-CM

## 2025-03-05 DIAGNOSIS — K59.00 CONSTIPATION, ACUTE: ICD-10-CM

## 2025-03-05 DIAGNOSIS — E11.65 TYPE 2 DIABETES MELLITUS WITH HYPERGLYCEMIA, WITHOUT LONG-TERM CURRENT USE OF INSULIN (CMD): Primary | ICD-10-CM

## 2025-03-05 DIAGNOSIS — M15.0 PRIMARY OSTEOARTHRITIS INVOLVING MULTIPLE JOINTS: ICD-10-CM

## 2025-03-05 DIAGNOSIS — Z12.11 COLON CANCER SCREENING: ICD-10-CM

## 2025-03-05 DIAGNOSIS — E11.69 TYPE 2 DIABETES MELLITUS WITH MORBID OBESITY  (CMD): ICD-10-CM

## 2025-03-05 DIAGNOSIS — E66.01 TYPE 2 DIABETES MELLITUS WITH MORBID OBESITY  (CMD): ICD-10-CM

## 2025-03-05 DIAGNOSIS — E11.65 TYPE 2 DIABETES MELLITUS WITH HYPERGLYCEMIA, WITHOUT LONG-TERM CURRENT USE OF INSULIN (CMD): ICD-10-CM

## 2025-03-05 PROBLEM — R05.3 CHRONIC COUGH: Status: RESOLVED | Noted: 2025-02-03 | Resolved: 2025-03-05

## 2025-03-05 LAB — GLUCOSE BLD-MCNC: 138 MG/DL (ref 65–99)

## 2025-03-05 RX ORDER — PIOGLITAZONE 30 MG/1
30 TABLET ORAL DAILY
Qty: 90 TABLET | Refills: 3 | Status: SHIPPED | OUTPATIENT
Start: 2025-03-05

## 2025-03-05 ASSESSMENT — ENCOUNTER SYMPTOMS
TREMORS: 0
EYE DISCHARGE: 0
APPETITE CHANGE: 0
VOICE CHANGE: 0
HALLUCINATIONS: 0
SHORTNESS OF BREATH: 0
SEIZURES: 0
BACK PAIN: 0
POLYDIPSIA: 0
FATIGUE: 0
RECTAL PAIN: 0
UNEXPECTED WEIGHT CHANGE: 0
EYE ITCHING: 0
VOMITING: 0
EYE REDNESS: 0
NERVOUS/ANXIOUS: 0
EYE PAIN: 0
BLOOD IN STOOL: 0
COUGH: 0
SLEEP DISTURBANCE: 0
DIAPHORESIS: 0
NAUSEA: 0
SINUS PAIN: 0
HEMATOLOGIC/LYMPHATIC NEGATIVE: 1
LIGHT-HEADEDNESS: 0
POLYPHAGIA: 0
PHOTOPHOBIA: 0
ABDOMINAL PAIN: 0
CONSTIPATION: 0
WHEEZING: 0
DIARRHEA: 0
ANAL BLEEDING: 0
RHINORRHEA: 0
WOUND: 0
CHILLS: 0
DIZZINESS: 0
SORE THROAT: 0
AGITATION: 0
APNEA: 0
ACTIVITY CHANGE: 0
CONFUSION: 0
ABDOMINAL DISTENTION: 0
HEADACHES: 0
TROUBLE SWALLOWING: 0
SINUS PRESSURE: 0
WEAKNESS: 0
FEVER: 0

## 2025-03-05 ASSESSMENT — PATIENT HEALTH QUESTIONNAIRE - PHQ9
1. LITTLE INTEREST OR PLEASURE IN DOING THINGS: NOT AT ALL
SUM OF ALL RESPONSES TO PHQ9 QUESTIONS 1 AND 2: 0
CLINICAL INTERPRETATION OF PHQ2 SCORE: NO FURTHER SCREENING NEEDED
SUM OF ALL RESPONSES TO PHQ9 QUESTIONS 1 AND 2: 0
2. FEELING DOWN, DEPRESSED OR HOPELESS: NOT AT ALL

## 2025-03-05 ASSESSMENT — PAIN SCALES - GENERAL: PAINLEVEL: 0

## 2025-03-06 ENCOUNTER — TELEPHONE (OUTPATIENT)
Dept: GASTROENTEROLOGY | Age: 75
End: 2025-03-06

## 2025-03-06 DIAGNOSIS — Z12.11 COLON CANCER SCREENING: Primary | ICD-10-CM

## 2025-03-06 LAB
CREAT UR-MCNC: 146 MG/DL
MICROALBUMIN UR-MCNC: 0.56 MG/DL
MICROALBUMIN/CREAT UR: 3.8 MG/G

## 2025-03-19 ENCOUNTER — APPOINTMENT (OUTPATIENT)
Dept: SURGERY | Age: 75
End: 2025-03-19
Attending: CLINICAL NURSE SPECIALIST

## 2025-03-26 ENCOUNTER — APPOINTMENT (OUTPATIENT)
Dept: SURGERY | Age: 75
End: 2025-03-26
Attending: SURGERY

## 2025-03-31 ENCOUNTER — OFFICE VISIT (OUTPATIENT)
Dept: SURGERY | Age: 75
End: 2025-03-31
Attending: CLINICAL NURSE SPECIALIST

## 2025-03-31 VITALS
BODY MASS INDEX: 38.45 KG/M2 | SYSTOLIC BLOOD PRESSURE: 169 MMHG | HEART RATE: 87 BPM | WEIGHT: 217 LBS | DIASTOLIC BLOOD PRESSURE: 80 MMHG | RESPIRATION RATE: 18 BRPM | OXYGEN SATURATION: 100 % | HEIGHT: 63 IN

## 2025-03-31 DIAGNOSIS — Z12.11 COLON CANCER SCREENING: ICD-10-CM

## 2025-03-31 DIAGNOSIS — R19.4 CHANGE IN BOWEL HABITS: ICD-10-CM

## 2025-03-31 DIAGNOSIS — Z01.818 ENCOUNTER FOR OTHER PREPROCEDURAL EXAMINATION: Primary | ICD-10-CM

## 2025-03-31 DIAGNOSIS — E11.65 TYPE 2 DIABETES MELLITUS WITH HYPERGLYCEMIA, WITHOUT LONG-TERM CURRENT USE OF INSULIN (CMD): ICD-10-CM

## 2025-03-31 DIAGNOSIS — Z86.0100 HISTORY OF COLON POLYPS: ICD-10-CM

## 2025-03-31 PROCEDURE — 99203 OFFICE O/P NEW LOW 30 MIN: CPT | Performed by: CLINICAL NURSE SPECIALIST

## 2025-03-31 PROCEDURE — 3079F DIAST BP 80-89 MM HG: CPT | Performed by: CLINICAL NURSE SPECIALIST

## 2025-03-31 PROCEDURE — 3077F SYST BP >= 140 MM HG: CPT | Performed by: CLINICAL NURSE SPECIALIST

## 2025-03-31 RX ORDER — SODIUM, POTASSIUM,MAG SULFATES 17.5-3.13G
1 SOLUTION, RECONSTITUTED, ORAL ORAL SEE ADMIN INSTRUCTIONS
Qty: 1 KIT | Refills: 0 | Status: SHIPPED | OUTPATIENT
Start: 2025-03-31

## 2025-03-31 ASSESSMENT — ENCOUNTER SYMPTOMS
APNEA: 0
VOMITING: 0
BLOOD IN STOOL: 0
NAUSEA: 0
LIGHT-HEADEDNESS: 0
ABDOMINAL PAIN: 0
FEVER: 0
ANAL BLEEDING: 0
RECTAL PAIN: 0
CHILLS: 0
ABDOMINAL DISTENTION: 1
DIARRHEA: 0
COUGH: 0
CONSTIPATION: 1
SHORTNESS OF BREATH: 0
DIZZINESS: 0
UNEXPECTED WEIGHT CHANGE: 0

## 2025-04-02 DIAGNOSIS — I10 PRIMARY HYPERTENSION: ICD-10-CM

## 2025-04-02 RX ORDER — LOSARTAN POTASSIUM AND HYDROCHLOROTHIAZIDE 12.5; 1 MG/1; MG/1
1 TABLET ORAL DAILY
Qty: 90 TABLET | Refills: 3 | Status: CANCELLED | OUTPATIENT
Start: 2025-04-02

## 2025-04-04 ENCOUNTER — TELEPHONE (OUTPATIENT)
Age: 75
End: 2025-04-04

## 2025-04-04 DIAGNOSIS — Z12.11 COLON CANCER SCREENING: Primary | ICD-10-CM

## 2025-04-04 DIAGNOSIS — Z86.0100 HISTORY OF COLON POLYPS: ICD-10-CM

## 2025-04-12 ENCOUNTER — HOSPITAL ENCOUNTER (OUTPATIENT)
Dept: GASTROENTEROLOGY | Age: 75
End: 2025-04-12
Attending: SURGERY

## 2025-04-12 ENCOUNTER — ANESTHESIA (OUTPATIENT)
Dept: GASTROENTEROLOGY | Age: 75
End: 2025-04-12

## 2025-04-12 ENCOUNTER — ANESTHESIA EVENT (OUTPATIENT)
Dept: GASTROENTEROLOGY | Age: 75
End: 2025-04-12

## 2025-04-12 VITALS
HEART RATE: 67 BPM | TEMPERATURE: 97 F | DIASTOLIC BLOOD PRESSURE: 83 MMHG | OXYGEN SATURATION: 100 % | RESPIRATION RATE: 22 BRPM | SYSTOLIC BLOOD PRESSURE: 156 MMHG

## 2025-04-12 DIAGNOSIS — Z12.11 COLON CANCER SCREENING: ICD-10-CM

## 2025-04-12 DIAGNOSIS — Z86.0100 HISTORY OF COLON POLYPS: ICD-10-CM

## 2025-04-12 DIAGNOSIS — K59.00 CONSTIPATION, ACUTE: Primary | ICD-10-CM

## 2025-04-12 LAB — GLUCOSE BLDC GLUCOMTR-MCNC: 113 MG/DL (ref 70–99)

## 2025-04-12 PROCEDURE — 10002800 HB RX 250 W HCPCS: Performed by: ANESTHESIOLOGY

## 2025-04-12 PROCEDURE — 82962 GLUCOSE BLOOD TEST: CPT

## 2025-04-12 PROCEDURE — 13000001 HB PHASE II RECOVERY EA 30 MINUTES

## 2025-04-12 PROCEDURE — 13000024 HB GI COMPLEX CASE S/U + 1ST 15 MIN

## 2025-04-12 PROCEDURE — 13000008 HB ANESTHESIA MAC OUTSIDE OR

## 2025-04-12 PROCEDURE — 10006023 HB SUPPLY 272

## 2025-04-12 PROCEDURE — C1889 IMPLANT/INSERT DEVICE, NOC: HCPCS

## 2025-04-12 PROCEDURE — 88305 TISSUE EXAM BY PATHOLOGIST: CPT | Performed by: SURGERY

## 2025-04-12 PROCEDURE — 45380 COLONOSCOPY AND BIOPSY: CPT | Performed by: SURGERY

## 2025-04-12 RX ORDER — PROPOFOL 10 MG/ML
INJECTION, EMULSION INTRAVENOUS PRN
Status: DISCONTINUED | OUTPATIENT
Start: 2025-04-12 | End: 2025-04-12

## 2025-04-12 RX ORDER — ONDANSETRON 2 MG/ML
4 INJECTION INTRAMUSCULAR; INTRAVENOUS
Status: DISCONTINUED | OUTPATIENT
Start: 2025-04-12 | End: 2025-04-12 | Stop reason: HOSPADM

## 2025-04-12 RX ORDER — 0.9 % SODIUM CHLORIDE 0.9 %
10 VIAL (ML) INJECTION PRN
Status: DISCONTINUED | OUTPATIENT
Start: 2025-04-12 | End: 2025-04-14 | Stop reason: HOSPADM

## 2025-04-12 RX ORDER — NICOTINE POLACRILEX 4 MG
30 LOZENGE BUCCAL
Status: DISCONTINUED | OUTPATIENT
Start: 2025-04-12 | End: 2025-04-14 | Stop reason: HOSPADM

## 2025-04-12 RX ORDER — 0.9 % SODIUM CHLORIDE 0.9 %
2 VIAL (ML) INJECTION EVERY 12 HOURS SCHEDULED
Status: DISCONTINUED | OUTPATIENT
Start: 2025-04-12 | End: 2025-04-14 | Stop reason: HOSPADM

## 2025-04-12 RX ORDER — DEXTROSE MONOHYDRATE 25 G/50ML
25 INJECTION, SOLUTION INTRAVENOUS PRN
Status: DISCONTINUED | OUTPATIENT
Start: 2025-04-12 | End: 2025-04-14 | Stop reason: HOSPADM

## 2025-04-12 RX ADMIN — PROPOFOL 100 MG: 10 INJECTION, EMULSION INTRAVENOUS at 11:06

## 2025-04-12 RX ADMIN — PROPOFOL 100 MG: 10 INJECTION, EMULSION INTRAVENOUS at 11:01

## 2025-04-12 RX ADMIN — PROPOFOL 50 MG: 10 INJECTION, EMULSION INTRAVENOUS at 11:14

## 2025-04-12 RX ADMIN — PROPOFOL 100 MG: 10 INJECTION, EMULSION INTRAVENOUS at 11:10

## 2025-04-12 RX ADMIN — PROPOFOL 50 MG: 10 INJECTION, EMULSION INTRAVENOUS at 10:57

## 2025-04-12 SDOH — SOCIAL STABILITY: SOCIAL INSECURITY: HOW OFTEN DOES ANYONE, INCLUDING FAMILY AND FRIENDS, PHYSICALLY HURT YOU?: NEVER

## 2025-04-12 SDOH — SOCIAL STABILITY: SOCIAL INSECURITY: HOW OFTEN DOES ANYONE, INCLUDING FAMILY AND FRIENDS, SCREAM OR CURSE AT YOU?: NEVER

## 2025-04-12 SDOH — SOCIAL STABILITY: SOCIAL INSECURITY: HOW OFTEN DOES ANYONE, INCLUDING FAMILY AND FRIENDS, INSULT OR TALK DOWN TO YOU?: NEVER

## 2025-04-12 SDOH — SOCIAL STABILITY: SOCIAL INSECURITY: HOW OFTEN DOES ANYONE, INCLUDING FAMILY AND FRIENDS, THREATEN YOU WITH HARM?: NEVER

## 2025-04-12 ASSESSMENT — PAIN SCALES - WONG BAKER: WONGBAKER_NUMERICALRESPONSE: 0

## 2025-04-12 ASSESSMENT — PAIN SCALES - GENERAL
PAINLEVEL_OUTOF10: 0

## 2025-04-12 ASSESSMENT — ACTIVITIES OF DAILY LIVING (ADL)
ADL_BEFORE_ADMISSION: INDEPENDENT
ADL_SCORE: 12

## 2025-04-14 VITALS
RESPIRATION RATE: 22 BRPM | SYSTOLIC BLOOD PRESSURE: 156 MMHG | HEART RATE: 67 BPM | TEMPERATURE: 97 F | OXYGEN SATURATION: 100 % | DIASTOLIC BLOOD PRESSURE: 83 MMHG

## 2025-04-15 LAB
ASR DISCLAIMER: NORMAL
CASE RPRT: NORMAL
CLINICAL INFO: NORMAL
PATH REPORT.FINAL DX SPEC: NORMAL
PATH REPORT.GROSS SPEC: NORMAL

## 2025-05-17 DIAGNOSIS — E11.9 TYPE 2 DIABETES MELLITUS WITHOUT COMPLICATION, WITHOUT LONG-TERM CURRENT USE OF INSULIN (CMD): ICD-10-CM

## 2025-05-19 RX ORDER — BLOOD SUGAR DIAGNOSTIC
STRIP MISCELLANEOUS
Qty: 100 EACH | Refills: 2 | Status: SHIPPED | OUTPATIENT
Start: 2025-05-19

## 2025-05-27 ENCOUNTER — APPOINTMENT (OUTPATIENT)
Dept: INTERNAL MEDICINE | Age: 75
End: 2025-05-27

## 2025-05-29 LAB — HM RETINAL EYE EXAM: NORMAL

## 2025-06-05 ENCOUNTER — CLINICAL DOCUMENTATION (OUTPATIENT)
Dept: INTERNAL MEDICINE | Age: 75
End: 2025-06-05

## 2025-06-05 ENCOUNTER — LAB SERVICES (OUTPATIENT)
Dept: LAB | Age: 75
End: 2025-06-05

## 2025-06-05 ENCOUNTER — APPOINTMENT (OUTPATIENT)
Dept: FAMILY MEDICINE | Age: 75
End: 2025-06-05

## 2025-06-05 ENCOUNTER — RESULTS FOLLOW-UP (OUTPATIENT)
Dept: INTERNAL MEDICINE | Age: 75
End: 2025-06-05

## 2025-06-05 ENCOUNTER — TELEPHONE (OUTPATIENT)
Dept: INTERNAL MEDICINE | Age: 75
End: 2025-06-05

## 2025-06-05 ENCOUNTER — APPOINTMENT (OUTPATIENT)
Dept: INTERNAL MEDICINE | Age: 75
End: 2025-06-05

## 2025-06-05 VITALS
TEMPERATURE: 97.6 F | OXYGEN SATURATION: 100 % | WEIGHT: 216 LBS | RESPIRATION RATE: 18 BRPM | SYSTOLIC BLOOD PRESSURE: 136 MMHG | HEART RATE: 63 BPM | DIASTOLIC BLOOD PRESSURE: 68 MMHG | BODY MASS INDEX: 38.27 KG/M2 | HEIGHT: 63 IN

## 2025-06-05 DIAGNOSIS — E11.65 TYPE 2 DIABETES MELLITUS WITH HYPERGLYCEMIA, WITHOUT LONG-TERM CURRENT USE OF INSULIN (CMD): ICD-10-CM

## 2025-06-05 DIAGNOSIS — F41.9 ANXIOUS MOOD: ICD-10-CM

## 2025-06-05 DIAGNOSIS — J34.9 SINUS PROBLEM: ICD-10-CM

## 2025-06-05 DIAGNOSIS — E11.65 TYPE 2 DIABETES MELLITUS WITH HYPERGLYCEMIA, WITHOUT LONG-TERM CURRENT USE OF INSULIN (CMD): Primary | ICD-10-CM

## 2025-06-05 DIAGNOSIS — I10 PRIMARY HYPERTENSION: ICD-10-CM

## 2025-06-05 LAB — GLUCOSE BLD-MCNC: 129 MG/DL (ref 65–99)

## 2025-06-05 PROCEDURE — 83036 HEMOGLOBIN GLYCOSYLATED A1C: CPT | Performed by: CLINICAL MEDICAL LABORATORY

## 2025-06-05 RX ORDER — METFORMIN HYDROCHLORIDE 500 MG/1
1000 TABLET, EXTENDED RELEASE ORAL DAILY
Qty: 180 TABLET | Refills: 3 | OUTPATIENT
Start: 2025-06-05

## 2025-06-05 RX ORDER — METFORMIN HYDROCHLORIDE 1000 MG/1
1000 TABLET, FILM COATED, EXTENDED RELEASE ORAL
Qty: 30 TABLET | Refills: 1 | Status: SHIPPED | OUTPATIENT
Start: 2025-06-05

## 2025-06-05 ASSESSMENT — PATIENT HEALTH QUESTIONNAIRE - PHQ9
1. LITTLE INTEREST OR PLEASURE IN DOING THINGS: SEVERAL DAYS
SUM OF ALL RESPONSES TO PHQ9 QUESTIONS 1 AND 2: 2
2. FEELING DOWN, DEPRESSED OR HOPELESS: SEVERAL DAYS
CLINICAL INTERPRETATION OF PHQ2 SCORE: NO FURTHER SCREENING NEEDED
SUM OF ALL RESPONSES TO PHQ9 QUESTIONS 1 AND 2: 2

## 2025-06-05 ASSESSMENT — ENCOUNTER SYMPTOMS
UNEXPECTED WEIGHT CHANGE: 0
BLOOD IN STOOL: 0
NERVOUS/ANXIOUS: 0
NAUSEA: 0
VOMITING: 0
SORE THROAT: 1
FEVER: 0

## 2025-06-05 ASSESSMENT — PAIN SCALES - GENERAL: PAINLEVEL_OUTOF10: 0

## 2025-06-06 PROBLEM — F41.9 ANXIOUS MOOD: Status: ACTIVE | Noted: 2025-06-06

## 2025-06-06 LAB — HBA1C MFR BLD: 6.4 % (ref 4.5–5.6)

## 2025-06-12 ENCOUNTER — APPOINTMENT (OUTPATIENT)
Dept: INTERNAL MEDICINE | Age: 75
End: 2025-06-12

## 2025-06-12 ENCOUNTER — OFFICE VISIT (OUTPATIENT)
Dept: FAMILY MEDICINE | Age: 75
End: 2025-06-12

## 2025-06-12 VITALS
HEIGHT: 63 IN | WEIGHT: 216 LBS | BODY MASS INDEX: 38.27 KG/M2 | HEART RATE: 66 BPM | TEMPERATURE: 98 F | DIASTOLIC BLOOD PRESSURE: 70 MMHG | SYSTOLIC BLOOD PRESSURE: 138 MMHG

## 2025-06-12 DIAGNOSIS — I10 PRIMARY HYPERTENSION: Primary | ICD-10-CM

## 2025-06-12 DIAGNOSIS — E11.65 TYPE 2 DIABETES MELLITUS WITH HYPERGLYCEMIA, WITHOUT LONG-TERM CURRENT USE OF INSULIN (CMD): ICD-10-CM

## 2025-06-13 DIAGNOSIS — E11.65 TYPE 2 DIABETES MELLITUS WITH HYPERGLYCEMIA, WITHOUT LONG-TERM CURRENT USE OF INSULIN (CMD): ICD-10-CM

## 2025-06-13 RX ORDER — METFORMIN HYDROCHLORIDE 500 MG/1
1000 TABLET, EXTENDED RELEASE ORAL DAILY
Qty: 60 TABLET | Refills: 1 | Status: SHIPPED | OUTPATIENT
Start: 2025-06-13

## 2025-09-01 DIAGNOSIS — E11.65 TYPE 2 DIABETES MELLITUS WITH HYPERGLYCEMIA, WITHOUT LONG-TERM CURRENT USE OF INSULIN (CMD): ICD-10-CM

## 2025-09-02 RX ORDER — METFORMIN HYDROCHLORIDE 500 MG/1
1000 TABLET, EXTENDED RELEASE ORAL DAILY
Qty: 60 TABLET | Refills: 1 | Status: SHIPPED | OUTPATIENT
Start: 2025-09-02

## 2025-09-12 ENCOUNTER — APPOINTMENT (OUTPATIENT)
Dept: FAMILY MEDICINE | Age: 75
End: 2025-09-12

## (undated) DEVICE — BANDAGE ACE ELASTIC 6"

## (undated) DEVICE — SEE MEDLINE ITEM 159606

## (undated) DEVICE — TUBING INSUFFLATOR W/ROT CONCT

## (undated) DEVICE — BAND TR COMP DEVICE REG 24CM

## (undated) DEVICE — CANNULA MC2 NVENT .5IN 28/36FR

## (undated) DEVICE — SYS VIRTUOSAPH PLUS EVM

## (undated) DEVICE — SUT PROLENE 7-0 BV175-6

## (undated) DEVICE — DRESSING ADHESIVE ISLAND 3 X 6

## (undated) DEVICE — Device

## (undated) DEVICE — SET ANGIO ACIST CVI ANGIOTOUCH

## (undated) DEVICE — GUIDEWIRE ANGIO 1.5MM .035X180

## (undated) DEVICE — PAD HEARTSTART DEFIB ADULT

## (undated) DEVICE — SUT PROLENE 5-0 36IN C-1

## (undated) DEVICE — DEVICE PLASMABLADE X 3.0S LT

## (undated) DEVICE — SENSOR LOW LEVEL OXYGEN

## (undated) DEVICE — SOL PLASMALYTE PH 7.4 1000ML

## (undated) DEVICE — HEMOCONCENTRATOR PED .09M2

## (undated) DEVICE — SOL ELECTROLYTE PH 7.4 500ML

## (undated) DEVICE — DRESSING TELFA + RECT 6X10IN

## (undated) DEVICE — BOWL UTILITY BLUE 32OZ

## (undated) DEVICE — PACK SURG PERF CARDPULM BYPS

## (undated) DEVICE — BANDAGE ADHESIVE FABRIC 2X4

## (undated) DEVICE — DRESSING TELFA + BARR 4X6IN

## (undated) DEVICE — VAC WOUND DISPOSABLE PREVENA

## (undated) DEVICE — CARTRIDGE HEPARIN 2 CHNNL ACT

## (undated) DEVICE — BAG MEDI-PLAST DECANTER C-FLOW

## (undated) DEVICE — GUIDEWIRE INQWIRE SE 3MM JTIP

## (undated) DEVICE — APPLICATOR SURG 2 SPRY 1 PLUNG

## (undated) DEVICE — INSERT STEALTH SURGICAL CLAMP

## (undated) DEVICE — SYR LUER LOCK STERILE 10ML

## (undated) DEVICE — KIT GLIDESHEATH SLEND 6FR 10CM

## (undated) DEVICE — SUT 2 30IN SILK BLK BRAIDE

## (undated) DEVICE — MARKER ANASTOMARK COR FLX

## (undated) DEVICE — SUT SILK 2-0 BLK BR KS 30 I

## (undated) DEVICE — SYS WASTE FLD DISPOSAL 1400ML

## (undated) DEVICE — SUT ETHBND XTRA 1 OS-8 30IN

## (undated) DEVICE — CONTAINER SPECIMEN SCREW 4OZ

## (undated) DEVICE — CARTRIDGE SILV 4CHAN 2.0-3.5MG

## (undated) DEVICE — SOL NORMAL USPCA 0.9%

## (undated) DEVICE — KIT HAND CONTROL HIGH PRESSUR

## (undated) DEVICE — APPLICATOR ENDOSCP 32CM5MM2TIP

## (undated) DEVICE — KIT CATHGARD DBL LUMN 9FRX11.5

## (undated) DEVICE — CANNULA NON-VENT 24FR 14.5IN

## (undated) DEVICE — CATH OPTITORQUE RADIAL 5FR

## (undated) DEVICE — SYR SLIP TIP 20CC

## (undated) DEVICE — NDL ASPIRATOR AIR 16G

## (undated) DEVICE — STOPCOCK 4-WAY

## (undated) DEVICE — BLADE SCALP OPHTL BEVEL STR

## (undated) DEVICE — SOL LAC RINGERS 1000ML INJ

## (undated) DEVICE — GLOVE PROTEXIS BLUE LATEX 7

## (undated) DEVICE — GLOVE PROTEXIS LTX MICRO 8

## (undated) DEVICE — GOWN SMARTSLEEVE AAMI LVL4 XXL

## (undated) DEVICE — GLOVE PROTEXIS PI SYN SURG 7.5

## (undated) DEVICE — INSERT INTRACK CLAMP ULT 66MM

## (undated) DEVICE — KIT C.A.T.S. FAST START 4/CASE

## (undated) DEVICE — HOLDER STRIP-T SELF ADH 2X10IN

## (undated) DEVICE — TRAY CATH FOL SIL TEMP 10 16FR

## (undated) DEVICE — DRAPE SLUSH WARMER WITH DISC

## (undated) DEVICE — DRAIN CHEST DRY SUCTION

## (undated) DEVICE — CATH CATHLON IV 16G 2IN

## (undated) DEVICE — DRESSING TRANS 4X4 TEGADERM

## (undated) DEVICE — DRESSING TEGADERM CHG 3.5X4.5

## (undated) DEVICE — PAD DEFIB CADENCE ADULT R2

## (undated) DEVICE — GLOVE PROTEXIS HYDROGEL SZ6.5

## (undated) DEVICE — SOL .9NACL PF 100 ML

## (undated) DEVICE — BOWL STERILE LARGE 32OZ

## (undated) DEVICE — CATH ALL PUR URTHL 20FR

## (undated) DEVICE — SUT VICRYL 3-0 8-18 PS-1

## (undated) DEVICE — PUNCH AORTIC ROT TIP 4.8MM

## (undated) DEVICE — CANNULA NASAL ADULT

## (undated) DEVICE — CARTRIDGE HEPARIN DOSE

## (undated) DEVICE — SOL NACL IRR 3000ML

## (undated) DEVICE — PENCIL ELECSURG ROCKER 15FT

## (undated) DEVICE — KIT SURGICAL TURNOVER

## (undated) DEVICE — GLOVE COTTON KNITTED CUFF